# Patient Record
Sex: FEMALE | Race: AMERICAN INDIAN OR ALASKA NATIVE | NOT HISPANIC OR LATINO
[De-identification: names, ages, dates, MRNs, and addresses within clinical notes are randomized per-mention and may not be internally consistent; named-entity substitution may affect disease eponyms.]

---

## 2018-08-09 PROBLEM — Z00.00 ENCOUNTER FOR PREVENTIVE HEALTH EXAMINATION: Status: ACTIVE | Noted: 2018-08-09

## 2018-08-14 ENCOUNTER — APPOINTMENT (OUTPATIENT)
Dept: OBGYN | Facility: CLINIC | Age: 38
End: 2018-08-14
Payer: MEDICAID

## 2018-08-14 VITALS — BODY MASS INDEX: 35 KG/M2 | HEIGHT: 64 IN | WEIGHT: 205 LBS

## 2018-08-14 PROCEDURE — 99202 OFFICE O/P NEW SF 15 MIN: CPT

## 2018-08-22 ENCOUNTER — RESULT REVIEW (OUTPATIENT)
Age: 38
End: 2018-08-22

## 2018-08-24 ENCOUNTER — APPOINTMENT (OUTPATIENT)
Dept: OBGYN | Facility: CLINIC | Age: 38
End: 2018-08-24

## 2018-08-28 ENCOUNTER — APPOINTMENT (OUTPATIENT)
Dept: OBGYN | Facility: CLINIC | Age: 38
End: 2018-08-28
Payer: MEDICAID

## 2018-08-28 PROCEDURE — 99213 OFFICE O/P EST LOW 20 MIN: CPT

## 2018-09-06 ENCOUNTER — APPOINTMENT (OUTPATIENT)
Dept: BREAST CENTER | Facility: CLINIC | Age: 38
End: 2018-09-06
Payer: COMMERCIAL

## 2018-09-06 VITALS
DIASTOLIC BLOOD PRESSURE: 72 MMHG | BODY MASS INDEX: 35 KG/M2 | HEART RATE: 80 BPM | HEIGHT: 64 IN | WEIGHT: 205 LBS | TEMPERATURE: 98.2 F | SYSTOLIC BLOOD PRESSURE: 109 MMHG

## 2018-09-06 DIAGNOSIS — Z78.9 OTHER SPECIFIED HEALTH STATUS: ICD-10-CM

## 2018-09-06 DIAGNOSIS — Z87.59 PERSONAL HISTORY OF OTHER COMPLICATIONS OF PREGNANCY, CHILDBIRTH AND THE PUERPERIUM: ICD-10-CM

## 2018-09-06 DIAGNOSIS — Z80.8 FAMILY HISTORY OF MALIGNANT NEOPLASM OF OTHER ORGANS OR SYSTEMS: ICD-10-CM

## 2018-09-06 DIAGNOSIS — Z80.0 FAMILY HISTORY OF MALIGNANT NEOPLASM OF DIGESTIVE ORGANS: ICD-10-CM

## 2018-09-06 PROCEDURE — 99243 OFF/OP CNSLTJ NEW/EST LOW 30: CPT

## 2018-09-10 ENCOUNTER — APPOINTMENT (OUTPATIENT)
Dept: BREAST CENTER | Facility: CLINIC | Age: 38
End: 2018-09-10
Payer: COMMERCIAL

## 2018-09-10 ENCOUNTER — OUTPATIENT (OUTPATIENT)
Dept: OUTPATIENT SERVICES | Facility: HOSPITAL | Age: 38
LOS: 1 days | End: 2018-09-10

## 2018-09-10 VITALS
TEMPERATURE: 98.4 F | SYSTOLIC BLOOD PRESSURE: 118 MMHG | HEIGHT: 64 IN | BODY MASS INDEX: 35 KG/M2 | HEART RATE: 84 BPM | WEIGHT: 205 LBS | DIASTOLIC BLOOD PRESSURE: 78 MMHG

## 2018-09-10 PROCEDURE — 19083 BX BREAST 1ST LESION US IMAG: CPT

## 2018-09-12 ENCOUNTER — OTHER (OUTPATIENT)
Age: 38
End: 2018-09-12

## 2018-09-14 ENCOUNTER — MESSAGE (OUTPATIENT)
Age: 38
End: 2018-09-14

## 2018-09-20 ENCOUNTER — APPOINTMENT (OUTPATIENT)
Dept: BREAST CENTER | Facility: CLINIC | Age: 38
End: 2018-09-20
Payer: COMMERCIAL

## 2018-09-20 ENCOUNTER — APPOINTMENT (OUTPATIENT)
Dept: BREAST CENTER | Facility: CLINIC | Age: 38
End: 2018-09-20

## 2018-09-20 PROCEDURE — 99215 OFFICE O/P EST HI 40 MIN: CPT

## 2018-09-24 ENCOUNTER — APPOINTMENT (OUTPATIENT)
Dept: BREAST CENTER | Facility: CLINIC | Age: 38
End: 2018-09-24

## 2018-10-01 ENCOUNTER — MESSAGE (OUTPATIENT)
Age: 38
End: 2018-10-01

## 2018-10-04 ENCOUNTER — ASOB RESULT (OUTPATIENT)
Age: 38
End: 2018-10-04

## 2018-10-04 ENCOUNTER — APPOINTMENT (OUTPATIENT)
Dept: ANTEPARTUM | Facility: CLINIC | Age: 38
End: 2018-10-04
Payer: MEDICAID

## 2018-10-04 PROCEDURE — 76817 TRANSVAGINAL US OBSTETRIC: CPT

## 2018-10-10 ENCOUNTER — OUTPATIENT (OUTPATIENT)
Dept: OUTPATIENT SERVICES | Facility: HOSPITAL | Age: 38
LOS: 1 days | Discharge: HOME | End: 2018-10-10
Payer: MEDICAID

## 2018-10-10 DIAGNOSIS — C50.919 MALIGNANT NEOPLASM OF UNSPECIFIED SITE OF UNSPECIFIED FEMALE BREAST: ICD-10-CM

## 2018-10-12 ENCOUNTER — EMERGENCY (EMERGENCY)
Facility: HOSPITAL | Age: 38
LOS: 1 days | End: 2018-10-12
Payer: MEDICAID

## 2018-10-12 PROCEDURE — 76817 TRANSVAGINAL US OBSTETRIC: CPT | Mod: 26

## 2018-10-12 PROCEDURE — 99284 EMERGENCY DEPT VISIT MOD MDM: CPT

## 2018-10-12 PROCEDURE — 76815 OB US LIMITED FETUS(S): CPT | Mod: 26

## 2018-10-17 ENCOUNTER — APPOINTMENT (OUTPATIENT)
Dept: HEMATOLOGY ONCOLOGY | Facility: CLINIC | Age: 38
End: 2018-10-17
Payer: MEDICAID

## 2018-10-17 ENCOUNTER — RESULT REVIEW (OUTPATIENT)
Age: 38
End: 2018-10-17

## 2018-10-17 VITALS
DIASTOLIC BLOOD PRESSURE: 69 MMHG | HEIGHT: 59.41 IN | BODY MASS INDEX: 41.4 KG/M2 | SYSTOLIC BLOOD PRESSURE: 110 MMHG | WEIGHT: 208.09 LBS | TEMPERATURE: 98 F | RESPIRATION RATE: 16 BRPM | OXYGEN SATURATION: 100 % | HEART RATE: 64 BPM

## 2018-10-17 DIAGNOSIS — Z86.018 PERSONAL HISTORY OF OTHER BENIGN NEOPLASM: ICD-10-CM

## 2018-10-17 LAB
BASOPHILS # BLD AUTO: 0 K/UL — SIGNIFICANT CHANGE UP (ref 0–0.2)
BASOPHILS NFR BLD AUTO: 0.6 % — SIGNIFICANT CHANGE UP (ref 0–2)
EOSINOPHIL # BLD AUTO: 0.1 K/UL — SIGNIFICANT CHANGE UP (ref 0–0.5)
EOSINOPHIL NFR BLD AUTO: 0.9 % — SIGNIFICANT CHANGE UP (ref 0–6)
HCT VFR BLD CALC: 37.8 % — SIGNIFICANT CHANGE UP (ref 34.5–45)
HGB BLD-MCNC: 12.8 G/DL — SIGNIFICANT CHANGE UP (ref 11.5–15.5)
LYMPHOCYTES # BLD AUTO: 2.5 K/UL — SIGNIFICANT CHANGE UP (ref 1–3.3)
LYMPHOCYTES # BLD AUTO: 30.6 % — SIGNIFICANT CHANGE UP (ref 13–44)
MCHC RBC-ENTMCNC: 27.6 PG — SIGNIFICANT CHANGE UP (ref 27–34)
MCHC RBC-ENTMCNC: 33.9 G/DL — SIGNIFICANT CHANGE UP (ref 32–36)
MCV RBC AUTO: 81.4 FL — SIGNIFICANT CHANGE UP (ref 80–100)
MONOCYTES # BLD AUTO: 0.3 K/UL — SIGNIFICANT CHANGE UP (ref 0–0.9)
MONOCYTES NFR BLD AUTO: 4 % — SIGNIFICANT CHANGE UP (ref 2–14)
NEUTROPHILS # BLD AUTO: 5.2 K/UL — SIGNIFICANT CHANGE UP (ref 1.8–7.4)
NEUTROPHILS NFR BLD AUTO: 63.9 % — SIGNIFICANT CHANGE UP (ref 43–77)
PLATELET # BLD AUTO: 274 K/UL — SIGNIFICANT CHANGE UP (ref 150–400)
RBC # BLD: 4.65 M/UL — SIGNIFICANT CHANGE UP (ref 3.8–5.2)
RBC # FLD: 12.9 % — SIGNIFICANT CHANGE UP (ref 10.3–14.5)
WBC # BLD: 8.2 K/UL — SIGNIFICANT CHANGE UP (ref 3.8–10.5)
WBC # FLD AUTO: 8.2 K/UL — SIGNIFICANT CHANGE UP (ref 3.8–10.5)

## 2018-10-17 PROCEDURE — 99205 OFFICE O/P NEW HI 60 MIN: CPT

## 2018-10-17 PROCEDURE — 93010 ELECTROCARDIOGRAM REPORT: CPT

## 2018-10-18 ENCOUNTER — APPOINTMENT (OUTPATIENT)
Dept: BREAST CENTER | Facility: CLINIC | Age: 38
End: 2018-10-18

## 2018-10-18 PROBLEM — Z86.018 HISTORY OF UTERINE LEIOMYOMA: Status: RESOLVED | Noted: 2018-08-14 | Resolved: 2018-10-18

## 2018-10-18 LAB
ALBUMIN SERPL ELPH-MCNC: 4.2 G/DL
ALP BLD-CCNC: 84 U/L
ALT SERPL-CCNC: 17 U/L
ANION GAP SERPL CALC-SCNC: 14 MMOL/L
APTT BLD: 27.6 SEC
AST SERPL-CCNC: 14 U/L
BILIRUB SERPL-MCNC: 0.2 MG/DL
BUN SERPL-MCNC: 4 MG/DL
CALCIUM SERPL-MCNC: 9.5 MG/DL
CHLORIDE SERPL-SCNC: 103 MMOL/L
CO2 SERPL-SCNC: 23 MMOL/L
CREAT SERPL-MCNC: 0.45 MG/DL
GLUCOSE SERPL-MCNC: 94 MG/DL
HAV IGM SER QL: NONREACTIVE
HBV CORE IGM SER QL: NONREACTIVE
HBV SURFACE AG SER QL: NONREACTIVE
HCV AB SER QL: NONREACTIVE
HCV S/CO RATIO: 0.07 S/CO
INR PPP: 1.05 RATIO
POTASSIUM SERPL-SCNC: 4.5 MMOL/L
PROT SERPL-MCNC: 6.9 G/DL
PT BLD: 11.9 SEC
SODIUM SERPL-SCNC: 140 MMOL/L

## 2018-10-19 ENCOUNTER — OTHER (OUTPATIENT)
Age: 38
End: 2018-10-19

## 2018-10-23 ENCOUNTER — APPOINTMENT (OUTPATIENT)
Dept: HEMATOLOGY ONCOLOGY | Facility: CLINIC | Age: 38
End: 2018-10-23
Payer: MEDICAID

## 2018-10-23 VITALS
TEMPERATURE: 97.9 F | SYSTOLIC BLOOD PRESSURE: 124 MMHG | HEART RATE: 72 BPM | WEIGHT: 209.44 LBS | OXYGEN SATURATION: 10 % | DIASTOLIC BLOOD PRESSURE: 70 MMHG | BODY MASS INDEX: 41.72 KG/M2 | RESPIRATION RATE: 16 BRPM

## 2018-10-23 PROCEDURE — 99215 OFFICE O/P EST HI 40 MIN: CPT

## 2018-10-24 ENCOUNTER — OTHER (OUTPATIENT)
Age: 38
End: 2018-10-24

## 2018-10-24 ENCOUNTER — LABORATORY RESULT (OUTPATIENT)
Age: 38
End: 2018-10-24

## 2018-10-24 ENCOUNTER — ASOB RESULT (OUTPATIENT)
Age: 38
End: 2018-10-24

## 2018-10-24 ENCOUNTER — APPOINTMENT (OUTPATIENT)
Dept: ANTEPARTUM | Facility: CLINIC | Age: 38
End: 2018-10-24
Payer: MEDICAID

## 2018-10-24 ENCOUNTER — FORM ENCOUNTER (OUTPATIENT)
Age: 38
End: 2018-10-24

## 2018-10-24 ENCOUNTER — APPOINTMENT (OUTPATIENT)
Dept: MATERNAL FETAL MEDICINE | Facility: CLINIC | Age: 38
End: 2018-10-24
Payer: MEDICAID

## 2018-10-24 ENCOUNTER — APPOINTMENT (OUTPATIENT)
Dept: BREAST CENTER | Facility: CLINIC | Age: 38
End: 2018-10-24
Payer: MEDICAID

## 2018-10-24 VITALS
BODY MASS INDEX: 41.18 KG/M2 | SYSTOLIC BLOOD PRESSURE: 122 MMHG | HEIGHT: 59.41 IN | DIASTOLIC BLOOD PRESSURE: 80 MMHG | WEIGHT: 207 LBS

## 2018-10-24 VITALS — WEIGHT: 207 LBS

## 2018-10-24 PROCEDURE — 99213 OFFICE O/P EST LOW 20 MIN: CPT | Mod: 25

## 2018-10-24 PROCEDURE — 36416 COLLJ CAPILLARY BLOOD SPEC: CPT

## 2018-10-24 PROCEDURE — 99215 OFFICE O/P EST HI 40 MIN: CPT

## 2018-10-24 PROCEDURE — 76801 OB US < 14 WKS SINGLE FETUS: CPT

## 2018-10-25 ENCOUNTER — LABORATORY RESULT (OUTPATIENT)
Age: 38
End: 2018-10-25

## 2018-10-25 ENCOUNTER — APPOINTMENT (OUTPATIENT)
Dept: RADIOLOGY | Facility: CLINIC | Age: 38
End: 2018-10-25
Payer: MEDICAID

## 2018-10-25 ENCOUNTER — APPOINTMENT (OUTPATIENT)
Dept: ULTRASOUND IMAGING | Facility: CLINIC | Age: 38
End: 2018-10-25
Payer: MEDICAID

## 2018-10-25 ENCOUNTER — OUTPATIENT (OUTPATIENT)
Dept: OUTPATIENT SERVICES | Facility: HOSPITAL | Age: 38
LOS: 1 days | End: 2018-10-25
Payer: MEDICAID

## 2018-10-25 DIAGNOSIS — C50.919 MALIGNANT NEOPLASM OF UNSPECIFIED SITE OF UNSPECIFIED FEMALE BREAST: ICD-10-CM

## 2018-10-25 PROCEDURE — 71046 X-RAY EXAM CHEST 2 VIEWS: CPT | Mod: 26

## 2018-10-25 PROCEDURE — 76700 US EXAM ABDOM COMPLETE: CPT

## 2018-10-25 PROCEDURE — 76700 US EXAM ABDOM COMPLETE: CPT | Mod: 26

## 2018-10-25 PROCEDURE — 71046 X-RAY EXAM CHEST 2 VIEWS: CPT

## 2018-10-29 ENCOUNTER — OTHER (OUTPATIENT)
Age: 38
End: 2018-10-29

## 2018-10-30 ENCOUNTER — OTHER (OUTPATIENT)
Age: 38
End: 2018-10-30

## 2018-10-30 ENCOUNTER — RESULT REVIEW (OUTPATIENT)
Age: 38
End: 2018-10-30

## 2018-10-31 ENCOUNTER — OTHER (OUTPATIENT)
Age: 38
End: 2018-10-31

## 2018-10-31 ENCOUNTER — APPOINTMENT (OUTPATIENT)
Dept: CV DIAGNOSITCS | Facility: HOSPITAL | Age: 38
End: 2018-10-31

## 2018-10-31 ENCOUNTER — OUTPATIENT (OUTPATIENT)
Dept: OUTPATIENT SERVICES | Facility: HOSPITAL | Age: 38
LOS: 1 days | End: 2018-10-31
Payer: MEDICAID

## 2018-10-31 DIAGNOSIS — C50.919 MALIGNANT NEOPLASM OF UNSPECIFIED SITE OF UNSPECIFIED FEMALE BREAST: ICD-10-CM

## 2018-10-31 PROCEDURE — 93306 TTE W/DOPPLER COMPLETE: CPT

## 2018-10-31 PROCEDURE — 93306 TTE W/DOPPLER COMPLETE: CPT | Mod: 26

## 2018-11-01 ENCOUNTER — NON-APPOINTMENT (OUTPATIENT)
Age: 38
End: 2018-11-01

## 2018-11-01 ENCOUNTER — OTHER (OUTPATIENT)
Age: 38
End: 2018-11-01

## 2018-11-01 ENCOUNTER — APPOINTMENT (OUTPATIENT)
Dept: CARDIOLOGY | Facility: CLINIC | Age: 38
End: 2018-11-01
Payer: MEDICAID

## 2018-11-01 ENCOUNTER — LABORATORY RESULT (OUTPATIENT)
Age: 38
End: 2018-11-01

## 2018-11-01 VITALS
HEIGHT: 59 IN | HEART RATE: 67 BPM | OXYGEN SATURATION: 100 % | DIASTOLIC BLOOD PRESSURE: 70 MMHG | SYSTOLIC BLOOD PRESSURE: 108 MMHG | WEIGHT: 207 LBS | BODY MASS INDEX: 41.73 KG/M2

## 2018-11-01 PROCEDURE — 93000 ELECTROCARDIOGRAM COMPLETE: CPT

## 2018-11-01 PROCEDURE — 99245 OFF/OP CONSLTJ NEW/EST HI 55: CPT

## 2018-11-02 ENCOUNTER — OUTPATIENT (OUTPATIENT)
Dept: OUTPATIENT SERVICES | Facility: HOSPITAL | Age: 38
LOS: 1 days | End: 2018-11-02
Payer: MEDICAID

## 2018-11-02 VITALS
OXYGEN SATURATION: 99 % | TEMPERATURE: 98 F | HEIGHT: 59 IN | WEIGHT: 207.9 LBS | HEART RATE: 88 BPM | DIASTOLIC BLOOD PRESSURE: 70 MMHG | RESPIRATION RATE: 18 BRPM | SYSTOLIC BLOOD PRESSURE: 124 MMHG

## 2018-11-02 DIAGNOSIS — N63.10 UNSPECIFIED LUMP IN THE RIGHT BREAST, UNSPECIFIED QUADRANT: ICD-10-CM

## 2018-11-02 DIAGNOSIS — C50.811 MALIGNANT NEOPLASM OF OVERLAPPING SITES OF RIGHT FEMALE BREAST: ICD-10-CM

## 2018-11-02 DIAGNOSIS — Z01.818 ENCOUNTER FOR OTHER PREPROCEDURAL EXAMINATION: ICD-10-CM

## 2018-11-02 DIAGNOSIS — Z34.90 ENCOUNTER FOR SUPERVISION OF NORMAL PREGNANCY, UNSPECIFIED, UNSPECIFIED TRIMESTER: ICD-10-CM

## 2018-11-02 PROCEDURE — G0463: CPT

## 2018-11-02 NOTE — H&P PST ADULT - PROBLEM SELECTOR PLAN 1
right breast mastectomy  technetium injection-no blue dye for sentinel lymph node biopsy  possible axillary lymph node dissection

## 2018-11-02 NOTE — H&P PST ADULT - HISTORY OF PRESENT ILLNESS
This is a 39 y/o female who is 11 weeks gestation, with right breast mass, she presents today for surgery, pt spoke Beninese, translated by Kristian # [id number]

## 2018-11-02 NOTE — H&P PST ADULT - ASSESSMENT
malignant neoplasm of overlapping sites of right female breast  encounter for supervision of normal pregnancy, unspecified

## 2018-11-02 NOTE — H&P PST ADULT - NS HIV RISK FACTOR NO
Diabetic Annual Assessment Clinical Note    /68 mmHg    Foot Assessment    Visual Inspection of the feet completed at appointment.     · Skin Abnormal, dryness noted  · Callous Normal  · Redness Normal  · Swelling Normal  · Hair Growth Abnormal  · Conchis No, Declined

## 2018-11-02 NOTE — H&P PST ADULT - NSANTHOSAYNRD_GEN_A_CORE
No. RUBÉN screening performed.  STOP BANG Legend: 0-2 = LOW Risk; 3-4 = INTERMEDIATE Risk; 5-8 = HIGH Risk

## 2018-11-05 ENCOUNTER — LABORATORY RESULT (OUTPATIENT)
Age: 38
End: 2018-11-05

## 2018-11-05 ENCOUNTER — APPOINTMENT (OUTPATIENT)
Dept: ANTEPARTUM | Facility: CLINIC | Age: 38
End: 2018-11-05
Payer: MEDICAID

## 2018-11-05 ENCOUNTER — OUTPATIENT (OUTPATIENT)
Dept: OUTPATIENT SERVICES | Facility: HOSPITAL | Age: 38
LOS: 1 days | End: 2018-11-05
Payer: MEDICAID

## 2018-11-05 ENCOUNTER — NON-APPOINTMENT (OUTPATIENT)
Age: 38
End: 2018-11-05

## 2018-11-05 ENCOUNTER — ASOB RESULT (OUTPATIENT)
Age: 38
End: 2018-11-05

## 2018-11-05 ENCOUNTER — OTHER (OUTPATIENT)
Age: 38
End: 2018-11-05

## 2018-11-05 ENCOUNTER — APPOINTMENT (OUTPATIENT)
Dept: MATERNAL FETAL MEDICINE | Facility: CLINIC | Age: 38
End: 2018-11-05
Payer: MEDICAID

## 2018-11-05 VITALS — SYSTOLIC BLOOD PRESSURE: 124 MMHG | BODY MASS INDEX: 41.89 KG/M2 | WEIGHT: 207.4 LBS | DIASTOLIC BLOOD PRESSURE: 74 MMHG

## 2018-11-05 DIAGNOSIS — O09.899 SUPERVISION OF OTHER HIGH RISK PREGNANCIES, UNSPECIFIED TRIMESTER: ICD-10-CM

## 2018-11-05 PROBLEM — E66.01 MORBID (SEVERE) OBESITY DUE TO EXCESS CALORIES: Chronic | Status: ACTIVE | Noted: 2018-11-02

## 2018-11-05 LAB
BILIRUB UR QL STRIP: NEGATIVE
GLUCOSE UR-MCNC: NEGATIVE
HCG UR QL: 0.2 EU/DL
HGB UR QL STRIP.AUTO: NEGATIVE
KETONES UR-MCNC: NEGATIVE
LEUKOCYTE ESTERASE UR QL STRIP: NEGATIVE
NITRITE UR QL STRIP: NEGATIVE
PH UR STRIP: 7.5
PROT UR STRIP-MCNC: NEGATIVE
SP GR UR STRIP: 1.02

## 2018-11-05 PROCEDURE — 99213 OFFICE O/P EST LOW 20 MIN: CPT | Mod: GC,25

## 2018-11-05 PROCEDURE — 87591 N.GONORRHOEAE DNA AMP PROB: CPT

## 2018-11-05 PROCEDURE — 87624 HPV HI-RISK TYP POOLED RSLT: CPT

## 2018-11-05 PROCEDURE — G0463: CPT

## 2018-11-05 PROCEDURE — 81003 URINALYSIS AUTO W/O SCOPE: CPT | Mod: NC,QW

## 2018-11-05 PROCEDURE — 87491 CHLMYD TRACH DNA AMP PROBE: CPT

## 2018-11-05 PROCEDURE — 76813 OB US NUCHAL MEAS 1 GEST: CPT

## 2018-11-05 PROCEDURE — 76801 OB US < 14 WKS SINGLE FETUS: CPT

## 2018-11-05 PROCEDURE — 81003 URINALYSIS AUTO W/O SCOPE: CPT

## 2018-11-06 ENCOUNTER — TRANSCRIPTION ENCOUNTER (OUTPATIENT)
Age: 38
End: 2018-11-06

## 2018-11-06 LAB
C TRACH RRNA SPEC QL NAA+PROBE: SIGNIFICANT CHANGE UP
HPV HIGH+LOW RISK DNA PNL CVX: SIGNIFICANT CHANGE UP
N GONORRHOEA RRNA SPEC QL NAA+PROBE: SIGNIFICANT CHANGE UP
SPECIMEN SOURCE: SIGNIFICANT CHANGE UP

## 2018-11-07 ENCOUNTER — RESULT REVIEW (OUTPATIENT)
Age: 38
End: 2018-11-07

## 2018-11-07 ENCOUNTER — INPATIENT (INPATIENT)
Facility: HOSPITAL | Age: 38
LOS: 0 days | Discharge: HOME | DRG: 819 | End: 2018-11-08
Attending: SURGERY | Admitting: SURGERY
Payer: MEDICAID

## 2018-11-07 ENCOUNTER — APPOINTMENT (OUTPATIENT)
Dept: BREAST CENTER | Facility: HOSPITAL | Age: 38
End: 2018-11-07
Payer: MEDICAID

## 2018-11-07 VITALS
TEMPERATURE: 98 F | SYSTOLIC BLOOD PRESSURE: 109 MMHG | RESPIRATION RATE: 17 BRPM | WEIGHT: 207.9 LBS | OXYGEN SATURATION: 100 % | HEART RATE: 85 BPM | DIASTOLIC BLOOD PRESSURE: 64 MMHG | HEIGHT: 59 IN

## 2018-11-07 DIAGNOSIS — Z34.90 ENCOUNTER FOR SUPERVISION OF NORMAL PREGNANCY, UNSPECIFIED, UNSPECIFIED TRIMESTER: ICD-10-CM

## 2018-11-07 PROCEDURE — 88331 PATH CONSLTJ SURG 1 BLK 1SPC: CPT | Mod: 26

## 2018-11-07 PROCEDURE — 19303 MAST SIMPLE COMPLETE: CPT

## 2018-11-07 PROCEDURE — 88342 IMHCHEM/IMCYTCHM 1ST ANTB: CPT | Mod: 26

## 2018-11-07 PROCEDURE — 38792 RA TRACER ID OF SENTINL NODE: CPT

## 2018-11-07 PROCEDURE — 88307 TISSUE EXAM BY PATHOLOGIST: CPT | Mod: 26

## 2018-11-07 PROCEDURE — ZZZZZ: CPT

## 2018-11-07 PROCEDURE — 88341 IMHCHEM/IMCYTCHM EA ADD ANTB: CPT | Mod: 26

## 2018-11-07 PROCEDURE — 78195 LYMPH SYSTEM IMAGING: CPT | Mod: 26

## 2018-11-07 PROCEDURE — 88334 PATH CONSLTJ SURG CYTO XM EA: CPT | Mod: 26,59

## 2018-11-07 PROCEDURE — 38525 BIOPSY/REMOVAL LYMPH NODES: CPT

## 2018-11-07 PROCEDURE — 88305 TISSUE EXAM BY PATHOLOGIST: CPT | Mod: 26

## 2018-11-07 RX ORDER — ONDANSETRON 8 MG/1
4 TABLET, FILM COATED ORAL ONCE
Qty: 0 | Refills: 0 | Status: DISCONTINUED | OUTPATIENT
Start: 2018-11-07 | End: 2018-11-07

## 2018-11-07 RX ORDER — ACETAMINOPHEN 500 MG
650 TABLET ORAL EVERY 6 HOURS
Qty: 0 | Refills: 0 | Status: DISCONTINUED | OUTPATIENT
Start: 2018-11-07 | End: 2018-11-08

## 2018-11-07 RX ORDER — SODIUM CHLORIDE 9 MG/ML
1000 INJECTION, SOLUTION INTRAVENOUS
Qty: 0 | Refills: 0 | Status: DISCONTINUED | OUTPATIENT
Start: 2018-11-07 | End: 2018-11-08

## 2018-11-07 RX ORDER — CODEINE SULFATE 60 MG/1
30 TABLET ORAL EVERY 4 HOURS
Qty: 0 | Refills: 0 | Status: DISCONTINUED | OUTPATIENT
Start: 2018-11-07 | End: 2018-11-08

## 2018-11-07 RX ORDER — SODIUM CHLORIDE 9 MG/ML
1000 INJECTION, SOLUTION INTRAVENOUS
Qty: 0 | Refills: 0 | Status: DISCONTINUED | OUTPATIENT
Start: 2018-11-07 | End: 2018-11-07

## 2018-11-07 RX ORDER — SODIUM CHLORIDE 9 MG/ML
3 INJECTION INTRAMUSCULAR; INTRAVENOUS; SUBCUTANEOUS EVERY 8 HOURS
Qty: 0 | Refills: 0 | Status: DISCONTINUED | OUTPATIENT
Start: 2018-11-07 | End: 2018-11-07

## 2018-11-07 RX ORDER — HYDROMORPHONE HYDROCHLORIDE 2 MG/ML
0.5 INJECTION INTRAMUSCULAR; INTRAVENOUS; SUBCUTANEOUS
Qty: 0 | Refills: 0 | Status: DISCONTINUED | OUTPATIENT
Start: 2018-11-07 | End: 2018-11-07

## 2018-11-07 RX ADMIN — SODIUM CHLORIDE 100 MILLILITER(S): 9 INJECTION, SOLUTION INTRAVENOUS at 21:28

## 2018-11-07 RX ADMIN — Medication 1 TABLET(S): at 21:28

## 2018-11-07 NOTE — CHART NOTE - NSCHARTNOTEFT_GEN_A_CORE
Pt seen with Dr. Granger at bedside in amb surgery for pre-op fetal ultrasound;   confirmed +; +movement noted  Will confirm postop in recovery

## 2018-11-07 NOTE — BRIEF OPERATIVE NOTE - PROCEDURE
<<-----Click on this checkbox to enter Procedure Right mastectomy with axillary sentinel lymph node biopsy  11/07/2018    Active  [id number]

## 2018-11-07 NOTE — BRIEF OPERATIVE NOTE - OPERATION/FINDINGS
right breast mass at 12 o'clock, sentinel lymph node biopsy, 3 lymph nodes sent for frozen, all negative for malignancy

## 2018-11-08 ENCOUNTER — TRANSCRIPTION ENCOUNTER (OUTPATIENT)
Age: 38
End: 2018-11-08

## 2018-11-08 VITALS
TEMPERATURE: 98 F | HEART RATE: 70 BPM | DIASTOLIC BLOOD PRESSURE: 60 MMHG | SYSTOLIC BLOOD PRESSURE: 104 MMHG | OXYGEN SATURATION: 100 % | RESPIRATION RATE: 17 BRPM

## 2018-11-08 DIAGNOSIS — Z82.49 FAMILY HISTORY OF ISCHEMIC HEART DISEASE AND OTHER DISEASES OF THE CIRCULATORY SYSTEM: ICD-10-CM

## 2018-11-08 DIAGNOSIS — C50.811 MALIGNANT NEOPLASM OF OVERLAPPING SITES OF RIGHT FEMALE BREAST: ICD-10-CM

## 2018-11-08 DIAGNOSIS — O9A.119: ICD-10-CM

## 2018-11-08 DIAGNOSIS — R73.03 PREDIABETES: ICD-10-CM

## 2018-11-08 LAB
CYTOLOGY SPEC DOC CYTO: SIGNIFICANT CHANGE UP
HCT VFR BLD CALC: 31.7 % — LOW (ref 34.5–45)
HGB BLD-MCNC: 10.3 G/DL — LOW (ref 11.5–15.5)
MCHC RBC-ENTMCNC: 27.1 PG — SIGNIFICANT CHANGE UP (ref 27–34)
MCHC RBC-ENTMCNC: 32.5 GM/DL — SIGNIFICANT CHANGE UP (ref 32–36)
MCV RBC AUTO: 83.3 FL — SIGNIFICANT CHANGE UP (ref 80–100)
PLATELET # BLD AUTO: 237 K/UL — SIGNIFICANT CHANGE UP (ref 150–400)
RBC # BLD: 3.8 M/UL — SIGNIFICANT CHANGE UP (ref 3.8–5.2)
RBC # FLD: 13.8 % — SIGNIFICANT CHANGE UP (ref 10.3–14.5)
WBC # BLD: 6.9 K/UL — SIGNIFICANT CHANGE UP (ref 3.8–10.5)
WBC # FLD AUTO: 6.9 K/UL — SIGNIFICANT CHANGE UP (ref 3.8–10.5)

## 2018-11-08 PROCEDURE — 88341 IMHCHEM/IMCYTCHM EA ADD ANTB: CPT

## 2018-11-08 PROCEDURE — 85027 COMPLETE CBC AUTOMATED: CPT

## 2018-11-08 PROCEDURE — 88331 PATH CONSLTJ SURG 1 BLK 1SPC: CPT

## 2018-11-08 PROCEDURE — 88305 TISSUE EXAM BY PATHOLOGIST: CPT

## 2018-11-08 PROCEDURE — A9541: CPT

## 2018-11-08 PROCEDURE — 88307 TISSUE EXAM BY PATHOLOGIST: CPT

## 2018-11-08 PROCEDURE — 88342 IMHCHEM/IMCYTCHM 1ST ANTB: CPT

## 2018-11-08 PROCEDURE — 86901 BLOOD TYPING SEROLOGIC RH(D): CPT

## 2018-11-08 PROCEDURE — 86900 BLOOD TYPING SEROLOGIC ABO: CPT

## 2018-11-08 PROCEDURE — 86850 RBC ANTIBODY SCREEN: CPT

## 2018-11-08 PROCEDURE — 88333 PATH CONSLTJ SURG CYTO XM 1: CPT

## 2018-11-08 PROCEDURE — 78195 LYMPH SYSTEM IMAGING: CPT

## 2018-11-08 RX ORDER — ACETAMINOPHEN 500 MG
2 TABLET ORAL
Qty: 80 | Refills: 0 | OUTPATIENT
Start: 2018-11-08 | End: 2018-11-17

## 2018-11-08 RX ORDER — CODEINE SULFATE 60 MG/1
1 TABLET ORAL
Qty: 12 | Refills: 0 | OUTPATIENT
Start: 2018-11-08 | End: 2018-11-10

## 2018-11-08 NOTE — DISCHARGE NOTE ADULT - HOSPITAL COURSE
38F 13w2d pregnant with right breast cancer underwent a right breast mastectomy with sentinel lymph node biopsy on 11/8/18. Fetal heart was checked prior and after the OR and both were normal. Patient's postoperative course was uncomplicated. Pain is controlled and she is tolerating diet. Patient will go home with the LANCE drain and will follow-up with Dr. Granger on 11/15/18.

## 2018-11-08 NOTE — DISCHARGE NOTE ADULT - PLAN OF CARE
Post-operative recovery Pain control as needed. Ambulate often. Encouraged movement of right upper extremity. Drain LANCE bulb every 12 hours if needed or once a day if the output is low. Pain control as needed. Ambulate often. Encouraged movement of right upper extremity. Drain LANCE bulb every 12 hours if needed or once a day if the output is low. Record the output. Can leave dressing open to air or apply dry gauze as needed.

## 2018-11-08 NOTE — DISCHARGE NOTE ADULT - PATIENT PORTAL LINK FT
You can access the [facility] Patient Portal, offered by [facility], by registering with the following website: [email or web address]

## 2018-11-08 NOTE — DISCHARGE NOTE ADULT - MEDICATION SUMMARY - MEDICATIONS TO TAKE
I will START or STAY ON the medications listed below when I get home from the hospital:    Tylenol 325 mg oral tablet  -- 2 tab(s) by mouth every 6 hours, As needed, Mild Pain (1 - 3), Moderate Pain (4 - 6) MDD:8 tablets  -- Indication: For Post-operative Pain    codeine sulfate 30 mg oral tablet  -- 1 tab(s) by mouth every 6 hours, As Needed -Severe Pain (7 - 10) MDD:4 tablets  -- Indication: For Post-operative Pain    PreNata oral tablet, chewable  -- 1 tab(s) by mouth once a day  -- Indication: For Pregnancy

## 2018-11-08 NOTE — PROGRESS NOTE ADULT - SUBJECTIVE AND OBJECTIVE BOX
38F POD #1 s/p right mastectomy with SLNBx.     Patient seen and examined at bedside. Patient denies pain, fevers, or chills. Tolerating clears without N/V. States she is hungry. Has been ambulating.     Vital Signs Last 24 Hrs  T(C): 36.8 (08 Nov 2018 05:51), Max: 37.2 (07 Nov 2018 17:11)  T(F): 98.3 (08 Nov 2018 05:51), Max: 98.9 (07 Nov 2018 17:11)  HR: 68 (08 Nov 2018 05:51) (68 - 118)  BP: 110/65 (08 Nov 2018 05:51) (104/57 - 127/68)  BP(mean): 77 (07 Nov 2018 16:56) (70 - 81)  RR: 18 (08 Nov 2018 05:51) (14 - 25)  SpO2: 100% (08 Nov 2018 05:51) (99% - 100%)    PE:   NAD  normal respiratory effort  right chest dressing c/d/i   LANCE drain in place    Output:   LANCE: 110cc/12hrs/SS    CBC pending    MEDICATIONS  (STANDING):  lactated ringers. 1000 milliLiter(s) (100 mL/Hr) IV Continuous <Continuous>  prenatal multivitamin 1 Tablet(s) Oral daily    MEDICATIONS  (PRN):  acetaminophen   Tablet .. 650 milliGRAM(s) Oral every 6 hours PRN Mild Pain (1 - 3), Moderate Pain (4 - 6)  codeine 30 milliGRAM(s) Oral every 4 hours PRN Severe Pain (7 - 10) 38F POD #1 s/p right mastectomy with SLNBx.     Patient seen and examined at bedside. Patient denies pain, fevers, or chills. Tolerating clears without N/V. States she is hungry. Has been ambulating.     Vital Signs Last 24 Hrs  T(C): 36.8 (08 Nov 2018 05:51), Max: 37.2 (07 Nov 2018 17:11)  T(F): 98.3 (08 Nov 2018 05:51), Max: 98.9 (07 Nov 2018 17:11)  HR: 68 (08 Nov 2018 05:51) (68 - 118)  BP: 110/65 (08 Nov 2018 05:51) (104/57 - 127/68)  BP(mean): 77 (07 Nov 2018 16:56) (70 - 81)  RR: 18 (08 Nov 2018 05:51) (14 - 25)  SpO2: 100% (08 Nov 2018 05:51) (99% - 100%)    PE:   NAD  normal respiratory effort  right chest dressing c/d/i ,  Incision intact, no evidence of hematoma or ecchymosis. Flap viable and flat.   LANCE drain in place, sanguinous.    Output:   LANCE: 110cc/12hrs/SS    CBC pending: Addendum:  Hgb 10.3 this am    MEDICATIONS  (STANDING):  lactated ringers. 1000 milliLiter(s) (100 mL/Hr) IV Continuous <Continuous>  prenatal multivitamin 1 Tablet(s) Oral daily    MEDICATIONS  (PRN):  acetaminophen   Tablet .. 650 milliGRAM(s) Oral every 6 hours PRN Mild Pain (1 - 3), Moderate Pain (4 - 6)  codeine 30 milliGRAM(s) Oral every 4 hours PRN Severe Pain (7 - 10)

## 2018-11-08 NOTE — DISCHARGE NOTE ADULT - CARE PROVIDER_API CALL
Vesna Granger), [facility]  [address] [place] [address]  Phone: [phone]  Fax: [phone]

## 2018-11-08 NOTE — PROGRESS NOTE ADULT - ASSESSMENT
38F POD #1 s/p right mastectomy with SLNBx. Doing well.  - Advance to regular diet  - pain control  - Encourage IS  - Ambulate   - Discharge today 38F POD #1 s/p right mastectomy with SLNBx. Doing well.  - Advance to regular diet  - pain control  - Encourage IS  - Ambulate   - Discharge today   -Pt may shower, no bath.  LANCE teachings  F.u with me in 1 week in [place], call sooner if issues.

## 2018-11-09 ENCOUNTER — OTHER (OUTPATIENT)
Age: 38
End: 2018-11-09

## 2018-11-09 ENCOUNTER — APPOINTMENT (OUTPATIENT)
Dept: INFUSION THERAPY | Facility: HOSPITAL | Age: 38
End: 2018-11-09

## 2018-11-09 NOTE — H&P PST ADULT - BP NONINVASIVE MEAN (MM HG)
Spoke with parent identified patient using name and .   Let know urine results and mom said she would be back in to  a cup
88

## 2018-11-15 ENCOUNTER — OUTPATIENT (OUTPATIENT)
Dept: OUTPATIENT SERVICES | Facility: HOSPITAL | Age: 38
LOS: 1 days | Discharge: HOME | End: 2018-11-15

## 2018-11-15 ENCOUNTER — APPOINTMENT (OUTPATIENT)
Dept: BREAST CENTER | Facility: CLINIC | Age: 38
End: 2018-11-15
Payer: MEDICAID

## 2018-11-15 VITALS
TEMPERATURE: 97.3 F | BODY MASS INDEX: 40.32 KG/M2 | SYSTOLIC BLOOD PRESSURE: 95 MMHG | DIASTOLIC BLOOD PRESSURE: 60 MMHG | HEART RATE: 72 BPM | WEIGHT: 200 LBS | HEIGHT: 59 IN

## 2018-11-15 DIAGNOSIS — C50.919 MALIGNANT NEOPLASM OF UNSPECIFIED SITE OF UNSPECIFIED FEMALE BREAST: ICD-10-CM

## 2018-11-15 PROCEDURE — 99024 POSTOP FOLLOW-UP VISIT: CPT

## 2018-11-19 ENCOUNTER — APPOINTMENT (OUTPATIENT)
Dept: BREAST CENTER | Facility: CLINIC | Age: 38
End: 2018-11-19
Payer: MEDICAID

## 2018-11-19 ENCOUNTER — OUTPATIENT (OUTPATIENT)
Dept: OUTPATIENT SERVICES | Facility: HOSPITAL | Age: 38
LOS: 1 days | End: 2018-11-19
Payer: MEDICAID

## 2018-11-19 ENCOUNTER — APPOINTMENT (OUTPATIENT)
Dept: MATERNAL FETAL MEDICINE | Facility: CLINIC | Age: 38
End: 2018-11-19
Payer: MEDICAID

## 2018-11-19 ENCOUNTER — LABORATORY RESULT (OUTPATIENT)
Age: 38
End: 2018-11-19

## 2018-11-19 VITALS
WEIGHT: 202 LBS | HEART RATE: 64 BPM | DIASTOLIC BLOOD PRESSURE: 69 MMHG | BODY MASS INDEX: 40.72 KG/M2 | HEIGHT: 59 IN | SYSTOLIC BLOOD PRESSURE: 122 MMHG

## 2018-11-19 DIAGNOSIS — O09.899 SUPERVISION OF OTHER HIGH RISK PREGNANCIES, UNSPECIFIED TRIMESTER: ICD-10-CM

## 2018-11-19 LAB
BILIRUB UR QL STRIP: NEGATIVE
GLUCOSE UR-MCNC: NEGATIVE
HCG UR QL: 0.2 EU/DL
HGB UR QL STRIP.AUTO: NEGATIVE
KETONES UR-MCNC: NEGATIVE
LEUKOCYTE ESTERASE UR QL STRIP: NORMAL
NITRITE UR QL STRIP: NEGATIVE
PH UR STRIP: 6
PROT UR STRIP-MCNC: NEGATIVE
SP GR UR STRIP: >1.03

## 2018-11-19 PROCEDURE — G0463: CPT

## 2018-11-19 PROCEDURE — 99214 OFFICE O/P EST MOD 30 MIN: CPT

## 2018-11-19 PROCEDURE — 87800 DETECT AGNT MULT DNA DIREC: CPT

## 2018-11-19 PROCEDURE — 99024 POSTOP FOLLOW-UP VISIT: CPT

## 2018-11-19 RX ORDER — AMOXICILLIN 500 MG/1
500 TABLET, FILM COATED ORAL 3 TIMES DAILY
Qty: 21 | Refills: 0 | Status: COMPLETED | COMMUNITY
Start: 2018-11-09 | End: 2018-11-19

## 2018-11-20 ENCOUNTER — NON-APPOINTMENT (OUTPATIENT)
Age: 38
End: 2018-11-20

## 2018-11-20 LAB
CANDIDA AB TITR SER: DETECTED
G VAGINALIS DNA SPEC QL NAA+PROBE: SIGNIFICANT CHANGE UP
T VAGINALIS SPEC QL WET PREP: SIGNIFICANT CHANGE UP

## 2018-11-21 DIAGNOSIS — O09.92 SUPERVISION OF HIGH RISK PREGNANCY, UNSPECIFIED, SECOND TRIMESTER: ICD-10-CM

## 2018-11-21 DIAGNOSIS — Z82.49 FAMILY HISTORY OF ISCHEMIC HEART DISEASE AND OTHER DISEASES OF THE CIRCULATORY SYSTEM: ICD-10-CM

## 2018-11-21 DIAGNOSIS — C50.811 MALIGNANT NEOPLASM OF OVERLAPPING SITES OF RIGHT FEMALE BREAST: ICD-10-CM

## 2018-11-22 ENCOUNTER — NON-APPOINTMENT (OUTPATIENT)
Age: 38
End: 2018-11-22

## 2018-11-26 ENCOUNTER — APPOINTMENT (OUTPATIENT)
Dept: HEMATOLOGY ONCOLOGY | Facility: CLINIC | Age: 38
End: 2018-11-26
Payer: MEDICAID

## 2018-11-26 VITALS
TEMPERATURE: 98.1 F | BODY MASS INDEX: 41.19 KG/M2 | SYSTOLIC BLOOD PRESSURE: 99 MMHG | HEART RATE: 71 BPM | OXYGEN SATURATION: 100 % | WEIGHT: 203.92 LBS | RESPIRATION RATE: 16 BRPM | DIASTOLIC BLOOD PRESSURE: 66 MMHG

## 2018-11-26 PROCEDURE — 99215 OFFICE O/P EST HI 40 MIN: CPT

## 2018-11-26 RX ORDER — MICONAZOLE NITRATE 200 MG/1
200 SUPPOSITORY VAGINAL
Qty: 3 | Refills: 0 | Status: DISCONTINUED | COMMUNITY
Start: 2018-11-22 | End: 2018-11-26

## 2018-11-30 NOTE — REASON FOR VISIT
[Follow-Up Visit] : a follow-up [Spouse] : spouse [FreeTextEntry1] :  [id number] [FreeTextEntry2] : marilyn

## 2018-11-30 NOTE — CONSULT LETTER
[Dear  ___] : Dear  [unfilled], [Consult Letter:] : I had the pleasure of evaluating your patient, [unfilled]. [Please see my note below.] : Please see my note below. [Consult Closing:] : Thank you very much for allowing me to participate in the care of this patient.  If you have any questions, please do not hesitate to contact me. [Sincerely,] : Sincerely, [DrRonnie  ___] : Dr. DE ANDA [DrRonnie ___] : Dr. DE ANDA [FreeTextEntry2] : Vesna Granger MD [FreeTextEntry3] : Viridiana Patrick M.D.\par  of Medicine\par [facility]\par [facility]\par [address] [place] [address]\par [address]\par Tele # [phone]; Fax [phone]

## 2018-11-30 NOTE — ASSESSMENT
[Curative] : Goals of care discussed with patient: Curative [FreeTextEntry1] : In summary, this is a very pleasant 38-year-old Yoruba-speaking lady who is diagnosed with T2 N0 stage IIA poorly differentiated ER/AK/HER-2/rajni NEGATIVE IDC of the right breast. She is currently 16 weeks pregnant. A CXR and abdominal sono didn’t show distant mets.  BRCA negative\par \par I had extensive discussion with the patient and spouse regarding breast cancer in pregnancy and the treatment options that are safe for both the mother and the growing fetus. She refused termination of pregnancy after finding out cancer diagnosis.  We discussed that all treatments including chemotherapy and surgery are contraindicated in first trimester but are considered relatively safe in second and third trimester. \par \par She is s/p surgery 2 weeks ago. We discussed the path report in detail and adjuvant chemotherapy is strongly recommended to decrease the risk of recurrence and improve survival. We have discussed medical termination of pregnancy as an option but patient decided to keep the pregnancy. We reviewed that the largest experience in pregnancy has been with anthracycline and alkylating agent chemotherapy. She will be a candidate for chemotherapy with Adriamycin plus cyclophosphamide in a sequential dose dense manner. We reviewed data from [place] registry which showed Neulasta is reasonably safe to use in 2nd and third trimester although it was a very small dataset. . There is not enough data to use taxanes in pregnancy therefore Taxol will be administered post partum. \par \par POTENTIAL SIDE EFFECTS TO THE PATIENT: I discussed the expected and unexpected side effects of chemotherapy, including but not limited to hair loss, fatigue, change in taste, mouth sores, nausea, vomiting, diarrhea, infusion reaction, allergic reaction, significant myelosuppression, need for blood transfusion, infection, bleeding, cardiac toxicity, neuropathy, chemical cystitis, kidney injury, nerve pain, muscle pain and detrimental effect on liver, lung and heart function. I also discussed a small but potential risk of development of acute leukemia with the use of Adriamycin and cyclophosphamide therapy.\par \par POTENTIAL SIDE EFFECTS TO THE FETUS: We discussed the available data including Case series and case reports of chemotherapy in second and third trimester of pregnancy which shows low risk (2-3%) for teratogenicity or developmental malformations but moderate to high risk for low birth weight, intrauterine growth retardation, prematurity and stillbirth/fetal demise. The long-term side effects to the  are unknown especially the risk of cardiotoxicity and infertility. \par \par The patient understood all the potential side effects and signed an informed consent after discussing the risks, benefits and alternatives.\par \par PLAN: She is recovering well from surgery. We plan to start chemotherapy in 2 weeks (18). She will be at 18 weeks gestation by that time. She will receive adriamycin every 2 weeks x 4 cycle followed by Cytoxan every 2 weeks x 4 cycles. We plan to hold chemotherapy 4 weeks prior to delivery to prevent  neutropenic complications. Currently she is planned for elective induction (NVD) at 37 weeks. We would start Taxol post partum\par \par PREMEDS/SUPPORTIVE CARE: Zofran ativan and dex are listed as part of prechemo antiemetic regimen in NCCN guidelines for breast cancer in pregnancy. The use of Emend is controversial in pregnancy. We discussed the issue of port placement in the OR, but that would increase anesthesia time by 45 min therefore we would treat her peripherally and likely do port placement after delivery. We discussed the issue of preeclampsia or gestational diabetes with steroids. She will be monitored closely by MFM. She will see MFM between every chemo session for close fetal monitoring. \par \par I explained her that EOD work up is limited due to risk of radiation exposure to the growing fetus. We plan to perform CT scans or a PET/CT scan after delivery. She will also be a candidate for PMRT due to large tumor size. \par \par The patient had plenty of time to ask questions and all of her questions were answered to her satisfaction. I gave her my office phone number and encouraged her to call with any questions or additional information.

## 2018-11-30 NOTE — PHYSICAL EXAM
[Fully active, able to carry on all pre-disease performance without restriction] : Status 0 - Fully active, able to carry on all pre-disease performance without restriction [Obese] : obese [Normal] : affect appropriate [de-identified] : S/p right mastectomy healing well. No CW or axillary nodules

## 2018-11-30 NOTE — HISTORY OF PRESENT ILLNESS
[de-identified] : Ms. EVANGELISTA HESTER  is a 38 year old female here for an evaluation of breast cancer. Her oncologic history is as follows:\par \par She felt a right breast mass in 8/2018 and was evaluated seen by GYN who sent her for breast imaging. She underwent diagnostic breast imaging on 8/21/18 which showed at 12:00 position of right breast there is a lobulated focus, measuring 2.0 x 2.0 x 2.2 cm 2 cm FN. 4 mm deep to the skin. Left breast benign. No enlarged axillary lymph nodes. Bx recommended. 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 There is marked lymphocytic infiltrate around the tumor. She was scheduled for MRI but found out that she was pregnant. Current gestation age at the time of initial consult: 11 weeks\par \par 10/23/18\par She is here with her . We discussed the diagnosis of rapidly growing triple negative breast ca, the size has increased since diagnosis and that chemotherapy will be needed to treat this cancer which can not be started until she is 14 weeks. They want to keep the pregnancy understanding that it will cause delay in treatment and potential risk to the patient and fetus. We discussed that lumpectomy will have issues with positive margins and RT will be contraindicated during to pregnancy and therefore u/l mastectomy is recommend. I discussed with Dr Granger that expander placement at the time of surgery will be an option. She is seeing the pt tomorrow and will discuss surgery and reconstruction options with her. She also saw Dr Eden Patrick med onc for a a second opinion.  [de-identified] : Ms. EVANGELISAT RUBIO is here for f/u for right breast cancer 16 weeks gestation\par s/p surgery (right mastectomy) 11/7/18 , 4.5 cm tumor and sentinel node negative- path report d/w her in detail. Tumor grew more than double in size between dx and surgery and is s/o aggressive tumor. Discussed to start chemo 12/7/18. S/e of chemo jody during pregnancy, expected and unexpected, pertaining to her and the fetus d/w her. Discussed to see MFM b/w chemo. Also a candidate for PMRT due to large tumor size\par LMP 8/8/18

## 2018-12-06 ENCOUNTER — APPOINTMENT (OUTPATIENT)
Dept: BREAST CENTER | Facility: CLINIC | Age: 38
End: 2018-12-06
Payer: MEDICAID

## 2018-12-06 ENCOUNTER — RECORD ABSTRACTING (OUTPATIENT)
Age: 38
End: 2018-12-06

## 2018-12-06 VITALS
BODY MASS INDEX: 41.53 KG/M2 | DIASTOLIC BLOOD PRESSURE: 67 MMHG | HEART RATE: 81 BPM | HEIGHT: 59 IN | WEIGHT: 206 LBS | TEMPERATURE: 98.1 F | SYSTOLIC BLOOD PRESSURE: 105 MMHG

## 2018-12-06 DIAGNOSIS — Z87.898 PERSONAL HISTORY OF OTHER SPECIFIED CONDITIONS: ICD-10-CM

## 2018-12-06 DIAGNOSIS — R92.8 OTHER ABNORMAL AND INCONCLUSIVE FINDINGS ON DIAGNOSTIC IMAGING OF BREAST: ICD-10-CM

## 2018-12-06 PROCEDURE — 99024 POSTOP FOLLOW-UP VISIT: CPT

## 2018-12-07 ENCOUNTER — LABORATORY RESULT (OUTPATIENT)
Age: 38
End: 2018-12-07

## 2018-12-07 ENCOUNTER — APPOINTMENT (OUTPATIENT)
Dept: INFUSION THERAPY | Facility: HOSPITAL | Age: 38
End: 2018-12-07

## 2018-12-07 ENCOUNTER — OTHER (OUTPATIENT)
Age: 38
End: 2018-12-07

## 2018-12-07 ENCOUNTER — RESULT REVIEW (OUTPATIENT)
Age: 38
End: 2018-12-07

## 2018-12-07 ENCOUNTER — APPOINTMENT (OUTPATIENT)
Dept: HEMATOLOGY ONCOLOGY | Facility: CLINIC | Age: 38
End: 2018-12-07
Payer: MEDICAID

## 2018-12-07 VITALS
RESPIRATION RATE: 16 BRPM | BODY MASS INDEX: 41.63 KG/M2 | OXYGEN SATURATION: 99 % | HEART RATE: 81 BPM | TEMPERATURE: 98.4 F | WEIGHT: 206.13 LBS | SYSTOLIC BLOOD PRESSURE: 101 MMHG | DIASTOLIC BLOOD PRESSURE: 68 MMHG

## 2018-12-07 PROBLEM — C50.911 MALIGNANT NEOPLASM OF UNSPECIFIED SITE OF RIGHT FEMALE BREAST: Chronic | Status: ACTIVE | Noted: 2018-12-04

## 2018-12-07 LAB
BASOPHILS # BLD AUTO: 0 K/UL — SIGNIFICANT CHANGE UP (ref 0–0.2)
BASOPHILS NFR BLD AUTO: 0.4 % — SIGNIFICANT CHANGE UP (ref 0–2)
EOSINOPHIL # BLD AUTO: 0.2 K/UL — SIGNIFICANT CHANGE UP (ref 0–0.5)
EOSINOPHIL NFR BLD AUTO: 2.1 % — SIGNIFICANT CHANGE UP (ref 0–6)
HCT VFR BLD CALC: 33.3 % — LOW (ref 34.5–45)
HGB BLD-MCNC: 11.4 G/DL — LOW (ref 11.5–15.5)
LYMPHOCYTES # BLD AUTO: 1.6 K/UL — SIGNIFICANT CHANGE UP (ref 1–3.3)
LYMPHOCYTES # BLD AUTO: 20.6 % — SIGNIFICANT CHANGE UP (ref 13–44)
MCHC RBC-ENTMCNC: 27.2 PG — SIGNIFICANT CHANGE UP (ref 27–34)
MCHC RBC-ENTMCNC: 34.2 G/DL — SIGNIFICANT CHANGE UP (ref 32–36)
MCV RBC AUTO: 79.6 FL — LOW (ref 80–100)
MONOCYTES # BLD AUTO: 0.3 K/UL — SIGNIFICANT CHANGE UP (ref 0–0.9)
MONOCYTES NFR BLD AUTO: 4 % — SIGNIFICANT CHANGE UP (ref 2–14)
NEUTROPHILS # BLD AUTO: 5.8 K/UL — SIGNIFICANT CHANGE UP (ref 1.8–7.4)
NEUTROPHILS NFR BLD AUTO: 72.9 % — SIGNIFICANT CHANGE UP (ref 43–77)
PLATELET # BLD AUTO: 263 K/UL — SIGNIFICANT CHANGE UP (ref 150–400)
RBC # BLD: 4.19 M/UL — SIGNIFICANT CHANGE UP (ref 3.8–5.2)
RBC # FLD: 13.1 % — SIGNIFICANT CHANGE UP (ref 10.3–14.5)
WBC # BLD: 8 K/UL — SIGNIFICANT CHANGE UP (ref 3.8–10.5)
WBC # FLD AUTO: 8 K/UL — SIGNIFICANT CHANGE UP (ref 3.8–10.5)

## 2018-12-07 PROCEDURE — 99215 OFFICE O/P EST HI 40 MIN: CPT

## 2018-12-07 NOTE — CONSULT LETTER
[FreeTextEntry2] : Vesna Granger MD [FreeTextEntry3] : Viridiana Patrick M.D.\par  of Medicine\par [facility]\par [facility] [place]\par [address] [place] [address]\par [address]\par Tele # [phone]; Fax [phone]

## 2018-12-07 NOTE — ASSESSMENT
[FreeTextEntry1] : In summary, this is a very pleasant 38-year-old Mauritian-speaking lady who is diagnosed with T2 N0 stage IIA poorly differentiated ER/SC/HER-2/arjni NEGATIVE IDC of the right breast. She is currently 17 weeks pregnant. A CXR and abdominal sono didn’t show distant mets.  BRCA negative. ddACT sequential started 12/7/18\par \par - Breast ca: On sequential ddACT chemo. C# 1 Justin today. S/e reviewed again. \par - Chemo induced N/V- Will get premedications with dexamethasone and Aloxi. She will use Reglan, Compazine as needed. IV fluid prn. no emend\par - GF induced bone pain- take Claritin. \par - Chemo induced dysgeusia and fatigue: Encourage p.o. fluids, small frequent meals.\par - Alopecia- prescription for wig given. \par - Instructed to call office and go directly to the emergency room with fever more than 100.4, shaking chills, productive cough, sore throat, shortness of breath or urinary symptoms. Patient verbalized understanding and agreement.\par - Emotional support provided, all questions answered.\par - She will f/u with Dr Valdez next week for close monitoring of fetus\par - Patient reminded to call with any new symptoms day or night and any change with the pregnancy to notify MFM right away.\par \par Patient seen and examined along with Dr. Viridiana Patrick\par \par rto 2 weeks

## 2018-12-07 NOTE — PHYSICAL EXAM
[de-identified] : S/p right mastectomy healing well. No CW or axillary nodules [de-identified] : Gravid uterus

## 2018-12-10 ENCOUNTER — LABORATORY RESULT (OUTPATIENT)
Age: 38
End: 2018-12-10

## 2018-12-10 ENCOUNTER — OUTPATIENT (OUTPATIENT)
Dept: OUTPATIENT SERVICES | Facility: HOSPITAL | Age: 38
LOS: 1 days | End: 2018-12-10
Payer: MEDICAID

## 2018-12-10 ENCOUNTER — NON-APPOINTMENT (OUTPATIENT)
Age: 38
End: 2018-12-10

## 2018-12-10 ENCOUNTER — APPOINTMENT (OUTPATIENT)
Dept: ANTEPARTUM | Facility: CLINIC | Age: 38
End: 2018-12-10
Payer: MEDICAID

## 2018-12-10 ENCOUNTER — APPOINTMENT (OUTPATIENT)
Dept: MATERNAL FETAL MEDICINE | Facility: CLINIC | Age: 38
End: 2018-12-10
Payer: MEDICAID

## 2018-12-10 ENCOUNTER — ASOB RESULT (OUTPATIENT)
Age: 38
End: 2018-12-10

## 2018-12-10 VITALS — WEIGHT: 205 LBS | BODY MASS INDEX: 41.41 KG/M2 | DIASTOLIC BLOOD PRESSURE: 60 MMHG | SYSTOLIC BLOOD PRESSURE: 100 MMHG

## 2018-12-10 DIAGNOSIS — O09.899 SUPERVISION OF OTHER HIGH RISK PREGNANCIES, UNSPECIFIED TRIMESTER: ICD-10-CM

## 2018-12-10 DIAGNOSIS — Z51.11 ENCOUNTER FOR ANTINEOPLASTIC CHEMOTHERAPY: ICD-10-CM

## 2018-12-10 DIAGNOSIS — Z90.11 ACQUIRED ABSENCE OF RIGHT BREAST AND NIPPLE: Chronic | ICD-10-CM

## 2018-12-10 DIAGNOSIS — Z3A.17 17 WEEKS GESTATION OF PREGNANCY: ICD-10-CM

## 2018-12-10 DIAGNOSIS — Z51.89 ENCOUNTER FOR OTHER SPECIFIED AFTERCARE: ICD-10-CM

## 2018-12-10 DIAGNOSIS — R11.2 NAUSEA WITH VOMITING, UNSPECIFIED: ICD-10-CM

## 2018-12-10 DIAGNOSIS — O09.522 SUPERVISION OF ELDERLY MULTIGRAVIDA, SECOND TRIMESTER: ICD-10-CM

## 2018-12-10 LAB
BILIRUB UR QL STRIP: NEGATIVE
GLUCOSE UR-MCNC: NEGATIVE
HCG UR QL: 0.2 EU/DL
HGB UR QL STRIP.AUTO: NEGATIVE
KETONES UR-MCNC: NEGATIVE
LEUKOCYTE ESTERASE UR QL STRIP: NORMAL
NITRITE UR QL STRIP: NEGATIVE
PH UR STRIP: 7
PROT UR STRIP-MCNC: NEGATIVE
SP GR UR STRIP: 1.02

## 2018-12-10 PROCEDURE — 81003 URINALYSIS AUTO W/O SCOPE: CPT | Mod: NC,QW

## 2018-12-10 PROCEDURE — 76805 OB US >/= 14 WKS SNGL FETUS: CPT | Mod: 26

## 2018-12-10 PROCEDURE — 99213 OFFICE O/P EST LOW 20 MIN: CPT | Mod: 25

## 2018-12-11 ENCOUNTER — LABORATORY RESULT (OUTPATIENT)
Age: 38
End: 2018-12-11

## 2018-12-11 LAB — GLUCOSE 1H P MEAL SERPL-MCNC: 151 MG/DL — HIGH (ref 70–134)

## 2018-12-12 ENCOUNTER — NON-APPOINTMENT (OUTPATIENT)
Age: 38
End: 2018-12-12

## 2018-12-12 LAB
CULTURE RESULTS: SIGNIFICANT CHANGE UP
SPECIMEN SOURCE: SIGNIFICANT CHANGE UP

## 2018-12-13 LAB — LEAD BLD-MCNC: 3 UG/DL — SIGNIFICANT CHANGE UP (ref 0–4)

## 2018-12-14 ENCOUNTER — APPOINTMENT (OUTPATIENT)
Dept: INFUSION THERAPY | Facility: HOSPITAL | Age: 38
End: 2018-12-14

## 2018-12-14 ENCOUNTER — APPOINTMENT (OUTPATIENT)
Dept: HEMATOLOGY ONCOLOGY | Facility: CLINIC | Age: 38
End: 2018-12-14
Payer: MEDICAID

## 2018-12-14 ENCOUNTER — INPATIENT (INPATIENT)
Facility: HOSPITAL | Age: 38
LOS: 0 days | Discharge: HOME | DRG: 392 | End: 2018-12-15
Attending: OBSTETRICS & GYNECOLOGY | Admitting: OBSTETRICS & GYNECOLOGY
Payer: MEDICAID

## 2018-12-14 VITALS
HEIGHT: 61 IN | RESPIRATION RATE: 18 BRPM | OXYGEN SATURATION: 98 % | SYSTOLIC BLOOD PRESSURE: 127 MMHG | HEART RATE: 88 BPM | WEIGHT: 192.02 LBS | TEMPERATURE: 98 F | DIASTOLIC BLOOD PRESSURE: 81 MMHG

## 2018-12-14 VITALS
DIASTOLIC BLOOD PRESSURE: 77 MMHG | RESPIRATION RATE: 16 BRPM | TEMPERATURE: 98.9 F | WEIGHT: 202.16 LBS | SYSTOLIC BLOOD PRESSURE: 110 MMHG | BODY MASS INDEX: 40.83 KG/M2 | OXYGEN SATURATION: 100 % | HEART RATE: 91 BPM

## 2018-12-14 DIAGNOSIS — Z3A.00 WEEKS OF GESTATION OF PREGNANCY NOT SPECIFIED: ICD-10-CM

## 2018-12-14 DIAGNOSIS — Z34.80 ENCOUNTER FOR SUPERVISION OF OTHER NORMAL PREGNANCY, UNSPECIFIED TRIMESTER: ICD-10-CM

## 2018-12-14 DIAGNOSIS — Z90.11 ACQUIRED ABSENCE OF RIGHT BREAST AND NIPPLE: Chronic | ICD-10-CM

## 2018-12-14 DIAGNOSIS — O26.899 OTHER SPECIFIED PREGNANCY RELATED CONDITIONS, UNSPECIFIED TRIMESTER: ICD-10-CM

## 2018-12-14 LAB
ALBUMIN SERPL ELPH-MCNC: 3.5 G/DL — SIGNIFICANT CHANGE UP (ref 3.3–5)
ALP SERPL-CCNC: 109 U/L — SIGNIFICANT CHANGE UP (ref 40–120)
ALT FLD-CCNC: 8 U/L — LOW (ref 10–45)
ANION GAP SERPL CALC-SCNC: 14 MMOL/L — SIGNIFICANT CHANGE UP (ref 5–17)
AST SERPL-CCNC: 9 U/L — LOW (ref 10–40)
BASOPHILS # BLD AUTO: 0 K/UL — SIGNIFICANT CHANGE UP (ref 0–0.2)
BILIRUB SERPL-MCNC: 0.1 MG/DL — LOW (ref 0.2–1.2)
BLD GP AB SCN SERPL QL: NEGATIVE — SIGNIFICANT CHANGE UP
BUN SERPL-MCNC: <4 MG/DL — LOW (ref 7–23)
CALCIUM SERPL-MCNC: 8.8 MG/DL — SIGNIFICANT CHANGE UP (ref 8.4–10.5)
CHLORIDE SERPL-SCNC: 103 MMOL/L — SIGNIFICANT CHANGE UP (ref 96–108)
CO2 SERPL-SCNC: 21 MMOL/L — LOW (ref 22–31)
CREAT SERPL-MCNC: 0.32 MG/DL — LOW (ref 0.5–1.3)
EOSINOPHIL # BLD AUTO: 0 K/UL — SIGNIFICANT CHANGE UP (ref 0–0.5)
GLUCOSE SERPL-MCNC: 107 MG/DL — HIGH (ref 70–99)
HCT VFR BLD CALC: 31.3 % — LOW (ref 34.5–45)
HGB BLD-MCNC: 10.7 G/DL — LOW (ref 11.5–15.5)
LYMPHOCYTES # BLD AUTO: 3.6 K/UL — HIGH (ref 1–3.3)
LYMPHOCYTES # BLD AUTO: 49 % — HIGH (ref 13–44)
MCHC RBC-ENTMCNC: 27.3 PG — SIGNIFICANT CHANGE UP (ref 27–34)
MCHC RBC-ENTMCNC: 34.1 GM/DL — SIGNIFICANT CHANGE UP (ref 32–36)
MCV RBC AUTO: 80.2 FL — SIGNIFICANT CHANGE UP (ref 80–100)
MONOCYTES # BLD AUTO: 0.2 K/UL — SIGNIFICANT CHANGE UP (ref 0–0.9)
MONOCYTES NFR BLD AUTO: 3 % — SIGNIFICANT CHANGE UP (ref 2–14)
NEUTROPHILS # BLD AUTO: 3.6 K/UL — SIGNIFICANT CHANGE UP (ref 1.8–7.4)
NEUTROPHILS NFR BLD AUTO: 45 % — SIGNIFICANT CHANGE UP (ref 43–77)
PLATELET # BLD AUTO: 265 K/UL — SIGNIFICANT CHANGE UP (ref 150–400)
POTASSIUM SERPL-MCNC: 3.6 MMOL/L — SIGNIFICANT CHANGE UP (ref 3.5–5.3)
POTASSIUM SERPL-SCNC: 3.6 MMOL/L — SIGNIFICANT CHANGE UP (ref 3.5–5.3)
PROT SERPL-MCNC: 6.5 G/DL — SIGNIFICANT CHANGE UP (ref 6–8.3)
RBC # BLD: 3.9 M/UL — SIGNIFICANT CHANGE UP (ref 3.8–5.2)
RBC # FLD: 14.1 % — SIGNIFICANT CHANGE UP (ref 10.3–14.5)
RH IG SCN BLD-IMP: POSITIVE — SIGNIFICANT CHANGE UP
SODIUM SERPL-SCNC: 138 MMOL/L — SIGNIFICANT CHANGE UP (ref 135–145)
WBC # BLD: 7.4 K/UL — SIGNIFICANT CHANGE UP (ref 3.8–10.5)
WBC # FLD AUTO: 7.4 K/UL — SIGNIFICANT CHANGE UP (ref 3.8–10.5)

## 2018-12-14 PROCEDURE — 99215 OFFICE O/P EST HI 40 MIN: CPT

## 2018-12-14 RX ORDER — SODIUM CHLORIDE 9 MG/ML
1000 INJECTION, SOLUTION INTRAVENOUS
Qty: 0 | Refills: 0 | Status: DISCONTINUED | OUTPATIENT
Start: 2018-12-14 | End: 2018-12-15

## 2018-12-14 RX ORDER — FOLIC ACID 0.8 MG
1 TABLET ORAL DAILY
Qty: 0 | Refills: 0 | Status: DISCONTINUED | OUTPATIENT
Start: 2018-12-14 | End: 2018-12-15

## 2018-12-14 RX ORDER — LOPERAMIDE HCL 2 MG
2 TABLET ORAL EVERY 4 HOURS
Qty: 0 | Refills: 0 | Status: DISCONTINUED | OUTPATIENT
Start: 2018-12-14 | End: 2018-12-15

## 2018-12-15 ENCOUNTER — TRANSCRIPTION ENCOUNTER (OUTPATIENT)
Age: 38
End: 2018-12-15

## 2018-12-15 VITALS
SYSTOLIC BLOOD PRESSURE: 100 MMHG | DIASTOLIC BLOOD PRESSURE: 65 MMHG | TEMPERATURE: 98 F | HEART RATE: 94 BPM | RESPIRATION RATE: 18 BRPM | OXYGEN SATURATION: 99 %

## 2018-12-15 LAB
1ST TRIMESTER DATA: SIGNIFICANT CHANGE UP
2ND TRIMESTER DATA: SIGNIFICANT CHANGE UP
AFP AMN-MCNC: SIGNIFICANT CHANGE UP
AFP SERPL-ACNC: SIGNIFICANT CHANGE UP
B-HCG FREE SERPL-MCNC: SIGNIFICANT CHANGE UP
B-HCG FREE SERPL-MCNC: SIGNIFICANT CHANGE UP
CLINICAL BIOCHEMIST REVIEW: SIGNIFICANT CHANGE UP
DEMOGRAPHIC DATA: SIGNIFICANT CHANGE UP
INHIBIN A SERPL-MCNC: SIGNIFICANT CHANGE UP
NT: SIGNIFICANT CHANGE UP
PAPP-A SERPL-ACNC: SIGNIFICANT CHANGE UP
SCREEN-FOOTER: SIGNIFICANT CHANGE UP
SCREEN-RECOMMENDATIONS: SIGNIFICANT CHANGE UP
T PALLIDUM AB TITR SER: NEGATIVE — SIGNIFICANT CHANGE UP
U ESTRIOL SERPL-SCNC: SIGNIFICANT CHANGE UP

## 2018-12-15 RX ORDER — LOPERAMIDE HCL 2 MG
1 TABLET ORAL
Qty: 0 | Refills: 0 | COMMUNITY
Start: 2018-12-15

## 2018-12-15 RX ADMIN — SODIUM CHLORIDE 125 MILLILITER(S): 9 INJECTION, SOLUTION INTRAVENOUS at 05:04

## 2018-12-15 NOTE — DISCHARGE NOTE ANTEPARTUM - PROVIDER TOKENS
FREE:[LAST:[[facility]],PHONE:[phone]],FAX:[(   )    -],ADDRESS:[[address] [place] [address]]]

## 2018-12-15 NOTE — DISCHARGE NOTE ANTEPARTUM - CARE PROVIDER_API CALL
[facility],   [address] [place] [address]  Phone: [phone]  Fax: (   )    -

## 2018-12-15 NOTE — DISCHARGE NOTE ANTEPARTUM - INSTRUCTIONS
Follow up with MD as directed call if any increased pain, fever, chills, pain on urination, vaginal bleeding, leakage of fluid, cramping, decreased fetal movement, abdominal pain and diarrhea

## 2018-12-15 NOTE — DISCHARGE NOTE ANTEPARTUM - PATIENT PORTAL LINK FT
You can access the [facility] Patient Portal, offered by [facility], by registering with the following website: [email or web address]

## 2018-12-15 NOTE — DISCHARGE NOTE ANTEPARTUM - MEDICATION SUMMARY - MEDICATIONS TO TAKE
I will START or STAY ON the medications listed below when I get home from the hospital:    loperamide 2 mg oral capsule  -- 1 cap(s) by mouth every 4 hours, As needed, Diarrhea  -- Indication: For Diarrhea    PreNata oral tablet, chewable  -- 1 tab(s) by mouth once a day  -- Indication: For Wellness

## 2018-12-15 NOTE — DISCHARGE NOTE ANTEPARTUM - HOSPITAL COURSE
a/w with severe diarrhea at 19w s/p chemo treatment for breast cancer for symptom monitoring and IV fluids. Pt was watched for 24 hour diarrhea spontaneously resolved; discharged home with close follow up.

## 2018-12-15 NOTE — DISCHARGE NOTE ANTEPARTUM - PLAN OF CARE
Wellness Rest more frequently or maintain complete bed rest if instructed by your health care provider.  Do not place anything into your vagina.   Do not have sexual intercourse.   Do not douche or use vaginal suppositories unless instructed by your health care provider.  Call your health care provider if you have any of the following:  - feel less than 4 baby movements in 1 hour.   - Contractions every fifteen (15) minutes for more than one (1) hour.   Which may be belly tightening and which may happen every few minutes. These contractions are not always painful, but can be at times.     - Increased vaginal discharge or fluid leakage, spotting or bleeding.   - Temperature above 100.4F    - Chills  - Change in the color of your vaginal fluid  - Foul smelling discharge    - Nausea, vomiting  - constant headache    - blurry vision

## 2018-12-15 NOTE — DISCHARGE NOTE ANTEPARTUM - CARE PLAN
Principal Discharge DX:	Breast cancer, right  Goal:	Wellness  Assessment and plan of treatment:	Rest more frequently or maintain complete bed rest if instructed by your health care provider.  Do not place anything into your vagina.   Do not have sexual intercourse.   Do not douche or use vaginal suppositories unless instructed by your health care provider.  Call your health care provider if you have any of the following:  - feel less than 4 baby movements in 1 hour.   - Contractions every fifteen (15) minutes for more than one (1) hour.   Which may be belly tightening and which may happen every few minutes. These contractions are not always painful, but can be at times.     - Increased vaginal discharge or fluid leakage, spotting or bleeding.   - Temperature above 100.4F    - Chills  - Change in the color of your vaginal fluid  - Foul smelling discharge    - Nausea, vomiting  - constant headache    - blurry vision

## 2018-12-16 ENCOUNTER — OUTPATIENT (OUTPATIENT)
Dept: OUTPATIENT SERVICES | Facility: HOSPITAL | Age: 38
LOS: 1 days | Discharge: HOME | End: 2018-12-16

## 2018-12-16 DIAGNOSIS — C50.919 MALIGNANT NEOPLASM OF UNSPECIFIED SITE OF UNSPECIFIED FEMALE BREAST: ICD-10-CM

## 2018-12-16 DIAGNOSIS — Z90.11 ACQUIRED ABSENCE OF RIGHT BREAST AND NIPPLE: Chronic | ICD-10-CM

## 2018-12-17 DIAGNOSIS — E86.0 DEHYDRATION: ICD-10-CM

## 2018-12-17 DIAGNOSIS — O9A.119: ICD-10-CM

## 2018-12-19 NOTE — ASSESSMENT
[Curative] : Goals of care discussed with patient: Curative [FreeTextEntry1] : In summary, this is a very pleasant 38-year-old Occitan-speaking lady who is diagnosed with T2 N0 stage IIA poorly differentiated ER/MI/HER-2/rajni NEGATIVE IDC of the right breast. She is currently 17 weeks pregnant. A CXR and abdominal sono didn’t show distant mets.  BRCA negative. ddACT sequential started 12/7/18\par \par - Breast ca: On sequential ddACT chemo. Justin C1D8  \par - Diarrhea and crampy abd pain- Likely chemo induced. She will get IVF hydration. She is sent to [facility] for fetal monitoring and r/o early labor.\par - Chemo induced N/V- Will get premedications with dexamethasone and Aloxi. She will use Reglan, Compazine as needed. IV fluid prn. no emend\par - GF induced bone pain- take Claritin. \par - Chemo induced dysgeusia and fatigue: Encourage p.o. fluids, small frequent meals.\par - Alopecia- prescription for wig given. \par - Instructed to call office and go directly to the emergency room with fever more than 100.4, shaking chills, productive cough, sore throat, shortness of breath or urinary symptoms. Patient verbalized understanding and agreement.\par - Emotional support provided, all questions answered.\par - Patient reminded to call with any new symptoms day or night and any change with the pregnancy to notify [facility] right away. Emergency phone number again provided to patient.\par \par Patient seen and examined along with Dr. Viridiana Patrick\par \par RTO 1 week

## 2018-12-19 NOTE — CONSULT LETTER
[Dear  ___] : Dear  [unfilled], [Consult Letter:] : I had the pleasure of evaluating your patient, [unfilled]. [Please see my note below.] : Please see my note below. [Consult Closing:] : Thank you very much for allowing me to participate in the care of this patient.  If you have any questions, please do not hesitate to contact me. [Sincerely,] : Sincerely, [DrRonnie  ___] : Dr. DE ANDA [DrRonnie ___] : Dr. DE ANDA [FreeTextEntry2] : Vesna Granger MD [FreeTextEntry3] : Viridiana Patrick M.D.\par  of Medicine\par [facility]\par [facility]\par [address] [place] [address]\par [address]\par Tele # [phone]; Fax [phone]

## 2018-12-19 NOTE — PHYSICAL EXAM
[Fully active, able to carry on all pre-disease performance without restriction] : Status 0 - Fully active, able to carry on all pre-disease performance without restriction [Obese] : obese [Normal] : affect appropriate [de-identified] : S/p right mastectomy healing well. No CW or axillary nodules [de-identified] : Gravid uterus

## 2018-12-19 NOTE — HISTORY OF PRESENT ILLNESS
[de-identified] : Ms. EVANGELISTA HESTER  is a 38 year old female here for an evaluation of breast cancer. Her oncologic history is as follows:\par \par She felt a right breast mass in 2018 and was evaluated seen by GYN who sent her for breast imaging. She underwent diagnostic breast imaging on 18 which showed at 12:00 position of right breast there is a lobulated focus, measuring 2.0 x 2.0 x 2.2 cm 2 cm FN. 4 mm deep to the skin. Left breast benign. No enlarged axillary lymph nodes. Bx recommended. 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 There is marked lymphocytic infiltrate around the tumor. She was scheduled for MRI but found out that she was pregnant. Current gestation age at the time of initial consult: 11 weeks\par \par 10/23/18\par She is here with her . We discussed the diagnosis of rapidly growing triple negative breast ca, the size has increased since diagnosis and that chemotherapy will be needed to treat this cancer which can not be started until she is 14 weeks. They want to keep the pregnancy understanding that it will cause delay in treatment and potential risk to the patient and fetus. We discussed that lumpectomy will have issues with positive margins and RT will be contraindicated during to pregnancy and therefore u/l mastectomy is recommend. I discussed with Dr Granger that expander placement at the time of surgery will be an option. She is seeing the pt tomorrow and will discuss surgery and reconstruction options with her. She also saw Dr Eden Patrick med onc for a a second opinion. \par \par s/p surgery (right mastectomy) 18 , 4.5 cm tumor and sentinel node negative- path report d/w her in detail. Tumor grew more than double in size between dx and surgery and is s/o aggressive tumor. Discussed to start chemo 18. S/e of chemo jody during pregnancy, expected and unexpected, pertaining to her and the fetus d/w her. Discussed to see MFM b/w chemo. Also a candidate for PMRT due to large tumor size. LMP 18\par \par I had extensive discussion with the patient and spouse regarding breast cancer in pregnancy and the treatment options that are safe for both the mother and the growing fetus. She refused termination of pregnancy after finding out cancer diagnosis.  We discussed that all treatments including chemotherapy and surgery are contraindicated in first trimester but are considered relatively safe in second and third trimester.  We have discussed medical termination of pregnancy as an option but patient decided to keep the pregnancy. We reviewed that the largest experience in pregnancy has been with anthracycline and alkylating agent chemotherapy. She will be a candidate for chemotherapy with Adriamycin plus cyclophosphamide in a sequential dose dense manner. We reviewed data from [place] registry which showed Neulasta is reasonably safe to use in 2nd and third trimester although it was a very small dataset. . There is not enough data to use taxanes in pregnancy therefore Taxol will be administered post partum. Potential s/e to the patient and growing fetus were discussed with the patient and  in detail via a . The patient understood all the potential side effects and signed an informed consent after discussing the risks, benefits and alternatives.\par \par \par PLAN: Starting 18 ( 17 weeks), She will receive adriamycin every 2 weeks x 4 cycle followed by Cytoxan every 2 weeks x 4 cycles. We plan to hold chemotherapy 4 weeks prior to delivery to prevent  neutropenic complications. Currently she is planned for elective induction (NVD) at 37 weeks. We would start Taxol post partum\par \par PREMEDS/SUPPORTIVE CARE: Zofran ativan and dex are listed as part of prechemo antiemetic regimen in NCCN guidelines for breast cancer in pregnancy. The use of Emend is controversial in pregnancy. We discussed the issue of port placement in the OR, but that would increase anesthesia time by 45 min therefore we would treat her peripherally and likely do port placement after delivery. We discussed the issue of preeclampsia or gestational diabetes with steroids. She will be monitored closely by MFM. She will see MFM between every chemo session for close fetal monitoring. \par \par I explained her that EOD work up is limited due to risk of radiation exposure to the growing fetus. We plan to perform CT scans or a PET/CT scan after delivery. She will also be a candidate for PMRT due to large tumor size. \par  [de-identified] : Ms. EVANGELISTA RUBIO is here for f/u for right breast cancer 18 weeks gestation\par C/o body aches on Sunday 12/9, with mild abd pain. Diarrhea started Wednesday but she was having soft stools since Monday.\par She denies blood in the stools, or nausea and vomiting. + cramping with BMs. \par She is eating and drinking w/o difficulty .No mouth sores, fever and chills. \par Baby is moving well, no vaginal discharge or vaginal bleeding. No back pain\par She has healed from the breast surgery with minimal pain when lifting arm otherwise is doing well.\par \par \par \par \par BRAT diet if sxs worsen may take Imodium for severe loose stools

## 2018-12-19 NOTE — REASON FOR VISIT
[Follow-Up Visit] : a follow-up [Urgent Visit] : an urgent  [Spouse] : spouse [FreeTextEntry1] :  [id number] [FreeTextEntry2] : Kam

## 2018-12-20 ENCOUNTER — LABORATORY RESULT (OUTPATIENT)
Age: 38
End: 2018-12-20

## 2018-12-20 ENCOUNTER — APPOINTMENT (OUTPATIENT)
Dept: BREAST CENTER | Facility: CLINIC | Age: 38
End: 2018-12-20
Payer: MEDICAID

## 2018-12-20 VITALS
HEIGHT: 59 IN | HEART RATE: 83 BPM | SYSTOLIC BLOOD PRESSURE: 116 MMHG | TEMPERATURE: 97.7 F | WEIGHT: 206 LBS | BODY MASS INDEX: 41.53 KG/M2 | DIASTOLIC BLOOD PRESSURE: 71 MMHG

## 2018-12-20 PROCEDURE — 82950 GLUCOSE TEST: CPT

## 2018-12-20 PROCEDURE — 84702 CHORIONIC GONADOTROPIN TEST: CPT

## 2018-12-20 PROCEDURE — 87086 URINE CULTURE/COLONY COUNT: CPT

## 2018-12-20 PROCEDURE — 82677 ASSAY OF ESTRIOL: CPT

## 2018-12-20 PROCEDURE — 99024 POSTOP FOLLOW-UP VISIT: CPT

## 2018-12-20 PROCEDURE — 76805 OB US >/= 14 WKS SNGL FETUS: CPT

## 2018-12-20 PROCEDURE — 82105 ALPHA-FETOPROTEIN SERUM: CPT

## 2018-12-20 PROCEDURE — 86336 INHIBIN A: CPT

## 2018-12-20 PROCEDURE — 81003 URINALYSIS AUTO W/O SCOPE: CPT

## 2018-12-20 PROCEDURE — 84704 HCG FREE BETACHAIN TEST: CPT

## 2018-12-20 PROCEDURE — 83655 ASSAY OF LEAD: CPT

## 2018-12-20 PROCEDURE — G0463: CPT

## 2018-12-20 PROCEDURE — 82951 GLUCOSE TOLERANCE TEST (GTT): CPT

## 2018-12-21 ENCOUNTER — RESULT REVIEW (OUTPATIENT)
Age: 38
End: 2018-12-21

## 2018-12-21 ENCOUNTER — LABORATORY RESULT (OUTPATIENT)
Age: 38
End: 2018-12-21

## 2018-12-21 ENCOUNTER — APPOINTMENT (OUTPATIENT)
Dept: INFUSION THERAPY | Facility: HOSPITAL | Age: 38
End: 2018-12-21

## 2018-12-21 ENCOUNTER — APPOINTMENT (OUTPATIENT)
Dept: HEMATOLOGY ONCOLOGY | Facility: CLINIC | Age: 38
End: 2018-12-21
Payer: MEDICAID

## 2018-12-21 VITALS
WEIGHT: 205.47 LBS | RESPIRATION RATE: 16 BRPM | DIASTOLIC BLOOD PRESSURE: 73 MMHG | BODY MASS INDEX: 41.5 KG/M2 | OXYGEN SATURATION: 100 % | TEMPERATURE: 97.9 F | HEART RATE: 77 BPM | SYSTOLIC BLOOD PRESSURE: 110 MMHG

## 2018-12-21 LAB
BASOPHILS # BLD AUTO: 0 K/UL — SIGNIFICANT CHANGE UP (ref 0–0.2)
BASOPHILS NFR BLD AUTO: 0.3 % — SIGNIFICANT CHANGE UP (ref 0–2)
EOSINOPHIL # BLD AUTO: 0.2 K/UL — SIGNIFICANT CHANGE UP (ref 0–0.5)
EOSINOPHIL NFR BLD AUTO: 1.8 % — SIGNIFICANT CHANGE UP (ref 0–6)
GLUCOSE 1H P GLC SERPL-MCNC: 190 MG/DL — SIGNIFICANT CHANGE UP (ref 70–199)
GLUCOSE 2H P GLC SERPL-MCNC: 169 MG/DL — HIGH (ref 70–139)
GLUCOSE 3H P GLC SERPL-MCNC: 128 MG/DL — SIGNIFICANT CHANGE UP
GLUCOSE P FAST BLDV-MCNC: 84 MG/DL — SIGNIFICANT CHANGE UP (ref 70–99)
HCT VFR BLD CALC: 32.1 % — LOW (ref 34.5–45)
HGB BLD-MCNC: 11.1 G/DL — LOW (ref 11.5–15.5)
LYMPHOCYTES # BLD AUTO: 1.9 K/UL — SIGNIFICANT CHANGE UP (ref 1–3.3)
LYMPHOCYTES # BLD AUTO: 20 % — SIGNIFICANT CHANGE UP (ref 13–44)
MCHC RBC-ENTMCNC: 27.6 PG — SIGNIFICANT CHANGE UP (ref 27–34)
MCHC RBC-ENTMCNC: 34.7 G/DL — SIGNIFICANT CHANGE UP (ref 32–36)
MCV RBC AUTO: 79.7 FL — LOW (ref 80–100)
MONOCYTES # BLD AUTO: 0.4 K/UL — SIGNIFICANT CHANGE UP (ref 0–0.9)
MONOCYTES NFR BLD AUTO: 4.5 % — SIGNIFICANT CHANGE UP (ref 2–14)
NEUTROPHILS # BLD AUTO: 7 K/UL — SIGNIFICANT CHANGE UP (ref 1.8–7.4)
NEUTROPHILS NFR BLD AUTO: 73.3 % — SIGNIFICANT CHANGE UP (ref 43–77)
PLATELET # BLD AUTO: 197 K/UL — SIGNIFICANT CHANGE UP (ref 150–400)
RBC # BLD: 4.02 M/UL — SIGNIFICANT CHANGE UP (ref 3.8–5.2)
RBC # FLD: 13.7 % — SIGNIFICANT CHANGE UP (ref 10.3–14.5)
WBC # BLD: 9.5 K/UL — SIGNIFICANT CHANGE UP (ref 3.8–10.5)
WBC # FLD AUTO: 9.5 K/UL — SIGNIFICANT CHANGE UP (ref 3.8–10.5)

## 2018-12-21 PROCEDURE — 99215 OFFICE O/P EST HI 40 MIN: CPT

## 2018-12-21 NOTE — ASSESSMENT
[Curative] : Goals of care discussed with patient: Curative [FreeTextEntry1] : In summary, this is a very pleasant 38-year-old Pashto-speaking lady who is diagnosed with T2 N0 stage IIA poorly differentiated ER/AL/HER-2/rajni NEGATIVE IDC of the right breast. She is currently 17 weeks pregnant. A CXR and abdominal sono didn’t show distant mets.  BRCA negative. ddACT sequential started 12/7/18\par \par - Breast ca: On sequential ddACT chemo. Justin C2 today. Counts good to proceed with chemo.\par - Diarrhea and crampy abd pain- Likely chemo induced. Imodium prn after chemo. If sx worsen, she will f/u MFM to r/o premature labor\par - Chemo induced N/V- Will get premedications with dexamethasone and Aloxi. She will use Reglan, Compazine as needed. IV fluid prn. no emend\par - GF induced bone pain- take Claritin. \par - Chemo induced dysgeusia and fatigue: Encourage p.o. fluids, small frequent meals.\par - Instructed to call office and go directly to the emergency room with fever more than 100.4, shaking chills, productive cough, sore throat, shortness of breath or urinary symptoms. Patient verbalized understanding and agreement.\par - Emotional support provided, all questions answered.\par - Patient reminded to call with any new symptoms day or night and any change with the pregnancy to notify MFM right away. Emergency phone number again provided to patient.\par \par rto 2 weeks

## 2018-12-21 NOTE — HISTORY OF PRESENT ILLNESS
[de-identified] : Ms. EVANGELISTA HESTER  is a 38 year old female here for an evaluation of breast cancer. Her oncologic history is as follows:\par \par She felt a right breast mass in 2018 and was evaluated seen by GYN who sent her for breast imaging. She underwent diagnostic breast imaging on 18 which showed at 12:00 position of right breast there is a lobulated focus, measuring 2.0 x 2.0 x 2.2 cm 2 cm FN. 4 mm deep to the skin. Left breast benign. No enlarged axillary lymph nodes. Bx recommended. 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 There is marked lymphocytic infiltrate around the tumor. She was scheduled for MRI but found out that she was pregnant. Current gestation age at the time of initial consult: 11 weeks\par \par 10/23/18\par She is here with her . We discussed the diagnosis of rapidly growing triple negative breast ca, the size has increased since diagnosis and that chemotherapy will be needed to treat this cancer which can not be started until she is 14 weeks. They want to keep the pregnancy understanding that it will cause delay in treatment and potential risk to the patient and fetus. We discussed that lumpectomy will have issues with positive margins and RT will be contraindicated during to pregnancy and therefore u/l mastectomy is recommend. I discussed with Dr Granger that expander placement at the time of surgery will be an option. She is seeing the pt tomorrow and will discuss surgery and reconstruction options with her. She also saw Dr Eden Patrick med onc for a a second opinion. \par \par s/p surgery (right mastectomy) 18 , 4.5 cm tumor and sentinel node negative- path report d/w her in detail. Tumor grew more than double in size between dx and surgery and is s/o aggressive tumor. Discussed to start chemo 18. S/e of chemo jody during pregnancy, expected and unexpected, pertaining to her and the fetus d/w her. Discussed to see MFM b/w chemo. Also a candidate for PMRT due to large tumor size. LMP 18\par \par I had extensive discussion with the patient and spouse regarding breast cancer in pregnancy and the treatment options that are safe for both the mother and the growing fetus. She refused termination of pregnancy after finding out cancer diagnosis.  We discussed that all treatments including chemotherapy and surgery are contraindicated in first trimester but are considered relatively safe in second and third trimester.  We have discussed medical termination of pregnancy as an option but patient decided to keep the pregnancy. We reviewed that the largest experience in pregnancy has been with anthracycline and alkylating agent chemotherapy. She will be a candidate for chemotherapy with Adriamycin plus cyclophosphamide in a sequential dose dense manner. We reviewed data from [place] registry which showed Neulasta is reasonably safe to use in 2nd and third trimester although it was a very small dataset. . There is not enough data to use taxanes in pregnancy therefore Taxol will be administered post partum. Potential s/e to the patient and growing fetus were discussed with the patient and  in detail via a . The patient understood all the potential side effects and signed an informed consent after discussing the risks, benefits and alternatives.\par \par \par PLAN: Starting 18 ( 17 weeks), She will receive adriamycin every 2 weeks x 4 cycle followed by Cytoxan every 2 weeks x 4 cycles. We plan to hold chemotherapy 4 weeks prior to delivery to prevent  neutropenic complications. Currently she is planned for elective induction (NVD) at 37 weeks. We would start Taxol post partum\par \par PREMEDS/SUPPORTIVE CARE: Zofran ativan and dex are listed as part of prechemo antiemetic regimen in NCCN guidelines for breast cancer in pregnancy. The use of Emend is controversial in pregnancy. We discussed the issue of port placement in the OR, but that would increase anesthesia time by 45 min therefore we would treat her peripherally and likely do port placement after delivery. We discussed the issue of preeclampsia or gestational diabetes with steroids. She will be monitored closely by MFM. She will see MFM between every chemo session for close fetal monitoring. \par \par I explained her that EOD work up is limited due to risk of radiation exposure to the growing fetus. We plan to perform CT scans or a PET/CT scan after delivery. She will also be a candidate for PMRT due to large tumor size. \par  [de-identified] : Ms. EVANGELISTA RUBIO is here for f/u for right breast cancer 19 weeks gestation Adriamycin C2D1\par She was seen C1D8 last week for abdominal cramping and diarrhea. She felt better after IV fluids. She was seen by MFM and was monitored overnight. Fetus was ok and cervix was closed. She was prescribed imodium by MFM. Sx resolved now. She also had mild body pains and nausea with chemo, all resolved now.\par She is eating and drinking w/o difficulty .No mouth sores, fever and chills. \par Baby is moving well, no vaginal discharge or vaginal bleeding. No back pain\par She has healed from the breast surgery with minimal pain when lifting arm otherwise is doing well.

## 2018-12-21 NOTE — PHYSICAL EXAM
[Fully active, able to carry on all pre-disease performance without restriction] : Status 0 - Fully active, able to carry on all pre-disease performance without restriction [Obese] : obese [Normal] : affect appropriate [de-identified] : S/p right mastectomy healing well. No CW or axillary nodules [de-identified] : Gravid uterus

## 2018-12-21 NOTE — REASON FOR VISIT
[Follow-Up Visit] : a follow-up [Other: _____] : [unfilled] [[employer] ] : provided by [employer]   [FreeTextEntry1] : [id number] [FreeTextEntry2] : Adri

## 2018-12-21 NOTE — CONSULT LETTER
[Dear  ___] : Dear  [unfilled], [Consult Letter:] : I had the pleasure of evaluating your patient, [unfilled]. [Please see my note below.] : Please see my note below. [Consult Closing:] : Thank you very much for allowing me to participate in the care of this patient.  If you have any questions, please do not hesitate to contact me. [Sincerely,] : Sincerely, [DrRonnie  ___] : Dr. DE ANDA [DrRonnie ___] : Dr. DE ANDA [FreeTextEntry2] : Vesna Granger MD [FreeTextEntry3] : Viridiana Patrick M.D.\par  of Medicine\par [facility]\par [facility] [place]\par [address] [place] [address]\par [address]\par Tele # [phone]; Fax [phone]

## 2018-12-24 ENCOUNTER — APPOINTMENT (OUTPATIENT)
Dept: MATERNAL FETAL MEDICINE | Facility: CLINIC | Age: 38
End: 2018-12-24

## 2018-12-24 ENCOUNTER — OUTPATIENT (OUTPATIENT)
Dept: OUTPATIENT SERVICES | Facility: HOSPITAL | Age: 38
LOS: 1 days | End: 2018-12-24
Payer: MEDICAID

## 2018-12-24 ENCOUNTER — NON-APPOINTMENT (OUTPATIENT)
Age: 38
End: 2018-12-24

## 2018-12-24 ENCOUNTER — APPOINTMENT (OUTPATIENT)
Dept: MATERNAL FETAL MEDICINE | Facility: CLINIC | Age: 38
End: 2018-12-24
Payer: MEDICAID

## 2018-12-24 ENCOUNTER — ASOB RESULT (OUTPATIENT)
Age: 38
End: 2018-12-24

## 2018-12-24 ENCOUNTER — APPOINTMENT (OUTPATIENT)
Dept: ANTEPARTUM | Facility: CLINIC | Age: 38
End: 2018-12-24
Payer: MEDICAID

## 2018-12-24 VITALS — BODY MASS INDEX: 41.41 KG/M2 | SYSTOLIC BLOOD PRESSURE: 106 MMHG | DIASTOLIC BLOOD PRESSURE: 68 MMHG | WEIGHT: 205 LBS

## 2018-12-24 DIAGNOSIS — R11.2 NAUSEA WITH VOMITING, UNSPECIFIED: ICD-10-CM

## 2018-12-24 DIAGNOSIS — Z90.11 ACQUIRED ABSENCE OF RIGHT BREAST AND NIPPLE: Chronic | ICD-10-CM

## 2018-12-24 DIAGNOSIS — Z51.89 ENCOUNTER FOR OTHER SPECIFIED AFTERCARE: ICD-10-CM

## 2018-12-24 DIAGNOSIS — O09.899 SUPERVISION OF OTHER HIGH RISK PREGNANCIES, UNSPECIFIED TRIMESTER: ICD-10-CM

## 2018-12-24 DIAGNOSIS — Z51.11 ENCOUNTER FOR ANTINEOPLASTIC CHEMOTHERAPY: ICD-10-CM

## 2018-12-24 LAB
BILIRUB UR QL STRIP: NEGATIVE
CLARITY UR: CLEAR
COLLECTION METHOD: NORMAL
GLUCOSE UR-MCNC: 500
HCG UR QL: 0.2 EU/DL
HGB UR QL STRIP.AUTO: NORMAL
KETONES UR-MCNC: NORMAL
LEUKOCYTE ESTERASE UR QL STRIP: NEGATIVE
NITRITE UR QL STRIP: NEGATIVE
PH UR STRIP: 6
PROT UR STRIP-MCNC: NEGATIVE
SP GR UR STRIP: 1.03

## 2018-12-24 PROCEDURE — 76811 OB US DETAILED SNGL FETUS: CPT | Mod: 26

## 2018-12-24 PROCEDURE — G0108: CPT

## 2018-12-24 PROCEDURE — 81003 URINALYSIS AUTO W/O SCOPE: CPT | Mod: NC,QW

## 2018-12-24 PROCEDURE — 76817 TRANSVAGINAL US OBSTETRIC: CPT

## 2018-12-24 PROCEDURE — 99213 OFFICE O/P EST LOW 20 MIN: CPT | Mod: 25

## 2018-12-24 PROCEDURE — 81003 URINALYSIS AUTO W/O SCOPE: CPT

## 2018-12-24 PROCEDURE — G0108 DIAB MANAGE TRN  PER INDIV: CPT

## 2018-12-24 PROCEDURE — 76811 OB US DETAILED SNGL FETUS: CPT

## 2018-12-24 PROCEDURE — 76817 TRANSVAGINAL US OBSTETRIC: CPT | Mod: 26

## 2018-12-24 PROCEDURE — G0463: CPT

## 2018-12-26 ENCOUNTER — NON-APPOINTMENT (OUTPATIENT)
Age: 38
End: 2018-12-26

## 2018-12-28 ENCOUNTER — OTHER (OUTPATIENT)
Age: 38
End: 2018-12-28

## 2018-12-29 ENCOUNTER — LABORATORY RESULT (OUTPATIENT)
Age: 38
End: 2018-12-29

## 2018-12-29 DIAGNOSIS — C50.919 MALIGNANT NEOPLASM OF UNSPECIFIED SITE OF UNSPECIFIED FEMALE BREAST: ICD-10-CM

## 2018-12-29 DIAGNOSIS — O24.419 GESTATIONAL DIABETES MELLITUS IN PREGNANCY, UNSPECIFIED CONTROL: ICD-10-CM

## 2019-01-02 ENCOUNTER — INPATIENT (INPATIENT)
Facility: HOSPITAL | Age: 39
LOS: 0 days | Discharge: HOME | DRG: 832 | End: 2019-01-03
Attending: OBSTETRICS & GYNECOLOGY | Admitting: OBSTETRICS & GYNECOLOGY
Payer: MEDICAID

## 2019-01-02 ENCOUNTER — OTHER (OUTPATIENT)
Age: 39
End: 2019-01-02

## 2019-01-02 VITALS
RESPIRATION RATE: 18 BRPM | HEART RATE: 84 BPM | DIASTOLIC BLOOD PRESSURE: 67 MMHG | OXYGEN SATURATION: 100 % | TEMPERATURE: 98 F | HEIGHT: 61 IN | SYSTOLIC BLOOD PRESSURE: 106 MMHG | WEIGHT: 207.23 LBS

## 2019-01-02 DIAGNOSIS — Z3A.00 WEEKS OF GESTATION OF PREGNANCY NOT SPECIFIED: ICD-10-CM

## 2019-01-02 DIAGNOSIS — O26.899 OTHER SPECIFIED PREGNANCY RELATED CONDITIONS, UNSPECIFIED TRIMESTER: ICD-10-CM

## 2019-01-02 DIAGNOSIS — Z34.80 ENCOUNTER FOR SUPERVISION OF OTHER NORMAL PREGNANCY, UNSPECIFIED TRIMESTER: ICD-10-CM

## 2019-01-02 DIAGNOSIS — Z90.11 ACQUIRED ABSENCE OF RIGHT BREAST AND NIPPLE: Chronic | ICD-10-CM

## 2019-01-02 LAB
ANION GAP SERPL CALC-SCNC: 14 MMOL/L — SIGNIFICANT CHANGE UP (ref 5–17)
APTT BLD: 23 SEC — LOW (ref 27.5–36.3)
BASOPHILS # BLD AUTO: 0.1 K/UL — SIGNIFICANT CHANGE UP (ref 0–0.2)
BASOPHILS NFR BLD AUTO: 0.6 % — SIGNIFICANT CHANGE UP (ref 0–2)
BLD GP AB SCN SERPL QL: NEGATIVE — SIGNIFICANT CHANGE UP
BUN SERPL-MCNC: 5 MG/DL — LOW (ref 7–23)
CALCIUM SERPL-MCNC: 9.1 MG/DL — SIGNIFICANT CHANGE UP (ref 8.4–10.5)
CHLORIDE SERPL-SCNC: 101 MMOL/L — SIGNIFICANT CHANGE UP (ref 96–108)
CO2 SERPL-SCNC: 22 MMOL/L — SIGNIFICANT CHANGE UP (ref 22–31)
CREAT SERPL-MCNC: 0.33 MG/DL — LOW (ref 0.5–1.3)
EOSINOPHIL # BLD AUTO: 0.1 K/UL — SIGNIFICANT CHANGE UP (ref 0–0.5)
EOSINOPHIL NFR BLD AUTO: 0.6 % — SIGNIFICANT CHANGE UP (ref 0–6)
FIBRINOGEN PPP-MCNC: 876 MG/DL — HIGH (ref 350–510)
GLUCOSE SERPL-MCNC: 90 MG/DL — SIGNIFICANT CHANGE UP (ref 70–99)
HCT VFR BLD CALC: 29.8 % — LOW (ref 34.5–45)
HGB BLD-MCNC: 10.5 G/DL — LOW (ref 11.5–15.5)
INR BLD: 0.95 RATIO — SIGNIFICANT CHANGE UP (ref 0.88–1.16)
LYMPHOCYTES # BLD AUTO: 2.4 K/UL — SIGNIFICANT CHANGE UP (ref 1–3.3)
LYMPHOCYTES # BLD AUTO: 21 % — SIGNIFICANT CHANGE UP (ref 13–44)
MAGNESIUM SERPL-MCNC: 1.9 MG/DL — SIGNIFICANT CHANGE UP (ref 1.6–2.6)
MCHC RBC-ENTMCNC: 28.4 PG — SIGNIFICANT CHANGE UP (ref 27–34)
MCHC RBC-ENTMCNC: 35.3 GM/DL — SIGNIFICANT CHANGE UP (ref 32–36)
MCV RBC AUTO: 80.6 FL — SIGNIFICANT CHANGE UP (ref 80–100)
MONOCYTES # BLD AUTO: 0.6 K/UL — SIGNIFICANT CHANGE UP (ref 0–0.9)
MONOCYTES NFR BLD AUTO: 5.1 % — SIGNIFICANT CHANGE UP (ref 2–14)
NEUTROPHILS # BLD AUTO: 8.4 K/UL — HIGH (ref 1.8–7.4)
NEUTROPHILS NFR BLD AUTO: 72.7 % — SIGNIFICANT CHANGE UP (ref 43–77)
PHOSPHATE SERPL-MCNC: 2.8 MG/DL — SIGNIFICANT CHANGE UP (ref 2.5–4.5)
PLATELET # BLD AUTO: 244 K/UL — SIGNIFICANT CHANGE UP (ref 150–400)
POTASSIUM SERPL-MCNC: 3.3 MMOL/L — LOW (ref 3.5–5.3)
POTASSIUM SERPL-SCNC: 3.3 MMOL/L — LOW (ref 3.5–5.3)
PROTHROM AB SERPL-ACNC: 10.9 SEC — SIGNIFICANT CHANGE UP (ref 10–12.9)
RBC # BLD: 3.7 M/UL — LOW (ref 3.8–5.2)
RBC # FLD: 14.6 % — HIGH (ref 10.3–14.5)
RH IG SCN BLD-IMP: POSITIVE — SIGNIFICANT CHANGE UP
SODIUM SERPL-SCNC: 137 MMOL/L — SIGNIFICANT CHANGE UP (ref 135–145)
WBC # BLD: 11.6 K/UL — HIGH (ref 3.8–10.5)
WBC # FLD AUTO: 11.6 K/UL — HIGH (ref 3.8–10.5)

## 2019-01-02 RX ORDER — SODIUM CHLORIDE 9 MG/ML
1000 INJECTION, SOLUTION INTRAVENOUS ONCE
Qty: 0 | Refills: 0 | Status: COMPLETED | OUTPATIENT
Start: 2019-01-02 | End: 2019-01-02

## 2019-01-02 RX ORDER — SODIUM CHLORIDE 9 MG/ML
1000 INJECTION, SOLUTION INTRAVENOUS
Qty: 0 | Refills: 0 | Status: DISCONTINUED | OUTPATIENT
Start: 2019-01-02 | End: 2019-01-03

## 2019-01-02 RX ORDER — ACETAMINOPHEN 500 MG
1000 TABLET ORAL ONCE
Qty: 0 | Refills: 0 | Status: COMPLETED | OUTPATIENT
Start: 2019-01-02 | End: 2019-01-02

## 2019-01-02 RX ORDER — VANCOMYCIN HCL 1 G
125 VIAL (EA) INTRAVENOUS EVERY 6 HOURS
Qty: 0 | Refills: 0 | Status: DISCONTINUED | OUTPATIENT
Start: 2019-01-02 | End: 2019-01-03

## 2019-01-02 RX ADMIN — SODIUM CHLORIDE 1000 MILLILITER(S): 9 INJECTION, SOLUTION INTRAVENOUS at 23:21

## 2019-01-02 RX ADMIN — Medication 125 MILLIGRAM(S): at 23:32

## 2019-01-02 RX ADMIN — SODIUM CHLORIDE 125 MILLILITER(S): 9 INJECTION, SOLUTION INTRAVENOUS at 23:21

## 2019-01-02 RX ADMIN — Medication 400 MILLIGRAM(S): at 23:21

## 2019-01-03 ENCOUNTER — TRANSCRIPTION ENCOUNTER (OUTPATIENT)
Age: 39
End: 2019-01-03

## 2019-01-03 ENCOUNTER — RESULT REVIEW (OUTPATIENT)
Age: 39
End: 2019-01-03

## 2019-01-03 VITALS
TEMPERATURE: 98 F | DIASTOLIC BLOOD PRESSURE: 67 MMHG | OXYGEN SATURATION: 99 % | RESPIRATION RATE: 18 BRPM | SYSTOLIC BLOOD PRESSURE: 106 MMHG | HEART RATE: 76 BPM

## 2019-01-03 LAB
ALBUMIN SERPL ELPH-MCNC: 3.4 G/DL — SIGNIFICANT CHANGE UP (ref 3.3–5)
ALP SERPL-CCNC: 93 U/L — SIGNIFICANT CHANGE UP (ref 40–120)
ALT FLD-CCNC: 5 U/L — LOW (ref 10–45)
ANION GAP SERPL CALC-SCNC: 10 MMOL/L — SIGNIFICANT CHANGE UP (ref 5–17)
APPEARANCE UR: CLEAR — SIGNIFICANT CHANGE UP
AST SERPL-CCNC: 9 U/L — LOW (ref 10–40)
BILIRUB SERPL-MCNC: 0.1 MG/DL — LOW (ref 0.2–1.2)
BILIRUB UR-MCNC: NEGATIVE — SIGNIFICANT CHANGE UP
BUN SERPL-MCNC: <4 MG/DL — LOW (ref 7–23)
CALCIUM SERPL-MCNC: 9.1 MG/DL — SIGNIFICANT CHANGE UP (ref 8.4–10.5)
CHLORIDE SERPL-SCNC: 104 MMOL/L — SIGNIFICANT CHANGE UP (ref 96–108)
CO2 SERPL-SCNC: 21 MMOL/L — LOW (ref 22–31)
COLOR SPEC: COLORLESS — SIGNIFICANT CHANGE UP
CREAT SERPL-MCNC: 0.36 MG/DL — LOW (ref 0.5–1.3)
CULTURE RESULTS: SIGNIFICANT CHANGE UP
DIFF PNL FLD: NEGATIVE — SIGNIFICANT CHANGE UP
GLUCOSE BLDC GLUCOMTR-MCNC: 117 MG/DL — HIGH (ref 70–99)
GLUCOSE BLDC GLUCOMTR-MCNC: 122 MG/DL — HIGH (ref 70–99)
GLUCOSE SERPL-MCNC: 113 MG/DL — HIGH (ref 70–99)
GLUCOSE UR QL: NEGATIVE — SIGNIFICANT CHANGE UP
KETONES UR-MCNC: NEGATIVE — SIGNIFICANT CHANGE UP
LEUKOCYTE ESTERASE UR-ACNC: NEGATIVE — SIGNIFICANT CHANGE UP
NITRITE UR-MCNC: NEGATIVE — SIGNIFICANT CHANGE UP
PH UR: 6.5 — SIGNIFICANT CHANGE UP (ref 5–8)
POTASSIUM SERPL-MCNC: 3.9 MMOL/L — SIGNIFICANT CHANGE UP (ref 3.5–5.3)
POTASSIUM SERPL-SCNC: 3.9 MMOL/L — SIGNIFICANT CHANGE UP (ref 3.5–5.3)
PROT SERPL-MCNC: 6.3 G/DL — SIGNIFICANT CHANGE UP (ref 6–8.3)
PROT UR-MCNC: NEGATIVE — SIGNIFICANT CHANGE UP
SODIUM SERPL-SCNC: 135 MMOL/L — SIGNIFICANT CHANGE UP (ref 135–145)
SP GR SPEC: 1 — LOW (ref 1.01–1.02)
SPECIMEN SOURCE: SIGNIFICANT CHANGE UP
UROBILINOGEN FLD QL: NEGATIVE — SIGNIFICANT CHANGE UP

## 2019-01-03 PROCEDURE — 99221 1ST HOSP IP/OBS SF/LOW 40: CPT | Mod: GC

## 2019-01-03 PROCEDURE — 99232 SBSQ HOSP IP/OBS MODERATE 35: CPT

## 2019-01-03 PROCEDURE — 99222 1ST HOSP IP/OBS MODERATE 55: CPT

## 2019-01-03 RX ORDER — POTASSIUM CHLORIDE 20 MEQ
20 PACKET (EA) ORAL
Qty: 0 | Refills: 0 | Status: COMPLETED | OUTPATIENT
Start: 2019-01-03 | End: 2019-01-03

## 2019-01-03 RX ORDER — VANCOMYCIN HCL 1 G
1 VIAL (EA) INTRAVENOUS
Qty: 16 | Refills: 0 | OUTPATIENT
Start: 2019-01-03 | End: 2019-01-06

## 2019-01-03 RX ORDER — AMOXICILLIN 250 MG/5ML
500 SUSPENSION, RECONSTITUTED, ORAL (ML) ORAL
Qty: 0 | Refills: 0 | Status: DISCONTINUED | OUTPATIENT
Start: 2019-01-03 | End: 2019-01-03

## 2019-01-03 RX ORDER — DIPHENHYDRAMINE HCL 50 MG
25 CAPSULE ORAL ONCE
Qty: 0 | Refills: 0 | Status: DISCONTINUED | OUTPATIENT
Start: 2019-01-03 | End: 2019-01-03

## 2019-01-03 RX ORDER — AMOXICILLIN 250 MG/5ML
1 SUSPENSION, RECONSTITUTED, ORAL (ML) ORAL
Qty: 28 | Refills: 0 | OUTPATIENT
Start: 2019-01-03 | End: 2019-01-09

## 2019-01-03 RX ORDER — INFLUENZA VIRUS VACCINE 15; 15; 15; 15 UG/.5ML; UG/.5ML; UG/.5ML; UG/.5ML
0.5 SUSPENSION INTRAMUSCULAR ONCE
Qty: 0 | Refills: 0 | Status: COMPLETED | OUTPATIENT
Start: 2019-01-03 | End: 2019-01-03

## 2019-01-03 RX ORDER — ACETAMINOPHEN 500 MG
1000 TABLET ORAL ONCE
Qty: 0 | Refills: 0 | Status: COMPLETED | OUTPATIENT
Start: 2019-01-03 | End: 2019-01-03

## 2019-01-03 RX ADMIN — Medication 500 MILLIGRAM(S): at 17:49

## 2019-01-03 RX ADMIN — Medication 125 MILLIGRAM(S): at 12:00

## 2019-01-03 RX ADMIN — Medication 400 MILLIGRAM(S): at 05:24

## 2019-01-03 RX ADMIN — Medication 1000 MILLIGRAM(S): at 00:10

## 2019-01-03 RX ADMIN — Medication 125 MILLIGRAM(S): at 17:49

## 2019-01-03 RX ADMIN — Medication 20 MILLIEQUIVALENT(S): at 08:37

## 2019-01-03 RX ADMIN — Medication 500 MILLIGRAM(S): at 12:00

## 2019-01-03 RX ADMIN — Medication 20 MILLIEQUIVALENT(S): at 10:25

## 2019-01-03 RX ADMIN — Medication 125 MILLIGRAM(S): at 07:07

## 2019-01-03 RX ADMIN — SODIUM CHLORIDE 125 MILLILITER(S): 9 INJECTION, SOLUTION INTRAVENOUS at 08:37

## 2019-01-03 NOTE — CHART NOTE - NSCHARTNOTEFT_GEN_A_CORE
MFM Fellow    Patient seen at bedside with Dr. Winston and Dr. Montalvo  In summary, this is a 37yo @21.2wks presents for weakness, diarrhea x several weeks duration. Patient noted to be C.Diff+/Shigella+ on cultures. Since admission, patient confirms loose stool, however decreased. +cramping. Denies lof/vb. +FM. Denies n/v, fevers/chills. No recent travel. States that she ntoed green vaginal discharge yesterday, which has not reoccured.     PNC c/b dx of breast CA (triple neg) in 10/2018, now s/p R mastectomy (), current on chemotx (last cycle on ), receiving adriamycin and cytoxan with plan for taxol post partum. Patient due for another cycle on . MedOnc: Dr. Viridiana Patrick  Patient with early diagnosis of GDM. Will need to schedule fetal echo at a later date.     ICU Vital Signs Last 24 Hrs  T(C): 36.8 (2019 08:19), Max: 36.9 (2019 20:50)  T(F): 98.2 (2019 08:19), Max: 98.4 (2019 20:50)  HR: 86 (2019 08:19) (76 - 86)  BP: 96/68 (2019 08:19) (96/68 - 107/73)  RR: 19 (2019 08:19) (18 - 19)  SpO2: 100% (2019 08:19) (100% - 100%)    As per discussion with ID- will initiate short course of ampicillin treatment (7 days) as well as PO vancomycin (14 days). Will repeat stool cultures/GI PCR, and depending on results, may decrease ampicillin treatment to 5 days. As per ID, would recommend not having chemotherapy course tomorrow. Will touch base with oncology regarding said recommendations.     Will check CMP and replete electrolytes as needed. Continue regular diet. Continue IVF hydration. Ambulate as tolerated. Will monitor FS while admitted.     Patient with outpatient ID appointment scheduled for .   Evaluate for possible discharge today.     Trey MCNAMARA  MFM Fellow

## 2019-01-03 NOTE — DISCHARGE NOTE ANTEPARTUM - CARE PLAN
Principal Discharge DX:	Clostridium difficile diarrhea  Goal:	resolution of diarrhea  Assessment and plan of treatment:	Take vancomycin and amoxicillin as direct (both 4 times a day). Follow up with infectious disease ([address]) as scheduled on Jan 8. Follow up with Dr. Patrick next week. Follow up with high risk clinic on Monday Jan 7.  Secondary Diagnosis:	21 weeks gestation of pregnancy  Goal:	continuation of pregnancy and prenatal care  Assessment and plan of treatment:	Monitor FS at home if glucometer is working. Follow up in [facility] on Monday for further management.

## 2019-01-03 NOTE — DISCHARGE NOTE ANTEPARTUM - PLAN OF CARE
resolution of diarrhea Take vancomycin and amoxicillin as direct (both 4 times a day). Follow up with infectious disease ([address]) as scheduled on Jan 8. Follow up with Dr. Patrick next week. Follow up with high risk clinic on Monday Jan 7. continuation of pregnancy and prenatal care Monitor FS at home if glucometer is working. Follow up in [facility] on Monday for further management.

## 2019-01-03 NOTE — DISCHARGE NOTE ANTEPARTUM - PROVIDER TOKENS
FREE:[LAST:[[facility]],PHONE:[phone]:[phone]    -],ADDRESS:[[address] [place] [address]]],TOKEN:'[id number]:'[id number]'

## 2019-01-03 NOTE — DISCHARGE NOTE ANTEPARTUM - PATIENT PORTAL LINK FT
You can access the [facility] Patient Portal, offered by [facility], by registering with the following website: [email or web address]

## 2019-01-03 NOTE — CONSULT NOTE ADULT - ATTENDING COMMENTS
Patient interviewed on rounds with Dr. Sommers.  Agree with above.     Juan Mosley MD   [phone]

## 2019-01-03 NOTE — DISCHARGE NOTE ANTEPARTUM - MEDICATION SUMMARY - MEDICATIONS TO TAKE
I will START or STAY ON the medications listed below when I get home from the hospital:    vancomycin 125 mg oral capsule  -- 1 cap(s) by mouth 4 times a day   -- Finish all this medication unless otherwise directed by prescriber.    -- Indication: For c diff    PreNata oral tablet, chewable  -- 1 tab(s) by mouth once a day  -- Indication: For vitamin    amoxicillin 500 mg oral tablet  -- 1 tab(s) by mouth every 6 hours   -- Finish all this medication unless otherwise directed by prescriber.    -- Indication: For Shigella

## 2019-01-03 NOTE — CONSULT NOTE ADULT - SUBJECTIVE AND OBJECTIVE BOX
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 Last chemo being dec 21st.    She developed diarrhea 2 weeks ago, watery, no blood-occ abd pain-no cramps. Was initially given imodium-but later stool culture sent wa spositive for shigella and also C diff pCR positive.  Patient had 4 BMS yesterday-was admitted for iV hydration and further management.  Fetal monitoring done and per OB fetus is doing well.    Allergies  No Known Allergies    Intolerances    MEDICATIONS  (STANDING):  amoxicillin 500 milliGRAM(s) Oral four times a day  diphenhydrAMINE   Injectable 25 milliGRAM(s) IV Push once  lactated ringers 1000 milliLiter(s) (125 mL/Hr) IV Continuous <Continuous>  vancomycin    Solution 125 milliGRAM(s) Oral every 6 hours    PAST MEDICAL & SURGICAL HISTORY:  Breast cancer, right  Morbid obesity  History of right total mastectomy    FAMILY HISTORY:  No pertinent family history in first degree relatives    SOCIAL HISTORY: No EtOH, no tobacco    REVIEW OF SYSTEMS:  All other review of systems is negative unless indicated above.    Height (cm): 154.94 (01-02 @ 20:50)  Weight (kg): 94 (01-02 @ 20:50)  BMI (kg/m2): 39.2 (01-02 @ 20:50)  BSA (m2): 1.92 (01-02 @ 20:50)    T(F): 98.2 (01-03-19 @ 12:29), Max: 98.4 (01-02-19 @ 20:50)  HR: 76 (01-03-19 @ 12:29)  BP: 106/67 (01-03-19 @ 12:29)  RR: 18 (01-03-19 @ 12:29)  SpO2: 99% (01-03-19 @ 12:29)  Wt(kg): --    GENERAL: NAD, well-developed  HEAD:  Atraumatic, Normocephalic  EYES: EOMI, PERRLA, conjunctiva and sclera clear  NECK: Supple, No JVD  CHEST/LUNG: Clear to auscultation bilaterally; No wheeze  HEART: Regular rate and rhythm; No murmurs, rubs, or gallops  ABDOMEN: Soft, Nontender, Nondistended; Bowel sounds present  EXTREMITIES:  2+ Peripheral Pulses, No clubbing, cyanosis, or edema  NEUROLOGY: non-focal  SKIN: No rashes or lesions                          10.5   11.6  )-----------( 244      ( 02 Jan 2019 21:56 )             29.8       01-03    135  |  104  |  <4<L>  ----------------------------<  113<H>  3.9   |  21<L>  |  0.36<L>    Ca    9.1      03 Jan 2019 12:07  Phos  2.8     01-02  Mg     1.9     01-02    TPro  6.3  /  Alb  3.4  /  TBili  0.1<L>  /  DBili  x   /  AST  9<L>  /  ALT  5<L>  /  AlkPhos  93  01-03      Magnesium, Serum: 1.9 mg/dL (01-02 @ 21:56)  Phosphorus Level, Serum: 2.8 mg/dL (01-02 @ 21:56) HPI:  38y old  Female currently 21 weeks pregnant with recently diagnosed 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     She developed diarrhea 2 weeks ago, watery, associated w abd cramps, no hematochezia. Was initially given imodium-but later stool culture sent due to persistent diarrhea which was positive for shigella and c. diff. Patient reports 5 BM yesterday, now improved to 3BM.  Still has abdominal cramping with meals.  Otherwise, no abd pain, n/v, fevers, chills.  Reports her mother recently came from [facility] 1 month ago and has similar sx of diarrhea. Fetal monitoring done and per OB fetus is doing well.    Allergies  No Known Allergies    Intolerances    MEDICATIONS  (STANDING):  amoxicillin 500 milliGRAM(s) Oral four times a day  diphenhydrAMINE   Injectable 25 milliGRAM(s) IV Push once  lactated ringers 1000 milliLiter(s) (125 mL/Hr) IV Continuous <Continuous>  vancomycin    Solution 125 milliGRAM(s) Oral every 6 hours    PAST MEDICAL & SURGICAL HISTORY:  Breast cancer, right  Morbid obesity  History of right total mastectomy    FAMILY HISTORY:  No pertinent family history in first degree relatives    SOCIAL HISTORY: No EtOH, no tobacco    REVIEW OF SYSTEMS:  All other review of systems is negative unless indicated above.    Height (cm): 154.94 (01-02 @ 20:50)  Weight (kg): 94 (01-02 @ 20:50)  BMI (kg/m2): 39.2 (01-02 @ 20:50)  BSA (m2): 1.92 (01-02 @ 20:50)    T(F): 98.2 (01-03-19 @ 12:29), Max: 98.4 (01-02-19 @ 20:50)  HR: 76 (01-03-19 @ 12:29)  BP: 106/67 (01-03-19 @ 12:29)  RR: 18 (01-03-19 @ 12:29)  SpO2: 99% (01-03-19 @ 12:29)  Wt(kg): --    GENERAL: NAD, well-developed  HEAD:  Atraumatic, Normocephalic  EYES: EOMI, PERRLA, conjunctiva and sclera clear  NECK: Supple, No JVD  CHEST/LUNG: Clear to auscultation bilaterally; No wheeze  HEART: Regular rate and rhythm; No murmurs, rubs, or gallops  ABDOMEN: Soft, nontender, Nondistended; Bowel sounds present  EXTREMITIES:  2+ Peripheral Pulses, No clubbing, cyanosis, or edema  NEUROLOGY: non-focal  SKIN: No rashes or lesions                          10.5   11.6  )-----------( 244      ( 02 Jan 2019 21:56 )             29.8       01-03    135  |  104  |  <4<L>  ----------------------------<  113<H>  3.9   |  21<L>  |  0.36<L>    Ca    9.1      03 Jan 2019 12:07  Phos  2.8     01-02  Mg     1.9     01-02    TPro  6.3  /  Alb  3.4  /  TBili  0.1<L>  /  DBili  x   /  AST  9<L>  /  ALT  5<L>  /  AlkPhos  93  01-03      Magnesium, Serum: 1.9 mg/dL (01-02 @ 21:56)  Phosphorus Level, Serum: 2.8 mg/dL (01-02 @ 21:56) HPI:  38y old  Female currently 21 weeks pregnant with recently diagnosed stage IIA 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     She developed diarrhea 2 weeks ago, watery, associated w abd cramps, no hematochezia. Was initially given imodium-but later stool culture sent due to persistent diarrhea which was positive for shigella and c. diff. Patient reports 5 BM yesterday, now improved to 3BM.  Still has abdominal cramping with meals.  Otherwise, no abd pain, n/v, fevers, chills.  Reports her mother recently came from [facility] 1 month ago and has similar sx of diarrhea. Fetal monitoring done and per OB fetus is doing well.    Allergies  No Known Allergies    Intolerances    MEDICATIONS  (STANDING):  amoxicillin 500 milliGRAM(s) Oral four times a day  diphenhydrAMINE   Injectable 25 milliGRAM(s) IV Push once  lactated ringers 1000 milliLiter(s) (125 mL/Hr) IV Continuous <Continuous>  vancomycin    Solution 125 milliGRAM(s) Oral every 6 hours    PAST MEDICAL & SURGICAL HISTORY:  Breast cancer, right  Morbid obesity  History of right total mastectomy    FAMILY HISTORY:  No pertinent family history in first degree relatives    SOCIAL HISTORY: No EtOH, no tobacco    REVIEW OF SYSTEMS:  All other review of systems is negative unless indicated above.    Height (cm): 154.94 (01-02 @ 20:50)  Weight (kg): 94 (01-02 @ 20:50)  BMI (kg/m2): 39.2 (01-02 @ 20:50)  BSA (m2): 1.92 (01-02 @ 20:50)    T(F): 98.2 (01-03-19 @ 12:29), Max: 98.4 (01-02-19 @ 20:50)  HR: 76 (01-03-19 @ 12:29)  BP: 106/67 (01-03-19 @ 12:29)  RR: 18 (01-03-19 @ 12:29)  SpO2: 99% (01-03-19 @ 12:29)  Wt(kg): --    GENERAL: NAD, well-developed  HEAD:  Atraumatic, Normocephalic  EYES: EOMI, PERRLA, conjunctiva and sclera clear  NECK: Supple, No JVD  CHEST/LUNG: Clear to auscultation bilaterally; No wheeze  HEART: Regular rate and rhythm; No murmurs, rubs, or gallops  ABDOMEN: Soft, nontender, Nondistended; Bowel sounds present  EXTREMITIES:  2+ Peripheral Pulses, No clubbing, cyanosis, or edema  NEUROLOGY: non-focal  SKIN: No rashes or lesions                          10.5   11.6  )-----------( 244      ( 02 Jan 2019 21:56 )             29.8       01-03    135  |  104  |  <4<L>  ----------------------------<  113<H>  3.9   |  21<L>  |  0.36<L>    Ca    9.1      03 Jan 2019 12:07  Phos  2.8     01-02  Mg     1.9     01-02    TPro  6.3  /  Alb  3.4  /  TBili  0.1<L>  /  DBili  x   /  AST  9<L>  /  ALT  5<L>  /  AlkPhos  93  01-03      Magnesium, Serum: 1.9 mg/dL (01-02 @ 21:56)  Phosphorus Level, Serum: 2.8 mg/dL (01-02 @ 21:56)

## 2019-01-03 NOTE — CONSULT NOTE ADULT - SUBJECTIVE AND OBJECTIVE BOX
Patient is a 38y old  Female who presents with a chief complaint of diarrhea X 2 weeks  Patient with h/o being preganant(currently at 21 weeks)  She was also diagnosed with Breast Ca in 11/18 and had a mastectomy followed by chemotherapy-last chemo being dec 21st.  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    REVIEW OF SYSTEMS  General:	Denies any malaise fatigue or chills. Fevers absent    Skin:No rash  	  Ophthalmologic:Denies any visual complaints,discharge redness or photophobia  	  ENMT:No nasal discharge,headache,sinus congestion or throat pain.No dental complaints    Respiratory and Thorax:No cough,sputum or chest pain.Denies shortness of breath  	  Cardiovascular:	No chest pain,palpitaions or dizziness    Gastrointestinal:	as above  1 loose BM today  No abd pain  No nausea or vomitting    Genitourinary:	No dysuria,frequency. No flank pain    Musculoskeletal:	No joint swelling or pain.No weakness    Neurological:No confusion,diziness.No extremity weakness.No bladder or bowel incontinence	    Psychiatric:No delusions or hallucinations	    Hematology/Lymphatics:	No LN swelling.No gum bleeding     Endocrine:	No recent weight gain or loss.No abnormal heat/cold intolerance    Allergic/Immunologic:	No hives or rash   Allergies    No Known Allergies    Intolerances        Antimicrobials:    amoxicillin 500 milliGRAM(s) Oral four times a day  vancomycin    Solution 125 milliGRAM(s) Oral every 6 hours      Prior Antimicrobials:  MEDICATIONS  (STANDING):    amoxicillin   500 milliGRAM(s) Oral (01-03-19 @ 12:00)    vancomycin    Solution   125 milliGRAM(s) Oral (01-03-19 @ 12:00)   125 milliGRAM(s) Oral (01-03-19 @ 07:07)   125 milliGRAM(s) Oral (01-02-19 @ 23:32)      Other medications reviewed      Vital Signs Last 24 Hrs  T(C): 36.8 (03 Jan 2019 08:19), Max: 36.9 (02 Jan 2019 20:50)  T(F): 98.2 (03 Jan 2019 08:19), Max: 98.4 (02 Jan 2019 20:50)  HR: 86 (03 Jan 2019 08:19) (76 - 86)  BP: 96/68 (03 Jan 2019 08:19) (96/68 - 107/73)  BP(mean): --  RR: 19 (03 Jan 2019 08:19) (18 - 19)  SpO2: 100% (03 Jan 2019 08:19) (100% - 100%)    PHYSICAL EXAM:Pleasant patient in no acute distress.      Constitutional:Comfortable.Awake and alert  No cachexia     Eyes:PERRL EOMI.NO discharge or conjunctival injection    ENMT:No sinus tenderness.No thrush.No pharyngeal exudate or erythema.Fair dental hygiene    Neck:Supple,No LN,no JVD      Respiratory:Good air entry bilaterally,CTA    Cardiovascular:S1 S2 wnl, No murmurs,rub or gallops    Gastrointestinal:Soft BS(+) Fullness- ? gravid uterus  No tenderness  No rebound or guarding     Genitourinary:No CVA tendereness         Extremities:No cyanosis,clubbing or edema.    Vascular:peripheral pulses felt    Neurological:AAO X 3,No grossly focal deficits    Skin:No rash     Lymph Nodes:No palpable LNs    Musculoskeletal:No joint swelling or LOM    Psychiatric:Affect normal.          Labs:                            10.5   11.6  )-----------( 244      ( 02 Jan 2019 21:56 )             29.8       Complete Blood Count + Automated Diff (01.02.19 @ 21:56)    WBC Count: 11.6 K/uL      01-02    137  |  101  |  5<L>  ----------------------------<  90  3.3<L>   |  22  |  0.33<L>    Ca    9.1      02 Jan 2019 21:56  Phos  2.8     01-02  Mg     1.9     01-02 Patient is a 38y old  Female who presents with a chief complaint of diarrhea X 2 weeks  Patient with h/o being preganant(currently at 21 weeks)  She was also diagnosed with Breast Ca in 11/18 and had a mastectomy followed by chemotherapy-last chemo being dec 21st.  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    REVIEW OF SYSTEMS  General:	Denies any malaise fatigue or chills. Fevers absent    Skin:No rash  	  Ophthalmologic:Denies any visual complaints,discharge redness or photophobia  	  ENMT:No nasal discharge,headache,sinus congestion or throat pain.No dental complaints    Respiratory and Thorax:No cough,sputum or chest pain.Denies shortness of breath  	  Cardiovascular:	No chest pain,palpitaions or dizziness    Gastrointestinal:	as above  1 loose BM today  No abd pain  No nausea or vomitting    Genitourinary:	No dysuria,frequency. No flank pain    Musculoskeletal:	No joint swelling or pain.No weakness    Neurological:No confusion,diziness.No extremity weakness.No bladder or bowel incontinence	    Psychiatric:No delusions or hallucinations	    Hematology/Lymphatics:	No LN swelling.No gum bleeding     Endocrine:	No recent weight gain or loss.No abnormal heat/cold intolerance    Allergic/Immunologic:	No hives or rash   Allergies    No Known Allergies    Intolerances        Antimicrobials:    amoxicillin 500 milliGRAM(s) Oral four times a day  vancomycin    Solution 125 milliGRAM(s) Oral every 6 hours      Prior Antimicrobials:  MEDICATIONS  (STANDING):    amoxicillin   500 milliGRAM(s) Oral (01-03-19 @ 12:00)    vancomycin    Solution   125 milliGRAM(s) Oral (01-03-19 @ 12:00)   125 milliGRAM(s) Oral (01-03-19 @ 07:07)   125 milliGRAM(s) Oral (01-02-19 @ 23:32)      Other medications reviewed      Vital Signs Last 24 Hrs  T(C): 36.8 (03 Jan 2019 08:19), Max: 36.9 (02 Jan 2019 20:50)  T(F): 98.2 (03 Jan 2019 08:19), Max: 98.4 (02 Jan 2019 20:50)  HR: 86 (03 Jan 2019 08:19) (76 - 86)  BP: 96/68 (03 Jan 2019 08:19) (96/68 - 107/73)  BP(mean): --  RR: 19 (03 Jan 2019 08:19) (18 - 19)  SpO2: 100% (03 Jan 2019 08:19) (100% - 100%)    PHYSICAL EXAM:Pleasant patient in no acute distress.      Constitutional:Comfortable.Awake and alert  No cachexia     Eyes:PERRL EOMI.NO discharge or conjunctival injection    ENMT:No sinus tenderness.No thrush.No pharyngeal exudate or erythema.Fair dental hygiene    Neck:Supple,No LN,no JVD      Respiratory:Good air entry bilaterally,CTA    Cardiovascular:S1 S2 wnl, No murmurs,rub or gallops    Gastrointestinal:Soft BS(+) Fullness- ? gravid uterus  No tenderness  No rebound or guarding     Genitourinary:No CVA tendereness         Extremities:No cyanosis,clubbing or edema.    Vascular:peripheral pulses felt    Neurological:AAO X 3,No grossly focal deficits    Skin:No rash     Lymph Nodes:No palpable LNs    Musculoskeletal:No joint swelling or LOM    Psychiatric:Affect normal.          Labs:                            10.5   11.6  )-----------( 244      ( 02 Jan 2019 21:56 )             29.8       Complete Blood Count + Automated Diff (01.02.19 @ 21:56)    WBC Count: 11.6 K/uL      01-02    137  |  101  |  5<L>  ----------------------------<  90  3.3<L>   |  22  |  0.33<L>    Ca    9.1      02 Jan 2019 21:56  Phos  2.8     01-02  Mg     1.9     01-02            Culture - Stool  	Previous Result Next Result					  	Order Start Date & Time 	12- 10:07	Result Date & Time 	12- 21:58		  	Ordering Clinician 	ELIOT BAUMANN	Result Status 	Final		  	Specimen 	None	Last Updated At 	[facility]		  	Observation Date & Time 	12- 10:07	Lab Number 	[id number]		  	Specimen Received Date & Time 	12- 19:40	Order Number 	[id number]		  			Age at Time of Test 	38 Years		    	Test Item	Value	Test Item Status	Sensitivities	               	Culture Result	 No enteric pathogens to date: Final culture pending Moderate Shigella species  (Stool culture examined for Salmonella, Shigella, Campylobacter, Aeromonas, Plesiomonas, Vibrio, E.coli O157 and Yersinia) 	Final		               	ORGANISM	 Shigella species  		Yes	    	  	Shigella species	  	Minimum Inhibitory Concentration	  Ampicillin	S (<=8)	  Ciprofloxacin	S (<=1)	  Trimethoprim/Sulfamethoxazole	R (>2/38)	        12/29/18 C diff PCR positive(result in HIE)

## 2019-01-03 NOTE — DISCHARGE NOTE ANTEPARTUM - MEDICATION SUMMARY - MEDICATIONS TO STOP TAKING
I will STOP taking the medications listed below when I get home from the hospital:    loperamide 2 mg oral capsule  -- 1 cap(s) by mouth every 4 hours, As needed, Diarrhea

## 2019-01-03 NOTE — CONSULT NOTE ADULT - ASSESSMENT
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 diff and shigella likely 2/2 to sick contact.    #GI infection  -afebrile, mild leukocytosis, VSS  -appreciate ID consult  -dc with amoxicillin and vanc as recommended  -fu ID closely outpt after discharge    #Breast cancer  -due for C3 of Adriamycin tomorrow, will hold in the setting of infection  -fu outpt with Dr. Viridiana Patrick upon discharge.    Vivian Sommers  Oncology Fellow  [phone]

## 2019-01-03 NOTE — DISCHARGE NOTE ANTEPARTUM - CARE PROVIDERS DIRECT ADDRESSES
,DirectAddress_Unknown,[email or web address]

## 2019-01-03 NOTE — DISCHARGE NOTE ANTEPARTUM - INSTRUCTIONS
Keep follow up appointment with OB, and diabetic educator as instructed and call Keep follow up appointment with OB ,ID, Oncology and diabetic educator as instructed and call doctor if you continue to have diarrhea, and any questions of concerns. Keep self well hydrated, continue good hand washing and good hygiene. Keep follow up appointment with OB ,ID, Oncology and diabetic educator as instructed and call doctor if you continue to have diarrhea, and any questions of concerns. Keep self well hydrated, continue good hand washing and good hygiene, and monitor blood sugars at home and call doctor if blood sugars is greater than 140.

## 2019-01-03 NOTE — CONSULT NOTE ADULT - ASSESSMENT
37 yo woman with  1) c diff colitis  2) Shigella on stool Cx  3) Diarrhea-still with loose stools  4) Breast Ca on chemo-last chemo 12/21 -not neutropenic  5) 21 weeks pregnant-monitored by OB-fetal health stable  6) Not  but has 3 yound kids at home.    Given immunecompromised status and continued symptoms Shigella will require Treatment ,though antimicrobials may exacerbate or delay C diff healing  Currently has no s/s complications or toxic megacolon. 37 yo woman with  1) c diff colitis  2) Shigella on stool Cx  3) Diarrhea-still with loose stools  4) Breast Ca on chemo-last chemo 12/21 -not neutropenic  5) 21 weeks pregnant-monitored by OB-fetal health stable  6) Not  but has 3 yound kids at home.    Given immunecompromised status and continued symptoms Shigella will require Treatment ,though antimicrobials may exacerbate or delay C diff healing  Currently has no s/s complications or toxic megacolon.    Rec:  1) Check blood Cx,repeat stool Cx and PCR  2) empiric po amox and po vanco  3) Will limit amox to a short 5-7 day course(Unless s/s dissemination or bacteremic)  Po vanco X 14 days  4) s/s of worsening explained to patient  5) IV hydration per primary team  If stable could be transitioned to OP-to follow up in ID clinic after DC-advised to come to Er immediately if any s/s worsening   6) Fetal health per OB  7) Hold chemo till acute infection resolved    Case d/w Ob team  Will tailor plan for ID issues  per course,results.Will defer to primary team on management of other issues.  Will Follow.  Beeper [id number]-iepm/afterhours/No response-[id number]

## 2019-01-03 NOTE — DISCHARGE NOTE ANTEPARTUM - CARE PROVIDER_API CALL
[facility],   [address] [place] [address]  Phone: [phone]  Fax: (   )    -    Billy Lundberg), Infectious Disease; Internal Medicine  [address] [place] [id number]  Phone: [phone]  Fax: [phone]    Viridiana Patrick), HematologyOncology; [facility]; Internal Medicine  [address] [place] [address]  Phone: [phone]  Fax: [phone]

## 2019-01-03 NOTE — DISCHARGE NOTE ANTEPARTUM - HOSPITAL COURSE
Pt (21wk pregnant, currently undergoing chemotherapy treatment for triple negative breast cancer s/p mastectomy) admitted for care coordination for c diff and shigella in stool cultures with chronic diarrhea. She was started on PO vanco for c diff and amox for shigella per ID recommendations. Onc saw the patient and recommended deferring chemotherapy for 1 week. She was discharged in stable condition with follow up with [facility], ID, and onc.

## 2019-01-04 ENCOUNTER — APPOINTMENT (OUTPATIENT)
Dept: HEMATOLOGY ONCOLOGY | Facility: CLINIC | Age: 39
End: 2019-01-04

## 2019-01-04 LAB
CULTURE RESULTS: SIGNIFICANT CHANGE UP
SPECIMEN SOURCE: SIGNIFICANT CHANGE UP

## 2019-01-05 LAB
-  AMPICILLIN: SIGNIFICANT CHANGE UP
-  CIPROFLOXACIN: SIGNIFICANT CHANGE UP
-  TRIMETHOPRIM/SULFAMETHOXAZOLE: SIGNIFICANT CHANGE UP
METHOD TYPE: SIGNIFICANT CHANGE UP

## 2019-01-07 ENCOUNTER — OUTPATIENT (OUTPATIENT)
Dept: OUTPATIENT SERVICES | Facility: HOSPITAL | Age: 39
LOS: 1 days | End: 2019-01-07
Payer: MEDICAID

## 2019-01-07 ENCOUNTER — APPOINTMENT (OUTPATIENT)
Dept: MATERNAL FETAL MEDICINE | Facility: CLINIC | Age: 39
End: 2019-01-07
Payer: MEDICAID

## 2019-01-07 ENCOUNTER — OUTPATIENT (OUTPATIENT)
Dept: OUTPATIENT SERVICES | Age: 39
LOS: 1 days | Discharge: HOME | End: 2019-01-07

## 2019-01-07 VITALS — DIASTOLIC BLOOD PRESSURE: 66 MMHG | BODY MASS INDEX: 42.62 KG/M2 | SYSTOLIC BLOOD PRESSURE: 98 MMHG | WEIGHT: 211 LBS

## 2019-01-07 DIAGNOSIS — Z90.11 ACQUIRED ABSENCE OF RIGHT BREAST AND NIPPLE: Chronic | ICD-10-CM

## 2019-01-07 DIAGNOSIS — O09.899 SUPERVISION OF OTHER HIGH RISK PREGNANCIES, UNSPECIFIED TRIMESTER: ICD-10-CM

## 2019-01-07 LAB
BILIRUB UR QL STRIP: NEGATIVE
CLARITY UR: CLEAR
COLLECTION METHOD: NORMAL
CULTURE RESULTS: SIGNIFICANT CHANGE UP
GLUCOSE UR-MCNC: NEGATIVE
HCG UR QL: 0.2 EU/DL
HGB UR QL STRIP.AUTO: NEGATIVE
KETONES UR-MCNC: NEGATIVE
LEUKOCYTE ESTERASE UR QL STRIP: NEGATIVE
NITRITE UR QL STRIP: NEGATIVE
ORGANISM # SPEC MICROSCOPIC CNT: SIGNIFICANT CHANGE UP
ORGANISM # SPEC MICROSCOPIC CNT: SIGNIFICANT CHANGE UP
PH UR STRIP: 8
PROT UR STRIP-MCNC: NEGATIVE
SP GR UR STRIP: 1.02
SPECIMEN SOURCE: SIGNIFICANT CHANGE UP

## 2019-01-07 PROCEDURE — G0108 DIAB MANAGE TRN  PER INDIV: CPT

## 2019-01-07 PROCEDURE — G0108: CPT

## 2019-01-07 PROCEDURE — G0463: CPT

## 2019-01-07 PROCEDURE — 81003 URINALYSIS AUTO W/O SCOPE: CPT

## 2019-01-07 PROCEDURE — 81003 URINALYSIS AUTO W/O SCOPE: CPT | Mod: NC,QW

## 2019-01-07 PROCEDURE — 99213 OFFICE O/P EST LOW 20 MIN: CPT | Mod: 25

## 2019-01-08 ENCOUNTER — APPOINTMENT (OUTPATIENT)
Dept: INFECTIOUS DISEASE | Facility: CLINIC | Age: 39
End: 2019-01-08

## 2019-01-08 ENCOUNTER — APPOINTMENT (OUTPATIENT)
Dept: PEDIATRIC CARDIOLOGY | Facility: CLINIC | Age: 39
End: 2019-01-08
Payer: MEDICAID

## 2019-01-08 LAB
CULTURE RESULTS: SIGNIFICANT CHANGE UP
CULTURE RESULTS: SIGNIFICANT CHANGE UP
SPECIMEN SOURCE: SIGNIFICANT CHANGE UP
SPECIMEN SOURCE: SIGNIFICANT CHANGE UP

## 2019-01-08 PROCEDURE — 76820 UMBILICAL ARTERY ECHO: CPT

## 2019-01-08 PROCEDURE — 93325 DOPPLER ECHO COLOR FLOW MAPG: CPT | Mod: 59

## 2019-01-08 PROCEDURE — 76825 ECHO EXAM OF FETAL HEART: CPT

## 2019-01-08 PROCEDURE — 76827 ECHO EXAM OF FETAL HEART: CPT

## 2019-01-08 PROCEDURE — 99241 OFFICE CONSULTATION NEW/ESTAB PATIENT 15 MIN: CPT | Mod: 25

## 2019-01-11 ENCOUNTER — LABORATORY RESULT (OUTPATIENT)
Age: 39
End: 2019-01-11

## 2019-01-11 ENCOUNTER — RESULT REVIEW (OUTPATIENT)
Age: 39
End: 2019-01-11

## 2019-01-11 ENCOUNTER — APPOINTMENT (OUTPATIENT)
Dept: INFUSION THERAPY | Facility: HOSPITAL | Age: 39
End: 2019-01-11

## 2019-01-11 ENCOUNTER — APPOINTMENT (OUTPATIENT)
Dept: HEMATOLOGY ONCOLOGY | Facility: CLINIC | Age: 39
End: 2019-01-11
Payer: MEDICAID

## 2019-01-11 VITALS
TEMPERATURE: 98 F | OXYGEN SATURATION: 100 % | HEART RATE: 77 BPM | SYSTOLIC BLOOD PRESSURE: 101 MMHG | BODY MASS INDEX: 42.08 KG/M2 | WEIGHT: 208.31 LBS | RESPIRATION RATE: 16 BRPM | DIASTOLIC BLOOD PRESSURE: 68 MMHG

## 2019-01-11 LAB
BASOPHILS # BLD AUTO: 0.1 K/UL — SIGNIFICANT CHANGE UP (ref 0–0.2)
BASOPHILS NFR BLD AUTO: 0.6 % — SIGNIFICANT CHANGE UP (ref 0–2)
EOSINOPHIL # BLD AUTO: 0.1 K/UL — SIGNIFICANT CHANGE UP (ref 0–0.5)
EOSINOPHIL NFR BLD AUTO: 1.2 % — SIGNIFICANT CHANGE UP (ref 0–6)
HCT VFR BLD CALC: 31.3 % — LOW (ref 34.5–45)
HGB BLD-MCNC: 10.5 G/DL — LOW (ref 11.5–15.5)
LYMPHOCYTES # BLD AUTO: 2.3 K/UL — SIGNIFICANT CHANGE UP (ref 1–3.3)
LYMPHOCYTES # BLD AUTO: 25.8 % — SIGNIFICANT CHANGE UP (ref 13–44)
MCHC RBC-ENTMCNC: 27.4 PG — SIGNIFICANT CHANGE UP (ref 27–34)
MCHC RBC-ENTMCNC: 33.7 G/DL — SIGNIFICANT CHANGE UP (ref 32–36)
MCV RBC AUTO: 81.4 FL — SIGNIFICANT CHANGE UP (ref 80–100)
MONOCYTES # BLD AUTO: 0.4 K/UL — SIGNIFICANT CHANGE UP (ref 0–0.9)
MONOCYTES NFR BLD AUTO: 4.8 % — SIGNIFICANT CHANGE UP (ref 2–14)
NEUTROPHILS # BLD AUTO: 6 K/UL — SIGNIFICANT CHANGE UP (ref 1.8–7.4)
NEUTROPHILS NFR BLD AUTO: 67.6 % — SIGNIFICANT CHANGE UP (ref 43–77)
PLATELET # BLD AUTO: 366 K/UL — SIGNIFICANT CHANGE UP (ref 150–400)
RBC # BLD: 3.84 M/UL — SIGNIFICANT CHANGE UP (ref 3.8–5.2)
RBC # FLD: 15 % — HIGH (ref 10.3–14.5)
WBC # BLD: 8.9 K/UL — SIGNIFICANT CHANGE UP (ref 3.8–10.5)
WBC # FLD AUTO: 8.9 K/UL — SIGNIFICANT CHANGE UP (ref 3.8–10.5)

## 2019-01-11 PROCEDURE — 99215 OFFICE O/P EST HI 40 MIN: CPT

## 2019-01-12 RX ORDER — AMOXICILLIN 500 MG/1
500 CAPSULE ORAL
Qty: 28 | Refills: 0 | Status: DISCONTINUED | COMMUNITY
Start: 2019-01-03

## 2019-01-12 RX ORDER — CEPHALEXIN 500 MG/1
500 CAPSULE ORAL
Qty: 20 | Refills: 0 | Status: DISCONTINUED | COMMUNITY
Start: 2018-09-21

## 2019-01-12 RX ORDER — LOPERAMIDE HCL 2 MG
2 CAPSULE ORAL
Refills: 0 | Status: DISCONTINUED | COMMUNITY
End: 2019-01-12

## 2019-01-13 ENCOUNTER — NON-APPOINTMENT (OUTPATIENT)
Age: 39
End: 2019-01-13

## 2019-01-14 ENCOUNTER — OUTPATIENT (OUTPATIENT)
Dept: OUTPATIENT SERVICES | Facility: HOSPITAL | Age: 39
LOS: 1 days | Discharge: HOME | End: 2019-01-14

## 2019-01-14 ENCOUNTER — APPOINTMENT (OUTPATIENT)
Dept: MATERNAL FETAL MEDICINE | Facility: CLINIC | Age: 39
End: 2019-01-14
Payer: MEDICAID

## 2019-01-14 ENCOUNTER — NON-APPOINTMENT (OUTPATIENT)
Age: 39
End: 2019-01-14

## 2019-01-14 ENCOUNTER — OUTPATIENT (OUTPATIENT)
Dept: OUTPATIENT SERVICES | Facility: HOSPITAL | Age: 39
LOS: 1 days | End: 2019-01-14
Payer: MEDICAID

## 2019-01-14 VITALS — BODY MASS INDEX: 42.42 KG/M2 | SYSTOLIC BLOOD PRESSURE: 100 MMHG | WEIGHT: 210 LBS | DIASTOLIC BLOOD PRESSURE: 68 MMHG

## 2019-01-14 DIAGNOSIS — C50.919 MALIGNANT NEOPLASM OF UNSPECIFIED SITE OF UNSPECIFIED FEMALE BREAST: ICD-10-CM

## 2019-01-14 DIAGNOSIS — Z90.11 ACQUIRED ABSENCE OF RIGHT BREAST AND NIPPLE: Chronic | ICD-10-CM

## 2019-01-14 DIAGNOSIS — O09.899 SUPERVISION OF OTHER HIGH RISK PREGNANCIES, UNSPECIFIED TRIMESTER: ICD-10-CM

## 2019-01-14 PROCEDURE — G0463: CPT

## 2019-01-14 PROCEDURE — 99213 OFFICE O/P EST LOW 20 MIN: CPT | Mod: 25

## 2019-01-14 PROCEDURE — 81003 URINALYSIS AUTO W/O SCOPE: CPT

## 2019-01-14 PROCEDURE — 81003 URINALYSIS AUTO W/O SCOPE: CPT | Mod: NC,QW

## 2019-01-15 ENCOUNTER — OTHER (OUTPATIENT)
Age: 39
End: 2019-01-15

## 2019-01-15 LAB
BILIRUB UR QL STRIP: NORMAL
GLUCOSE UR-MCNC: NORMAL
HCG UR QL: 0.2 EU/DL
HGB UR QL STRIP.AUTO: NORMAL
KETONES UR-MCNC: NORMAL
LEUKOCYTE ESTERASE UR QL STRIP: NORMAL
NITRITE UR QL STRIP: NORMAL
PH UR STRIP: 7
PROT UR STRIP-MCNC: NORMAL
SP GR UR STRIP: 1.01

## 2019-01-15 NOTE — PHYSICAL EXAM
[Fully active, able to carry on all pre-disease performance without restriction] : Status 0 - Fully active, able to carry on all pre-disease performance without restriction [Obese] : obese [Normal] : affect appropriate [de-identified] : S/p right mastectomy healing well. No CW or axillary nodules. [de-identified] : Gravid uterus

## 2019-01-15 NOTE — HISTORY OF PRESENT ILLNESS
[de-identified] : Ms. EVANGELISTA HESTER  is a 38 year old female here for an evaluation of breast cancer. Her oncologic history is as follows:\par \par She felt a right breast mass in 2018 and was evaluated seen by GYN who sent her for breast imaging. She underwent diagnostic breast imaging on 18 which showed at 12:00 position of right breast there is a lobulated focus, measuring 2.0 x 2.0 x 2.2 cm 2 cm FN. 4 mm deep to the skin. Left breast benign. No enlarged axillary lymph nodes. Bx recommended. 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 There is marked lymphocytic infiltrate around the tumor. She was scheduled for MRI but found out that she was pregnant. Current gestation age at the time of initial consult: 11 weeks\par \par 10/23/18\par She is here with her . We discussed the diagnosis of rapidly growing triple negative breast ca, the size has increased since diagnosis and that chemotherapy will be needed to treat this cancer which can not be started until she is 14 weeks. They want to keep the pregnancy understanding that it will cause delay in treatment and potential risk to the patient and fetus. We discussed that lumpectomy will have issues with positive margins and RT will be contraindicated during to pregnancy and therefore u/l mastectomy is recommend. I discussed with Dr Granger that expander placement at the time of surgery will be an option. She is seeing the pt tomorrow and will discuss surgery and reconstruction options with her. She also saw Dr Eden Patrick med onc for a a second opinion. \par \par s/p surgery (right mastectomy) 18 , 4.5 cm tumor and sentinel node negative- path report d/w her in detail. Tumor grew more than double in size between dx and surgery and is s/o aggressive tumor. Discussed to start chemo 18. S/e of chemo jody during pregnancy, expected and unexpected, pertaining to her and the fetus d/w her. Discussed to see MFM b/w chemo. Also a candidate for PMRT due to large tumor size. LMP 18\par \par I had extensive discussion with the patient and spouse regarding breast cancer in pregnancy and the treatment options that are safe for both the mother and the growing fetus. She refused termination of pregnancy after finding out cancer diagnosis.  We discussed that all treatments including chemotherapy and surgery are contraindicated in first trimester but are considered relatively safe in second and third trimester.  We have discussed medical termination of pregnancy as an option but patient decided to keep the pregnancy. We reviewed that the largest experience in pregnancy has been with anthracycline and alkylating agent chemotherapy. She will be a candidate for chemotherapy with Adriamycin plus cyclophosphamide in a sequential dose dense manner. We reviewed data from [place] registry which showed Neulasta is reasonably safe to use in 2nd and third trimester although it was a very small dataset. . There is not enough data to use taxanes in pregnancy therefore Taxol will be administered post partum. Potential s/e to the patient and growing fetus were discussed with the patient and  in detail via a . The patient understood all the potential side effects and signed an informed consent after discussing the risks, benefits and alternatives.\par \par \par PLAN: Starting 18 ( 17 weeks), She will receive adriamycin every 2 weeks x 4 cycle followed by Cytoxan every 2 weeks x 4 cycles. We plan to hold chemotherapy 4 weeks prior to delivery to prevent  neutropenic complications. Currently she is planned for elective induction (NVD) at 37 weeks. We would start Taxol post partum\par \par PREMEDS/SUPPORTIVE CARE: Zofran ativan and dex are listed as part of prechemo antiemetic regimen in NCCN guidelines for breast cancer in pregnancy. The use of Emend is controversial in pregnancy. We discussed the issue of port placement in the OR, but that would increase anesthesia time by 45 min therefore we would treat her peripherally and likely do port placement after delivery. We discussed the issue of preeclampsia or gestational diabetes with steroids. She will be monitored closely by MFM. She will see MFM between every chemo session for close fetal monitoring. \par \par I explained her that EOD work up is limited due to risk of radiation exposure to the growing fetus. We plan to perform CT scans or a PET/CT scan after delivery. She will also be a candidate for PMRT due to large tumor size. \par  [de-identified] : Ms. EVANGELISTA RUBIO is here for f/u for right breast cancer, 22 weeks gestation,  Adriamycin (C2 on 12/21) C3D1 today. Delayed 1 week due to CDIFF and shigella. \par \par S/p infection with shigella and c-diff, confirmed with cultures and PCR x 2, was hospitalized for diarrhea and abd pain, treated with ampicillin and vanco,  Ampicillin completed yesterday. Vanco 2 more days left. Patient reports mom had Shigella 2 years ago, Mom has come in to help her 2 months prior but is not ill. She denies sick contacts. \par Diarrhea greatly improved, she reports 1 soft stool a day and denies abd cramping or blood in stools. She is eating and drinking w/o difficulty. Mild nausea, no vomiting, mouth sores, fever and chills. No Bone pain, no neuropathy. Mild fatigue and altered taste x 3 days after chemo. \par Baby is moving well, no vaginal discharge or vaginal bleeding. No back pain, No pelvic pain.\par She has healed from the breast surgery with minimal pain when lifting arm otherwise is doing well.\par

## 2019-01-15 NOTE — CONSULT LETTER
[Dear  ___] : Dear  [unfilled], [Consult Letter:] : I had the pleasure of evaluating your patient, [unfilled]. [Please see my note below.] : Please see my note below. [Consult Closing:] : Thank you very much for allowing me to participate in the care of this patient.  If you have any questions, please do not hesitate to contact me. [Sincerely,] : Sincerely, [DrRonnie  ___] : Dr. DE ANDA [DrRonnie ___] : Dr. DE ANDA [FreeTextEntry2] : Vesna Granger MD [FreeTextEntry3] : Viridiana Patrick M.D.\par  of Medicine\par [facility]\par [facility] [place]\par [address] [place] [address]\par [address]\par Tele # [phone]; Fax [phone]

## 2019-01-15 NOTE — ASSESSMENT
[Curative] : Goals of care discussed with patient: Curative [FreeTextEntry1] : In summary, this is a very pleasant 38-year-old Yakut-speaking lady who is diagnosed with T2 N0 stage IIA poorly differentiated ER/ME/HER-2/rajni NEGATIVE IDC of the right breast. She is currently 22 weeks pregnant. A CXR and abdominal sono didn’t show distant mets.  BRCA negative. ddACT sequential started 12/7/18\par \par - Breast ca: On sequential ddACT chemo. She developed Cdiff and shigella diarrhea after C2. Completed abx course. Sx greatly improved. Justin C3 today. Counts good to proceed with chemo.\par -Chemo induced anemia: mild- will continue to monitor. Transfusion if Hb < 8. \par - Infectious Diarrhea: secondary to Shigella and c-diff. Patient hospitalized and had 1 week delay of cycle 3. She is now finishing up vanco with great improvement in symptoms. I discussed the case with Dr Lundberg in ID and she is cleared to restart chemo as long as sx are better. She will finish up abx course. We will consider longer course of PO vanco if diarrhea recur after chemo. Patient missed appointment on 1/8 with infectious disease. She will f/u with ID in 2 weeks.  She was advised to call and speak with on call doctor if She has increased loose stools, and vanco will be prescribed for longer duration.\par -Abd pain secondary to infectious diarrhea: currently resolved. We discussed crampy abd pain could be a sign of early labor. She understood and will call OB/MFM if pain develops\par - Chemo induced N/V- Will get premedications with dexamethasone and Aloxi. She will use Reglan as needed. IV fluid prn. no emend\par - GF induced bone pain- take Claritin. \par - Chemo induced dysgeusia and fatigue: Encourage p.o. fluids, small frequent meals.\par - Instructed to call office and go directly to the emergency room with fever more than 100.4, shaking chills, productive cough, sore throat, shortness of breath or urinary symptoms. Patient verbalized understanding and agreement.\par - Emotional support provided, all questions answered.\par - Patient reminded to call with any new symptoms day or night and any change with the pregnancy to notify MFM right away. Emergency phone number again provided to patient.\par \par Patient seen and examined along with Dr. Viridiana Patrick.\par \par RTO 2 weeks

## 2019-01-15 NOTE — REASON FOR VISIT
[Follow-Up Visit] : a follow-up [Other: _____] : [unfilled] [[employer] ] : provided by [employer]   [FreeTextEntry2] : Aston [id number]

## 2019-01-17 ENCOUNTER — OUTPATIENT (OUTPATIENT)
Dept: OUTPATIENT SERVICES | Facility: HOSPITAL | Age: 39
LOS: 1 days | End: 2019-01-17
Payer: MEDICAID

## 2019-01-17 DIAGNOSIS — O26.899 OTHER SPECIFIED PREGNANCY RELATED CONDITIONS, UNSPECIFIED TRIMESTER: ICD-10-CM

## 2019-01-17 DIAGNOSIS — O9A.119: ICD-10-CM

## 2019-01-17 DIAGNOSIS — O24.419 GESTATIONAL DIABETES MELLITUS IN PREGNANCY, UNSPECIFIED CONTROL: ICD-10-CM

## 2019-01-17 DIAGNOSIS — Z90.11 ACQUIRED ABSENCE OF RIGHT BREAST AND NIPPLE: Chronic | ICD-10-CM

## 2019-01-17 DIAGNOSIS — Z3A.00 WEEKS OF GESTATION OF PREGNANCY NOT SPECIFIED: ICD-10-CM

## 2019-01-17 DIAGNOSIS — A09 INFECTIOUS GASTROENTERITIS AND COLITIS, UNSPECIFIED: ICD-10-CM

## 2019-01-17 DIAGNOSIS — O99.210 OBESITY COMPLICATING PREGNANCY, UNSPECIFIED TRIMESTER: ICD-10-CM

## 2019-01-17 LAB
BASOPHILS # BLD AUTO: 0.1 K/UL — SIGNIFICANT CHANGE UP (ref 0–0.2)
EOSINOPHIL # BLD AUTO: 0.2 K/UL — SIGNIFICANT CHANGE UP (ref 0–0.5)
EOSINOPHIL NFR BLD AUTO: 3 % — SIGNIFICANT CHANGE UP (ref 0–6)
HCT VFR BLD CALC: 29.8 % — LOW (ref 34.5–45)
HGB BLD-MCNC: 10.4 G/DL — LOW (ref 11.5–15.5)
LYMPHOCYTES # BLD AUTO: 2.9 K/UL — SIGNIFICANT CHANGE UP (ref 1–3.3)
LYMPHOCYTES # BLD AUTO: 34 % — SIGNIFICANT CHANGE UP (ref 13–44)
MCHC RBC-ENTMCNC: 28.8 PG — SIGNIFICANT CHANGE UP (ref 27–34)
MCHC RBC-ENTMCNC: 34.9 GM/DL — SIGNIFICANT CHANGE UP (ref 32–36)
MCV RBC AUTO: 82.5 FL — SIGNIFICANT CHANGE UP (ref 80–100)
MONOCYTES # BLD AUTO: 0.3 K/UL — SIGNIFICANT CHANGE UP (ref 0–0.9)
MONOCYTES NFR BLD AUTO: 5 % — SIGNIFICANT CHANGE UP (ref 2–14)
NEUTROPHILS # BLD AUTO: 6.7 K/UL — SIGNIFICANT CHANGE UP (ref 1.8–7.4)
NEUTROPHILS NFR BLD AUTO: 55 % — SIGNIFICANT CHANGE UP (ref 43–77)
PLATELET # BLD AUTO: 320 K/UL — SIGNIFICANT CHANGE UP (ref 150–400)
RBC # BLD: 3.61 M/UL — LOW (ref 3.8–5.2)
RBC # FLD: 14.7 % — HIGH (ref 10.3–14.5)
WBC # BLD: 10.1 K/UL — SIGNIFICANT CHANGE UP (ref 3.8–10.5)
WBC # FLD AUTO: 10.1 K/UL — SIGNIFICANT CHANGE UP (ref 3.8–10.5)

## 2019-01-17 PROCEDURE — 99242 OFF/OP CONSLTJ NEW/EST SF 20: CPT

## 2019-01-17 RX ORDER — SODIUM CHLORIDE 9 MG/ML
1000 INJECTION, SOLUTION INTRAVENOUS ONCE
Qty: 0 | Refills: 0 | Status: DISCONTINUED | OUTPATIENT
Start: 2019-01-17 | End: 2019-02-01

## 2019-01-17 RX ORDER — VANCOMYCIN HCL 1 G
125 VIAL (EA) INTRAVENOUS EVERY 6 HOURS
Qty: 0 | Refills: 0 | Status: DISCONTINUED | OUTPATIENT
Start: 2019-01-17 | End: 2019-02-01

## 2019-01-17 RX ORDER — SODIUM CHLORIDE 9 MG/ML
1000 INJECTION, SOLUTION INTRAVENOUS
Qty: 0 | Refills: 0 | Status: DISCONTINUED | OUTPATIENT
Start: 2019-01-17 | End: 2019-02-01

## 2019-01-18 ENCOUNTER — APPOINTMENT (OUTPATIENT)
Dept: INFUSION THERAPY | Facility: HOSPITAL | Age: 39
End: 2019-01-18

## 2019-01-18 ENCOUNTER — OTHER (OUTPATIENT)
Age: 39
End: 2019-01-18

## 2019-01-18 ENCOUNTER — APPOINTMENT (OUTPATIENT)
Dept: ANTEPARTUM | Facility: CLINIC | Age: 39
End: 2019-01-18
Payer: MEDICAID

## 2019-01-18 ENCOUNTER — APPOINTMENT (OUTPATIENT)
Dept: HEMATOLOGY ONCOLOGY | Facility: CLINIC | Age: 39
End: 2019-01-18

## 2019-01-18 ENCOUNTER — ASOB RESULT (OUTPATIENT)
Age: 39
End: 2019-01-18

## 2019-01-18 LAB
APPEARANCE UR: CLEAR — SIGNIFICANT CHANGE UP
BILIRUB UR-MCNC: NEGATIVE — SIGNIFICANT CHANGE UP
COLOR SPEC: YELLOW — SIGNIFICANT CHANGE UP
CULTURE RESULTS: SIGNIFICANT CHANGE UP
DIFF PNL FLD: NEGATIVE — SIGNIFICANT CHANGE UP
GLUCOSE UR QL: NEGATIVE — SIGNIFICANT CHANGE UP
KETONES UR-MCNC: NEGATIVE — SIGNIFICANT CHANGE UP
LEUKOCYTE ESTERASE UR-ACNC: NEGATIVE — SIGNIFICANT CHANGE UP
NITRITE UR-MCNC: NEGATIVE — SIGNIFICANT CHANGE UP
PH UR: 8.5 — HIGH (ref 5–8)
PROT UR-MCNC: NEGATIVE — SIGNIFICANT CHANGE UP
SP GR SPEC: 1.01 — LOW (ref 1.01–1.02)
SPECIMEN SOURCE: SIGNIFICANT CHANGE UP
UROBILINOGEN FLD QL: NEGATIVE — SIGNIFICANT CHANGE UP

## 2019-01-18 PROCEDURE — G0463: CPT

## 2019-01-18 PROCEDURE — 76817 TRANSVAGINAL US OBSTETRIC: CPT | Mod: 26

## 2019-01-18 PROCEDURE — 85027 COMPLETE CBC AUTOMATED: CPT

## 2019-01-18 PROCEDURE — 87086 URINE CULTURE/COLONY COUNT: CPT

## 2019-01-18 PROCEDURE — 81003 URINALYSIS AUTO W/O SCOPE: CPT

## 2019-01-18 PROCEDURE — 87507 IADNA-DNA/RNA PROBE TQ 12-25: CPT

## 2019-01-18 PROCEDURE — 76816 OB US FOLLOW-UP PER FETUS: CPT | Mod: 26

## 2019-01-18 RX ORDER — AMOXICILLIN 250 MG/5ML
1 SUSPENSION, RECONSTITUTED, ORAL (ML) ORAL
Qty: 21 | Refills: 0 | OUTPATIENT
Start: 2019-01-18 | End: 2019-01-24

## 2019-01-18 RX ORDER — VANCOMYCIN HCL 1 G
1 VIAL (EA) INTRAVENOUS
Qty: 56 | Refills: 0 | OUTPATIENT
Start: 2019-01-18 | End: 2019-01-31

## 2019-01-18 RX ADMIN — Medication 125 MILLIGRAM(S): at 01:10

## 2019-01-18 RX ADMIN — Medication 125 MILLIGRAM(S): at 06:13

## 2019-01-18 RX ADMIN — SODIUM CHLORIDE 125 MILLILITER(S): 9 INJECTION, SOLUTION INTRAVENOUS at 01:11

## 2019-01-18 NOTE — CONSULT NOTE ADULT - ASSESSMENT
30 yo woman pregnant at 23 weeks  h/o shigella with diarrhea  also C diff Positive  s/p 7 adys of amox and po vanco x 2 weeks  Diarrhea had resolved-but now again with recurrence  Fetal monitoring OK  Abd exam benign  No s/s clinically of dissemination  GI PCRa gain positive-stool Cx pending  While the PCR may represent colonization/false positive given her pregnancy as well as chemo would be reasonable to re treat with amox and po vanco pending the repeat Culture  fetal monitoring per OB  If stable ok with ID to DC-to follow in ID clinic as scheduled this wednesday  Advised in taht case to come back to ER immediately if any s/s worsening  Case d/w OB team  Infectious Diseases Service will cover over weekend.  Please call [phone] if issues

## 2019-01-18 NOTE — CONSULT NOTE ADULT - SUBJECTIVE AND OBJECTIVE BOX
Patient is a 38y old  Female who presents with a chief complaint of diarrhea  Patient well known to me   Previously seen for C diff/shigella .  Patient also 23 wks pregannt,h/o breast ca s/p chemotherapy  had been treated 3 weeks ago with po amoxicillin and vanco  Finished vanco 1/14  Had been doing well-developed some abdominal cramps and loose stools  was admitted for observation-fetal monitoring was OK  She had a formed stool today am  No cramps  was restarted on po vanco  Stool PCR sent   ID consulted    PAST MEDICAL & SURGICAL HISTORY:  Breast cancer, right  Morbid obesity  History of right total mastectomy      Social history: no     Smoking,    ETOH,      IVDU       FAMILY HISTORY:  No pertinent family history in first degree relatives  - Family history of medical problems in all first degree relatives reviewed.None of these were found to be related to patients current illness.    REVIEW OF SYSTEMS  General:	Denies any malaise fatigue or chills. Fevers absent    Skin:No rash  	  Ophthalmologic:Denies any visual complaints,discharge redness or photophobia  	  ENMT:No nasal discharge,headache,sinus congestion or throat pain.No dental complaints    Respiratory and Thorax:No cough,sputum or chest pain.Denies shortness of breath  	  Cardiovascular:	No chest pain,palpitaions or dizziness    Gastrointestinal:	as above     Genitourinary:	No dysuria,frequency. No flank pain    Musculoskeletal:	No joint swelling or pain.No weakness    Neurological:No confusion,diziness.No extremity weakness.No bladder or bowel incontinence	    Psychiatric:No delusions or hallucinations	    Hematology/Lymphatics:	No LN swelling.No gum bleeding     Endocrine:	No recent weight gain or loss.No abnormal heat/cold intolerance    Allergic/Immunologic:	No hives or rash   Allergies    No Known Allergies    Intolerances        Antimicrobials:    vancomycin    Solution 125 milliGRAM(s) Oral every 6 hours      Prior Antimicrobials:  MEDICATIONS  (STANDING):  vancomycin    Solution   125 milliGRAM(s) Oral (01-18-19 @ 06:13)   125 milliGRAM(s) Oral (01-18-19 @ 01:10)      Other medications reviewed      VSS reviewed afebrile     PHYSICAL EXAM:Pleasant patient in no acute distress.      Constitutional:Comfortable.Awake and alert  No cachexia     Eyes:PERRL EOMI.NO discharge or conjunctival injection    ENMT:No sinus tenderness.No thrush.No pharyngeal exudate or erythema.Fair dental hygiene    Neck:Supple,No LN,no JVD      Respiratory:Good air entry bilaterally,CTA    Cardiovascular:S1 S2 wnl, No murmurs,rub or gallops    Gastrointestinal:gravid uterus Soft BS(+) no tenderness no masses ,No rebound or guarding    Genitourinary:No CVA tendereness         Extremities:No cyanosis,clubbing or edema.    Vascular:peripheral pulses felt    Neurological:AAO X 3,No grossly focal deficits    Skin:No rash     Lymph Nodes:No palpable LNs    Musculoskeletal:No joint swelling or LOM    Psychiatric:Affect normal.          Labs:                            10.4   10.1  )-----------( 320      ( 17 Jan 2019 22:38 )             29.8         GI PCR Panel, Stool (01.18.19 @ 10:36)    Specimen Source: .Stool Feces    Culture Results:   Shigella/Enteroinvasive E. coli (EIEC)  DETECTED by PCR  *******Please Note:*******  GI panel PCR evaluates for:  Campylobacter, Plesiomonas shigelloides, Salmonella,  Vibrio, Yersinia enterocolitica, Enteroaggregative  Escherichia coli (EAEC), Enteropathogenic E.coli (EPEC),  Enterotoxigenic E. coli (ETEC) lt/st, Shiga-like  toxin-producing E. coli (STEC) stx1/stx2,  Shigella/ Enteroinvasive E. coli (EIEC), Cryptosporidium,  Cyclospora cayetanensis, Entamoeba histolytica,  Giardia lamblia, Adenovirus F 40/41, Astrovirus,  Norovirus GI/GII, Rotavirus A, Sapovirus

## 2019-01-19 LAB
CULTURE RESULTS: SIGNIFICANT CHANGE UP
SPECIMEN SOURCE: SIGNIFICANT CHANGE UP

## 2019-01-23 ENCOUNTER — OUTPATIENT (OUTPATIENT)
Dept: OUTPATIENT SERVICES | Facility: HOSPITAL | Age: 39
LOS: 1 days | End: 2019-01-23
Payer: MEDICAID

## 2019-01-23 ENCOUNTER — APPOINTMENT (OUTPATIENT)
Dept: OTHER | Facility: CLINIC | Age: 39
End: 2019-01-23
Payer: MEDICAID

## 2019-01-23 ENCOUNTER — APPOINTMENT (OUTPATIENT)
Dept: MATERNAL FETAL MEDICINE | Facility: CLINIC | Age: 39
End: 2019-01-23
Payer: MEDICAID

## 2019-01-23 ENCOUNTER — APPOINTMENT (OUTPATIENT)
Dept: INFECTIOUS DISEASE | Facility: CLINIC | Age: 39
End: 2019-01-23
Payer: MEDICAID

## 2019-01-23 VITALS
DIASTOLIC BLOOD PRESSURE: 67 MMHG | BODY MASS INDEX: 41.73 KG/M2 | SYSTOLIC BLOOD PRESSURE: 100 MMHG | WEIGHT: 207 LBS | HEART RATE: 70 BPM | OXYGEN SATURATION: 100 % | TEMPERATURE: 97.2 F | HEIGHT: 59 IN | RESPIRATION RATE: 17 BRPM

## 2019-01-23 VITALS
SYSTOLIC BLOOD PRESSURE: 100 MMHG | HEIGHT: 59 IN | BODY MASS INDEX: 41.6 KG/M2 | DIASTOLIC BLOOD PRESSURE: 60 MMHG | WEIGHT: 206.38 LBS

## 2019-01-23 DIAGNOSIS — O09.899 SUPERVISION OF OTHER HIGH RISK PREGNANCIES, UNSPECIFIED TRIMESTER: ICD-10-CM

## 2019-01-23 DIAGNOSIS — Z90.11 ACQUIRED ABSENCE OF RIGHT BREAST AND NIPPLE: Chronic | ICD-10-CM

## 2019-01-23 PROCEDURE — G0108: CPT

## 2019-01-23 PROCEDURE — 99214 OFFICE O/P EST MOD 30 MIN: CPT

## 2019-01-23 PROCEDURE — 99243 OFF/OP CNSLTJ NEW/EST LOW 30: CPT

## 2019-01-23 PROCEDURE — 81003 URINALYSIS AUTO W/O SCOPE: CPT | Mod: NC,QW

## 2019-01-23 PROCEDURE — G0108 DIAB MANAGE TRN  PER INDIV: CPT

## 2019-01-23 PROCEDURE — 99213 OFFICE O/P EST LOW 20 MIN: CPT | Mod: 25

## 2019-01-23 PROCEDURE — 81003 URINALYSIS AUTO W/O SCOPE: CPT

## 2019-01-23 PROCEDURE — G0463: CPT

## 2019-01-23 NOTE — REVIEW OF SYSTEMS
[Fever] : no fever [Chills] : no chills [Eye Pain] : no eye pain [Chest Pain] : no chest pain [Shortness Of Breath] : no shortness of breath [Wheezing] : no wheezing [SOB on Exertion] : no shortness of breath during exertion [Abdominal Pain] : no abdominal pain [Vomiting] : no vomiting [Dysuria] : no dysuria [Joint Pain] : no joint pain [Skin Lesions] : no skin lesions [FreeTextEntry7] : semi-formed to loose stools-NOT watery,2-3 /day.No abd cramps

## 2019-01-23 NOTE — PHYSICAL EXAM
[General Appearance - Alert] : alert [General Appearance - In No Acute Distress] : in no acute distress [Sclera] : the sclera and conjunctiva were normal [Extraocular Movements] : extraocular movements were intact [Outer Ear] : the ears and nose were normal in appearance [Neck Appearance] : the appearance of the neck was normal [] : no respiratory distress [Exaggerated Use Of Accessory Muscles For Inspiration] : no accessory muscle use [Heart Rate And Rhythm] : heart rate was normal and rhythm regular [Murmurs] : no murmurs [Full Pulse] : the pedal pulses are present [Edema] : there was no peripheral edema [Bowel Sounds] : normal bowel sounds [Abdomen Soft] : soft [Abdomen Tenderness] : non-tender [Musculoskeletal - Swelling] : no joint swelling [Cranial Nerves] : cranial nerves 2-12 were intact [Oriented To Time, Place, And Person] : oriented to person, place, and time [FreeTextEntry1] : gravid uterus

## 2019-01-23 NOTE — HISTORY OF PRESENT ILLNESS
[FreeTextEntry1] : 39 yo woman 24 weeks pregnant\par h/o breast ca on chemo\par recently seen for C diff\par Had recurrence after finishing 2 week course\par Also with shigella on Cx and PCR in 12/18-was given amoxicillin which she had taken for a week\par Last week seen in hospital-after chemo-had abd cramps and diarrhea\par Stool PCR again positive\par Improved-fetal monitoring OK\par dicsharged on po amoxicllin and vanco\par Now feels well\par ROS as below

## 2019-01-23 NOTE — ASSESSMENT
[FreeTextEntry1] : 37 yo with pregnancy\par Breast Ca on chemo\par C diff-recurrent\par Shigella colonization v recurrence_prior stool Cx were positive X 2-wasnt sent during hospital stay\par Clinically stable\par On po amox + vanco\par Rec:\par 1) Will check stool Cx-and PCR>if Cx negative-the stool PCR represents colonization and will not need treatment\par 2) Continue po vancoi-will give tapering 4 week course to finish 6 week course in view of recurrence and chemo\par 3) Advised about s/s worsening-to goto ER if occurs\par 4) Chemo per hem-onc-if stable and no active loose stools can proceed from ID point of view-however will 1) Continue po vanco as above 2) Monitor stool Cx and clinically for any shigella recurrence\par \par RTC 4-6 weeks-asked to contact us earlier if any issues.

## 2019-01-24 DIAGNOSIS — O24.410 GESTATIONAL DIABETES MELLITUS IN PREGNANCY, DIET CONTROLLED: ICD-10-CM

## 2019-01-24 DIAGNOSIS — C50.919 MALIGNANT NEOPLASM OF UNSPECIFIED SITE OF UNSPECIFIED FEMALE BREAST: ICD-10-CM

## 2019-01-25 ENCOUNTER — LABORATORY RESULT (OUTPATIENT)
Age: 39
End: 2019-01-25

## 2019-01-25 ENCOUNTER — RESULT REVIEW (OUTPATIENT)
Age: 39
End: 2019-01-25

## 2019-01-25 ENCOUNTER — APPOINTMENT (OUTPATIENT)
Dept: INFUSION THERAPY | Facility: HOSPITAL | Age: 39
End: 2019-01-25

## 2019-01-25 ENCOUNTER — APPOINTMENT (OUTPATIENT)
Dept: HEMATOLOGY ONCOLOGY | Facility: CLINIC | Age: 39
End: 2019-01-25
Payer: MEDICAID

## 2019-01-25 VITALS
RESPIRATION RATE: 14 BRPM | TEMPERATURE: 98.1 F | HEART RATE: 77 BPM | SYSTOLIC BLOOD PRESSURE: 105 MMHG | OXYGEN SATURATION: 100 % | BODY MASS INDEX: 42.3 KG/M2 | WEIGHT: 209.44 LBS | DIASTOLIC BLOOD PRESSURE: 71 MMHG

## 2019-01-25 LAB
BASOPHILS # BLD AUTO: 0.1 K/UL — SIGNIFICANT CHANGE UP (ref 0–0.2)
BASOPHILS NFR BLD AUTO: 0.6 % — SIGNIFICANT CHANGE UP (ref 0–2)
EOSINOPHIL # BLD AUTO: 0.1 K/UL — SIGNIFICANT CHANGE UP (ref 0–0.5)
EOSINOPHIL NFR BLD AUTO: 1.3 % — SIGNIFICANT CHANGE UP (ref 0–6)
HCT VFR BLD CALC: 30.4 % — LOW (ref 34.5–45)
HGB BLD-MCNC: 10.7 G/DL — LOW (ref 11.5–15.5)
LYMPHOCYTES # BLD AUTO: 2.2 K/UL — SIGNIFICANT CHANGE UP (ref 1–3.3)
LYMPHOCYTES # BLD AUTO: 23.3 % — SIGNIFICANT CHANGE UP (ref 13–44)
MCHC RBC-ENTMCNC: 28.5 PG — SIGNIFICANT CHANGE UP (ref 27–34)
MCHC RBC-ENTMCNC: 35.3 G/DL — SIGNIFICANT CHANGE UP (ref 32–36)
MCV RBC AUTO: 80.9 FL — SIGNIFICANT CHANGE UP (ref 80–100)
MONOCYTES # BLD AUTO: 0.4 K/UL — SIGNIFICANT CHANGE UP (ref 0–0.9)
MONOCYTES NFR BLD AUTO: 4.8 % — SIGNIFICANT CHANGE UP (ref 2–14)
NEUTROPHILS # BLD AUTO: 6.5 K/UL — SIGNIFICANT CHANGE UP (ref 1.8–7.4)
NEUTROPHILS NFR BLD AUTO: 70 % — SIGNIFICANT CHANGE UP (ref 43–77)
PLATELET # BLD AUTO: 204 K/UL — SIGNIFICANT CHANGE UP (ref 150–400)
RBC # BLD: 3.76 M/UL — LOW (ref 3.8–5.2)
RBC # FLD: 14.9 % — HIGH (ref 10.3–14.5)
WBC # BLD: 9.4 K/UL — SIGNIFICANT CHANGE UP (ref 3.8–10.5)
WBC # FLD AUTO: 9.4 K/UL — SIGNIFICANT CHANGE UP (ref 3.8–10.5)

## 2019-01-25 PROCEDURE — 99215 OFFICE O/P EST HI 40 MIN: CPT

## 2019-01-26 DIAGNOSIS — O09.92 SUPERVISION OF HIGH RISK PREGNANCY, UNSPECIFIED, SECOND TRIMESTER: ICD-10-CM

## 2019-01-26 DIAGNOSIS — O24.419 GESTATIONAL DIABETES MELLITUS IN PREGNANCY, UNSPECIFIED CONTROL: ICD-10-CM

## 2019-01-26 DIAGNOSIS — O9A.119: ICD-10-CM

## 2019-01-28 ENCOUNTER — APPOINTMENT (OUTPATIENT)
Dept: MATERNAL FETAL MEDICINE | Facility: CLINIC | Age: 39
End: 2019-01-28

## 2019-01-29 LAB
BACTERIA STL CULT: NORMAL
GI PCR PANEL, STOOL: NORMAL

## 2019-01-30 ENCOUNTER — ASOB RESULT (OUTPATIENT)
Age: 39
End: 2019-01-30

## 2019-01-30 ENCOUNTER — APPOINTMENT (OUTPATIENT)
Dept: ANTEPARTUM | Facility: CLINIC | Age: 39
End: 2019-01-30
Payer: MEDICAID

## 2019-01-30 PROCEDURE — 76815 OB US LIMITED FETUS(S): CPT | Mod: 26

## 2019-01-31 NOTE — PHYSICAL EXAM
[Fully active, able to carry on all pre-disease performance without restriction] : Status 0 - Fully active, able to carry on all pre-disease performance without restriction [Obese] : obese [Normal] : affect appropriate [de-identified] : S/p right mastectomy healing well. No CW or axillary nodules. [de-identified] : Gravid uterus.

## 2019-01-31 NOTE — REASON FOR VISIT
[Follow-Up Visit] : a follow-up [Other: _____] : [unfilled] [[employer] ] : provided by [employer]   [FreeTextEntry2] : Aston [id number]

## 2019-01-31 NOTE — CONSULT LETTER
[Dear  ___] : Dear  [unfilled], [Consult Letter:] : I had the pleasure of evaluating your patient, [unfilled]. [Please see my note below.] : Please see my note below. [Consult Closing:] : Thank you very much for allowing me to participate in the care of this patient.  If you have any questions, please do not hesitate to contact me. [Sincerely,] : Sincerely, [DrRonnie  ___] : Dr. DE ANDA [DrRonnie ___] : Dr. DE ANDA [FreeTextEntry2] : Vesna Granger MD [FreeTextEntry3] : Viridiana Patrick M.D.\par  of Medicine\par [facility]\par [facility] [place]\par [address] [place] [address]\par [address]\par Tele # [phone]; Fax [phone]

## 2019-01-31 NOTE — ASSESSMENT
[Curative] : Goals of care discussed with patient: Curative [FreeTextEntry1] : In summary, this is a very pleasant 38-year-old Mongolian-speaking lady who is diagnosed with T2 N0 stage IIA poorly differentiated ER/AK/HER-2/rajni NEGATIVE IDC of the right breast. She is currently 22 weeks pregnant. A CXR and abdominal sono didn’t show distant mets.  BRCA negative. ddACT sequential started 18\par \par - Breast ca: On sequential ddACT chemo. She developed Cdiff and shigella diarrhea after C2. Completed abx course. Sx improved and she got C3 delayed by a week. She is here for C4. She reports worsening diarrhea x 2 days. She has been having diarrhea for 2 weeks but it is watery and more frequent x 2 days. She recently had stool studies with Dr Glover and she has cleared shigella after abx x 2. I discussed the concern about worsening diarrhea after chemo and risk of  labor. I would see her next week and treat if sx improves. She will stay on PO vanco for long term due to risk of CDiff recurrence. I called infectious disease and discussed the plan for abx with Dr Glover. \par -Chemo induced anemia: mild- will continue to monitor. Transfusion if Hb < 8. \par -Abd pain secondary to infectious diarrhea: currently resolved. We discussed crampy abd pain could be a sign of early labor. She understood and will call OB/MFM if pain develops\par - Chemo induced N/V- Will get premedications with dexamethasone and Aloxi. She will use Reglan as needed. IV fluid prn. no emend\par - GF induced bone pain- take Claritin. \par - Chemo induced dysgeusia and fatigue: Encourage p.o. fluids, small frequent meals.\par - Instructed to call office and go directly to the emergency room with fever more than 100.4, shaking chills, productive cough, sore throat, shortness of breath or urinary symptoms. Patient verbalized understanding and agreement.\par - Emotional support provided, all questions answered.\par - Patient reminded to call with any new symptoms day or night and any change with the pregnancy to notify MFM right away. Emergency phone number again provided to patient.\par \par Patient seen and examined along with Dr. Viridiana Patrick.\par \par RTO 2 weeks

## 2019-01-31 NOTE — HISTORY OF PRESENT ILLNESS
[de-identified] : Ms. EVANGELISTA HESTER  is a 38 year old female here for an evaluation of breast cancer. Her oncologic history is as follows:\par \par She felt a right breast mass in 2018 and was evaluated seen by GYN who sent her for breast imaging. She underwent diagnostic breast imaging on 18 which showed at 12:00 position of right breast there is a lobulated focus, measuring 2.0 x 2.0 x 2.2 cm 2 cm FN. 4 mm deep to the skin. Left breast benign. No enlarged axillary lymph nodes. Bx recommended. 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 There is marked lymphocytic infiltrate around the tumor. She was scheduled for MRI but found out that she was pregnant. Current gestation age at the time of initial consult: 11 weeks\par \par 10/23/18\par She is here with her . We discussed the diagnosis of rapidly growing triple negative breast ca, the size has increased since diagnosis and that chemotherapy will be needed to treat this cancer which can not be started until she is 14 weeks. They want to keep the pregnancy understanding that it will cause delay in treatment and potential risk to the patient and fetus. We discussed that lumpectomy will have issues with positive margins and RT will be contraindicated during to pregnancy and therefore u/l mastectomy is recommend. I discussed with Dr Granger that expander placement at the time of surgery will be an option. She is seeing the pt tomorrow and will discuss surgery and reconstruction options with her. She also saw Dr Eden Patrick med onc for a a second opinion. \par \par s/p surgery (right mastectomy) 18 , 4.5 cm tumor and sentinel node negative- path report d/w her in detail. Tumor grew more than double in size between dx and surgery and is s/o aggressive tumor. Discussed to start chemo 18. S/e of chemo jody during pregnancy, expected and unexpected, pertaining to her and the fetus d/w her. Discussed to see MFM b/w chemo. Also a candidate for PMRT due to large tumor size. LMP 18\par \par I had extensive discussion with the patient and spouse regarding breast cancer in pregnancy and the treatment options that are safe for both the mother and the growing fetus. She refused termination of pregnancy after finding out cancer diagnosis.  We discussed that all treatments including chemotherapy and surgery are contraindicated in first trimester but are considered relatively safe in second and third trimester.  We have discussed medical termination of pregnancy as an option but patient decided to keep the pregnancy. We reviewed that the largest experience in pregnancy has been with anthracycline and alkylating agent chemotherapy. She will be a candidate for chemotherapy with Adriamycin plus cyclophosphamide in a sequential dose dense manner. We reviewed data from [place] registry which showed Neulasta is reasonably safe to use in 2nd and third trimester although it was a very small dataset. . There is not enough data to use taxanes in pregnancy therefore Taxol will be administered post partum. Potential s/e to the patient and growing fetus were discussed with the patient and  in detail via a . The patient understood all the potential side effects and signed an informed consent after discussing the risks, benefits and alternatives.\par \par \par PLAN: Starting 18 ( 17 weeks), She will receive adriamycin every 2 weeks x 4 cycle followed by Cytoxan every 2 weeks x 4 cycles. We plan to hold chemotherapy 4 weeks prior to delivery to prevent  neutropenic complications. Currently she is planned for elective induction (NVD) at 37 weeks. We would start Taxol post partum\par \par PREMEDS/SUPPORTIVE CARE: Zofran ativan and dex are listed as part of prechemo antiemetic regimen in NCCN guidelines for breast cancer in pregnancy. The use of Emend is controversial in pregnancy. We discussed the issue of port placement in the OR, but that would increase anesthesia time by 45 min therefore we would treat her peripherally and likely do port placement after delivery. We discussed the issue of preeclampsia or gestational diabetes with steroids. She will be monitored closely by MFM. She will see MFM between every chemo session for close fetal monitoring. \par \par I explained her that EOD work up is limited due to risk of radiation exposure to the growing fetus. We plan to perform CT scans or a PET/CT scan after delivery. She will also be a candidate for PMRT due to large tumor size. \par \par S/p infection with shigella and c-diff, confirmed with cultures and PCR x 2, was hospitalized for diarrhea and abd pain, treated with ampicillin and vanco,  Ampicillin completed yesterday. Vanco 2 more days left. Patient reports mom had Shigella 2 years ago, Mom has come in to help her 2 months prior but is not ill. She denies sick contacts. \par  [de-identified] : Ms. EVANGELISTA RUBIO is here for f/u for right breast cancer, 24 weeks gestation,  Adriamycin (C2 on 12/21) C3D1 was delayed 1 week due to CDIFF and shigella. S/p readmission to hospital for increased loose stools and abd cramping. Seen by ID, started on PO amox and PO vanco. She saw ID 2 days ago and stool culture and PCR negative. She has been having 3-4 loose watery BM x 2 weeks, no blood, no abd pain or cramping. Stools were semisolid initially now loose watery x 2 days. She finished amox today. \par Mild nausea, no vomiting, mouth sores, fever and chills. No Bone pain, no neuropathy. Mild fatigue and altered taste x 3 days after chemo. \par Baby is moving well, no vaginal discharge or vaginal bleeding. No back pain, No pelvic pain.\par She has healed from the breast surgery with minimal pain when lifting arm otherwise is doing well.\par \par

## 2019-02-01 ENCOUNTER — RESULT REVIEW (OUTPATIENT)
Age: 39
End: 2019-02-01

## 2019-02-01 ENCOUNTER — APPOINTMENT (OUTPATIENT)
Dept: HEMATOLOGY ONCOLOGY | Facility: CLINIC | Age: 39
End: 2019-02-01
Payer: MEDICAID

## 2019-02-01 ENCOUNTER — APPOINTMENT (OUTPATIENT)
Dept: INFUSION THERAPY | Facility: HOSPITAL | Age: 39
End: 2019-02-01

## 2019-02-01 ENCOUNTER — LABORATORY RESULT (OUTPATIENT)
Age: 39
End: 2019-02-01

## 2019-02-01 VITALS
WEIGHT: 209.44 LBS | OXYGEN SATURATION: 95 % | SYSTOLIC BLOOD PRESSURE: 98 MMHG | HEART RATE: 76 BPM | BODY MASS INDEX: 42.3 KG/M2 | RESPIRATION RATE: 16 BRPM | DIASTOLIC BLOOD PRESSURE: 65 MMHG | TEMPERATURE: 98.5 F

## 2019-02-01 LAB
BASOPHILS # BLD AUTO: 0.1 K/UL — SIGNIFICANT CHANGE UP (ref 0–0.2)
BASOPHILS NFR BLD AUTO: 0.7 % — SIGNIFICANT CHANGE UP (ref 0–2)
EOSINOPHIL # BLD AUTO: 0.1 K/UL — SIGNIFICANT CHANGE UP (ref 0–0.5)
EOSINOPHIL NFR BLD AUTO: 1 % — SIGNIFICANT CHANGE UP (ref 0–6)
HCT VFR BLD CALC: 29.7 % — LOW (ref 34.5–45)
HGB BLD-MCNC: 10.2 G/DL — LOW (ref 11.5–15.5)
LYMPHOCYTES # BLD AUTO: 1.5 K/UL — SIGNIFICANT CHANGE UP (ref 1–3.3)
LYMPHOCYTES # BLD AUTO: 19.5 % — SIGNIFICANT CHANGE UP (ref 13–44)
MCHC RBC-ENTMCNC: 28.4 PG — SIGNIFICANT CHANGE UP (ref 27–34)
MCHC RBC-ENTMCNC: 34.3 G/DL — SIGNIFICANT CHANGE UP (ref 32–36)
MCV RBC AUTO: 82.9 FL — SIGNIFICANT CHANGE UP (ref 80–100)
MONOCYTES # BLD AUTO: 0.3 K/UL — SIGNIFICANT CHANGE UP (ref 0–0.9)
MONOCYTES NFR BLD AUTO: 4.6 % — SIGNIFICANT CHANGE UP (ref 2–14)
NEUTROPHILS # BLD AUTO: 5.6 K/UL — SIGNIFICANT CHANGE UP (ref 1.8–7.4)
NEUTROPHILS NFR BLD AUTO: 74.3 % — SIGNIFICANT CHANGE UP (ref 43–77)
PLATELET # BLD AUTO: 315 K/UL — SIGNIFICANT CHANGE UP (ref 150–400)
RBC # BLD: 3.58 M/UL — LOW (ref 3.8–5.2)
RBC # FLD: 15.2 % — HIGH (ref 10.3–14.5)
WBC # BLD: 7.6 K/UL — SIGNIFICANT CHANGE UP (ref 3.8–10.5)
WBC # FLD AUTO: 7.6 K/UL — SIGNIFICANT CHANGE UP (ref 3.8–10.5)

## 2019-02-01 PROCEDURE — 99215 OFFICE O/P EST HI 40 MIN: CPT

## 2019-02-02 NOTE — PHYSICAL EXAM
[Fully active, able to carry on all pre-disease performance without restriction] : Status 0 - Fully active, able to carry on all pre-disease performance without restriction [Obese] : obese [Normal] : affect appropriate [de-identified] : S/p right mastectomy healing well. No CW or axillary nodules. [de-identified] : Gravid uterus.

## 2019-02-02 NOTE — REASON FOR VISIT
[Follow-Up Visit] : a follow-up [Other: _____] : [unfilled] [[employer] ] : provided by [employer]   [FreeTextEntry2] : david [phone]

## 2019-02-02 NOTE — ASSESSMENT
[Curative] : Goals of care discussed with patient: Curative [FreeTextEntry1] : In summary, this is a very pleasant 38-year-old [place]ian-speaking lady who is diagnosed with T2 N0 stage IIA poorly differentiated ER/MN/HER-2/rajni NEGATIVE IDC of the right breast. She is currently 22 weeks pregnant. A CXR and abdominal sono didn’t show distant mets.  BRCA negative. ddACT sequential started 12/7/18\par \par - Breast ca: On sequential ddACT chemo. She developed Cdiff and shigella diarrhea after C2. Completed abx course. Sx improved and C3 was delayed by a week. C4 was also delayed by a week due to worsening diarrhea. She is feeling better now. Counts good. C4 today\par - We discussed cytoxan s/e, risks/benefits, schedule and premeds. She wont need neulasta/claritin/dex and other premeds. \par - She will stay on PO vanco for long term due to risk of CDiff recurrence. I called infectious disease and discussed the plan for abx with Dr Glover. \par -Chemo induced anemia: mild- will continue to monitor. Transfusion if Hb < 8. \par -Abd pain secondary to infectious diarrhea: currently resolved. We discussed crampy abd pain could be a sign of early labor. She understood and will call OB/MFM if pain develops.\par - Chemo induced N/V- Will get premedications with dexamethasone and Aloxi. She will use Reglan as needed. IV fluid prn. no emend\par - GF induced bone pain- take Claritin. \par - Chemo induced dysgeusia and fatigue: Encourage p.o. fluids, small frequent meals.\par - Instructed to call office and go directly to the emergency room with fever more than 100.4, shaking chills, productive cough, sore throat, shortness of breath or urinary symptoms. Patient verbalized understanding and agreement.\par - Emotional support provided, all questions answered.\par - Patient reminded to call with any new symptoms day or night and any change with the pregnancy to notify MFM right away. Emergency phone number again provided to patient.\par \par RTO 2 weeks

## 2019-02-02 NOTE — HISTORY OF PRESENT ILLNESS
[de-identified] : Ms. EVANGELISTA HESTER  is a 38 year old female here for an evaluation of breast cancer. Her oncologic history is as follows:\par \par She felt a right breast mass in 2018 and was evaluated seen by GYN who sent her for breast imaging. She underwent diagnostic breast imaging on 18 which showed at 12:00 position of right breast there is a lobulated focus, measuring 2.0 x 2.0 x 2.2 cm 2 cm FN. 4 mm deep to the skin. Left breast benign. No enlarged axillary lymph nodes. Bx recommended. 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 There is marked lymphocytic infiltrate around the tumor. She was scheduled for MRI but found out that she was pregnant. Current gestation age at the time of initial consult: 11 weeks\par \par 10/23/18\par She is here with her . We discussed the diagnosis of rapidly growing triple negative breast ca, the size has increased since diagnosis and that chemotherapy will be needed to treat this cancer which can not be started until she is 14 weeks. They want to keep the pregnancy understanding that it will cause delay in treatment and potential risk to the patient and fetus. We discussed that lumpectomy will have issues with positive margins and RT will be contraindicated during to pregnancy and therefore u/l mastectomy is recommend. I discussed with Dr Granger that expander placement at the time of surgery will be an option. She is seeing the pt tomorrow and will discuss surgery and reconstruction options with her. She also saw Dr Eden Patrick med onc for a a second opinion. \par \par s/p surgery (right mastectomy) 18 , 4.5 cm tumor and sentinel node negative- path report d/w her in detail. Tumor grew more than double in size between dx and surgery and is s/o aggressive tumor. Discussed to start chemo 18. S/e of chemo jody during pregnancy, expected and unexpected, pertaining to her and the fetus d/w her. Discussed to see MFM b/w chemo. Also a candidate for PMRT due to large tumor size. LMP 18\par \par I had extensive discussion with the patient and spouse regarding breast cancer in pregnancy and the treatment options that are safe for both the mother and the growing fetus. She refused termination of pregnancy after finding out cancer diagnosis.  We discussed that all treatments including chemotherapy and surgery are contraindicated in first trimester but are considered relatively safe in second and third trimester.  We have discussed medical termination of pregnancy as an option but patient decided to keep the pregnancy. We reviewed that the largest experience in pregnancy has been with anthracycline and alkylating agent chemotherapy. She will be a candidate for chemotherapy with Adriamycin plus cyclophosphamide in a sequential dose dense manner. We reviewed data from [place] registry which showed Neulasta is reasonably safe to use in 2nd and third trimester although it was a very small dataset. . There is not enough data to use taxanes in pregnancy therefore Taxol will be administered post partum. Potential s/e to the patient and growing fetus were discussed with the patient and  in detail via a . The patient understood all the potential side effects and signed an informed consent after discussing the risks, benefits and alternatives.\par \par \par PLAN: Starting 18 ( 17 weeks), She will receive adriamycin every 2 weeks x 4 cycle followed by Cytoxan every 2 weeks x 4 cycles. We plan to hold chemotherapy 4 weeks prior to delivery to prevent  neutropenic complications. Currently she is planned for elective induction (NVD) at 37 weeks. We would start Taxol post partum\par \par PREMEDS/SUPPORTIVE CARE: Zofran ativan and dex are listed as part of prechemo antiemetic regimen in NCCN guidelines for breast cancer in pregnancy. The use of Emend is controversial in pregnancy. We discussed the issue of port placement in the OR, but that would increase anesthesia time by 45 min therefore we would treat her peripherally and likely do port placement after delivery. We discussed the issue of preeclampsia or gestational diabetes with steroids. She will be monitored closely by MFM. She will see MFM between every chemo session for close fetal monitoring. \par \par I explained her that EOD work up is limited due to risk of radiation exposure to the growing fetus. We plan to perform CT scans or a PET/CT scan after delivery. She will also be a candidate for PMRT due to large tumor size. \par \par S/p infection with shigella and c-diff, confirmed with cultures and PCR x 2, was hospitalized for diarrhea and abd pain, treated with ampicillin and vanco,  Ampicillin completed yesterday. Vanco 2 more days left. Patient reports mom had Shigella 2 years ago, Mom has come in to help her 2 months prior but is not ill. She denies sick contacts. \par  [de-identified] : Ms. EVANGELISTA RUBIO is here for f/u for right breast cancer, 25 weeks gestation,  Adriamycin C4 today.  \par C3D1 was delayed 1 week due to diagnosis of CDIFF and shigella. C4 was delayed last week due to persistent loose watery stools. She is feeling better now. Soft BM 2-3 times a day. No watery diarrhea, bloody stools or abdominal cramping. Taking PO vanco. Mild nausea, no vomiting, mouth sores, fever and chills. No Bone pain, no neuropathy. Mild fatigue and altered taste x 3 days after chemo. \par Baby is moving well, no vaginal discharge or vaginal bleeding. No back pain, No pelvic pain.\par She has healed from the breast surgery with minimal pain when lifting arm otherwise is doing well.\par \par

## 2019-02-02 NOTE — CONSULT LETTER
[Dear  ___] : Dear  [unfilled], [Consult Letter:] : I had the pleasure of evaluating your patient, [unfilled]. [Please see my note below.] : Please see my note below. [Consult Closing:] : Thank you very much for allowing me to participate in the care of this patient.  If you have any questions, please do not hesitate to contact me. [Sincerely,] : Sincerely, [DrRonnie  ___] : Dr. DE ANDA [DrRonnie ___] : Dr. DE ANDA [FreeTextEntry2] : Vesna Granger MD [FreeTextEntry3] : Viridiana Patrick M.D.\par  of Medicine\par [facility]\par [facility]\par [address] [place] [address]\par [address]\par Tele # [phone]; Fax [phone]

## 2019-02-04 ENCOUNTER — NON-APPOINTMENT (OUTPATIENT)
Age: 39
End: 2019-02-04

## 2019-02-04 ENCOUNTER — APPOINTMENT (OUTPATIENT)
Dept: MATERNAL FETAL MEDICINE | Facility: CLINIC | Age: 39
End: 2019-02-04
Payer: MEDICAID

## 2019-02-04 ENCOUNTER — OUTPATIENT (OUTPATIENT)
Dept: OUTPATIENT SERVICES | Facility: HOSPITAL | Age: 39
LOS: 1 days | End: 2019-02-04
Payer: MEDICAID

## 2019-02-04 VITALS
DIASTOLIC BLOOD PRESSURE: 62 MMHG | BODY MASS INDEX: 42.74 KG/M2 | SYSTOLIC BLOOD PRESSURE: 106 MMHG | WEIGHT: 212 LBS | HEIGHT: 59 IN

## 2019-02-04 DIAGNOSIS — Z51.11 ENCOUNTER FOR ANTINEOPLASTIC CHEMOTHERAPY: ICD-10-CM

## 2019-02-04 DIAGNOSIS — R11.2 NAUSEA WITH VOMITING, UNSPECIFIED: ICD-10-CM

## 2019-02-04 DIAGNOSIS — Z51.89 ENCOUNTER FOR OTHER SPECIFIED AFTERCARE: ICD-10-CM

## 2019-02-04 DIAGNOSIS — Z90.11 ACQUIRED ABSENCE OF RIGHT BREAST AND NIPPLE: Chronic | ICD-10-CM

## 2019-02-04 DIAGNOSIS — O09.899 SUPERVISION OF OTHER HIGH RISK PREGNANCIES, UNSPECIFIED TRIMESTER: ICD-10-CM

## 2019-02-04 PROCEDURE — 81003 URINALYSIS AUTO W/O SCOPE: CPT

## 2019-02-04 PROCEDURE — G0108 DIAB MANAGE TRN  PER INDIV: CPT

## 2019-02-04 PROCEDURE — G0108: CPT

## 2019-02-04 PROCEDURE — 99213 OFFICE O/P EST LOW 20 MIN: CPT | Mod: 25

## 2019-02-04 PROCEDURE — G0463: CPT

## 2019-02-04 PROCEDURE — 81003 URINALYSIS AUTO W/O SCOPE: CPT | Mod: NC,QW

## 2019-02-05 ENCOUNTER — APPOINTMENT (OUTPATIENT)
Dept: PEDIATRIC CARDIOLOGY | Facility: CLINIC | Age: 39
End: 2019-02-05
Payer: MEDICAID

## 2019-02-05 ENCOUNTER — OUTPATIENT (OUTPATIENT)
Dept: OUTPATIENT SERVICES | Facility: HOSPITAL | Age: 39
LOS: 1 days | Discharge: HOME | End: 2019-02-05

## 2019-02-05 DIAGNOSIS — Z90.11 ACQUIRED ABSENCE OF RIGHT BREAST AND NIPPLE: Chronic | ICD-10-CM

## 2019-02-05 DIAGNOSIS — C50.919 MALIGNANT NEOPLASM OF UNSPECIFIED SITE OF UNSPECIFIED FEMALE BREAST: ICD-10-CM

## 2019-02-05 PROCEDURE — 93325 DOPPLER ECHO COLOR FLOW MAPG: CPT | Mod: 59

## 2019-02-05 PROCEDURE — 76821 MIDDLE CEREBRAL ARTERY ECHO: CPT

## 2019-02-05 PROCEDURE — 76826 ECHO EXAM OF FETAL HEART: CPT

## 2019-02-05 PROCEDURE — 99214 OFFICE O/P EST MOD 30 MIN: CPT | Mod: 25

## 2019-02-05 PROCEDURE — 76828 ECHO EXAM OF FETAL HEART: CPT

## 2019-02-05 PROCEDURE — 76820 UMBILICAL ARTERY ECHO: CPT

## 2019-02-11 ENCOUNTER — APPOINTMENT (OUTPATIENT)
Dept: ANTEPARTUM | Facility: CLINIC | Age: 39
End: 2019-02-11
Payer: MEDICAID

## 2019-02-11 ENCOUNTER — ASOB RESULT (OUTPATIENT)
Age: 39
End: 2019-02-11

## 2019-02-11 ENCOUNTER — NON-APPOINTMENT (OUTPATIENT)
Age: 39
End: 2019-02-11

## 2019-02-11 ENCOUNTER — LABORATORY RESULT (OUTPATIENT)
Age: 39
End: 2019-02-11

## 2019-02-11 ENCOUNTER — APPOINTMENT (OUTPATIENT)
Dept: MATERNAL FETAL MEDICINE | Facility: CLINIC | Age: 39
End: 2019-02-11
Payer: MEDICAID

## 2019-02-11 ENCOUNTER — OUTPATIENT (OUTPATIENT)
Dept: OUTPATIENT SERVICES | Facility: HOSPITAL | Age: 39
LOS: 1 days | End: 2019-02-11
Payer: MEDICAID

## 2019-02-11 VITALS
WEIGHT: 211.38 LBS | DIASTOLIC BLOOD PRESSURE: 60 MMHG | HEIGHT: 59 IN | BODY MASS INDEX: 42.61 KG/M2 | SYSTOLIC BLOOD PRESSURE: 100 MMHG

## 2019-02-11 DIAGNOSIS — O09.899 SUPERVISION OF OTHER HIGH RISK PREGNANCIES, UNSPECIFIED TRIMESTER: ICD-10-CM

## 2019-02-11 DIAGNOSIS — Z03.74 ENCOUNTER FOR SUSPECTED PROBLEM WITH FETAL GROWTH RULED OUT: ICD-10-CM

## 2019-02-11 DIAGNOSIS — Z90.11 ACQUIRED ABSENCE OF RIGHT BREAST AND NIPPLE: Chronic | ICD-10-CM

## 2019-02-11 DIAGNOSIS — Z3A.26 26 WEEKS GESTATION OF PREGNANCY: ICD-10-CM

## 2019-02-11 PROCEDURE — G0463: CPT

## 2019-02-11 PROCEDURE — 81003 URINALYSIS AUTO W/O SCOPE: CPT | Mod: NC,QW

## 2019-02-11 PROCEDURE — 36415 COLL VENOUS BLD VENIPUNCTURE: CPT

## 2019-02-11 PROCEDURE — G0108: CPT

## 2019-02-11 PROCEDURE — 83655 ASSAY OF LEAD: CPT

## 2019-02-11 PROCEDURE — 76816 OB US FOLLOW-UP PER FETUS: CPT | Mod: 26

## 2019-02-11 PROCEDURE — 81003 URINALYSIS AUTO W/O SCOPE: CPT

## 2019-02-11 PROCEDURE — 76816 OB US FOLLOW-UP PER FETUS: CPT

## 2019-02-11 PROCEDURE — G0108 DIAB MANAGE TRN  PER INDIV: CPT

## 2019-02-11 PROCEDURE — 99213 OFFICE O/P EST LOW 20 MIN: CPT | Mod: GE,25

## 2019-02-13 LAB — LEAD BLD-MCNC: 3 UG/DL — SIGNIFICANT CHANGE UP (ref 0–4)

## 2019-02-14 DIAGNOSIS — O24.419 GESTATIONAL DIABETES MELLITUS IN PREGNANCY, UNSPECIFIED CONTROL: ICD-10-CM

## 2019-02-14 DIAGNOSIS — C50.919 MALIGNANT NEOPLASM OF UNSPECIFIED SITE OF UNSPECIFIED FEMALE BREAST: ICD-10-CM

## 2019-02-15 ENCOUNTER — LABORATORY RESULT (OUTPATIENT)
Age: 39
End: 2019-02-15

## 2019-02-15 ENCOUNTER — RESULT REVIEW (OUTPATIENT)
Age: 39
End: 2019-02-15

## 2019-02-15 ENCOUNTER — APPOINTMENT (OUTPATIENT)
Dept: INFUSION THERAPY | Facility: HOSPITAL | Age: 39
End: 2019-02-15

## 2019-02-15 ENCOUNTER — APPOINTMENT (OUTPATIENT)
Dept: HEMATOLOGY ONCOLOGY | Facility: CLINIC | Age: 39
End: 2019-02-15
Payer: MEDICAID

## 2019-02-15 VITALS
OXYGEN SATURATION: 100 % | SYSTOLIC BLOOD PRESSURE: 115 MMHG | DIASTOLIC BLOOD PRESSURE: 75 MMHG | BODY MASS INDEX: 42.52 KG/M2 | WEIGHT: 210.54 LBS | RESPIRATION RATE: 15 BRPM | TEMPERATURE: 97.6 F | HEART RATE: 84 BPM

## 2019-02-15 LAB
BASOPHILS # BLD AUTO: 0.1 K/UL — SIGNIFICANT CHANGE UP (ref 0–0.2)
BASOPHILS NFR BLD AUTO: 0.8 % — SIGNIFICANT CHANGE UP (ref 0–2)
EOSINOPHIL # BLD AUTO: 0.2 K/UL — SIGNIFICANT CHANGE UP (ref 0–0.5)
EOSINOPHIL NFR BLD AUTO: 2.2 % — SIGNIFICANT CHANGE UP (ref 0–6)
HCT VFR BLD CALC: 32.3 % — LOW (ref 34.5–45)
HGB BLD-MCNC: 10.8 G/DL — LOW (ref 11.5–15.5)
LYMPHOCYTES # BLD AUTO: 2 K/UL — SIGNIFICANT CHANGE UP (ref 1–3.3)
LYMPHOCYTES # BLD AUTO: 22.8 % — SIGNIFICANT CHANGE UP (ref 13–44)
MCHC RBC-ENTMCNC: 28.2 PG — SIGNIFICANT CHANGE UP (ref 27–34)
MCHC RBC-ENTMCNC: 33.6 G/DL — SIGNIFICANT CHANGE UP (ref 32–36)
MCV RBC AUTO: 84 FL — SIGNIFICANT CHANGE UP (ref 80–100)
MONOCYTES # BLD AUTO: 0.3 K/UL — SIGNIFICANT CHANGE UP (ref 0–0.9)
MONOCYTES NFR BLD AUTO: 3.7 % — SIGNIFICANT CHANGE UP (ref 2–14)
NEUTROPHILS # BLD AUTO: 6.1 K/UL — SIGNIFICANT CHANGE UP (ref 1.8–7.4)
NEUTROPHILS NFR BLD AUTO: 70.5 % — SIGNIFICANT CHANGE UP (ref 43–77)
PLATELET # BLD AUTO: 235 K/UL — SIGNIFICANT CHANGE UP (ref 150–400)
RBC # BLD: 3.84 M/UL — SIGNIFICANT CHANGE UP (ref 3.8–5.2)
RBC # FLD: 15.7 % — HIGH (ref 10.3–14.5)
WBC # BLD: 8.6 K/UL — SIGNIFICANT CHANGE UP (ref 3.8–10.5)
WBC # FLD AUTO: 8.6 K/UL — SIGNIFICANT CHANGE UP (ref 3.8–10.5)

## 2019-02-15 PROCEDURE — 99215 OFFICE O/P EST HI 40 MIN: CPT

## 2019-02-16 DIAGNOSIS — O09.90 SUPERVISION OF HIGH RISK PREGNANCY, UNSPECIFIED, UNSPECIFIED TRIMESTER: ICD-10-CM

## 2019-02-16 DIAGNOSIS — O24.419 GESTATIONAL DIABETES MELLITUS IN PREGNANCY, UNSPECIFIED CONTROL: ICD-10-CM

## 2019-02-17 NOTE — HISTORY OF PRESENT ILLNESS
[de-identified] : Ms. EVANGELISTA HESTER  is a 38 year old female here for an evaluation of breast cancer. Her oncologic history is as follows:\par \par She felt a right breast mass in 2018 and was evaluated seen by GYN who sent her for breast imaging. She underwent diagnostic breast imaging on 18 which showed at 12:00 position of right breast there is a lobulated focus, measuring 2.0 x 2.0 x 2.2 cm 2 cm FN. 4 mm deep to the skin. Left breast benign. No enlarged axillary lymph nodes. Bx recommended. 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 There is marked lymphocytic infiltrate around the tumor. She was scheduled for MRI but found out that she was pregnant. Current gestation age at the time of initial consult: 11 weeks\par \par 10/23/18\par She is here with her . We discussed the diagnosis of rapidly growing triple negative breast ca, the size has increased since diagnosis and that chemotherapy will be needed to treat this cancer which can not be started until she is 14 weeks. They want to keep the pregnancy understanding that it will cause delay in treatment and potential risk to the patient and fetus. We discussed that lumpectomy will have issues with positive margins and RT will be contraindicated during to pregnancy and therefore u/l mastectomy is recommend. I discussed with Dr Granger that expander placement at the time of surgery will be an option. She is seeing the pt tomorrow and will discuss surgery and reconstruction options with her. She also saw Dr Eden Patrick med onc for a a second opinion. \par \par s/p surgery (right mastectomy) 18 , 4.5 cm tumor and sentinel node negative- path report d/w her in detail. Tumor grew more than double in size between dx and surgery and is s/o aggressive tumor. Discussed to start chemo 18. S/e of chemo jody during pregnancy, expected and unexpected, pertaining to her and the fetus d/w her. Discussed to see MFM b/w chemo. Also a candidate for PMRT due to large tumor size. LMP 18\par \par I had extensive discussion with the patient and spouse regarding breast cancer in pregnancy and the treatment options that are safe for both the mother and the growing fetus. She refused termination of pregnancy after finding out cancer diagnosis.  We discussed that all treatments including chemotherapy and surgery are contraindicated in first trimester but are considered relatively safe in second and third trimester.  We have discussed medical termination of pregnancy as an option but patient decided to keep the pregnancy. We reviewed that the largest experience in pregnancy has been with anthracycline and alkylating agent chemotherapy. She will be a candidate for chemotherapy with Adriamycin plus cyclophosphamide in a sequential dose dense manner. We reviewed data from [place] registry which showed Neulasta is reasonably safe to use in 2nd and third trimester although it was a very small dataset. . There is not enough data to use taxanes in pregnancy therefore Taxol will be administered post partum. Potential s/e to the patient and growing fetus were discussed with the patient and  in detail via a . The patient understood all the potential side effects and signed an informed consent after discussing the risks, benefits and alternatives.\par \par \par PLAN: Starting 18 ( 17 weeks), She will receive adriamycin every 2 weeks x 4 cycle followed by Cytoxan every 2 weeks x 4 cycles. We plan to hold chemotherapy 4 weeks prior to delivery to prevent  neutropenic complications. Currently she is planned for elective induction (NVD) at 37 weeks. We would start Taxol post partum\par \par PREMEDS/SUPPORTIVE CARE: Zofran ativan and dex are listed as part of prechemo antiemetic regimen in NCCN guidelines for breast cancer in pregnancy. The use of Emend is controversial in pregnancy. We discussed the issue of port placement in the OR, but that would increase anesthesia time by 45 min therefore we would treat her peripherally and likely do port placement after delivery. We discussed the issue of preeclampsia or gestational diabetes with steroids. She will be monitored closely by MFM. She will see MFM between every chemo session for close fetal monitoring. \par \par I explained her that EOD work up is limited due to risk of radiation exposure to the growing fetus. We plan to perform CT scans or a PET/CT scan after delivery. She will also be a candidate for PMRT due to large tumor size. \par \par S/p infection with shigella and c-diff, confirmed with cultures and PCR x 2, was hospitalized for diarrhea and abd pain, treated with ampicillin and vanco,  Ampicillin completed yesterday. Vanco 2 more days left. Patient reports mom had Shigella 2 years ago, Mom has come in to help her 2 months prior but is not ill. She denies sick contacts. \par  [de-identified] : Ms. EVANGELISTA RUBIO is here for f/u for right breast cancer, 27 weeks gestation,  s/p Adriamycin x 4 last cycle on 2/1/19. Now starting Cytoxan C1D1 today. \par Adriamycin C3D1 was delayed 1 week due to diagnosis of CDIFF and shigella. C4 further delayed another week due to persistent loose watery stools. She is feeling better now. Soft BM 2-3 times a day. No watery diarrhea, bloody stools or abdominal cramping. Taking po Vanco, has only one pill left, instructions on bottle for patient to take for 4 weeks, but only 67 pills dispensed.  Mild nausea, no vomiting, mouth sores, fever and chills. No Bone pain, no neuropathy. Mild fatigue and altered taste x 3 days after chemo. \par Baby is moving well, no vaginal discharge or vaginal bleeding. No back pain, No pelvic pain. She has healed well from the breast surgery with minimal pain when lifting arm otherwise is doing well. She was diagnosed with gestational diabetes and is started on insulin.\par \par

## 2019-02-17 NOTE — PHYSICAL EXAM
[Fully active, able to carry on all pre-disease performance without restriction] : Status 0 - Fully active, able to carry on all pre-disease performance without restriction [Obese] : obese [Normal] : affect appropriate [de-identified] : S/p right mastectomy healing well. No CW or axillary nodules [de-identified] : Gravid uterus.

## 2019-02-17 NOTE — ASSESSMENT
[Curative] : Goals of care discussed with patient: Curative [FreeTextEntry1] : In summary, this is a very pleasant 38-year-old Yakut-speaking lady who is diagnosed with T2 N0 stage IIA poorly differentiated ER/[place]/HER-2/rajni NEGATIVE IDC of the right breast. She is currently 22 weeks pregnant. A CXR and abdominal sono didn’t show distant mets.  BRCA negative. ddACT sequential started 12/7/18. C4 Justin on 2/1/19. Cycle 1 of Cytoxan today.\par \par - Breast ca: On sequential ddACT chemo. She developed Cdiff and shigella diarrhea after C2. Completed abx course. Sx improved and C3 was delayed by a week. C4 was also delayed by a week due to worsening diarrhea. She is feeling better now. Counts good. Cytoxan Cycle 1 today.Counts reviewed stable ok to proceed with chemo.\par - Anemia secondary to chemotherapy mild, no need for transfusion will continue to monitor.Transfusion if Hb<8.\par - She will stay on PO Vanco for long term due to risk of CDiff recurrence. She was prescribed Vanco, but only had one pill left. 21 pills of refill prescribed today and [facility] to ar[facility] by [facility]. I  contacted  Dr Glover who will make appointment for f/u and continue to prescribe a longer course of po Vanco.Symptoms of diarrhea have now resolved.\par -Abd pain secondary to infectious diarrhea: currently resolved. We discussed crampy abd pain could be a sign of early labor. She understood and will call OB/MFM if pain develops.\par - Chemo induced N/V- She will use Reglan as needed. IV fluid prn. no emend\par - Chemo induced dysgeusia and fatigue: Encourage p.o. fluids, small frequent meals.\par - Instructed to call office and go directly to the emergency room with fever more than 100.4, shaking chills, productive cough, sore throat, shortness of breath or urinary symptoms. Patient verbalized understanding and agreement.\par - Emotional support provided, all questions answered.\par - Patient reminded to call with any new symptoms day or night and any change with the pregnancy to notify MFM right away. Emergency phone number again provided to patient.\par -Gestational diabetes, She will continue with Insulin and f/u with MFM for Diabetes control.\par -Elevated Alk phos reactive secondary to chemotherapy.Will continue to monitor.\par \par Case d/w Dr. Wild covering for Dr. Viridiana Patrick\par \par RTO 2 weeks

## 2019-02-17 NOTE — CONSULT LETTER
[Dear  ___] : Dear  [unfilled], [Consult Letter:] : I had the pleasure of evaluating your patient, [unfilled]. [Please see my note below.] : Please see my note below. [Consult Closing:] : Thank you very much for allowing me to participate in the care of this patient.  If you have any questions, please do not hesitate to contact me. [Sincerely,] : Sincerely, [DrRonnie  ___] : Dr. DE ANDA [DrRonnie ___] : Dr. DE ANDA [FreeTextEntry2] : Vesna Granger MD [FreeTextEntry3] : Viridiana Patrick M.D.\par  of Medicine\par [facility]\par [facility]\par [address] [place] [address]\par [address]\par Tele # [phone]; Fax [phone]

## 2019-02-17 NOTE — REASON FOR VISIT
[Follow-Up Visit] : a follow-up [[employer] ] : provided by [employer]   [Other: _____] : [unfilled] [FreeTextEntry1] : [id number] [FreeTextEntry2] : Kim

## 2019-02-17 NOTE — REVIEW OF SYSTEMS
[Negative] : Allergic/Immunologic [Fever] : no fever [Recent Change In Weight] : ~T no recent weight change

## 2019-02-19 DIAGNOSIS — R11.2 NAUSEA WITH VOMITING, UNSPECIFIED: ICD-10-CM

## 2019-02-19 DIAGNOSIS — Z51.11 ENCOUNTER FOR ANTINEOPLASTIC CHEMOTHERAPY: ICD-10-CM

## 2019-02-20 ENCOUNTER — ASOB RESULT (OUTPATIENT)
Age: 39
End: 2019-02-20

## 2019-02-20 ENCOUNTER — APPOINTMENT (OUTPATIENT)
Dept: ANTEPARTUM | Facility: CLINIC | Age: 39
End: 2019-02-20
Payer: MEDICAID

## 2019-02-20 ENCOUNTER — NON-APPOINTMENT (OUTPATIENT)
Age: 39
End: 2019-02-20

## 2019-02-20 ENCOUNTER — OUTPATIENT (OUTPATIENT)
Dept: OUTPATIENT SERVICES | Facility: HOSPITAL | Age: 39
LOS: 1 days | End: 2019-02-20
Payer: MEDICAID

## 2019-02-20 ENCOUNTER — APPOINTMENT (OUTPATIENT)
Dept: MATERNAL FETAL MEDICINE | Facility: CLINIC | Age: 39
End: 2019-02-20
Payer: MEDICAID

## 2019-02-20 ENCOUNTER — RESULT CHARGE (OUTPATIENT)
Age: 39
End: 2019-02-20

## 2019-02-20 VITALS — BODY MASS INDEX: 42.42 KG/M2 | WEIGHT: 210 LBS | SYSTOLIC BLOOD PRESSURE: 106 MMHG | DIASTOLIC BLOOD PRESSURE: 60 MMHG

## 2019-02-20 DIAGNOSIS — O09.899 SUPERVISION OF OTHER HIGH RISK PREGNANCIES, UNSPECIFIED TRIMESTER: ICD-10-CM

## 2019-02-20 DIAGNOSIS — Z90.11 ACQUIRED ABSENCE OF RIGHT BREAST AND NIPPLE: Chronic | ICD-10-CM

## 2019-02-20 LAB
BILIRUB UR QL STRIP: NORMAL
GLUCOSE UR-MCNC: NORMAL
HCG UR QL: 0.2 EU/DL
HGB UR QL STRIP.AUTO: NORMAL
KETONES UR-MCNC: NORMAL
LEUKOCYTE ESTERASE UR QL STRIP: NORMAL
NITRITE UR QL STRIP: NORMAL
PH UR STRIP: 6
PROT UR STRIP-MCNC: NORMAL
SP GR UR STRIP: 1.02

## 2019-02-20 PROCEDURE — 76815 OB US LIMITED FETUS(S): CPT

## 2019-02-20 PROCEDURE — 76819 FETAL BIOPHYS PROFIL W/O NST: CPT

## 2019-02-20 PROCEDURE — 81003 URINALYSIS AUTO W/O SCOPE: CPT | Mod: NC,QW

## 2019-02-20 PROCEDURE — 99213 OFFICE O/P EST LOW 20 MIN: CPT | Mod: 25

## 2019-02-20 PROCEDURE — G0463: CPT

## 2019-02-20 PROCEDURE — 76815 OB US LIMITED FETUS(S): CPT | Mod: 26

## 2019-02-20 PROCEDURE — 81003 URINALYSIS AUTO W/O SCOPE: CPT

## 2019-02-20 PROCEDURE — 76819 FETAL BIOPHYS PROFIL W/O NST: CPT | Mod: 26

## 2019-02-21 ENCOUNTER — NON-APPOINTMENT (OUTPATIENT)
Age: 39
End: 2019-02-21

## 2019-02-22 DIAGNOSIS — O09.90 SUPERVISION OF HIGH RISK PREGNANCY, UNSPECIFIED, UNSPECIFIED TRIMESTER: ICD-10-CM

## 2019-02-22 DIAGNOSIS — O24.419 GESTATIONAL DIABETES MELLITUS IN PREGNANCY, UNSPECIFIED CONTROL: ICD-10-CM

## 2019-02-28 ENCOUNTER — INBOUND DOCUMENT (OUTPATIENT)
Age: 39
End: 2019-02-28

## 2019-03-01 ENCOUNTER — LABORATORY RESULT (OUTPATIENT)
Age: 39
End: 2019-03-01

## 2019-03-01 ENCOUNTER — APPOINTMENT (OUTPATIENT)
Dept: INFUSION THERAPY | Facility: HOSPITAL | Age: 39
End: 2019-03-01

## 2019-03-01 ENCOUNTER — APPOINTMENT (OUTPATIENT)
Dept: HEMATOLOGY ONCOLOGY | Facility: CLINIC | Age: 39
End: 2019-03-01
Payer: MEDICAID

## 2019-03-01 ENCOUNTER — RESULT REVIEW (OUTPATIENT)
Age: 39
End: 2019-03-01

## 2019-03-01 LAB
BASOPHILS # BLD AUTO: 0.1 K/UL — SIGNIFICANT CHANGE UP (ref 0–0.2)
BASOPHILS NFR BLD AUTO: 0.7 % — SIGNIFICANT CHANGE UP (ref 0–2)
EOSINOPHIL # BLD AUTO: 0.2 K/UL — SIGNIFICANT CHANGE UP (ref 0–0.5)
EOSINOPHIL NFR BLD AUTO: 2 % — SIGNIFICANT CHANGE UP (ref 0–6)
HCT VFR BLD CALC: 32.4 % — LOW (ref 34.5–45)
HGB BLD-MCNC: 10.8 G/DL — LOW (ref 11.5–15.5)
LYMPHOCYTES # BLD AUTO: 1.7 K/UL — SIGNIFICANT CHANGE UP (ref 1–3.3)
LYMPHOCYTES # BLD AUTO: 19.9 % — SIGNIFICANT CHANGE UP (ref 13–44)
MCHC RBC-ENTMCNC: 27.8 PG — SIGNIFICANT CHANGE UP (ref 27–34)
MCHC RBC-ENTMCNC: 33.5 G/DL — SIGNIFICANT CHANGE UP (ref 32–36)
MCV RBC AUTO: 83.2 FL — SIGNIFICANT CHANGE UP (ref 80–100)
MONOCYTES # BLD AUTO: 0.4 K/UL — SIGNIFICANT CHANGE UP (ref 0–0.9)
MONOCYTES NFR BLD AUTO: 5.1 % — SIGNIFICANT CHANGE UP (ref 2–14)
NEUTROPHILS # BLD AUTO: 6.1 K/UL — SIGNIFICANT CHANGE UP (ref 1.8–7.4)
NEUTROPHILS NFR BLD AUTO: 72.2 % — SIGNIFICANT CHANGE UP (ref 43–77)
PLATELET # BLD AUTO: 310 K/UL — SIGNIFICANT CHANGE UP (ref 150–400)
RBC # BLD: 3.89 M/UL — SIGNIFICANT CHANGE UP (ref 3.8–5.2)
RBC # FLD: 15 % — HIGH (ref 10.3–14.5)
WBC # BLD: 8.4 K/UL — SIGNIFICANT CHANGE UP (ref 3.8–10.5)
WBC # FLD AUTO: 8.4 K/UL — SIGNIFICANT CHANGE UP (ref 3.8–10.5)

## 2019-03-01 PROCEDURE — 99215 OFFICE O/P EST HI 40 MIN: CPT

## 2019-03-04 ENCOUNTER — INPATIENT (INPATIENT)
Facility: HOSPITAL | Age: 39
LOS: 3 days | Discharge: HOME | DRG: 832 | End: 2019-03-08
Attending: OBSTETRICS & GYNECOLOGY | Admitting: OBSTETRICS & GYNECOLOGY
Payer: MEDICAID

## 2019-03-04 ENCOUNTER — APPOINTMENT (OUTPATIENT)
Dept: MATERNAL FETAL MEDICINE | Facility: CLINIC | Age: 39
End: 2019-03-04
Payer: MEDICAID

## 2019-03-04 ENCOUNTER — APPOINTMENT (OUTPATIENT)
Dept: ANTEPARTUM | Facility: CLINIC | Age: 39
End: 2019-03-04

## 2019-03-04 ENCOUNTER — OUTPATIENT (OUTPATIENT)
Dept: OUTPATIENT SERVICES | Facility: HOSPITAL | Age: 39
LOS: 1 days | End: 2019-03-04
Payer: MEDICAID

## 2019-03-04 ENCOUNTER — NON-APPOINTMENT (OUTPATIENT)
Age: 39
End: 2019-03-04

## 2019-03-04 VITALS
DIASTOLIC BLOOD PRESSURE: 61 MMHG | HEART RATE: 73 BPM | OXYGEN SATURATION: 98 % | TEMPERATURE: 98 F | SYSTOLIC BLOOD PRESSURE: 97 MMHG | RESPIRATION RATE: 18 BRPM

## 2019-03-04 DIAGNOSIS — O26.899 OTHER SPECIFIED PREGNANCY RELATED CONDITIONS, UNSPECIFIED TRIMESTER: ICD-10-CM

## 2019-03-04 DIAGNOSIS — C44.501 UNSPECIFIED MALIGNANT NEOPLASM OF SKIN OF BREAST: ICD-10-CM

## 2019-03-04 DIAGNOSIS — Z90.11 ACQUIRED ABSENCE OF RIGHT BREAST AND NIPPLE: Chronic | ICD-10-CM

## 2019-03-04 DIAGNOSIS — O09.899 SUPERVISION OF OTHER HIGH RISK PREGNANCIES, UNSPECIFIED TRIMESTER: ICD-10-CM

## 2019-03-04 DIAGNOSIS — Z34.80 ENCOUNTER FOR SUPERVISION OF OTHER NORMAL PREGNANCY, UNSPECIFIED TRIMESTER: ICD-10-CM

## 2019-03-04 DIAGNOSIS — A04.72 ENTEROCOLITIS DUE TO CLOSTRIDIUM DIFFICILE, NOT SPECIFIED AS RECURRENT: ICD-10-CM

## 2019-03-04 DIAGNOSIS — Z3A.00 WEEKS OF GESTATION OF PREGNANCY NOT SPECIFIED: ICD-10-CM

## 2019-03-04 LAB
ALBUMIN SERPL ELPH-MCNC: 3.3 G/DL — SIGNIFICANT CHANGE UP (ref 3.3–5)
ALP SERPL-CCNC: 117 U/L — SIGNIFICANT CHANGE UP (ref 40–120)
ALT FLD-CCNC: 11 U/L — SIGNIFICANT CHANGE UP (ref 10–45)
ANION GAP SERPL CALC-SCNC: 15 MMOL/L — SIGNIFICANT CHANGE UP (ref 5–17)
AST SERPL-CCNC: 13 U/L — SIGNIFICANT CHANGE UP (ref 10–40)
BASOPHILS # BLD AUTO: 0 K/UL — SIGNIFICANT CHANGE UP (ref 0–0.2)
BASOPHILS NFR BLD AUTO: 0.4 % — SIGNIFICANT CHANGE UP (ref 0–2)
BILIRUB SERPL-MCNC: 0.1 MG/DL — LOW (ref 0.2–1.2)
BUN SERPL-MCNC: 6 MG/DL — LOW (ref 7–23)
C DIFF GDH STL QL: NEGATIVE — SIGNIFICANT CHANGE UP
C DIFF GDH STL QL: SIGNIFICANT CHANGE UP
CALCIUM SERPL-MCNC: 9.4 MG/DL — SIGNIFICANT CHANGE UP (ref 8.4–10.5)
CHLORIDE SERPL-SCNC: 105 MMOL/L — SIGNIFICANT CHANGE UP (ref 96–108)
CO2 SERPL-SCNC: 18 MMOL/L — LOW (ref 22–31)
CREAT SERPL-MCNC: 0.32 MG/DL — LOW (ref 0.5–1.3)
EOSINOPHIL # BLD AUTO: 0.1 K/UL — SIGNIFICANT CHANGE UP (ref 0–0.5)
EOSINOPHIL NFR BLD AUTO: 0.9 % — SIGNIFICANT CHANGE UP (ref 0–6)
GLUCOSE SERPL-MCNC: 98 MG/DL — SIGNIFICANT CHANGE UP (ref 70–99)
HCT VFR BLD CALC: 31.9 % — LOW (ref 34.5–45)
HGB BLD-MCNC: 11.3 G/DL — LOW (ref 11.5–15.5)
LYMPHOCYTES # BLD AUTO: 1.6 K/UL — SIGNIFICANT CHANGE UP (ref 1–3.3)
LYMPHOCYTES # BLD AUTO: 17.4 % — SIGNIFICANT CHANGE UP (ref 13–44)
MCHC RBC-ENTMCNC: 29.5 PG — SIGNIFICANT CHANGE UP (ref 27–34)
MCHC RBC-ENTMCNC: 35.3 GM/DL — SIGNIFICANT CHANGE UP (ref 32–36)
MCV RBC AUTO: 83.5 FL — SIGNIFICANT CHANGE UP (ref 80–100)
MONOCYTES # BLD AUTO: 0.4 K/UL — SIGNIFICANT CHANGE UP (ref 0–0.9)
MONOCYTES NFR BLD AUTO: 4.3 % — SIGNIFICANT CHANGE UP (ref 2–14)
NEUTROPHILS # BLD AUTO: 7.3 K/UL — SIGNIFICANT CHANGE UP (ref 1.8–7.4)
NEUTROPHILS NFR BLD AUTO: 77 % — SIGNIFICANT CHANGE UP (ref 43–77)
PLATELET # BLD AUTO: 273 K/UL — SIGNIFICANT CHANGE UP (ref 150–400)
POTASSIUM SERPL-MCNC: 3.7 MMOL/L — SIGNIFICANT CHANGE UP (ref 3.5–5.3)
POTASSIUM SERPL-SCNC: 3.7 MMOL/L — SIGNIFICANT CHANGE UP (ref 3.5–5.3)
PROT SERPL-MCNC: 6.7 G/DL — SIGNIFICANT CHANGE UP (ref 6–8.3)
RBC # BLD: 3.82 M/UL — SIGNIFICANT CHANGE UP (ref 3.8–5.2)
RBC # FLD: 15.3 % — HIGH (ref 10.3–14.5)
SODIUM SERPL-SCNC: 138 MMOL/L — SIGNIFICANT CHANGE UP (ref 135–145)
WBC # BLD: 9.5 K/UL — SIGNIFICANT CHANGE UP (ref 3.8–10.5)
WBC # FLD AUTO: 9.5 K/UL — SIGNIFICANT CHANGE UP (ref 3.8–10.5)

## 2019-03-04 PROCEDURE — G0463: CPT

## 2019-03-04 PROCEDURE — G0108 DIAB MANAGE TRN  PER INDIV: CPT

## 2019-03-04 PROCEDURE — 99255 IP/OBS CONSLTJ NEW/EST HI 80: CPT

## 2019-03-04 PROCEDURE — 99213 OFFICE O/P EST LOW 20 MIN: CPT | Mod: 25

## 2019-03-04 PROCEDURE — G0108: CPT

## 2019-03-04 PROCEDURE — 81003 URINALYSIS AUTO W/O SCOPE: CPT | Mod: NC,QW

## 2019-03-04 PROCEDURE — 81003 URINALYSIS AUTO W/O SCOPE: CPT

## 2019-03-04 RX ORDER — INSULIN LISPRO 100/ML
10 VIAL (ML) SUBCUTANEOUS
Qty: 0 | Refills: 0 | Status: DISCONTINUED | OUTPATIENT
Start: 2019-03-04 | End: 2019-03-08

## 2019-03-04 RX ORDER — DEXTROSE 50 % IN WATER 50 %
25 SYRINGE (ML) INTRAVENOUS ONCE
Qty: 0 | Refills: 0 | Status: DISCONTINUED | OUTPATIENT
Start: 2019-03-04 | End: 2019-03-08

## 2019-03-04 RX ORDER — SODIUM CHLORIDE 9 MG/ML
1000 INJECTION, SOLUTION INTRAVENOUS
Qty: 0 | Refills: 0 | Status: DISCONTINUED | OUTPATIENT
Start: 2019-03-04 | End: 2019-03-08

## 2019-03-04 RX ORDER — INSULIN GLARGINE 100 [IU]/ML
12 INJECTION, SOLUTION SUBCUTANEOUS AT BEDTIME
Qty: 0 | Refills: 0 | Status: DISCONTINUED | OUTPATIENT
Start: 2019-03-04 | End: 2019-03-05

## 2019-03-04 RX ORDER — DEXTROSE 50 % IN WATER 50 %
15 SYRINGE (ML) INTRAVENOUS ONCE
Qty: 0 | Refills: 0 | Status: DISCONTINUED | OUTPATIENT
Start: 2019-03-04 | End: 2019-03-08

## 2019-03-04 RX ORDER — DEXTROSE 50 % IN WATER 50 %
12.5 SYRINGE (ML) INTRAVENOUS ONCE
Qty: 0 | Refills: 0 | Status: DISCONTINUED | OUTPATIENT
Start: 2019-03-04 | End: 2019-03-08

## 2019-03-04 RX ORDER — GLUCAGON INJECTION, SOLUTION 0.5 MG/.1ML
1 INJECTION, SOLUTION SUBCUTANEOUS ONCE
Qty: 0 | Refills: 0 | Status: DISCONTINUED | OUTPATIENT
Start: 2019-03-04 | End: 2019-03-08

## 2019-03-04 RX ORDER — INSULIN LISPRO 100/ML
8 VIAL (ML) SUBCUTANEOUS
Qty: 0 | Refills: 0 | Status: DISCONTINUED | OUTPATIENT
Start: 2019-03-04 | End: 2019-03-08

## 2019-03-04 RX ORDER — INSULIN LISPRO 100 [IU]/ML
100 INJECTION, SOLUTION INTRAVENOUS; SUBCUTANEOUS
Qty: 2 | Refills: 2 | Status: DISCONTINUED | COMMUNITY
Start: 2019-02-11 | End: 2019-03-04

## 2019-03-04 RX ORDER — SODIUM CHLORIDE 9 MG/ML
1000 INJECTION, SOLUTION INTRAVENOUS
Qty: 0 | Refills: 0 | Status: DISCONTINUED | OUTPATIENT
Start: 2019-03-04 | End: 2019-03-07

## 2019-03-04 RX ORDER — HEPARIN SODIUM 5000 [USP'U]/ML
5000 INJECTION INTRAVENOUS; SUBCUTANEOUS EVERY 12 HOURS
Qty: 0 | Refills: 0 | Status: DISCONTINUED | OUTPATIENT
Start: 2019-03-04 | End: 2019-03-08

## 2019-03-04 RX ORDER — VANCOMYCIN HCL 1 G
125 VIAL (EA) INTRAVENOUS EVERY 6 HOURS
Qty: 0 | Refills: 0 | Status: DISCONTINUED | OUTPATIENT
Start: 2019-03-04 | End: 2019-03-05

## 2019-03-04 RX ORDER — INSULIN LISPRO 100/ML
VIAL (ML) SUBCUTANEOUS
Qty: 0 | Refills: 0 | Status: DISCONTINUED | OUTPATIENT
Start: 2019-03-04 | End: 2019-03-08

## 2019-03-04 RX ADMIN — INSULIN GLARGINE 12 UNIT(S): 100 INJECTION, SOLUTION SUBCUTANEOUS at 22:57

## 2019-03-04 NOTE — CONSULT NOTE ADULT - ASSESSMENT
38 f, 30 w pregnant with breast ca s/p R mastectomy and on chemo now, last chemo 3/1, recurrent C-diff and shigella diarrhea now off antibiotics p/w abd pain and diarrhea for 1 day. she had 9 watery  BMs today. The abd pain gets worse after eating, but denied fever, chills, N/V or dysuria  afebrile, no labs yet    watery diarrhea and abd pain in the setting of pregnant pt s/p chemo 3 days ago and previous recurrent C-diff  ?C-diff again vs chemo induced    * check CBC, CMP  * C-diff  * stool PCR  * stool cx  * start vanco  q 6 for now

## 2019-03-04 NOTE — CONSULT NOTE ADULT - SUBJECTIVE AND OBJECTIVE BOX
HPI:  38 f, 30 w pregnant with breast ca s/p R mastectomy and on chemo now, last chemo 3/1, recurrent C-diff and shigella diarrhea now off antibiotics p/w abd pain and diarrhea for 1 day. she had 9 watery  BMs today. The abd pain gets worse after eating, but denied fever, chills, N/V or dysuria    PAST MEDICAL & SURGICAL HISTORY:  Breast cancer, right  Morbid obesity  History of right total mastectomy      Allergies    No Known Allergies    Intolerances        ANTIMICROBIALS:      OTHER MEDS:  lactated ringers 1000 milliLiter(s) IV Continuous <Continuous>      SOCIAL HISTORY:     lives with  and children, no smoking, alcohol or drug abuse    FAMILY HISTORY:  No recent diarrhea illness in the children or       ROS:    All other systems negative     Constitutional: no fever, no chills, no weight loss, no night sweats  Eye: no eye pain, no redness, no vision changes  ENT:  no sore throat, no rhinorrhea  Cardiovascular:  no chest pain, no palpitation  Respiratory:  no SOB, no cough  GI:  + abd pain, no vomiting, + watery diarrhea  urinary: no dysuria, no hematuria, no flank pain  : no vaginal discharge or bleeding  musculoskeletal:  no joint pain, no joint swelling  skin:  no rash  neurology:  no headache, no seizure, no change in mental status  psych: no anxiety, no depression     Physical Exam:    General:    NAD, non toxic  Head: atraumatic, normocephalic  Eyes: normal sclera and conjunctiva  ENT:   no oropharyngeal lesions, no LAD, neck supple  Cardio:    regular S1,S2, no murmur  Respiratory:   clear b/l, no wheezing  abd:   soft, BS +, not tender, no hepatosplenomegaly  :   pregnant,  no CVAT, no suprapubic tenderness, no sutherland  Musculoskeletal : no joint swelling, no edema  Skin:    no rash  vascular: no lines, normal pulses  Neurologic:     no focal deficits  psych: normal affect, no suicidal ideation      Drug Dosing Weight  Height (cm): 150 (15 Feb 2019 09:13)  Weight (kg): 95.5 (15 Feb 2019 09:13)  BMI (kg/m2): 42.4 (15 Feb 2019 09:13)  BSA (m2): 1.89 (15 Feb 2019 09:13)    Vital Signs Last 24 Hrs  T(F): 98.3 (03-01-19 @ 09:30), Max: 98.3 (03-01-19 @ 09:30)                      RADIOLOGY:    no imaging

## 2019-03-05 ENCOUNTER — APPOINTMENT (OUTPATIENT)
Dept: ANTEPARTUM | Facility: CLINIC | Age: 39
End: 2019-03-05

## 2019-03-05 ENCOUNTER — NON-APPOINTMENT (OUTPATIENT)
Age: 39
End: 2019-03-05

## 2019-03-05 ENCOUNTER — ASOB RESULT (OUTPATIENT)
Age: 39
End: 2019-03-05

## 2019-03-05 DIAGNOSIS — O99.830 OTHER INFECTION CARRIER STATE COMPLICATING PREGNANCY: ICD-10-CM

## 2019-03-05 LAB
BILIRUB UR QL STRIP: NORMAL
CULTURE RESULTS: SIGNIFICANT CHANGE UP
GLUCOSE BLDC GLUCOMTR-MCNC: 102 MG/DL — HIGH (ref 70–99)
GLUCOSE BLDC GLUCOMTR-MCNC: 102 MG/DL — HIGH (ref 70–99)
GLUCOSE BLDC GLUCOMTR-MCNC: 78 MG/DL — SIGNIFICANT CHANGE UP (ref 70–99)
GLUCOSE BLDC GLUCOMTR-MCNC: 81 MG/DL — SIGNIFICANT CHANGE UP (ref 70–99)
GLUCOSE BLDC GLUCOMTR-MCNC: 81 MG/DL — SIGNIFICANT CHANGE UP (ref 70–99)
GLUCOSE BLDC GLUCOMTR-MCNC: 84 MG/DL — SIGNIFICANT CHANGE UP (ref 70–99)
GLUCOSE BLDC GLUCOMTR-MCNC: 91 MG/DL — SIGNIFICANT CHANGE UP (ref 70–99)
GLUCOSE UR-MCNC: NORMAL
HCG UR QL: 0.2 EU/DL
HGB UR QL STRIP.AUTO: NORMAL
KETONES UR-MCNC: NORMAL
LEUKOCYTE ESTERASE UR QL STRIP: NORMAL
NITRITE UR QL STRIP: NORMAL
PH UR STRIP: 7
PROT UR STRIP-MCNC: NORMAL
SP GR UR STRIP: 1.02
SPECIMEN SOURCE: SIGNIFICANT CHANGE UP

## 2019-03-05 PROCEDURE — 99221 1ST HOSP IP/OBS SF/LOW 40: CPT | Mod: 25

## 2019-03-05 PROCEDURE — 76816 OB US FOLLOW-UP PER FETUS: CPT | Mod: 26

## 2019-03-05 PROCEDURE — 99222 1ST HOSP IP/OBS MODERATE 55: CPT | Mod: GC

## 2019-03-05 PROCEDURE — 99232 SBSQ HOSP IP/OBS MODERATE 35: CPT

## 2019-03-05 PROCEDURE — 76818 FETAL BIOPHYS PROFILE W/NST: CPT | Mod: 26

## 2019-03-05 RX ORDER — LOPERAMIDE HCL 2 MG
4 TABLET ORAL ONCE
Qty: 0 | Refills: 0 | Status: COMPLETED | OUTPATIENT
Start: 2019-03-05 | End: 2019-03-05

## 2019-03-05 RX ORDER — INSULIN DETEMIR 100/ML (3)
12 INSULIN PEN (ML) SUBCUTANEOUS AT BEDTIME
Qty: 0 | Refills: 0 | Status: DISCONTINUED | OUTPATIENT
Start: 2019-03-05 | End: 2019-03-08

## 2019-03-05 RX ORDER — LOPERAMIDE HCL 2 MG
2 TABLET ORAL EVERY 12 HOURS
Qty: 0 | Refills: 0 | Status: DISCONTINUED | OUTPATIENT
Start: 2019-03-05 | End: 2019-03-07

## 2019-03-05 RX ADMIN — Medication 125 MILLIGRAM(S): at 06:26

## 2019-03-05 RX ADMIN — Medication 125 MILLIGRAM(S): at 00:27

## 2019-03-05 RX ADMIN — HEPARIN SODIUM 5000 UNIT(S): 5000 INJECTION INTRAVENOUS; SUBCUTANEOUS at 06:26

## 2019-03-05 RX ADMIN — Medication 8 UNIT(S): at 20:20

## 2019-03-05 RX ADMIN — HEPARIN SODIUM 5000 UNIT(S): 5000 INJECTION INTRAVENOUS; SUBCUTANEOUS at 17:56

## 2019-03-05 RX ADMIN — Medication 4 MILLIGRAM(S): at 16:39

## 2019-03-05 RX ADMIN — Medication 8 UNIT(S): at 09:59

## 2019-03-05 RX ADMIN — Medication 10 UNIT(S): at 15:08

## 2019-03-05 RX ADMIN — SODIUM CHLORIDE 125 MILLILITER(S): 9 INJECTION, SOLUTION INTRAVENOUS at 04:00

## 2019-03-05 NOTE — DIETITIAN INITIAL EVALUATION ADULT. - NS AS NUTRI INTERV ED CONTENT
Purpose of the nutrition education/Reviewed Nutrition Therapy for Gestational Diabetes. Discussed balanced meal pattern, monitoring portion sizes, carbohydrate counting, including protein at each meal and snack, and limiting concentrated sweets, eliminated juices from diet. Provided Pt a Consistent Carbohydrate Meal Pattern (Breakfast= 30g, Mid-morning Snack- 15 g, Lunch= 45-60g, Afternoon Snack= 15g, Dinner= 45-60g, Night Snack= 30g). Reinforced importance of self-monitoring blood glucose levels./Nutrition relationship to health/disease/Priority modifications/Recommended modifications

## 2019-03-05 NOTE — DIETITIAN INITIAL EVALUATION ADULT. - PERTINENT MEDS FT
12 units of lantus at night, 8 units humalog before breakfast and dinner, 10units of humalog before lunch, Humalog insulin sliding scale

## 2019-03-05 NOTE — DIETITIAN INITIAL EVALUATION ADULT. - OTHER INFO
Pt seen for diabetes on high-risk maternity unit. Pt states she does not have T2DM but was diagnosed with GDM in January. Started on insulin and injects in her abdomen and side of legs. Confirms good appetite, denies any nausea/vomiting. Presented with diarrhea, originally thought to be c.diff but labs were negative. Per ID diarrhea likely secondary to side effects from chemo for her recent diagnosis of breast cancer.

## 2019-03-05 NOTE — DIETITIAN INITIAL EVALUATION ADULT. - SOURCE
other (specify)/patient comprehensive chart review, Patient discussed on interdisciplinary rounds,  Gwenith/patient/other (specify)

## 2019-03-05 NOTE — CONSULT NOTE ADULT - SUBJECTIVE AND OBJECTIVE BOX
Chief Complaint:  Patient is a 38y old  Female who presents with a chief complaint of     HPI:  39 yo F 30 weeks pregnant with gestational DM and breast cancer (dx'ed .2018) s/p R masectomy and currently on chemotherapy, recent admission for Cdiff presenting with 2 days of diarrhea.    Patient reports diarrhea started yesterday, 12 BMs watery, nonbloody. Reports abdominal pain worsened with meals, LLQ crampy, relieved with BMs. Today patient reports improved abdominal pain and improved diarrhea.    As per patient, she was recently diagnosed with Cdiff colitis which she completed treatment with Vanco PO about a week ago. Denies any diarrhea over the course of last week until yesterday. Denies any fevers, chills, N/V, recent travels. Does have a dughter with strep throat at home but no other family members with diarrhea.        Allergies:  No Known Allergies      Home Medications:    Hospital Medications:  dextrose 40% Gel 15 Gram(s) Oral once PRN  dextrose 5%. 1000 milliLiter(s) IV Continuous <Continuous>  dextrose 50% Injectable 12.5 Gram(s) IV Push once  dextrose 50% Injectable 25 Gram(s) IV Push once  dextrose 50% Injectable 25 Gram(s) IV Push once  glucagon  Injectable 1 milliGRAM(s) IntraMuscular once PRN  heparin  Injectable 5000 Unit(s) SubCutaneous every 12 hours  insulin detemir injectable (LEVEMIR) 12 Unit(s) SubCutaneous at bedtime  insulin lispro (HumaLOG) corrective regimen sliding scale   SubCutaneous three times a day with meals  insulin lispro Injectable (HumaLOG) 8 Unit(s) SubCutaneous before breakfast  insulin lispro Injectable (HumaLOG) 10 Unit(s) SubCutaneous before lunch  insulin lispro Injectable (HumaLOG) 8 Unit(s) SubCutaneous before dinner  lactated ringers 1000 milliLiter(s) IV Continuous <Continuous>      PMHX/PSHX:  Breast cancer, right  Morbid obesity  No pertinent past medical history  History of right total mastectomy  No significant past surgical history      Family history:  No pertinent family history in first degree relatives      Social History:     ROS:   as above      PHYSICAL EXAM:     GENERAL:  NAD  HEENT: sclera anicteric  CHEST:  no increased WOB  HEART: rrr, no mgr  ABDOMEN: obese, nontender on palpation, no rebound or guarding  EXTREMITIES:  no cyanosis,clubbing or edema  SKIN:  No rash/erythema/ecchymoses/petechiae/wounds/abscess/warm/dry  NEURO:  Alert, oriented    Vital Signs:  Vital Signs Last 24 Hrs  T(C): 37.1 (05 Mar 2019 08:42), Max: 37.1 (05 Mar 2019 08:42)  T(F): 98.8 (05 Mar 2019 08:42), Max: 98.8 (05 Mar 2019 08:42)  HR: 69 (05 Mar 2019 08:42) (69 - 85)  BP: 106/70 (05 Mar 2019 08:42) (97/61 - 117/75)  BP(mean): --  RR: 18 (05 Mar 2019 08:42) (18 - 18)  SpO2: 100% (05 Mar 2019 08:42) (98% - 100%)  Daily     Daily Weight in k.1 (05 Mar 2019 10:52)    LABS:                        11.3   9.5   )-----------( 273      ( 04 Mar 2019 17:30 )             31.9     03-04    138  |  105  |  6<L>  ----------------------------<  98  3.7   |  18<L>  |  0.32<L>    Ca    9.4      04 Mar 2019 17:30    TPro  6.7  /  Alb  3.3  /  TBili  0.1<L>  /  DBili  x   /  AST  13  /  ALT  11  /  AlkPhos  117  03-04    LIVER FUNCTIONS - ( 04 Mar 2019 17:30 )  Alb: 3.3 g/dL / Pro: 6.7 g/dL / ALK PHOS: 117 U/L / ALT: 11 U/L / AST: 13 U/L / GGT: x                   Imaging:

## 2019-03-05 NOTE — DIETITIAN INITIAL EVALUATION ADULT. - ENERGY NEEDS
ht: 61 inches, pre pregnancy wt: 190 pounds, prepregnancy BMI: 35.6 kg/m2, IBW: 105 pounds (+/- 10%)  Edema: none noted. Skin per nursing documentation: no pressure ulcers.

## 2019-03-05 NOTE — CONSULT NOTE ADULT - ASSESSMENT
39 yo F 30 weeks pregnant with gestational DM and breast cancer (dx'ed 9.2018) s/p R masectomy and currently on chemotherapy, recent admission for Cdiff presenting with 2 days of diarrhea. Cdiff and GI PCR panel negative. Diarrhea now improving.    1)Diarrhea  Patient presenting with diarrhea following a recent diagnosis of Cdiff which resolved after Vanco PO treatment. Cdiff stool here negative. GI PCR panel negative from 3/5/19. Now improving.  DDx: viral gastroenteritis vs. chemo induced diarrhea    - supportive care as per primary team  - if diarrhea does not continue to improve or worsens, can consider further evaluation  - rest of plan as per primary team 37 yo F 30 weeks pregnant with gestational DM and breast cancer (dx'ed 9.2018) s/p R mastectomy and currently on chemotherapy, recent admission for Cdiff presenting with recurrent diarrhea a few days after finishing vancomycin. Cdiff and GI PCR panel negative. Diarrhea now improving.    1)Diarrhea  Patient presenting with diarrhea following a recent diagnosis of Cdiff which resolved after Vanco PO treatment. Cdiff stool here negative. GI PCR panel negative from 3/5/19. Now improving.  DDx: viral gastroenteritis vs. chemo induced diarrhea    - supportive care as per primary team  - if diarrhea does not continue to improve or worsens, can consider further evaluation  - rest of plan as per primary team

## 2019-03-05 NOTE — DIETITIAN INITIAL EVALUATION ADULT. - ADHERENCE
Pt following a consistent CHO diet after being diagnosed with GDM in January. State she [facility] on [facility]. Pt unaware of what usual blood sugars should be and was having sugars >160mg/dL after meals and 100-102mg/dL in the morning. Was balancing meals with protein and CHO but was drinking juice and using regular sugar. Portion sizes seemed appropriate and somewhat knowledgeable of foods containing CHO. Confirms NKFA. Took a prenatal Multivitamin PTA./fair

## 2019-03-05 NOTE — DIETITIAN INITIAL EVALUATION ADULT. - NS AS NUTRI INTERV MEALS SNACK
Carbohydrate - modified diet/Change diet to CSTCHOGDM. Encourage po intake w/ snacks ordered at meals.

## 2019-03-05 NOTE — PROGRESS NOTE ADULT - ASSESSMENT
38 f, 30 w pregnant with breast ca s/p R mastectomy and on chemo now, last chemo 3/1, recurrent C-diff and shigella diarrhea now off antibiotics p/w abd pain and diarrhea   afebrile, normal WBC  C-diff and PCR both negative    watery diarrhea and abd pain in the setting of pregnant pt s/p chemo 3 days ago and previous recurrent C-diff  C-diff and stool PCR both negative, likely chemo induced    * pt has a good appetite and eating  * monitor off antibiotics

## 2019-03-05 NOTE — PROGRESS NOTE ADULT - SUBJECTIVE AND OBJECTIVE BOX
Follow Up:  diarrhea with h/o C-diff, pregnant on chemo    Interval History: pt stable and afebrile, still has diarrhea but C-diff and stool PCR both negative    ROS:      All other systems negative    Constitutional: no fever, no chills  Head: no trauma  Eyes: no vision changes, no eye pain  ENT:  no sore throat, no rhinorrhea  Cardiovascular:  no chest pain, no palpitation  Respiratory:  no SOB, no cough  GI:  + abd pain and cramping, no vomiting, + watery diarrhea, today 6  urinary: no dysuria, no hematuria, no flank pain  musculoskeletal:  no joint pain, no joint swelling  skin:  no rash  neurology:  no headache, no seizure, no change in mental status  psych: no anxiety, no depression         Allergies  No Known Allergies        ANTIMICROBIALS:      OTHER MEDS:  dextrose 40% Gel 15 Gram(s) Oral once PRN  dextrose 5%. 1000 milliLiter(s) IV Continuous <Continuous>  dextrose 50% Injectable 12.5 Gram(s) IV Push once  dextrose 50% Injectable 25 Gram(s) IV Push once  dextrose 50% Injectable 25 Gram(s) IV Push once  glucagon  Injectable 1 milliGRAM(s) IntraMuscular once PRN  heparin  Injectable 5000 Unit(s) SubCutaneous every 12 hours  insulin detemir injectable (LEVEMIR) 12 Unit(s) SubCutaneous at bedtime  insulin lispro (HumaLOG) corrective regimen sliding scale   SubCutaneous three times a day with meals  insulin lispro Injectable (HumaLOG) 8 Unit(s) SubCutaneous before breakfast  insulin lispro Injectable (HumaLOG) 10 Unit(s) SubCutaneous before lunch  insulin lispro Injectable (HumaLOG) 8 Unit(s) SubCutaneous before dinner  lactated ringers 1000 milliLiter(s) IV Continuous <Continuous>      Vital Signs Last 24 Hrs  T(C): 37.1 (05 Mar 2019 08:42), Max: 37.1 (05 Mar 2019 08:42)  T(F): 98.8 (05 Mar 2019 08:42), Max: 98.8 (05 Mar 2019 08:42)  HR: 69 (05 Mar 2019 08:42) (69 - 85)  BP: 106/70 (05 Mar 2019 08:42) (97/61 - 117/75)  BP(mean): --  RR: 18 (05 Mar 2019 08:42) (18 - 18)  SpO2: 100% (05 Mar 2019 08:42) (98% - 100%)    Physical Exam:  General:    NAD,  non toxic, A&O x 3  Head: atraumatic, normocephalic  Eye: normal sclera and conjunctiva  ENT:    no oropharyngeal lesions,   no LAD,   neck supple  Cardio:     regular S1, S2,  no murmur  Respiratory:    clear b/l,    no wheezing  abd:     soft,   BS +,   no tenderness,    no organomegaly  :   no CVAT,  no suprapubic tenderness,   no  sutherland  Musculoskeletal:   no joint swelling,   no edema  vascular: no lines, normal pulses  Skin:    no rash  Neurologic:     no focal deficit  psych: normal affect, no suicidal ideation                          11.3   9.5   )-----------( 273      ( 04 Mar 2019 17:30 )             31.9       03-04    138  |  105  |  6<L>  ----------------------------<  98  3.7   |  18<L>  |  0.32<L>    Ca    9.4      04 Mar 2019 17:30    TPro  6.7  /  Alb  3.3  /  TBili  0.1<L>  /  DBili  x   /  AST  13  /  ALT  11  /  AlkPhos  117  03-04          MICROBIOLOGY:  v  .Stool Feces  03-05-19   GI PCR Results: NOT detected            Clostridium difficile [facility] Toxins A&amp;B, EIA:   Negative (03-04-19 @ 16:52)  Clostridium difficile [facility] Interpretation: Negative for toxigenic C. Difficile.     RADIOLOGY:  no imaging

## 2019-03-06 ENCOUNTER — OUTPATIENT (OUTPATIENT)
Dept: OUTPATIENT SERVICES | Facility: HOSPITAL | Age: 39
LOS: 1 days | Discharge: HOME | End: 2019-03-06
Payer: MEDICAID

## 2019-03-06 DIAGNOSIS — Z90.11 ACQUIRED ABSENCE OF RIGHT BREAST AND NIPPLE: Chronic | ICD-10-CM

## 2019-03-06 DIAGNOSIS — C50.919 MALIGNANT NEOPLASM OF UNSPECIFIED SITE OF UNSPECIFIED FEMALE BREAST: ICD-10-CM

## 2019-03-06 LAB
ANION GAP SERPL CALC-SCNC: 13 MMOL/L — SIGNIFICANT CHANGE UP (ref 5–17)
BUN SERPL-MCNC: 6 MG/DL — LOW (ref 7–23)
CALCIUM SERPL-MCNC: 8.8 MG/DL — SIGNIFICANT CHANGE UP (ref 8.4–10.5)
CHLORIDE SERPL-SCNC: 102 MMOL/L — SIGNIFICANT CHANGE UP (ref 96–108)
CO2 SERPL-SCNC: 19 MMOL/L — LOW (ref 22–31)
CREAT SERPL-MCNC: 0.36 MG/DL — LOW (ref 0.5–1.3)
CULTURE RESULTS: SIGNIFICANT CHANGE UP
GLUCOSE BLDC GLUCOMTR-MCNC: 101 MG/DL — HIGH (ref 70–99)
GLUCOSE BLDC GLUCOMTR-MCNC: 74 MG/DL — SIGNIFICANT CHANGE UP (ref 70–99)
GLUCOSE BLDC GLUCOMTR-MCNC: 78 MG/DL — SIGNIFICANT CHANGE UP (ref 70–99)
GLUCOSE BLDC GLUCOMTR-MCNC: 87 MG/DL — SIGNIFICANT CHANGE UP (ref 70–99)
GLUCOSE BLDC GLUCOMTR-MCNC: 88 MG/DL — SIGNIFICANT CHANGE UP (ref 70–99)
GLUCOSE BLDC GLUCOMTR-MCNC: 96 MG/DL — SIGNIFICANT CHANGE UP (ref 70–99)
GLUCOSE BLDC GLUCOMTR-MCNC: 98 MG/DL — SIGNIFICANT CHANGE UP (ref 70–99)
GLUCOSE SERPL-MCNC: 83 MG/DL — SIGNIFICANT CHANGE UP (ref 70–99)
POTASSIUM SERPL-MCNC: 3.5 MMOL/L — SIGNIFICANT CHANGE UP (ref 3.5–5.3)
POTASSIUM SERPL-SCNC: 3.5 MMOL/L — SIGNIFICANT CHANGE UP (ref 3.5–5.3)
SODIUM SERPL-SCNC: 134 MMOL/L — LOW (ref 135–145)
SPECIMEN SOURCE: SIGNIFICANT CHANGE UP

## 2019-03-06 PROCEDURE — 99232 SBSQ HOSP IP/OBS MODERATE 35: CPT

## 2019-03-06 PROCEDURE — 99231 SBSQ HOSP IP/OBS SF/LOW 25: CPT | Mod: 25

## 2019-03-06 RX ORDER — ONDANSETRON 8 MG/1
4 TABLET, FILM COATED ORAL ONCE
Qty: 0 | Refills: 0 | Status: COMPLETED | OUTPATIENT
Start: 2019-03-06 | End: 2019-03-06

## 2019-03-06 RX ORDER — SODIUM CHLORIDE 9 MG/ML
1000 INJECTION, SOLUTION INTRAVENOUS
Qty: 0 | Refills: 0 | Status: DISCONTINUED | OUTPATIENT
Start: 2019-03-06 | End: 2019-03-07

## 2019-03-06 RX ORDER — BENZOCAINE AND MENTHOL 5; 1 G/100ML; G/100ML
1 LIQUID ORAL EVERY 4 HOURS
Qty: 0 | Refills: 0 | Status: DISCONTINUED | OUTPATIENT
Start: 2019-03-06 | End: 2019-03-08

## 2019-03-06 RX ADMIN — Medication 2 MILLIGRAM(S): at 05:09

## 2019-03-06 RX ADMIN — HEPARIN SODIUM 5000 UNIT(S): 5000 INJECTION INTRAVENOUS; SUBCUTANEOUS at 17:23

## 2019-03-06 RX ADMIN — HEPARIN SODIUM 5000 UNIT(S): 5000 INJECTION INTRAVENOUS; SUBCUTANEOUS at 05:09

## 2019-03-06 RX ADMIN — Medication 2 MILLIGRAM(S): at 17:24

## 2019-03-06 RX ADMIN — Medication 12 UNIT(S): at 00:49

## 2019-03-06 RX ADMIN — Medication 12 UNIT(S): at 23:25

## 2019-03-06 RX ADMIN — Medication 8 UNIT(S): at 09:30

## 2019-03-06 RX ADMIN — Medication 8 UNIT(S): at 20:42

## 2019-03-06 RX ADMIN — Medication 10 UNIT(S): at 15:02

## 2019-03-06 RX ADMIN — Medication 75 MILLIGRAM(S): at 17:23

## 2019-03-06 RX ADMIN — ONDANSETRON 4 MILLIGRAM(S): 8 TABLET, FILM COATED ORAL at 20:01

## 2019-03-06 RX ADMIN — SODIUM CHLORIDE 125 MILLILITER(S): 9 INJECTION, SOLUTION INTRAVENOUS at 05:43

## 2019-03-06 NOTE — PROGRESS NOTE ADULT - SUBJECTIVE AND OBJECTIVE BOX
Follow Up:  diarrhea with h/o C-diff, pregnant on chemo    Interval History: pt stable and afebrile, C-diff and stool PCR both negative, diarrhea resolved    ROS:      All other systems negative    Constitutional: no fever, no chills  Head: no trauma  Eyes: no vision changes, no eye pain  ENT:  no sore throat, no rhinorrhea  Cardiovascular:  no chest pain, no palpitation  Respiratory:  no SOB, no cough  GI: no abd pain and cramping, no vomiting, resolved diarrhea  urinary: no dysuria, no hematuria, no flank pain  musculoskeletal:  no joint pain, no joint swelling  skin:  no rash  neurology:  no headache, no seizure, no change in mental status  psych: no anxiety, no depression           Allergies  No Known Allergies        ANTIMICROBIALS:      OTHER MEDS:  dextrose 40% Gel 15 Gram(s) Oral once PRN  dextrose 5%. 1000 milliLiter(s) IV Continuous <Continuous>  dextrose 50% Injectable 12.5 Gram(s) IV Push once  dextrose 50% Injectable 25 Gram(s) IV Push once  dextrose 50% Injectable 25 Gram(s) IV Push once  glucagon  Injectable 1 milliGRAM(s) IntraMuscular once PRN  heparin  Injectable 5000 Unit(s) SubCutaneous every 12 hours  insulin detemir injectable (LEVEMIR) 12 Unit(s) SubCutaneous at bedtime  insulin lispro (HumaLOG) corrective regimen sliding scale   SubCutaneous three times a day with meals  insulin lispro Injectable (HumaLOG) 8 Unit(s) SubCutaneous before breakfast  insulin lispro Injectable (HumaLOG) 10 Unit(s) SubCutaneous before lunch  insulin lispro Injectable (HumaLOG) 8 Unit(s) SubCutaneous before dinner  lactated ringers 1000 milliLiter(s) IV Continuous <Continuous>  lactated ringers. 1000 milliLiter(s) IV Continuous <Continuous>  loperamide 2 milliGRAM(s) Oral every 12 hours PRN      Vital Signs Last 24 Hrs  T(C): 36.8 (06 Mar 2019 08:49), Max: 37.1 (05 Mar 2019 21:40)  T(F): 98.2 (06 Mar 2019 08:49), Max: 98.8 (05 Mar 2019 21:40)  HR: 91 (06 Mar 2019 08:49) (79 - 92)  BP: 105/67 (06 Mar 2019 08:49) (96/64 - 108/73)  BP(mean): --  RR: 18 (06 Mar 2019 08:49) (18 - 18)  SpO2: 98% (06 Mar 2019 08:49) (98% - 100%)    Physical Exam:  General:    NAD,  non toxic, A&O x 3  Head: atraumatic, normocephalic  Eye: normal sclera and conjunctiva  ENT:    no oropharyngeal lesions,   no LAD,   neck supple  Cardio:     regular S1, S2,  no murmur  Respiratory:    clear b/l,    no wheezing  abd:     soft,   BS +,   no tenderness,    no organomegaly  :   no CVAT,  no suprapubic tenderness,   no  sutherland  Musculoskeletal:   no joint swelling,   no edema  vascular: no lines, normal pulses  Skin:    no rash  Neurologic:     no focal deficit  psych: normal affect, no suicidal ideation                          11.3   9.5   )-----------( 273      ( 04 Mar 2019 17:30 )             31.9       03-06    134<L>  |  102  |  6<L>  ----------------------------<  83  3.5   |  19<L>  |  0.36<L>    Ca    8.8      06 Mar 2019 06:42    TPro  6.7  /  Alb  3.3  /  TBili  0.1<L>  /  DBili  x   /  AST  13  /  ALT  11  /  AlkPhos  117  03-04          MICROBIOLOGY:  v  .Stool Feces  03-05-19   GI PCR Results: NOT detected      .Stool Feces  03-05-19   No enteric pathogens to date: Final culture pending  --  --            Clostridium difficile [facility] Toxins A&amp;B, EIA:   Negative (03-04-19 @ 16:52)  Clostridium difficile [facility] Interpretation: Negative for toxigenic C. Difficile.  history. (03-04-19 @ 16:52)      RADIOLOGY:  no imaging

## 2019-03-06 NOTE — HISTORY OF PRESENT ILLNESS
[de-identified] : Ms. EVANGELISTA HESTER  is a 38 year old female here for an evaluation of breast cancer. Her oncologic history is as follows:\par \par She felt a right breast mass in 2018 and was evaluated seen by GYN who sent her for breast imaging. She underwent diagnostic breast imaging on 18 which showed at 12:00 position of right breast there is a lobulated focus, measuring 2.0 x 2.0 x 2.2 cm 2 cm FN. 4 mm deep to the skin. Left breast benign. No enlarged axillary lymph nodes. Bx recommended. 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 There is marked lymphocytic infiltrate around the tumor. She was scheduled for MRI but found out that she was pregnant. Current gestation age at the time of initial consult: 11 weeks\par \par 10/23/18\par She is here with her . We discussed the diagnosis of rapidly growing triple negative breast ca, the size has increased since diagnosis and that chemotherapy will be needed to treat this cancer which can not be started until she is 14 weeks. They want to keep the pregnancy understanding that it will cause delay in treatment and potential risk to the patient and fetus. We discussed that lumpectomy will have issues with positive margins and RT will be contraindicated during to pregnancy and therefore u/l mastectomy is recommend. I discussed with Dr Granger that expander placement at the time of surgery will be an option. She is seeing the pt tomorrow and will discuss surgery and reconstruction options with her. She also saw Dr Eden Patrick med onc for a a second opinion. \par \par s/p surgery (right mastectomy) 18 , 4.5 cm tumor and sentinel node negative- path report d/w her in detail. Tumor grew more than double in size between dx and surgery and is s/o aggressive tumor. Discussed to start chemo 18. S/e of chemo jody during pregnancy, expected and unexpected, pertaining to her and the fetus d/w her. Discussed to see MFM b/w chemo. Also a candidate for PMRT due to large tumor size. LMP 18\par \par I had extensive discussion with the patient and spouse regarding breast cancer in pregnancy and the treatment options that are safe for both the mother and the growing fetus. She refused termination of pregnancy after finding out cancer diagnosis.  We discussed that all treatments including chemotherapy and surgery are contraindicated in first trimester but are considered relatively safe in second and third trimester.  We have discussed medical termination of pregnancy as an option but patient decided to keep the pregnancy. We reviewed that the largest experience in pregnancy has been with anthracycline and alkylating agent chemotherapy. She will be a candidate for chemotherapy with Adriamycin plus cyclophosphamide in a sequential dose dense manner. We reviewed data from [place] registry which showed Neulasta is reasonably safe to use in 2nd and third trimester although it was a very small dataset. . There is not enough data to use taxanes in pregnancy therefore Taxol will be administered post partum. Potential s/e to the patient and growing fetus were discussed with the patient and  in detail via a . The patient understood all the potential side effects and signed an informed consent after discussing the risks, benefits and alternatives.\par \par \par PLAN: Starting 18 ( 17 weeks), She will receive adriamycin every 2 weeks x 4 cycle followed by Cytoxan every 2 weeks x 4 cycles. We plan to hold chemotherapy 4 weeks prior to delivery to prevent  neutropenic complications. Currently she is planned for elective induction (NVD) at 37 weeks. We would start Taxol post partum\par \par PREMEDS/SUPPORTIVE CARE: Zofran ativan and dex are listed as part of prechemo antiemetic regimen in NCCN guidelines for breast cancer in pregnancy. The use of Emend is controversial in pregnancy. We discussed the issue of port placement in the OR, but that would increase anesthesia time by 45 min therefore we would treat her peripherally and likely do port placement after delivery. We discussed the issue of preeclampsia or gestational diabetes with steroids. She will be monitored closely by MFM. She will see MFM between every chemo session for close fetal monitoring. \par \par I explained her that EOD work up is limited due to risk of radiation exposure to the growing fetus. We plan to perform CT scans or a PET/CT scan after delivery. She will also be a candidate for PMRT due to large tumor size. \par \par S/p infection with shigella and c-diff, confirmed with cultures and PCR x 2, was hospitalized for diarrhea and abd pain, treated with ampicillin and vanco,  Ampicillin completed yesterday. Vanco 2 more days left. Patient reports mom had Shigella 2 years ago, Mom has come in to help her 2 months prior but is not ill. She denies sick contacts. \par  [de-identified] : Ms. EVANGELISTA RUBIO is here for f/u for right breast cancer, 29 weeks gestation,  s/p Adriamycin x 4 completed 2/1/19.  Cytoxan C2D1 today. \par Adriamycin course complicated by Shigella, and C-diff causing delays of cycle 3 and 4.  She was treated with long course of vanco, now off it for past couple of days. Being followed by infectious disease. BMs normal. She is feeling better now. No watery diarrhea, bloody stools or abdominal cramping.  Mild nausea, no vomiting, mouth sores, fever and chills. No Bone pain, no neuropathy. Mild fatigue and altered taste x 3 days after chemo. \par Baby is moving well, no vaginal discharge or vaginal bleeding. No back pain, No pelvic pain. She has healed well from the breast surgery with minimal pain when lifting arm otherwise is doing well. She was diagnosed with gestational diabetes and is taking insulin.\par \par

## 2019-03-06 NOTE — ASSESSMENT
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 She is currently 22 weeks pregnant. A CXR and abdominal sono didn’t show distant mets.  BRCA negative. ddACT sequential started 12/7/18. C4 Justin on 2/1/19. Cycle 2 of Cytoxan today.\par \par - Breast ca: On sequential ddACT chemo. She developed Cdiff and shigella diarrhea after C2 Justin Completed abx course. Sx improved no further diarrhea. She is feeling better now. Counts stable. ok to proceed with chemo C# 2 cytoxan\par - Anemia secondary to chemotherapy mild, no need for transfusion will continue to monitor.Transfusion if Hb<8.\par - Completed long course of Vanco. Followed by  Dr Glover next appt in 2 weeks. Symptoms of diarrhea have now resolved.\par -Abd pain secondary to infectious diarrhea: currently resolved. We discussed crampy abd pain could be a sign of early labor. She understood and will call OB/MFM if pain develops.\par - Chemo induced N/V- She will use Reglan as needed. IV fluid prn. no emend\par - Chemo induced dysgeusia and fatigue: Encourage p.o. fluids, small frequent meals.\par - Instructed to call office and go directly to the emergency room with fever more than 100.4, shaking chills, productive cough, sore throat, shortness of breath or urinary symptoms. Patient verbalized understanding and agreement.\par - Emotional support provided, all questions answered.\par - Patient reminded to call with any new symptoms day or night and any change with the pregnancy to notify MFM right away. Emergency phone number again provided to patient.\par -Gestational diabetes, She will continue with Insulin and f/u with MFM for Diabetes control.\par -Elevated Alk phos reactive secondary to chemotherapy.Will continue to monitor.\par \par RTO 2 weeks

## 2019-03-06 NOTE — CONSULT LETTER
[FreeTextEntry2] : Vesna Granger MD [FreeTextEntry3] : Viridiana Patrick M.D.\par  of Medicine\par [facility]\par [facility] [place]\par [address] [place] [address]\par [address]\par Tele # [phone]; Fax [phone]

## 2019-03-06 NOTE — PHYSICAL EXAM
[de-identified] : S/p right mastectomy healing well. No CW or axillary nodules. [de-identified] : Gravid uterus.

## 2019-03-06 NOTE — CHART NOTE - NSCHARTNOTEFT_GEN_A_CORE
Gastroenterology Brief Note   - diarrhea improved yesterday   - rest of care per primary team   - GI will sign off

## 2019-03-06 NOTE — PROGRESS NOTE ADULT - ASSESSMENT
38 f, 30 w pregnant with breast ca s/p R mastectomy and on chemo now, last chemo 3/1, recurrent C-diff and shigella diarrhea now off antibiotics p/w abd pain and diarrhea   afebrile, normal WBC  C-diff and PCR both negative  stool cx also negative    watery diarrhea and abd pain in the setting of pregnant pt s/p chemo 3 d PTA and previous recurrent C-diff  C-diff, stool PCR and stool cx negative, diarrhea resolved  likely chemo induced    * pt has a good appetite and eating, no diarrhea  * will sign off, please call with questions

## 2019-03-07 DIAGNOSIS — A04.72 ENTEROCOLITIS DUE TO CLOSTRIDIUM DIFFICILE, NOT SPECIFIED AS RECURRENT: ICD-10-CM

## 2019-03-07 DIAGNOSIS — O26.849 UTERINE SIZE-DATE DISCREPANCY, UNSPECIFIED TRIMESTER: ICD-10-CM

## 2019-03-07 LAB
ALBUMIN SERPL ELPH-MCNC: 3.3 G/DL — SIGNIFICANT CHANGE UP (ref 3.3–5)
ALP SERPL-CCNC: 110 U/L — SIGNIFICANT CHANGE UP (ref 40–120)
ALT FLD-CCNC: 13 U/L — SIGNIFICANT CHANGE UP (ref 10–45)
ANION GAP SERPL CALC-SCNC: 13 MMOL/L — SIGNIFICANT CHANGE UP (ref 5–17)
AST SERPL-CCNC: 22 U/L — SIGNIFICANT CHANGE UP (ref 10–40)
BILIRUB SERPL-MCNC: 0.2 MG/DL — SIGNIFICANT CHANGE UP (ref 0.2–1.2)
BUN SERPL-MCNC: 5 MG/DL — LOW (ref 7–23)
CALCIUM SERPL-MCNC: 9.4 MG/DL — SIGNIFICANT CHANGE UP (ref 8.4–10.5)
CHLORIDE SERPL-SCNC: 101 MMOL/L — SIGNIFICANT CHANGE UP (ref 96–108)
CO2 SERPL-SCNC: 20 MMOL/L — LOW (ref 22–31)
CREAT SERPL-MCNC: 0.36 MG/DL — LOW (ref 0.5–1.3)
GLUCOSE BLDC GLUCOMTR-MCNC: 102 MG/DL — HIGH (ref 70–99)
GLUCOSE BLDC GLUCOMTR-MCNC: 74 MG/DL — SIGNIFICANT CHANGE UP (ref 70–99)
GLUCOSE BLDC GLUCOMTR-MCNC: 80 MG/DL — SIGNIFICANT CHANGE UP (ref 70–99)
GLUCOSE BLDC GLUCOMTR-MCNC: 82 MG/DL — SIGNIFICANT CHANGE UP (ref 70–99)
GLUCOSE BLDC GLUCOMTR-MCNC: 85 MG/DL — SIGNIFICANT CHANGE UP (ref 70–99)
GLUCOSE BLDC GLUCOMTR-MCNC: 86 MG/DL — SIGNIFICANT CHANGE UP (ref 70–99)
GLUCOSE BLDC GLUCOMTR-MCNC: 93 MG/DL — SIGNIFICANT CHANGE UP (ref 70–99)
GLUCOSE SERPL-MCNC: 70 MG/DL — SIGNIFICANT CHANGE UP (ref 70–99)
POTASSIUM SERPL-MCNC: 3.4 MMOL/L — LOW (ref 3.5–5.3)
POTASSIUM SERPL-SCNC: 3.4 MMOL/L — LOW (ref 3.5–5.3)
PROT SERPL-MCNC: 6.4 G/DL — SIGNIFICANT CHANGE UP (ref 6–8.3)
SODIUM SERPL-SCNC: 134 MMOL/L — LOW (ref 135–145)

## 2019-03-07 PROCEDURE — 99223 1ST HOSP IP/OBS HIGH 75: CPT

## 2019-03-07 PROCEDURE — 99231 SBSQ HOSP IP/OBS SF/LOW 25: CPT | Mod: 25

## 2019-03-07 RX ORDER — LOPERAMIDE HCL 2 MG
2 TABLET ORAL EVERY 6 HOURS
Qty: 0 | Refills: 0 | Status: DISCONTINUED | OUTPATIENT
Start: 2019-03-07 | End: 2019-03-08

## 2019-03-07 RX ORDER — SODIUM CHLORIDE 9 MG/ML
1000 INJECTION, SOLUTION INTRAVENOUS
Qty: 0 | Refills: 0 | Status: DISCONTINUED | OUTPATIENT
Start: 2019-03-07 | End: 2019-03-08

## 2019-03-07 RX ADMIN — Medication 8 UNIT(S): at 08:25

## 2019-03-07 RX ADMIN — Medication 2 MILLIGRAM(S): at 06:44

## 2019-03-07 RX ADMIN — Medication 12 UNIT(S): at 23:21

## 2019-03-07 RX ADMIN — SODIUM CHLORIDE 125 MILLILITER(S): 9 INJECTION, SOLUTION INTRAVENOUS at 06:44

## 2019-03-07 RX ADMIN — Medication 2 MILLIGRAM(S): at 17:58

## 2019-03-07 RX ADMIN — Medication 2 MILLIGRAM(S): at 12:27

## 2019-03-07 RX ADMIN — SODIUM CHLORIDE 125 MILLILITER(S): 9 INJECTION, SOLUTION INTRAVENOUS at 03:21

## 2019-03-07 RX ADMIN — Medication 8 UNIT(S): at 19:58

## 2019-03-07 RX ADMIN — Medication 75 MILLIGRAM(S): at 17:58

## 2019-03-07 RX ADMIN — HEPARIN SODIUM 5000 UNIT(S): 5000 INJECTION INTRAVENOUS; SUBCUTANEOUS at 17:58

## 2019-03-07 RX ADMIN — Medication 10 UNIT(S): at 13:58

## 2019-03-07 RX ADMIN — Medication 2 MILLIGRAM(S): at 23:22

## 2019-03-07 RX ADMIN — HEPARIN SODIUM 5000 UNIT(S): 5000 INJECTION INTRAVENOUS; SUBCUTANEOUS at 06:44

## 2019-03-07 NOTE — CONSULT NOTE ADULT - ASSESSMENT
37 yo f w/ pmhx breast ca s/p mastectomy on chemotherapy who  presented with abdominal cramping and severe diarrhea, likely secondary to chemotherapy.    1) Diarrhea- infectious workup negative  - being monitored off of antibiotics  - likely secondary to chemotherapy, can continue supportive care  - continue imodium OTC, consider lomotil if refractory   - may need aggressive ppx with next chemotherapy treatment     2) Breast Cancer- triple negative disease on chemotherapy  - due for cytoxan again next Friday  - can f/u as outpatient to resume treatment assuming diarrhea self-resolves  - has appt with Dr. Patrick next week    Nery Cat, PGY-4  Hematology-Oncology Fellow  [phone] ([place]) [phone] ([facility]) 39 yo f w/ pmhx breast ca s/p mastectomy on chemotherapy who  presented with abdominal cramping and severe diarrhea, likely secondary to chemotherapy.    1) Diarrhea- infectious workup negative  - being monitored off of antibiotics  - likely secondary to chemotherapy, can continue supportive care  - continue imodium OTC, consider lomotil if refractory   - may need aggressive ppx with next chemotherapy treatment   - if persistent, may need GI evaluation with colonoscopy     2) Breast Cancer- triple negative disease on chemotherapy  - due for cytoxan again next Friday  - can f/u as outpatient to resume treatment assuming diarrhea self-resolves  - has appt with Dr. Patrick next week    Nery Cat, PGY-4  Hematology-Oncology Fellow  [phone] ([place]) [phone] ([facility])

## 2019-03-07 NOTE — CONSULT NOTE ADULT - SUBJECTIVE AND OBJECTIVE BOX
HPI:  39 yo f w/ pmhx breast ca s/p mastectomy on chemotherapy who  presented with abdominal cramping and severe diarrhea. She is currently 30 weeks pregnancy. Last chemo was on 3/1 with cytoxan. She then developed severe diarrhea on Monday with associated cramping pain. Of note, pt has history of recurrent C-diff and shigella diarrhea. She completed antibiotics about 8 days ago. Did not have any symptoms until this presentation. She reports having issues with prior chemotherapy as well. Has tried imodium in the past. At first had about 10 BMs. Feels better currently. Has had about 5 episodes today. No more abdominal pain, tolerating diet.     PAST MEDICAL & SURGICAL HISTORY:  Breast cancer, right  Morbid obesity  History of right total mastectomy      Allergies    No Known Allergies    Intolerances        MEDICATIONS  (STANDING):  dextrose 5%. 1000 milliLiter(s) (50 mL/Hr) IV Continuous <Continuous>  dextrose 50% Injectable 12.5 Gram(s) IV Push once  dextrose 50% Injectable 25 Gram(s) IV Push once  dextrose 50% Injectable 25 Gram(s) IV Push once  heparin  Injectable 5000 Unit(s) SubCutaneous every 12 hours  insulin detemir injectable (LEVEMIR) 12 Unit(s) SubCutaneous at bedtime  insulin lispro (HumaLOG) corrective regimen sliding scale   SubCutaneous three times a day with meals  insulin lispro Injectable (HumaLOG) 8 Unit(s) SubCutaneous before breakfast  insulin lispro Injectable (HumaLOG) 10 Unit(s) SubCutaneous before lunch  insulin lispro Injectable (HumaLOG) 8 Unit(s) SubCutaneous before dinner  lactated ringers 1000 milliLiter(s) (125 mL/Hr) IV Continuous <Continuous>  lactated ringers. 1000 milliLiter(s) (125 mL/Hr) IV Continuous <Continuous>  loperamide 2 milliGRAM(s) Oral every 6 hours  oseltamivir 75 milliGRAM(s) Oral every 24 hours    MEDICATIONS  (PRN):  benzocaine 15 mG/menthol 3.6 mG Lozenge 1 Lozenge Oral every 4 hours PRN Mouth Sores  dextrose 40% Gel 15 Gram(s) Oral once PRN Blood Glucose LESS THAN 70 milliGRAM(s)/deciliter  glucagon  Injectable 1 milliGRAM(s) IntraMuscular once PRN Glucose LESS THAN 70 milligrams/deciliter      FAMILY HISTORY:  No pertinent family history in first degree relatives      SOCIAL HISTORY: No EtOH, no tobacco        VITALS:   T(F): 98.2 (03-07-19 @ 13:00), Max: 98.8 (03-06-19 @ 17:20)  HR: 76 (03-07-19 @ 13:00)  BP: 96/62 (03-07-19 @ 13:00)  RR: 18 (03-07-19 @ 13:00)  SpO2: 98% (03-07-19 @ 13:00)  Wt(kg): --    PHYSICAL EXAM    GENERAL: NAD, well-developed  HEAD:  Atraumatic, Normocephalic  EYES: EOMI, PERRLA, conjunctiva and sclera clear  NECK: Supple, No JVD  CHEST/LUNG: Clear to auscultation bilaterally; No wheeze  HEART: Regular rate and rhythm; No murmurs, rubs, or gallops  ABDOMEN: Soft, Nontender, Nondistended  EXTREMITIES: No clubbing, cyanosis, or edema  NEUROLOGY: non-focal  SKIN: No rashes or lesions    LABS:     03-07    134<L>  |  101  |  5<L>  ----------------------------<  70  3.4<L>   |  20<L>  |  0.36<L>    Ca    9.4      07 Mar 2019 11:05    TPro  6.4  /  Alb  3.3  /  TBili  0.2  /  DBili  x   /  AST  22  /  ALT  13  /  AlkPhos  110  03-07        .Stool Feces  03-05 @ 00:51   GI PCR Results: NOT detected  *******Please Note:*******  GI panel PCR evaluates for:  Campylobacter, Plesiomonas shigelloides, Salmonella,  Vibrio, Yersinia enterocolitica, Enteroaggregative  Escherichia coli (EAEC), Enteropathogenic E.coli (EPEC),  Enterotoxigenic E. coli (ETEC) lt/st, Shiga-like  toxin-producing E. coli (STEC) stx1/stx2,  Shigella/ Enteroinvasive E. coli (EIEC), Cryptosporidium,  Cyclospora cayetanensis, Entamoeba histolytica,  Giardia lamblia, Adenovirus F 40/41, Astrovirus,  Norovirus GI/GII, Rotavirus A, Sapovirus  --  --      .Stool Feces  03-05 @ 00:49   No enteric pathogens isolated.  (Stool culture examined for Salmonella,  Shigella, Campylobacter, Aeromonas, Plesiomonas,  Vibrio, E.coli O157 and Yersinia)  --  --      .Urine Clean Catch (Midstream)  01-18 @ 13:56   <10,000 CFU/ml Normal Urogenital travis present  --  --      .Stool Feces  01-18 @ 10:36   Shigella/Enteroinvasive E. coli (EIEC)  DETECTED by PCR  *******Please Note:*******  GI panel PCR evaluates for:  Campylobacter, Plesiomonas shigelloides, Salmonella,  Vibrio, Yersinia enterocolitica, Enteroaggregative  Escherichia coli (EAEC), Enteropathogenic E.coli (EPEC),  Enterotoxigenic E. coli (ETEC) lt/st, Shiga-like  toxin-producing E. coli (STEC) stx1/stx2,  Shigella/ Enteroinvasive E. coli (EIEC), Cryptosporidium,  Cyclospora cayetanensis, Entamoeba histolytica,  Giardia lamblia, Adenovirus F 40/41, Astrovirus,  Norovirus GI/GII, Rotavirus A, Sapovirus  --  --      .Blood Blood-Peripheral  01-03 @ 18:25   No growth at 5 days.  --  --      .Stool Feces  01-03 @ 16:49   Moderate Shigella species  (Stool culture examined for Salmonella,  Shigella, Campylobacter, Aeromonas, Plesiomonas,  Vibrio, E.coli O157 and Yersinia)  --  Shigella species      .Urine Clean Catch (Midstream)  01-03 @ 05:32   <10,000 CFU/ml  Normal Urogenital travis present  --  --      .Urine  12-11 @ 06:19   Culture grew 3 or more types of organisms which indicate  collection contamination; consider recollection only if clinically  indicated.  --  --          IMAGING:

## 2019-03-08 ENCOUNTER — TRANSCRIPTION ENCOUNTER (OUTPATIENT)
Age: 39
End: 2019-03-08

## 2019-03-08 ENCOUNTER — APPOINTMENT (OUTPATIENT)
Dept: ANTEPARTUM | Facility: CLINIC | Age: 39
End: 2019-03-08

## 2019-03-08 ENCOUNTER — APPOINTMENT (OUTPATIENT)
Dept: PEDIATRIC CARDIOLOGY | Facility: CLINIC | Age: 39
End: 2019-03-08

## 2019-03-08 ENCOUNTER — ASOB RESULT (OUTPATIENT)
Age: 39
End: 2019-03-08

## 2019-03-08 VITALS
OXYGEN SATURATION: 98 % | SYSTOLIC BLOOD PRESSURE: 131 MMHG | HEART RATE: 71 BPM | DIASTOLIC BLOOD PRESSURE: 85 MMHG | RESPIRATION RATE: 18 BRPM | TEMPERATURE: 97 F

## 2019-03-08 LAB
ALBUMIN SERPL ELPH-MCNC: 2.8 G/DL — LOW (ref 3.3–5)
ALP SERPL-CCNC: 100 U/L — SIGNIFICANT CHANGE UP (ref 40–120)
ALT FLD-CCNC: 16 U/L — SIGNIFICANT CHANGE UP (ref 10–45)
ANION GAP SERPL CALC-SCNC: 14 MMOL/L — SIGNIFICANT CHANGE UP (ref 5–17)
AST SERPL-CCNC: 19 U/L — SIGNIFICANT CHANGE UP (ref 10–40)
BILIRUB SERPL-MCNC: 0.1 MG/DL — LOW (ref 0.2–1.2)
BUN SERPL-MCNC: 6 MG/DL — LOW (ref 7–23)
CALCIUM SERPL-MCNC: 8.7 MG/DL — SIGNIFICANT CHANGE UP (ref 8.4–10.5)
CHLORIDE SERPL-SCNC: 105 MMOL/L — SIGNIFICANT CHANGE UP (ref 96–108)
CO2 SERPL-SCNC: 20 MMOL/L — LOW (ref 22–31)
CREAT SERPL-MCNC: 0.38 MG/DL — LOW (ref 0.5–1.3)
GLUCOSE BLDC GLUCOMTR-MCNC: 100 MG/DL — HIGH (ref 70–99)
GLUCOSE BLDC GLUCOMTR-MCNC: 79 MG/DL — SIGNIFICANT CHANGE UP (ref 70–99)
GLUCOSE SERPL-MCNC: 78 MG/DL — SIGNIFICANT CHANGE UP (ref 70–99)
POTASSIUM SERPL-MCNC: 3.6 MMOL/L — SIGNIFICANT CHANGE UP (ref 3.5–5.3)
POTASSIUM SERPL-SCNC: 3.6 MMOL/L — SIGNIFICANT CHANGE UP (ref 3.5–5.3)
PROT SERPL-MCNC: 5.7 G/DL — LOW (ref 6–8.3)
SODIUM SERPL-SCNC: 139 MMOL/L — SIGNIFICANT CHANGE UP (ref 135–145)

## 2019-03-08 PROCEDURE — 87507 IADNA-DNA/RNA PROBE TQ 12-25: CPT

## 2019-03-08 PROCEDURE — 87045 FECES CULTURE AEROBIC BACT: CPT

## 2019-03-08 PROCEDURE — 76818 FETAL BIOPHYS PROFILE W/NST: CPT

## 2019-03-08 PROCEDURE — 80048 BASIC METABOLIC PNL TOTAL CA: CPT

## 2019-03-08 PROCEDURE — 82962 GLUCOSE BLOOD TEST: CPT

## 2019-03-08 PROCEDURE — 80053 COMPREHEN METABOLIC PANEL: CPT

## 2019-03-08 PROCEDURE — 59025 FETAL NON-STRESS TEST: CPT

## 2019-03-08 PROCEDURE — 87449 NOS EACH ORGANISM AG IA: CPT

## 2019-03-08 PROCEDURE — G0463: CPT

## 2019-03-08 PROCEDURE — 99238 HOSP IP/OBS DSCHRG MGMT 30/<: CPT | Mod: 25

## 2019-03-08 PROCEDURE — 87046 STOOL CULTR AEROBIC BACT EA: CPT

## 2019-03-08 PROCEDURE — 85027 COMPLETE CBC AUTOMATED: CPT

## 2019-03-08 PROCEDURE — 87324 CLOSTRIDIUM AG IA: CPT

## 2019-03-08 PROCEDURE — 90715 TDAP VACCINE 7 YRS/> IM: CPT

## 2019-03-08 PROCEDURE — 76818 FETAL BIOPHYS PROFILE W/NST: CPT | Mod: 26

## 2019-03-08 RX ORDER — INSULIN DETEMIR 100/ML (3)
12 INSULIN PEN (ML) SUBCUTANEOUS
Qty: 0 | Refills: 0 | COMMUNITY
Start: 2019-03-08

## 2019-03-08 RX ORDER — TETANUS TOXOID, REDUCED DIPHTHERIA TOXOID AND ACELLULAR PERTUSSIS VACCINE, ADSORBED 5; 2.5; 8; 8; 2.5 [IU]/.5ML; [IU]/.5ML; UG/.5ML; UG/.5ML; UG/.5ML
0.5 SUSPENSION INTRAMUSCULAR ONCE
Qty: 0 | Refills: 0 | Status: COMPLETED | OUTPATIENT
Start: 2019-03-08 | End: 2019-03-08

## 2019-03-08 RX ORDER — LOPERAMIDE HCL 2 MG
1 TABLET ORAL
Qty: 0 | Refills: 0 | COMMUNITY
Start: 2019-03-08

## 2019-03-08 RX ORDER — INSULIN LISPRO 100/ML
8 VIAL (ML) SUBCUTANEOUS
Qty: 0 | Refills: 0 | COMMUNITY
Start: 2019-03-08

## 2019-03-08 RX ADMIN — Medication 2 MILLIGRAM(S): at 07:30

## 2019-03-08 RX ADMIN — Medication 8 UNIT(S): at 08:04

## 2019-03-08 RX ADMIN — TETANUS TOXOID, REDUCED DIPHTHERIA TOXOID AND ACELLULAR PERTUSSIS VACCINE, ADSORBED 0.5 MILLILITER(S): 5; 2.5; 8; 8; 2.5 SUSPENSION INTRAMUSCULAR at 11:34

## 2019-03-08 RX ADMIN — HEPARIN SODIUM 5000 UNIT(S): 5000 INJECTION INTRAVENOUS; SUBCUTANEOUS at 07:30

## 2019-03-08 RX ADMIN — SODIUM CHLORIDE 125 MILLILITER(S): 9 INJECTION, SOLUTION INTRAVENOUS at 07:30

## 2019-03-08 NOTE — DISCHARGE NOTE ANTEPARTUM - MEDICATION SUMMARY - MEDICATIONS TO TAKE
I will START or STAY ON the medications listed below when I get home from the hospital:    insulin detemir 100 units/mL subcutaneous solution  -- 12 unit(s) subcutaneous once a day (at bedtime)  -- Indication: For diabetes    insulin lispro  -- 8 unit(s) subcutaneous at breakfast  10 units at lunch   8 units at dinner  -- Indication: For diabetes    loperamide 2 mg oral capsule  -- 1 cap(s) by mouth every 6 hours  -- Indication: For diarrhea    Reglan 10 mg oral tablet  -- 1 tab(s) by mouth 4 times a day (before meals and at bedtime), As Needed for nausea  -- Indication: For home    oseltamivir 75 mg oral capsule  -- 1 cap(s) by mouth every 24 hours  -- Indication: For flu     PreNata oral tablet, chewable  -- 1 tab(s) by mouth once a day  -- Indication: For home

## 2019-03-08 NOTE — DISCHARGE NOTE ANTEPARTUM - PLAN OF CARE
Antepartum care Continue to follow up with high risk clinic and Oncology as scheduled. Please call with any complaints or concerns. Keep all schedules appointments.

## 2019-03-08 NOTE — DISCHARGE NOTE ANTEPARTUM - HOSPITAL COURSE
38  30w3d presents for diarrhea, hx of chemo and recent c diff infection, stool studies negative, likely related to chemo administration, resolved with loperamide. Fetal status reassuring. On the day of discharge the patient is afebrile and hemodynamically stable. She is ambulating without assistance, voiding spontaneously, and tolerating regular diet. He pain is well controlled on oral medication. She is cleared for discharge with instructions for appropriate follow up.

## 2019-03-08 NOTE — DISCHARGE NOTE ANTEPARTUM - CARE PLAN
Principal Discharge DX:	Diarrhea, unspecified type  Goal:	Antepartum care  Assessment and plan of treatment:	Continue to follow up with high risk clinic and Oncology as scheduled. Please call with any complaints or concerns. Keep all schedules appointments.

## 2019-03-08 NOTE — DISCHARGE NOTE ANTEPARTUM - PATIENT PORTAL LINK FT
You can access the [facility] Patient Portal, offered by [facility], by registering with the following website: [email or web address]

## 2019-03-11 ENCOUNTER — APPOINTMENT (OUTPATIENT)
Dept: ANTEPARTUM | Facility: CLINIC | Age: 39
End: 2019-03-11
Payer: MEDICAID

## 2019-03-11 ENCOUNTER — NON-APPOINTMENT (OUTPATIENT)
Age: 39
End: 2019-03-11

## 2019-03-11 ENCOUNTER — APPOINTMENT (OUTPATIENT)
Dept: ANTEPARTUM | Facility: CLINIC | Age: 39
End: 2019-03-11

## 2019-03-11 ENCOUNTER — OUTPATIENT (OUTPATIENT)
Dept: OUTPATIENT SERVICES | Facility: HOSPITAL | Age: 39
LOS: 1 days | End: 2019-03-11
Payer: MEDICAID

## 2019-03-11 ENCOUNTER — ASOB RESULT (OUTPATIENT)
Age: 39
End: 2019-03-11

## 2019-03-11 ENCOUNTER — APPOINTMENT (OUTPATIENT)
Dept: MATERNAL FETAL MEDICINE | Facility: CLINIC | Age: 39
End: 2019-03-11

## 2019-03-11 ENCOUNTER — APPOINTMENT (OUTPATIENT)
Dept: MATERNAL FETAL MEDICINE | Facility: CLINIC | Age: 39
End: 2019-03-11
Payer: MEDICAID

## 2019-03-11 VITALS — WEIGHT: 210.5 LBS | SYSTOLIC BLOOD PRESSURE: 112 MMHG | DIASTOLIC BLOOD PRESSURE: 70 MMHG | BODY MASS INDEX: 42.52 KG/M2

## 2019-03-11 DIAGNOSIS — O09.899 SUPERVISION OF OTHER HIGH RISK PREGNANCIES, UNSPECIFIED TRIMESTER: ICD-10-CM

## 2019-03-11 DIAGNOSIS — Z90.11 ACQUIRED ABSENCE OF RIGHT BREAST AND NIPPLE: Chronic | ICD-10-CM

## 2019-03-11 LAB
BILIRUB UR QL STRIP: NORMAL
GLUCOSE UR-MCNC: NORMAL
HCG UR QL: 0.2 EU/DL
HGB UR QL STRIP.AUTO: NORMAL
KETONES UR-MCNC: NORMAL
LEUKOCYTE ESTERASE UR QL STRIP: NORMAL
NITRITE UR QL STRIP: NORMAL
PH UR STRIP: 6.5
PROT UR STRIP-MCNC: NORMAL
SP GR UR STRIP: 1.02

## 2019-03-11 PROCEDURE — G0108 DIAB MANAGE TRN  PER INDIV: CPT

## 2019-03-11 PROCEDURE — G0463: CPT

## 2019-03-11 PROCEDURE — 76818 FETAL BIOPHYS PROFILE W/NST: CPT | Mod: 26

## 2019-03-11 PROCEDURE — 81003 URINALYSIS AUTO W/O SCOPE: CPT

## 2019-03-11 PROCEDURE — 76818 FETAL BIOPHYS PROFILE W/NST: CPT

## 2019-03-11 PROCEDURE — G0108: CPT

## 2019-03-11 PROCEDURE — 99213 OFFICE O/P EST LOW 20 MIN: CPT | Mod: 25

## 2019-03-11 PROCEDURE — 81003 URINALYSIS AUTO W/O SCOPE: CPT | Mod: NC,QW

## 2019-03-12 ENCOUNTER — OUTPATIENT (OUTPATIENT)
Dept: OUTPATIENT SERVICES | Age: 39
LOS: 1 days | Discharge: HOME | End: 2019-03-12

## 2019-03-12 ENCOUNTER — MEDICATION RENEWAL (OUTPATIENT)
Age: 39
End: 2019-03-12

## 2019-03-12 ENCOUNTER — NON-APPOINTMENT (OUTPATIENT)
Age: 39
End: 2019-03-12

## 2019-03-12 DIAGNOSIS — Z90.11 ACQUIRED ABSENCE OF RIGHT BREAST AND NIPPLE: Chronic | ICD-10-CM

## 2019-03-13 ENCOUNTER — RX RENEWAL (OUTPATIENT)
Age: 39
End: 2019-03-13

## 2019-03-13 ENCOUNTER — APPOINTMENT (OUTPATIENT)
Dept: PEDIATRIC CARDIOLOGY | Facility: CLINIC | Age: 39
End: 2019-03-13
Payer: MEDICAID

## 2019-03-13 PROCEDURE — 99214 OFFICE O/P EST MOD 30 MIN: CPT | Mod: 25

## 2019-03-13 PROCEDURE — 93325 DOPPLER ECHO COLOR FLOW MAPG: CPT

## 2019-03-13 PROCEDURE — 76828 ECHO EXAM OF FETAL HEART: CPT

## 2019-03-13 PROCEDURE — 76826 ECHO EXAM OF FETAL HEART: CPT

## 2019-03-13 RX ORDER — INSULIN LISPRO 100 [IU]/ML
100 INJECTION, SOLUTION INTRAVENOUS; SUBCUTANEOUS
Qty: 1 | Refills: 0 | Status: DISCONTINUED | COMMUNITY
Start: 2019-03-04 | End: 2019-03-13

## 2019-03-15 ENCOUNTER — RESULT REVIEW (OUTPATIENT)
Age: 39
End: 2019-03-15

## 2019-03-15 ENCOUNTER — LABORATORY RESULT (OUTPATIENT)
Age: 39
End: 2019-03-15

## 2019-03-15 ENCOUNTER — APPOINTMENT (OUTPATIENT)
Dept: HEMATOLOGY ONCOLOGY | Facility: CLINIC | Age: 39
End: 2019-03-15
Payer: MEDICAID

## 2019-03-15 ENCOUNTER — APPOINTMENT (OUTPATIENT)
Dept: INFUSION THERAPY | Facility: HOSPITAL | Age: 39
End: 2019-03-15

## 2019-03-15 VITALS
WEIGHT: 209.99 LBS | BODY MASS INDEX: 42.42 KG/M2 | HEART RATE: 73 BPM | TEMPERATURE: 97.7 F | SYSTOLIC BLOOD PRESSURE: 109 MMHG | RESPIRATION RATE: 16 BRPM | DIASTOLIC BLOOD PRESSURE: 74 MMHG | OXYGEN SATURATION: 100 %

## 2019-03-15 LAB
BASOPHILS # BLD AUTO: 0.1 K/UL — SIGNIFICANT CHANGE UP (ref 0–0.2)
BASOPHILS NFR BLD AUTO: 0.8 % — SIGNIFICANT CHANGE UP (ref 0–2)
EOSINOPHIL # BLD AUTO: 0.3 K/UL — SIGNIFICANT CHANGE UP (ref 0–0.5)
EOSINOPHIL NFR BLD AUTO: 3.8 % — SIGNIFICANT CHANGE UP (ref 0–6)
HCT VFR BLD CALC: 31.4 % — LOW (ref 34.5–45)
HGB BLD-MCNC: 11 G/DL — LOW (ref 11.5–15.5)
LYMPHOCYTES # BLD AUTO: 2.1 K/UL — SIGNIFICANT CHANGE UP (ref 1–3.3)
LYMPHOCYTES # BLD AUTO: 30.4 % — SIGNIFICANT CHANGE UP (ref 13–44)
MCHC RBC-ENTMCNC: 28.8 PG — SIGNIFICANT CHANGE UP (ref 27–34)
MCHC RBC-ENTMCNC: 35 G/DL — SIGNIFICANT CHANGE UP (ref 32–36)
MCV RBC AUTO: 82.4 FL — SIGNIFICANT CHANGE UP (ref 80–100)
MONOCYTES # BLD AUTO: 0.3 K/UL — SIGNIFICANT CHANGE UP (ref 0–0.9)
MONOCYTES NFR BLD AUTO: 4.4 % — SIGNIFICANT CHANGE UP (ref 2–14)
NEUTROPHILS # BLD AUTO: 4.2 K/UL — SIGNIFICANT CHANGE UP (ref 1.8–7.4)
NEUTROPHILS NFR BLD AUTO: 60.6 % — SIGNIFICANT CHANGE UP (ref 43–77)
PLATELET # BLD AUTO: 285 K/UL — SIGNIFICANT CHANGE UP (ref 150–400)
RBC # BLD: 3.81 M/UL — SIGNIFICANT CHANGE UP (ref 3.8–5.2)
RBC # FLD: 14.3 % — SIGNIFICANT CHANGE UP (ref 10.3–14.5)
WBC # BLD: 7 K/UL — SIGNIFICANT CHANGE UP (ref 3.8–10.5)
WBC # FLD AUTO: 7 K/UL — SIGNIFICANT CHANGE UP (ref 3.8–10.5)

## 2019-03-15 PROCEDURE — 99215 OFFICE O/P EST HI 40 MIN: CPT

## 2019-03-18 ENCOUNTER — APPOINTMENT (OUTPATIENT)
Dept: ANTEPARTUM | Facility: CLINIC | Age: 39
End: 2019-03-18
Payer: MEDICAID

## 2019-03-18 ENCOUNTER — APPOINTMENT (OUTPATIENT)
Dept: BREAST CENTER | Facility: CLINIC | Age: 39
End: 2019-03-18
Payer: MEDICAID

## 2019-03-18 ENCOUNTER — ASOB RESULT (OUTPATIENT)
Age: 39
End: 2019-03-18

## 2019-03-18 ENCOUNTER — NON-APPOINTMENT (OUTPATIENT)
Age: 39
End: 2019-03-18

## 2019-03-18 ENCOUNTER — APPOINTMENT (OUTPATIENT)
Dept: MATERNAL FETAL MEDICINE | Facility: CLINIC | Age: 39
End: 2019-03-18
Payer: MEDICAID

## 2019-03-18 ENCOUNTER — OUTPATIENT (OUTPATIENT)
Dept: OUTPATIENT SERVICES | Facility: HOSPITAL | Age: 39
LOS: 1 days | End: 2019-03-18
Payer: MEDICAID

## 2019-03-18 VITALS
BODY MASS INDEX: 42.13 KG/M2 | HEART RATE: 74 BPM | SYSTOLIC BLOOD PRESSURE: 103 MMHG | DIASTOLIC BLOOD PRESSURE: 70 MMHG | TEMPERATURE: 97.8 F | HEIGHT: 59 IN | WEIGHT: 209 LBS

## 2019-03-18 VITALS — SYSTOLIC BLOOD PRESSURE: 112 MMHG | WEIGHT: 209 LBS | DIASTOLIC BLOOD PRESSURE: 80 MMHG | BODY MASS INDEX: 42.21 KG/M2

## 2019-03-18 DIAGNOSIS — Z90.11 ACQUIRED ABSENCE OF RIGHT BREAST AND NIPPLE: Chronic | ICD-10-CM

## 2019-03-18 DIAGNOSIS — O09.899 SUPERVISION OF OTHER HIGH RISK PREGNANCIES, UNSPECIFIED TRIMESTER: ICD-10-CM

## 2019-03-18 DIAGNOSIS — Z82.49 FAMILY HISTORY OF ISCHEMIC HEART DISEASE AND OTHER DISEASES OF THE CIRCULATORY SYSTEM: ICD-10-CM

## 2019-03-18 PROCEDURE — 81003 URINALYSIS AUTO W/O SCOPE: CPT

## 2019-03-18 PROCEDURE — 76818 FETAL BIOPHYS PROFILE W/NST: CPT | Mod: 26

## 2019-03-18 PROCEDURE — 81003 URINALYSIS AUTO W/O SCOPE: CPT | Mod: NC,QW

## 2019-03-18 PROCEDURE — 99214 OFFICE O/P EST MOD 30 MIN: CPT

## 2019-03-18 PROCEDURE — G0108 DIAB MANAGE TRN  PER INDIV: CPT

## 2019-03-18 PROCEDURE — 99213 OFFICE O/P EST LOW 20 MIN: CPT | Mod: 25,GE

## 2019-03-18 PROCEDURE — G0108: CPT

## 2019-03-18 PROCEDURE — G0463: CPT

## 2019-03-18 NOTE — PHYSICAL EXAM
[Normocephalic] : normocephalic [Atraumatic] : atraumatic [No Supraclavicular Adenopathy] : no supraclavicular adenopathy [Supple] : supple [Examined in the supine and seated position] : examined in the supine and seated position [No Thyromegaly] : no thyromegaly [No dominant masses] : no dominant masses in right breast  [No dominant masses] : no dominant masses left breast [No Nipple Retraction] : no right nipple retraction [No Nipple Discharge] : no left nipple discharge [No Axillary Lymphadenopathy] : no left axillary lymphadenopathy [No Edema] : no edema [No Swelling] : no swelling [Full ROM] : full range of motion [No Rashes] : no rashes [No Ulceration] : no ulceration [de-identified] : Right mastectomy scar healed well. No seroma.  [de-identified] : FLORI [de-identified] : gravid 32 wks

## 2019-03-18 NOTE — HISTORY OF PRESENT ILLNESS
[FreeTextEntry1] : 37 y/o  female, Ambry negative, EDC 5/4/19 at 32 wks, here for post-op visit. 11/7/18 S/P Right mastectomy with SLNBx. Pregnancy complicated by GDM on insulin. Starting NST testing this week. Plans for elective induction 37wk (4/23/19). Here with sister Lana.\par 11/7/18 [facility] Surgical path: R breast: IDC with apocrine features, G3 (4.5cm) with negative margins (3.2cm nearest margin posterior), R SLN negative for metastatic disease 0/3, LVI neg. Path stage IIA. \par Doing well. R arm slightly tender with full extension. No swelling to arm. Incision site without redness or signs of infection. Started chemo 12/7 with Dr. Viridiana Patrick. Next appt 12/21. \par Office core biopsy 9/10/18 [facility]: R 12:00 2.2X 2.0cm- Invasive mammary carcinoma with medullary features. G2/3, No comedonecrosis, marked lyphatic infiltrate around the tumor. ER/KS/Opi9xfk (-); Ki67 (70%), p53 (-); S100 (focally +); CD3 (+); CD68 (+); CK5/6 D5/16 B4 Squamous and mesothelial cells (+); E-Caderin Epithelial, pronormoblasts (+). \par 8/21/18 [facility] Rad [facility] US: R 12:00 N4 2.0X 2.0X 2.2cm lobulated hypoechoic focus seen 4mm deep to the skin. L breast no remarkable findings. BR4 US guided Bx rec. U/s of axilla was done and was negative. \par Maunt with stomach cancer, MGM with mouth cancer, Muncle leg cancer. No breast or ovarian cancer.

## 2019-03-18 NOTE — ASSESSMENT
[FreeTextEntry1] : 37 y/o  female, Ambry negative, EDC 5/4/19 at 32 wks, here for post-op visit. 11/7/18 S/P Right mastectomy with SLNBx. Pregnancy complicated by GDM on insulin. Starting NST testing this week. Plans for elective induction 37wk (4/23/19)\par 11/7/18 [facility] Surgical path: R breast: IDC with apocrine features, G3 (4.5cm) with negative margins (3.2cm nearest margin posterior), R SLN negative for metastatic disease 0/3, LVI neg. Path stage IIA. \par Doing well. R arm slightly tender with full extension. No swelling to arm. Incision site without redness or signs of infection. Started chemo 12/7 with Dr. Viridiana Patrick. Next appt 12/21. \par Office core biopsy 9/10/18 [facility]: R 12:00 2.2X 2.0cm- Invasive mammary carcinoma with medullary features. G2/3, No comedonecrosis, marked lyphatic infiltrate around the tumor. ER/ME/Wqm5feg (-); Ki67 (70%), p53 (-); S100 (focally +); CD3 (+); CD68 (+); CK5/6 D5/16 B4 Squamous and mesothelial cells (+); E-Caderin Epithelial, pronormoblasts (+). \par 8/21/18 [facility] Rad [facility] US: R 12:00 N4 2.0X 2.0X 2.2cm lobulated hypoechoic focus seen 4mm deep to the skin. L breast no remarkable findings. BR4 US guided Bx rec. U/s of axilla was done and was negative. \par Maunt with stomach cancer, MGM with mouth cancer, Muncle leg cancer. No breast or ovarian cancer.\par CBE: Right mastectomy scar well healed. Left negative.  No adenopathy.\par Discussed rec appt with rad onc since 4.5 cm and card given, after delivery.\par PET/CT per med onc after delivery.

## 2019-03-19 DIAGNOSIS — Z51.11 ENCOUNTER FOR ANTINEOPLASTIC CHEMOTHERAPY: ICD-10-CM

## 2019-03-19 DIAGNOSIS — R11.2 NAUSEA WITH VOMITING, UNSPECIFIED: ICD-10-CM

## 2019-03-19 RX ORDER — VANCOMYCIN HYDROCHLORIDE 125 MG/1
125 CAPSULE ORAL
Qty: 63 | Refills: 2 | Status: DISCONTINUED | COMMUNITY
Start: 2019-01-23 | End: 2019-03-19

## 2019-03-19 RX ORDER — VANCOMYCIN HYDROCHLORIDE 125 MG/1
125 CAPSULE ORAL
Qty: 40 | Refills: 0 | Status: DISCONTINUED | COMMUNITY
Start: 2018-12-31 | End: 2019-03-19

## 2019-03-19 RX ORDER — VANCOMYCIN HYDROCHLORIDE 125 MG/1
125 CAPSULE ORAL 3 TIMES DAILY
Qty: 21 | Refills: 0 | Status: DISCONTINUED | COMMUNITY
Start: 2019-02-15 | End: 2019-03-19

## 2019-03-20 DIAGNOSIS — Z64.1 PROBLEMS RELATED TO MULTIPARITY: ICD-10-CM

## 2019-03-20 DIAGNOSIS — O09.90 SUPERVISION OF HIGH RISK PREGNANCY, UNSPECIFIED, UNSPECIFIED TRIMESTER: ICD-10-CM

## 2019-03-20 DIAGNOSIS — O24.419 GESTATIONAL DIABETES MELLITUS IN PREGNANCY, UNSPECIFIED CONTROL: ICD-10-CM

## 2019-03-20 DIAGNOSIS — E66.9 OBESITY, UNSPECIFIED: ICD-10-CM

## 2019-03-20 DIAGNOSIS — O26.849 UTERINE SIZE-DATE DISCREPANCY, UNSPECIFIED TRIMESTER: ICD-10-CM

## 2019-03-21 ENCOUNTER — NON-APPOINTMENT (OUTPATIENT)
Age: 39
End: 2019-03-21

## 2019-03-21 ENCOUNTER — RX RENEWAL (OUTPATIENT)
Age: 39
End: 2019-03-21

## 2019-03-21 NOTE — ASSESSMENT
[Curative] : Goals of care discussed with patient: Curative [FreeTextEntry1] : In summary, this is a very pleasant 38-year-old Telugu-speaking lady who is diagnosed with T2 N0 stage IIA poorly differentiated ER/NV/HER-2/rajni NEGATIVE IDC of the right breast. She is currently 22 weeks pregnant. A CXR and abdominal sono didn’t show distant mets.  BRCA negative. ddACT sequential started 12/7/18. C4 Justin on 2/1/19. Cycle 3 of Cytoxan today.\par \par - Breast ca: On sequential ddACT chemo. She developed Cdiff and shigella diarrhea after C2 Justin Completed abx course. Sx improved and she tolerated next 6 weeks of chemo well. She is S/p hospitalization after last chemo again for another episode of diarrhea. Infectious work up was negative. Improved with supportive care. Diarrhea was likely chemo induced. We discussed imodium use in detail. She is feeling better now. Counts stable. ok to proceed with chemo C# 3 Cytoxan\par - Anemia secondary to chemotherapy/ pregnancy, grade 1 no need for transfusion will continue to monitor.Transfusion if Hb<8.\par -Elevated alk phos reactive secondary to chemotherapy will continue to monitor.\par - C diff and shigella infection: Completed long course abx. Continue f/u with  Dr Glover. \par -Abd pain secondary to infectious diarrhea: currently resolved. We discussed crampy abd pain could be a sign of early labor. She understood and will call OB/MFM if pain develops.\par - Chemo induced N/V- She will use Reglan as needed. IV fluid prn. no emend\par - Chemo induced dysgeusia and fatigue: Encourage p.o. fluids, small frequent meals.\par - Instructed to call office and go directly to the emergency room with fever more than 100.4, shaking chills, productive cough, sore throat, shortness of breath or urinary symptoms. Patient verbalized understanding and agreement.\par - Emotional support provided, all questions answered.\par - Patient reminded to call with any new symptoms day or night and any change with the pregnancy to notify MFM right away. Emergency phone number again provided to patient.\par -Gestational diabetes, She will continue with Insulin and f/u with MFM for Diabetes control.\par \par - She was given instructions for Imodium use, She will call if sx are not controlled with Imodium, or if she has blood, mucos, abd pain or fevers\par \par Patient seen and examined along with Dr. Viridiana Patrick\par \par RTO 2 weeks

## 2019-03-21 NOTE — CONSULT LETTER
[Dear  ___] : Dear  [unfilled], [Consult Letter:] : I had the pleasure of evaluating your patient, [unfilled]. [Please see my note below.] : Please see my note below. [Consult Closing:] : Thank you very much for allowing me to participate in the care of this patient.  If you have any questions, please do not hesitate to contact me. [Sincerely,] : Sincerely, [DrRonnie  ___] : Dr. DE ANDA [DrRonnie ___] : Dr. DE ANDA [FreeTextEntry2] : Vesna Granger MD [FreeTextEntry3] : Viridiana Patrick M.D.\par  of Medicine\par [facility]\par [facility] [place]\par [address] [place] [address]\par [address]\par Tele # [phone]; Fax [phone]

## 2019-03-21 NOTE — HISTORY OF PRESENT ILLNESS
[de-identified] : Ms. EVANGELISTA HESTER  is a 38 year old female here for an evaluation of breast cancer. Her oncologic history is as follows:\par \par She felt a right breast mass in 2018 and was evaluated seen by GYN who sent her for breast imaging. She underwent diagnostic breast imaging on 18 which showed at 12:00 position of right breast there is a lobulated focus, measuring 2.0 x 2.0 x 2.2 cm 2 cm FN. 4 mm deep to the skin. Left breast benign. No enlarged axillary lymph nodes. Bx recommended. 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 There is marked lymphocytic infiltrate around the tumor. She was scheduled for MRI but found out that she was pregnant. Current gestation age at the time of initial consult: 11 weeks\par \par 10/23/18\par She is here with her . We discussed the diagnosis of rapidly growing triple negative breast ca, the size has increased since diagnosis and that chemotherapy will be needed to treat this cancer which can not be started until she is 14 weeks. They want to keep the pregnancy understanding that it will cause delay in treatment and potential risk to the patient and fetus. We discussed that lumpectomy will have issues with positive margins and RT will be contraindicated during to pregnancy and therefore u/l mastectomy is recommend. I discussed with Dr Granger that expander placement at the time of surgery will be an option. She is seeing the pt tomorrow and will discuss surgery and reconstruction options with her. She also saw Dr Eden Patrick med onc for a a second opinion. \par \par s/p surgery (right mastectomy) 18 , 4.5 cm tumor and sentinel node negative- path report d/w her in detail. Tumor grew more than double in size between dx and surgery and is s/o aggressive tumor. Discussed to start chemo 18. S/e of chemo jody during pregnancy, expected and unexpected, pertaining to her and the fetus d/w her. Discussed to see MFM b/w chemo. Also a candidate for PMRT due to large tumor size. LMP 18\par \par I had extensive discussion with the patient and spouse regarding breast cancer in pregnancy and the treatment options that are safe for both the mother and the growing fetus. She refused termination of pregnancy after finding out cancer diagnosis.  We discussed that all treatments including chemotherapy and surgery are contraindicated in first trimester but are considered relatively safe in second and third trimester.  We have discussed medical termination of pregnancy as an option but patient decided to keep the pregnancy. We reviewed that the largest experience in pregnancy has been with anthracycline and alkylating agent chemotherapy. She will be a candidate for chemotherapy with Adriamycin plus cyclophosphamide in a sequential dose dense manner. We reviewed data from [place] registry which showed Neulasta is reasonably safe to use in 2nd and third trimester although it was a very small dataset. . There is not enough data to use taxanes in pregnancy therefore Taxol will be administered post partum. Potential s/e to the patient and growing fetus were discussed with the patient and  in detail via a . The patient understood all the potential side effects and signed an informed consent after discussing the risks, benefits and alternatives.\par \par \par PLAN: Starting 18 ( 17 weeks), She will receive adriamycin every 2 weeks x 4 cycle followed by Cytoxan every 2 weeks x 4 cycles. We plan to hold chemotherapy 4 weeks prior to delivery to prevent  neutropenic complications. Currently she is planned for elective induction (NVD) at 37 weeks. We would start Taxol post partum\par \par PREMEDS/SUPPORTIVE CARE: Zofran ativan and dex are listed as part of prechemo antiemetic regimen in NCCN guidelines for breast cancer in pregnancy. The use of Emend is controversial in pregnancy. We discussed the issue of port placement in the OR, but that would increase anesthesia time by 45 min therefore we would treat her peripherally and likely do port placement after delivery. We discussed the issue of preeclampsia or gestational diabetes with steroids. She will be monitored closely by MFM. She will see MFM between every chemo session for close fetal monitoring. \par \par I explained her that EOD work up is limited due to risk of radiation exposure to the growing fetus. We plan to perform CT scans or a PET/CT scan after delivery. She will also be a candidate for PMRT due to large tumor size. \par \par S/p infection with shigella and c-diff, confirmed with cultures and PCR x 2, was hospitalized for diarrhea and abd pain, treated with ampicillin and vanco,  Ampicillin completed yesterday. Vanco 2 more days left. Patient reports mom had Shigella 2 years ago, Mom has come in to help her 2 months prior but is not ill. She denies sick contacts. \par  [de-identified] : Ms. EVANGELISTA RUBIO is here for f/u for right breast cancer, 31 weeks gestation,  s/p Adriamycin x 4 completed 2/1/19. (Adriamycin course complicated by Shigella, and C-diff causing delays of cycle 3 and 4. ) Cytoxan C3D1 today. \par She was hospitalized for 5 days after last chemo due to diarrhea. Infectious work up was negative.Completed vancomycin course prior to last treatment. Sx were controlled with supportive care. She feels back to normal now. No abd pain, NVD, fevers.  No Bone pain, no neuropathy. Mild fatigue and altered taste x 3 days after chemo. \par Baby is moving well, no vaginal discharge or vaginal bleeding. No back pain, No pelvic pain. She has healed well from the breast surgery with minimal pain when lifting arm otherwise is doing well. She was diagnosed with gestational diabetes and is taking insulin.\par We discussed the plan to give C4 in 2 weeks and deliver at around 37 weeks. She will need Taxol q2w x 4 doses after delivery. We will perform CT scans after the baby is born. \par

## 2019-03-21 NOTE — REASON FOR VISIT
[Follow-Up Visit] : a follow-up [[employer] ] : provided by [employer]   [Other: _____] : [unfilled] [FreeTextEntry1] : [id number] [FreeTextEntry2] : [place]

## 2019-03-21 NOTE — PHYSICAL EXAM
[Fully active, able to carry on all pre-disease performance without restriction] : Status 0 - Fully active, able to carry on all pre-disease performance without restriction [Obese] : obese [Normal] : affect appropriate [de-identified] : S/p right mastectomy healing well. No CW or axillary nodules [de-identified] : Gravid uterus.

## 2019-03-25 ENCOUNTER — APPOINTMENT (OUTPATIENT)
Dept: ANTEPARTUM | Facility: CLINIC | Age: 39
End: 2019-03-25
Payer: MEDICAID

## 2019-03-25 ENCOUNTER — ASOB RESULT (OUTPATIENT)
Age: 39
End: 2019-03-25

## 2019-03-25 ENCOUNTER — APPOINTMENT (OUTPATIENT)
Dept: MATERNAL FETAL MEDICINE | Facility: CLINIC | Age: 39
End: 2019-03-25
Payer: MEDICAID

## 2019-03-25 ENCOUNTER — APPOINTMENT (OUTPATIENT)
Dept: INFECTIOUS DISEASE | Facility: CLINIC | Age: 39
End: 2019-03-25
Payer: MEDICAID

## 2019-03-25 ENCOUNTER — OUTPATIENT (OUTPATIENT)
Dept: OUTPATIENT SERVICES | Facility: HOSPITAL | Age: 39
LOS: 1 days | End: 2019-03-25
Payer: MEDICAID

## 2019-03-25 VITALS
HEIGHT: 59 IN | SYSTOLIC BLOOD PRESSURE: 108 MMHG | BODY MASS INDEX: 43.55 KG/M2 | DIASTOLIC BLOOD PRESSURE: 70 MMHG | WEIGHT: 216 LBS

## 2019-03-25 VITALS
BODY MASS INDEX: 43.55 KG/M2 | WEIGHT: 216 LBS | OXYGEN SATURATION: 99 % | TEMPERATURE: 98.1 F | HEIGHT: 59 IN | DIASTOLIC BLOOD PRESSURE: 65 MMHG | SYSTOLIC BLOOD PRESSURE: 111 MMHG | HEART RATE: 80 BPM

## 2019-03-25 DIAGNOSIS — O09.523 SUPERVISION OF ELDERLY MULTIGRAVIDA, THIRD TRIMESTER: ICD-10-CM

## 2019-03-25 DIAGNOSIS — O09.899 SUPERVISION OF OTHER HIGH RISK PREGNANCIES, UNSPECIFIED TRIMESTER: ICD-10-CM

## 2019-03-25 DIAGNOSIS — Z90.11 ACQUIRED ABSENCE OF RIGHT BREAST AND NIPPLE: Chronic | ICD-10-CM

## 2019-03-25 DIAGNOSIS — O24.419 GESTATIONAL DIABETES MELLITUS IN PREGNANCY, UNSPECIFIED CONTROL: ICD-10-CM

## 2019-03-25 PROCEDURE — G0463: CPT

## 2019-03-25 PROCEDURE — 99214 OFFICE O/P EST MOD 30 MIN: CPT

## 2019-03-25 PROCEDURE — 81003 URINALYSIS AUTO W/O SCOPE: CPT

## 2019-03-25 PROCEDURE — 76818 FETAL BIOPHYS PROFILE W/NST: CPT | Mod: 26

## 2019-03-25 PROCEDURE — G0108 DIAB MANAGE TRN  PER INDIV: CPT

## 2019-03-25 PROCEDURE — 76818 FETAL BIOPHYS PROFILE W/NST: CPT

## 2019-03-25 PROCEDURE — G0108: CPT

## 2019-03-25 PROCEDURE — 99213 OFFICE O/P EST LOW 20 MIN: CPT | Mod: 25

## 2019-03-25 PROCEDURE — 81003 URINALYSIS AUTO W/O SCOPE: CPT | Mod: NC,QW

## 2019-03-25 NOTE — ASSESSMENT
[FreeTextEntry1] : 37 yo with pregnancy\par Breast Ca on chemo\par C diff-recurrent\par Shigella colonization v recurrence_prior stool Cx \par \par Clinically stable\par s/p long tapering course of po vanco\par Now is stable off antimicrobials\par No diarrhea\par Some of her loose stools may be chemo related\par Have advised her about s/s worsening and to goto ER if occur\par have also advised her about risk of C diff recurrence\par To continue follow up with OB/Oncology\par No antimicrobials indicated presently\par Patient verbalized understanding\par have asked her to contact us if any issues-else she will be seen in the ID clinic as needed

## 2019-03-25 NOTE — REVIEW OF SYSTEMS
[As Noted in HPI] : as noted in HPI [Fever] : no fever [Chills] : no chills [Eye Pain] : no eye pain [Chest Pain] : no chest pain [Shortness Of Breath] : no shortness of breath [Wheezing] : no wheezing [SOB on Exertion] : no shortness of breath during exertion [Abdominal Pain] : no abdominal pain [Vomiting] : no vomiting [Dysuria] : no dysuria [Joint Pain] : no joint pain [Skin Lesions] : no skin lesions

## 2019-03-29 ENCOUNTER — OUTPATIENT (OUTPATIENT)
Dept: OUTPATIENT SERVICES | Facility: HOSPITAL | Age: 39
LOS: 1 days | End: 2019-03-29
Payer: MEDICAID

## 2019-03-29 ENCOUNTER — LABORATORY RESULT (OUTPATIENT)
Age: 39
End: 2019-03-29

## 2019-03-29 ENCOUNTER — APPOINTMENT (OUTPATIENT)
Dept: INFUSION THERAPY | Facility: HOSPITAL | Age: 39
End: 2019-03-29

## 2019-03-29 ENCOUNTER — RESULT REVIEW (OUTPATIENT)
Age: 39
End: 2019-03-29

## 2019-03-29 ENCOUNTER — APPOINTMENT (OUTPATIENT)
Dept: HEMATOLOGY ONCOLOGY | Facility: CLINIC | Age: 39
End: 2019-03-29
Payer: MEDICAID

## 2019-03-29 ENCOUNTER — APPOINTMENT (OUTPATIENT)
Dept: ANTEPARTUM | Facility: CLINIC | Age: 39
End: 2019-03-29

## 2019-03-29 ENCOUNTER — ASOB RESULT (OUTPATIENT)
Age: 39
End: 2019-03-29

## 2019-03-29 VITALS
DIASTOLIC BLOOD PRESSURE: 76 MMHG | TEMPERATURE: 97.5 F | OXYGEN SATURATION: 100 % | SYSTOLIC BLOOD PRESSURE: 112 MMHG | BODY MASS INDEX: 43.55 KG/M2 | WEIGHT: 215.61 LBS | RESPIRATION RATE: 18 BRPM | HEART RATE: 72 BPM

## 2019-03-29 DIAGNOSIS — O26.899 OTHER SPECIFIED PREGNANCY RELATED CONDITIONS, UNSPECIFIED TRIMESTER: ICD-10-CM

## 2019-03-29 DIAGNOSIS — Z3A.00 WEEKS OF GESTATION OF PREGNANCY NOT SPECIFIED: ICD-10-CM

## 2019-03-29 DIAGNOSIS — Z90.11 ACQUIRED ABSENCE OF RIGHT BREAST AND NIPPLE: Chronic | ICD-10-CM

## 2019-03-29 LAB
APPEARANCE UR: CLEAR — SIGNIFICANT CHANGE UP
BASOPHILS # BLD AUTO: 0 K/UL — SIGNIFICANT CHANGE UP (ref 0–0.2)
BASOPHILS NFR BLD AUTO: 0.2 % — SIGNIFICANT CHANGE UP (ref 0–2)
BILIRUB UR-MCNC: NEGATIVE — SIGNIFICANT CHANGE UP
COLOR SPEC: YELLOW — SIGNIFICANT CHANGE UP
DIFF PNL FLD: NEGATIVE — SIGNIFICANT CHANGE UP
EOSINOPHIL # BLD AUTO: 0.2 K/UL — SIGNIFICANT CHANGE UP (ref 0–0.5)
EOSINOPHIL NFR BLD AUTO: 3.6 % — SIGNIFICANT CHANGE UP (ref 0–6)
GLUCOSE UR QL: NEGATIVE — SIGNIFICANT CHANGE UP
HCT VFR BLD CALC: 32.4 % — LOW (ref 34.5–45)
HGB BLD-MCNC: 11.1 G/DL — LOW (ref 11.5–15.5)
KETONES UR-MCNC: NEGATIVE — SIGNIFICANT CHANGE UP
LEUKOCYTE ESTERASE UR-ACNC: NEGATIVE — SIGNIFICANT CHANGE UP
LYMPHOCYTES # BLD AUTO: 1.7 K/UL — SIGNIFICANT CHANGE UP (ref 1–3.3)
LYMPHOCYTES # BLD AUTO: 27.6 % — SIGNIFICANT CHANGE UP (ref 13–44)
MCHC RBC-ENTMCNC: 28.4 PG — SIGNIFICANT CHANGE UP (ref 27–34)
MCHC RBC-ENTMCNC: 34.2 G/DL — SIGNIFICANT CHANGE UP (ref 32–36)
MCV RBC AUTO: 83 FL — SIGNIFICANT CHANGE UP (ref 80–100)
MONOCYTES # BLD AUTO: 0.3 K/UL — SIGNIFICANT CHANGE UP (ref 0–0.9)
MONOCYTES NFR BLD AUTO: 4.2 % — SIGNIFICANT CHANGE UP (ref 2–14)
NEUTROPHILS # BLD AUTO: 4 K/UL — SIGNIFICANT CHANGE UP (ref 1.8–7.4)
NEUTROPHILS NFR BLD AUTO: 64.5 % — SIGNIFICANT CHANGE UP (ref 43–77)
NITRITE UR-MCNC: NEGATIVE — SIGNIFICANT CHANGE UP
PH UR: 7 — SIGNIFICANT CHANGE UP (ref 5–8)
PLATELET # BLD AUTO: 274 K/UL — SIGNIFICANT CHANGE UP (ref 150–400)
PROT UR-MCNC: SIGNIFICANT CHANGE UP
RBC # BLD: 3.91 M/UL — SIGNIFICANT CHANGE UP (ref 3.8–5.2)
RBC # FLD: 14.4 % — SIGNIFICANT CHANGE UP (ref 10.3–14.5)
SP GR SPEC: 1.02 — SIGNIFICANT CHANGE UP (ref 1.01–1.02)
UROBILINOGEN FLD QL: NEGATIVE — SIGNIFICANT CHANGE UP
WBC # BLD: 6.2 K/UL — SIGNIFICANT CHANGE UP (ref 3.8–10.5)
WBC # FLD AUTO: 6.2 K/UL — SIGNIFICANT CHANGE UP (ref 3.8–10.5)

## 2019-03-29 PROCEDURE — 76818 FETAL BIOPHYS PROFILE W/NST: CPT | Mod: 26

## 2019-03-29 PROCEDURE — 99215 OFFICE O/P EST HI 40 MIN: CPT

## 2019-03-29 PROCEDURE — 59025 FETAL NON-STRESS TEST: CPT

## 2019-03-29 PROCEDURE — 87086 URINE CULTURE/COLONY COUNT: CPT

## 2019-03-29 PROCEDURE — 76816 OB US FOLLOW-UP PER FETUS: CPT | Mod: 26

## 2019-03-29 PROCEDURE — G0463: CPT

## 2019-03-29 PROCEDURE — 81003 URINALYSIS AUTO W/O SCOPE: CPT

## 2019-03-29 RX ORDER — ACETAMINOPHEN 500 MG
975 TABLET ORAL ONCE
Qty: 0 | Refills: 0 | Status: DISCONTINUED | OUTPATIENT
Start: 2019-03-29 | End: 2019-04-13

## 2019-03-29 NOTE — REASON FOR VISIT
[Follow-Up Visit] : a follow-up [[employer] ] : provided by [employer]   [FreeTextEntry1] : [id number] [FreeTextEntry2] : Erwin

## 2019-03-29 NOTE — HISTORY OF PRESENT ILLNESS
[de-identified] : Ms. EVANGELISTA HESTER  is a 38 year old female here for an evaluation of breast cancer. Her oncologic history is as follows:\par \par She felt a right breast mass in 2018 and was evaluated seen by GYN who sent her for breast imaging. She underwent diagnostic breast imaging on 18 which showed at 12:00 position of right breast there is a lobulated focus, measuring 2.0 x 2.0 x 2.2 cm 2 cm FN. 4 mm deep to the skin. Left breast benign. No enlarged axillary lymph nodes. Bx recommended. 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 There is marked lymphocytic infiltrate around the tumor. She was scheduled for MRI but found out that she was pregnant. Current gestation age at the time of initial consult: 11 weeks\par \par 10/23/18\par She is here with her . We discussed the diagnosis of rapidly growing triple negative breast ca, the size has increased since diagnosis and that chemotherapy will be needed to treat this cancer which can not be started until she is 14 weeks. They want to keep the pregnancy understanding that it will cause delay in treatment and potential risk to the patient and fetus. We discussed that lumpectomy will have issues with positive margins and RT will be contraindicated during to pregnancy and therefore u/l mastectomy is recommend. I discussed with Dr Granger that expander placement at the time of surgery will be an option. She is seeing the pt tomorrow and will discuss surgery and reconstruction options with her. She also saw Dr Eden Patrick med onc for a a second opinion. \par \par s/p surgery (right mastectomy) 18 , 4.5 cm tumor and sentinel node negative- path report d/w her in detail. Tumor grew more than double in size between dx and surgery and is s/o aggressive tumor. Discussed to start chemo 18. S/e of chemo jody during pregnancy, expected and unexpected, pertaining to her and the fetus d/w her. Discussed to see MFM b/w chemo. Also a candidate for PMRT due to large tumor size. LMP 18\par \par I had extensive discussion with the patient and spouse regarding breast cancer in pregnancy and the treatment options that are safe for both the mother and the growing fetus. She refused termination of pregnancy after finding out cancer diagnosis.  We discussed that all treatments including chemotherapy and surgery are contraindicated in first trimester but are considered relatively safe in second and third trimester.  We have discussed medical termination of pregnancy as an option but patient decided to keep the pregnancy. We reviewed that the largest experience in pregnancy has been with anthracycline and alkylating agent chemotherapy. She will be a candidate for chemotherapy with Adriamycin plus cyclophosphamide in a sequential dose dense manner. We reviewed data from [place] registry which showed Neulasta is reasonably safe to use in 2nd and third trimester although it was a very small dataset. . There is not enough data to use taxanes in pregnancy therefore Taxol will be administered post partum. Potential s/e to the patient and growing fetus were discussed with the patient and  in detail via a . The patient understood all the potential side effects and signed an informed consent after discussing the risks, benefits and alternatives.\par \par \par PLAN: Starting 18 ( 17 weeks), She will receive adriamycin every 2 weeks x 4 cycle followed by Cytoxan every 2 weeks x 4 cycles. We plan to hold chemotherapy 4 weeks prior to delivery to prevent  neutropenic complications. Currently she is planned for elective induction (NVD) at 37 weeks. We would start Taxol post partum\par \par PREMEDS/SUPPORTIVE CARE: Zofran ativan and dex are listed as part of prechemo antiemetic regimen in NCCN guidelines for breast cancer in pregnancy. The use of Emend is controversial in pregnancy. We discussed the issue of port placement in the OR, but that would increase anesthesia time by 45 min therefore we would treat her peripherally and likely do port placement after delivery. We discussed the issue of preeclampsia or gestational diabetes with steroids. She will be monitored closely by MFM. She will see MFM between every chemo session for close fetal monitoring. \par \par I explained her that EOD work up is limited due to risk of radiation exposure to the growing fetus. We plan to perform CT scans or a PET/CT scan after delivery. She will also be a candidate for PMRT due to large tumor size. \par \par S/p infection with shigella and c-diff after Adriamycin c#2, confirmed with cultures and PCR x 2, was hospitalized for diarrhea and abd pain, treated with ampicillin and vanco,  Ampicillin completed yesterday. Vanco 2 more days left. Patient reports mom had Shigella 2 years ago, Mom has come in to help her 2 months prior but is not ill. She denies sick contacts. \par \par She was hospitalized for 5 days after Cytoxan Omi # 2 due to diarrhea. Infectious work up was negative.Completed vancomycin course prior to last treatment. Sx were controlled with supportive care. She feels back to normal now. No abd pain, NVD, fevers.  No Bone pain, no neuropathy.\par \par We discussed the plan to give C4 in 2 weeks and deliver at around 37 weeks. She will need Taxol q2w x 4 doses after delivery. We will perform CT scans after the baby is born. [de-identified] : Ms. EVANGELISTA RUBIO is here for f/u for right breast cancer, 33 weeks gestation,  s/p Adriamycin x 4 completed 2/1/19. (Adriamycin course complicated by Shigella, and C-diff causing delays of cycle 3 and 4. ) Cytoxan C4D1 today. \par C/o lower abd pressure and back pain for 2 days, never felt this way before, has some back pain + 8 lb weight gain, denies contractions. Baby moving well, no vaginal discharge or vaginal bleeding. . Denies n/v and diarrhea no cramping. no fevers. She was diagnosed with gestational diabetes and is taking insulin. BG stable.\par \par  \par

## 2019-03-29 NOTE — ASSESSMENT
[Curative] : Goals of care discussed with patient: Curative [FreeTextEntry1] : In summary, this is a very pleasant 38-year-old Georgian-speaking lady who is diagnosed with T2 N0 stage IIA poorly differentiated ER/CO/HER-2/rajni NEGATIVE IDC of the right breast. She is currently 22 weeks pregnant. A CXR and abdominal sono didn’t show distant mets.  BRCA negative. ddACT sequential started 12/7/18. C4 Justin on 2/1/19. Cycle 3 of Cytoxan today.\par \par - Breast ca: On sequential ddACT chemo. She developed Cdiff and shigella diarrhea after C2 Justin Completed abx course. Sx improved and she tolerated next 6 weeks of chemo well. She is S/p hospitalization after Cytoxan # 2  again for another episode of diarrhea. Infectious work up was negative. Improved with supportive care. Diarrhea was likely chemo induced. She is s/p C# 3 Cytoxan and took imodium PRN. She tolerated treatment well but is c/o new onset back pain and pelvic pressure x 2 days. I discussed my concern about premature labor. I called Dr Olaf Babin ([facility]) and discussed my concerns with her. She rec to send the  pt to L&D for checkup. We will cancel chemo today. She will be monitored and if sx resolve and no concern for premature labor, would plan to give cytoxan early next week. Pt wants tuesday appt. Current plan is to deliver at 37 weeks or later unless she develops diabetic fetopathy. She will need Taxol q2w x 4 doses after delivery. We will perform CT scans after the baby is born.\par - Anemia secondary to chemotherapy/ pregnancy, grade 1 no need for transfusion will continue to monitor.Transfusion if Hb<8.\par -Elevated alk phos reactive secondary to chemotherapy will continue to monitor.\par - C diff and shigella infection: Completed long course abx. No diarrhea. Continue f/u with  Dr Glover. \par - Chemo induced N/V- She will use Reglan as needed. IV fluid prn. no emend\par - Chemo induced dysgeusia and fatigue: Encourage p.o. fluids, small frequent meals.\par - Instructed to call office and go directly to the emergency room with fever more than 100.4, shaking chills, productive cough, sore throat, shortness of breath or urinary symptoms. Patient verbalized understanding and agreement.\par - Emotional support provided, all questions answered.\par - Patient reminded to call with any new symptoms day or night and any change with the pregnancy to notify MFM right away. Emergency phone number again provided to patient.\par -Gestational diabetes, She will continue with Insulin and f/u with MFM for Diabetes control.\par - She was given instructions for Imodium use, She will call if sx are not controlled with Imodium, or if she has blood, mucos, abd pain or fevers\par \par RTO next week

## 2019-03-29 NOTE — CONSULT LETTER
[Dear  ___] : Dear  [unfilled], [Consult Letter:] : I had the pleasure of evaluating your patient, [unfilled]. [Please see my note below.] : Please see my note below. [Consult Closing:] : Thank you very much for allowing me to participate in the care of this patient.  If you have any questions, please do not hesitate to contact me. [Sincerely,] : Sincerely, [DrRonnie  ___] : Dr. DE ANDA [DrRonnie ___] : Dr. DE ANDA [FreeTextEntry2] : Vesna Granger MD [FreeTextEntry3] : Viridiana Patrick M.D.\par  of Medicine\par [facility]\par [facility] [place]\par [address] [place] [address]\par [address]\par Tele # [phone]; Fax [phone]

## 2019-03-29 NOTE — PHYSICAL EXAM
[Fully active, able to carry on all pre-disease performance without restriction] : Status 0 - Fully active, able to carry on all pre-disease performance without restriction [Obese] : obese [Normal] : affect appropriate [de-identified] : S/p right mastectomy healing well. No CW or axillary nodules [de-identified] : Gravid uterus.

## 2019-03-31 LAB
CULTURE RESULTS: SIGNIFICANT CHANGE UP
SPECIMEN SOURCE: SIGNIFICANT CHANGE UP

## 2019-04-01 ENCOUNTER — APPOINTMENT (OUTPATIENT)
Dept: MATERNAL FETAL MEDICINE | Facility: CLINIC | Age: 39
End: 2019-04-01

## 2019-04-01 ENCOUNTER — APPOINTMENT (OUTPATIENT)
Dept: ANTEPARTUM | Facility: CLINIC | Age: 39
End: 2019-04-01

## 2019-04-01 DIAGNOSIS — O24.414 GESTATIONAL DIABETES MELLITUS IN PREGNANCY, INSULIN CONTROLLED: ICD-10-CM

## 2019-04-01 DIAGNOSIS — Z3A.32 32 WEEKS GESTATION OF PREGNANCY: ICD-10-CM

## 2019-04-02 ENCOUNTER — RESULT REVIEW (OUTPATIENT)
Age: 39
End: 2019-04-02

## 2019-04-02 ENCOUNTER — APPOINTMENT (OUTPATIENT)
Dept: HEMATOLOGY ONCOLOGY | Facility: CLINIC | Age: 39
End: 2019-04-02
Payer: MEDICAID

## 2019-04-02 ENCOUNTER — OUTPATIENT (OUTPATIENT)
Dept: OUTPATIENT SERVICES | Facility: HOSPITAL | Age: 39
LOS: 1 days | End: 2019-04-02
Payer: MEDICAID

## 2019-04-02 ENCOUNTER — APPOINTMENT (OUTPATIENT)
Dept: INFUSION THERAPY | Facility: HOSPITAL | Age: 39
End: 2019-04-02

## 2019-04-02 ENCOUNTER — LABORATORY RESULT (OUTPATIENT)
Age: 39
End: 2019-04-02

## 2019-04-02 ENCOUNTER — APPOINTMENT (OUTPATIENT)
Dept: ANTEPARTUM | Facility: CLINIC | Age: 39
End: 2019-04-02

## 2019-04-02 ENCOUNTER — ASOB RESULT (OUTPATIENT)
Age: 39
End: 2019-04-02

## 2019-04-02 DIAGNOSIS — Z90.11 ACQUIRED ABSENCE OF RIGHT BREAST AND NIPPLE: Chronic | ICD-10-CM

## 2019-04-02 DIAGNOSIS — O26.899 OTHER SPECIFIED PREGNANCY RELATED CONDITIONS, UNSPECIFIED TRIMESTER: ICD-10-CM

## 2019-04-02 DIAGNOSIS — Z3A.00 WEEKS OF GESTATION OF PREGNANCY NOT SPECIFIED: ICD-10-CM

## 2019-04-02 LAB
BASOPHILS # BLD AUTO: 0 K/UL — SIGNIFICANT CHANGE UP (ref 0–0.2)
BASOPHILS NFR BLD AUTO: 0.6 % — SIGNIFICANT CHANGE UP (ref 0–2)
EOSINOPHIL # BLD AUTO: 0.2 K/UL — SIGNIFICANT CHANGE UP (ref 0–0.5)
EOSINOPHIL NFR BLD AUTO: 2.9 % — SIGNIFICANT CHANGE UP (ref 0–6)
HCT VFR BLD CALC: 32.3 % — LOW (ref 34.5–45)
HGB BLD-MCNC: 11.4 G/DL — LOW (ref 11.5–15.5)
LYMPHOCYTES # BLD AUTO: 1.5 K/UL — SIGNIFICANT CHANGE UP (ref 1–3.3)
LYMPHOCYTES # BLD AUTO: 21.1 % — SIGNIFICANT CHANGE UP (ref 13–44)
MCHC RBC-ENTMCNC: 29.1 PG — SIGNIFICANT CHANGE UP (ref 27–34)
MCHC RBC-ENTMCNC: 35.2 G/DL — SIGNIFICANT CHANGE UP (ref 32–36)
MCV RBC AUTO: 82.5 FL — SIGNIFICANT CHANGE UP (ref 80–100)
MONOCYTES # BLD AUTO: 0.2 K/UL — SIGNIFICANT CHANGE UP (ref 0–0.9)
MONOCYTES NFR BLD AUTO: 2.8 % — SIGNIFICANT CHANGE UP (ref 2–14)
NEUTROPHILS # BLD AUTO: 5.1 K/UL — SIGNIFICANT CHANGE UP (ref 1.8–7.4)
NEUTROPHILS NFR BLD AUTO: 72.5 % — SIGNIFICANT CHANGE UP (ref 43–77)
PLATELET # BLD AUTO: 281 K/UL — SIGNIFICANT CHANGE UP (ref 150–400)
RBC # BLD: 3.92 M/UL — SIGNIFICANT CHANGE UP (ref 3.8–5.2)
RBC # FLD: 14.1 % — SIGNIFICANT CHANGE UP (ref 10.3–14.5)
WBC # BLD: 7.1 K/UL — SIGNIFICANT CHANGE UP (ref 3.8–10.5)
WBC # FLD AUTO: 7.1 K/UL — SIGNIFICANT CHANGE UP (ref 3.8–10.5)

## 2019-04-02 PROCEDURE — 59025 FETAL NON-STRESS TEST: CPT

## 2019-04-02 PROCEDURE — G0463: CPT

## 2019-04-02 PROCEDURE — 76818 FETAL BIOPHYS PROFILE W/NST: CPT | Mod: 26

## 2019-04-02 PROCEDURE — 99214 OFFICE O/P EST MOD 30 MIN: CPT

## 2019-04-02 PROCEDURE — 59025 FETAL NON-STRESS TEST: CPT | Mod: 26

## 2019-04-07 ENCOUNTER — LABORATORY RESULT (OUTPATIENT)
Age: 39
End: 2019-04-07

## 2019-04-07 NOTE — CONSULT LETTER
[Dear  ___] : Dear  [unfilled], [Consult Letter:] : I had the pleasure of evaluating your patient, [unfilled]. [Please see my note below.] : Please see my note below. [Consult Closing:] : Thank you very much for allowing me to participate in the care of this patient.  If you have any questions, please do not hesitate to contact me. [Sincerely,] : Sincerely, [DrRonnie  ___] : Dr. DE ANDA [DrRonnie ___] : Dr. DE ANDA [FreeTextEntry2] : Vesna Granger MD [FreeTextEntry3] : Viridiana Patrick M.D.\par  of Medicine\par [facility]\par [facility]\par [address] [place] [address]\par [address]\par Tele # [phone]; Fax [phone]

## 2019-04-07 NOTE — PHYSICAL EXAM
[Fully active, able to carry on all pre-disease performance without restriction] : Status 0 - Fully active, able to carry on all pre-disease performance without restriction [Obese] : obese [Normal] : affect appropriate [de-identified] : S/p right mastectomy healing well. No CW or axillary nodules [de-identified] : Gravid uterus

## 2019-04-07 NOTE — ASSESSMENT
[Curative] : Goals of care discussed with patient: Curative [FreeTextEntry1] : In summary, this is a very pleasant 38-year-old Yoruba-speaking lady who is diagnosed with T2 N0 stage IIA poorly differentiated ER/MD/HER-2/rajni NEGATIVE IDC of the right breast. She is currently 22 weeks pregnant. A CXR and abdominal sono didn’t show distant mets.  BRCA negative. ddACT sequential started 12/7/18. C4 Justin on 2/1/19. Cycle 3 of Cytoxan today.\par \par - Breast ca: On sequential ddACT chemo. She developed Cdiff and shigella diarrhea after C2 Justin. Completed abx course. Sx improved and she tolerated next 6 weeks of chemo well. She is S/p hospitalization after Cytoxan # 2  again for another episode of diarrhea. Infectious work up was negative. Improved with supportive care. Diarrhea was likely chemo induced. She is s/p C# 3 Cytoxan and took Imodium PRN. She presented for C# 4 last week but was c/o new onset back pain and pelvic pressure, concerning for  premature labor. I called Dr Olaf Babin ([facility]) and discussed my concerns with her. Pt was sent to L&D for checkup. She was seen by [facility] last weak and today. Sxs have resolved, no sign of premature labor. She will get Cytoxan C# 4 today.  Current plan is to deliver at 37 weeks or later unless she develops diabetic fetopathy. She will need Taxol q2w x 4 doses after delivery. We will perform CT scans after the baby is born.\par - Anemia secondary to chemotherapy/ pregnancy, grade 1 no need for transfusion will continue to monitor.Transfusion if Hb<8.\par -Elevated alk phos reactive secondary to chemotherapy will continue to monitor.\par - C diff and shigella infection: Completed long course abx. No diarrhea. Continue f/u with  Dr Glover. \par - Chemo induced N/V- She will use Reglan as needed. IV fluid prn. no emend\par - Chemo induced dysgeusia and fatigue: Encourage p.o. fluids, small frequent meals.\par - Instructed to call office and go directly to the emergency room with fever more than 100.4, shaking chills, productive cough, sore throat, shortness of breath or urinary symptoms. Patient verbalized understanding and agreement.\par - Emotional support provided, all questions answered.\par - Patient reminded to call with any new symptoms day or night and any change with the pregnancy to notify MFM right away. Emergency phone number again provided to patient.\par -Gestational diabetes, She will continue with Insulin and f/u with MFM for Diabetes control.\par \par \par RTO 2 weeks post partum. or sooner PRN.\par Case d/w Dr. Viridiana Patrick

## 2019-04-07 NOTE — HISTORY OF PRESENT ILLNESS
[de-identified] : Ms. EVANGELISTA HESTER  is a 38 year old female here for an evaluation of breast cancer. Her oncologic history is as follows:\par \par She felt a right breast mass in 2018 and was evaluated seen by GYN who sent her for breast imaging. She underwent diagnostic breast imaging on 18 which showed at 12:00 position of right breast there is a lobulated focus, measuring 2.0 x 2.0 x 2.2 cm 2 cm FN. 4 mm deep to the skin. Left breast benign. No enlarged axillary lymph nodes. Bx recommended. 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 There is marked lymphocytic infiltrate around the tumor. She was scheduled for MRI but found out that she was pregnant. Current gestation age at the time of initial consult: 11 weeks\par \par 10/23/18\par She is here with her . We discussed the diagnosis of rapidly growing triple negative breast ca, the size has increased since diagnosis and that chemotherapy will be needed to treat this cancer which can not be started until she is 14 weeks. They want to keep the pregnancy understanding that it will cause delay in treatment and potential risk to the patient and fetus. We discussed that lumpectomy will have issues with positive margins and RT will be contraindicated during to pregnancy and therefore u/l mastectomy is recommend. I discussed with Dr Granger that expander placement at the time of surgery will be an option. She is seeing the pt tomorrow and will discuss surgery and reconstruction options with her. She also saw Dr Eden Patrick med onc for a a second opinion. \par \par s/p surgery (right mastectomy) 18 , 4.5 cm tumor and sentinel node negative- path report d/w her in detail. Tumor grew more than double in size between dx and surgery and is s/o aggressive tumor. Discussed to start chemo 18. S/e of chemo jody during pregnancy, expected and unexpected, pertaining to her and the fetus d/w her. Discussed to see MFM b/w chemo. Also a candidate for PMRT due to large tumor size. LMP 18\par \par I had extensive discussion with the patient and spouse regarding breast cancer in pregnancy and the treatment options that are safe for both the mother and the growing fetus. She refused termination of pregnancy after finding out cancer diagnosis.  We discussed that all treatments including chemotherapy and surgery are contraindicated in first trimester but are considered relatively safe in second and third trimester.  We have discussed medical termination of pregnancy as an option but patient decided to keep the pregnancy. We reviewed that the largest experience in pregnancy has been with anthracycline and alkylating agent chemotherapy. She will be a candidate for chemotherapy with Adriamycin plus cyclophosphamide in a sequential dose dense manner. We reviewed data from [place] registry which showed Neulasta is reasonably safe to use in 2nd and third trimester although it was a very small dataset. . There is not enough data to use taxanes in pregnancy therefore Taxol will be administered post partum. Potential s/e to the patient and growing fetus were discussed with the patient and  in detail via a . The patient understood all the potential side effects and signed an informed consent after discussing the risks, benefits and alternatives.\par \par \par PLAN: Starting 18 ( 17 weeks), She will receive adriamycin every 2 weeks x 4 cycle followed by Cytoxan every 2 weeks x 4 cycles. We plan to hold chemotherapy 4 weeks prior to delivery to prevent  neutropenic complications. Currently she is planned for elective induction (NVD) at 37 weeks. We would start Taxol post partum\par \par PREMEDS/SUPPORTIVE CARE: Zofran ativan and dex are listed as part of prechemo antiemetic regimen in NCCN guidelines for breast cancer in pregnancy. The use of Emend is controversial in pregnancy. We discussed the issue of port placement in the OR, but that would increase anesthesia time by 45 min therefore we would treat her peripherally and likely do port placement after delivery. We discussed the issue of preeclampsia or gestational diabetes with steroids. She will be monitored closely by MFM. She will see MFM between every chemo session for close fetal monitoring. \par \par I explained her that EOD work up is limited due to risk of radiation exposure to the growing fetus. We plan to perform CT scans or a PET/CT scan after delivery. She will also be a candidate for PMRT due to large tumor size. \par \par S/p infection with shigella and c-diff after Adriamycin c#2, confirmed with cultures and PCR x 2, was hospitalized for diarrhea and abd pain, treated with ampicillin and vanco,  Ampicillin completed yesterday. Vanco 2 more days left. Patient reports mom had Shigella 2 years ago, Mom has come in to help her 2 months prior but is not ill. She denies sick contacts. \par \par She was hospitalized for 5 days after Cytoxan Omi # 2 due to diarrhea. Infectious work up was negative.Completed vancomycin course prior to last treatment. Sx were controlled with supportive care. She feels back to normal now. No abd pain, NVD, fevers.  No Bone pain, no neuropathy.\par \par We discussed the plan to give C4 in 2 weeks and deliver at around 37 weeks. She will need Taxol q2w x 4 doses after delivery. We will perform CT scans after the baby is born. [de-identified] : Ms. EVANGELISTA RUBIO is here for f/u for right breast cancer, 33 weeks gestation,  s/p Adriamycin x 4 completed 2/1/19. (Adriamycin course complicated by Shigella, and C-diff causing delays of cycle 3 and 4. ) Cytoxan C4D1 moved a couple days after being cancelled this past Friday.\par She C/o lower abd pressure and back pain more noticeable with ambulation. She was sent over to [facility] on 3/29, and again seen earlier today by [facility]. Her exam and ultrasound was good, No signs of early labor. She is not c/o of any new symptoms today. She denies contractions. Baby moving well, no abd pain, no back pain, no vaginal discharge or vaginal bleeding.  Denies n/v and diarrhea no cramping. no fevers. She was diagnosed with gestational diabetes and is taking insulin. BG stable.\par \par  \par

## 2019-04-07 NOTE — REASON FOR VISIT
[Follow-Up Visit] : a follow-up [[employer] ] : provided by [employer]   [Other: _____] : [unfilled] [FreeTextEntry1] : [id number] [FreeTextEntry2] : [place]

## 2019-04-08 ENCOUNTER — OUTPATIENT (OUTPATIENT)
Dept: OUTPATIENT SERVICES | Facility: HOSPITAL | Age: 39
LOS: 1 days | End: 2019-04-08
Payer: MEDICAID

## 2019-04-08 ENCOUNTER — APPOINTMENT (OUTPATIENT)
Dept: ANTEPARTUM | Facility: CLINIC | Age: 39
End: 2019-04-08
Payer: MEDICAID

## 2019-04-08 ENCOUNTER — ASOB RESULT (OUTPATIENT)
Age: 39
End: 2019-04-08

## 2019-04-08 ENCOUNTER — APPOINTMENT (OUTPATIENT)
Dept: MATERNAL FETAL MEDICINE | Facility: CLINIC | Age: 39
End: 2019-04-08
Payer: MEDICAID

## 2019-04-08 VITALS — DIASTOLIC BLOOD PRESSURE: 62 MMHG | SYSTOLIC BLOOD PRESSURE: 110 MMHG

## 2019-04-08 DIAGNOSIS — O09.899 SUPERVISION OF OTHER HIGH RISK PREGNANCIES, UNSPECIFIED TRIMESTER: ICD-10-CM

## 2019-04-08 DIAGNOSIS — Z90.11 ACQUIRED ABSENCE OF RIGHT BREAST AND NIPPLE: Chronic | ICD-10-CM

## 2019-04-08 PROCEDURE — 76818 FETAL BIOPHYS PROFILE W/NST: CPT | Mod: 26

## 2019-04-08 PROCEDURE — G0108 DIAB MANAGE TRN  PER INDIV: CPT

## 2019-04-08 PROCEDURE — 86780 TREPONEMA PALLIDUM: CPT

## 2019-04-08 PROCEDURE — 99213 OFFICE O/P EST LOW 20 MIN: CPT | Mod: 25

## 2019-04-08 PROCEDURE — 85027 COMPLETE CBC AUTOMATED: CPT

## 2019-04-08 PROCEDURE — 36415 COLL VENOUS BLD VENIPUNCTURE: CPT

## 2019-04-08 PROCEDURE — 76818 FETAL BIOPHYS PROFILE W/NST: CPT

## 2019-04-08 PROCEDURE — G0463: CPT | Mod: 25

## 2019-04-08 PROCEDURE — 87389 HIV-1 AG W/HIV-1&-2 AB AG IA: CPT

## 2019-04-08 PROCEDURE — 87653 STREP B DNA AMP PROBE: CPT

## 2019-04-08 PROCEDURE — G0108: CPT

## 2019-04-08 PROCEDURE — 81003 URINALYSIS AUTO W/O SCOPE: CPT

## 2019-04-08 PROCEDURE — 81003 URINALYSIS AUTO W/O SCOPE: CPT | Mod: NC,QW

## 2019-04-09 LAB
HCT VFR BLD CALC: 37.2 % — SIGNIFICANT CHANGE UP (ref 34.5–45)
HGB BLD-MCNC: 11.5 G/DL — SIGNIFICANT CHANGE UP (ref 11.5–15.5)
HIV 1+2 AB+HIV1 P24 AG SERPL QL IA: SIGNIFICANT CHANGE UP
MCHC RBC-ENTMCNC: 27.6 PG — SIGNIFICANT CHANGE UP (ref 27–34)
MCHC RBC-ENTMCNC: 30.9 GM/DL — LOW (ref 32–36)
MCV RBC AUTO: 89.4 FL — SIGNIFICANT CHANGE UP (ref 80–100)
PLATELET # BLD AUTO: 307 K/UL — SIGNIFICANT CHANGE UP (ref 150–400)
RBC # BLD: 4.16 M/UL — SIGNIFICANT CHANGE UP (ref 3.8–5.2)
RBC # FLD: 14.9 % — HIGH (ref 10.3–14.5)
T PALLIDUM AB TITR SER: NEGATIVE — SIGNIFICANT CHANGE UP
WBC # BLD: 7.43 K/UL — SIGNIFICANT CHANGE UP (ref 3.8–10.5)
WBC # FLD AUTO: 7.43 K/UL — SIGNIFICANT CHANGE UP (ref 3.8–10.5)

## 2019-04-10 ENCOUNTER — APPOINTMENT (OUTPATIENT)
Dept: HEMATOLOGY ONCOLOGY | Facility: CLINIC | Age: 39
End: 2019-04-10

## 2019-04-10 ENCOUNTER — OUTPATIENT (OUTPATIENT)
Dept: OUTPATIENT SERVICES | Facility: HOSPITAL | Age: 39
LOS: 1 days | Discharge: HOME | End: 2019-04-10

## 2019-04-10 DIAGNOSIS — Z90.11 ACQUIRED ABSENCE OF RIGHT BREAST AND NIPPLE: Chronic | ICD-10-CM

## 2019-04-10 DIAGNOSIS — C50.919 MALIGNANT NEOPLASM OF UNSPECIFIED SITE OF UNSPECIFIED FEMALE BREAST: ICD-10-CM

## 2019-04-10 LAB
GROUP B BETA STREP DNA (PCR): DETECTED
GROUP B BETA STREP INTERPRETATION: SIGNIFICANT CHANGE UP
SOURCE GROUP B STREP: SIGNIFICANT CHANGE UP

## 2019-04-15 ENCOUNTER — APPOINTMENT (OUTPATIENT)
Dept: MATERNAL FETAL MEDICINE | Facility: CLINIC | Age: 39
End: 2019-04-15
Payer: MEDICAID

## 2019-04-15 ENCOUNTER — ASOB RESULT (OUTPATIENT)
Age: 39
End: 2019-04-15

## 2019-04-15 ENCOUNTER — OUTPATIENT (OUTPATIENT)
Dept: OUTPATIENT SERVICES | Facility: HOSPITAL | Age: 39
LOS: 1 days | End: 2019-04-15
Payer: MEDICAID

## 2019-04-15 ENCOUNTER — APPOINTMENT (OUTPATIENT)
Dept: ANTEPARTUM | Facility: CLINIC | Age: 39
End: 2019-04-15
Payer: MEDICAID

## 2019-04-15 DIAGNOSIS — O09.899 SUPERVISION OF OTHER HIGH RISK PREGNANCIES, UNSPECIFIED TRIMESTER: ICD-10-CM

## 2019-04-15 DIAGNOSIS — Z90.11 ACQUIRED ABSENCE OF RIGHT BREAST AND NIPPLE: Chronic | ICD-10-CM

## 2019-04-15 DIAGNOSIS — C50.919 MALIGNANT NEOPLASM OF UNSPECIFIED SITE OF UNSPECIFIED FEMALE BREAST: ICD-10-CM

## 2019-04-15 DIAGNOSIS — O9A.119: ICD-10-CM

## 2019-04-15 PROCEDURE — G0108 DIAB MANAGE TRN  PER INDIV: CPT

## 2019-04-15 PROCEDURE — G0463: CPT

## 2019-04-15 PROCEDURE — 76818 FETAL BIOPHYS PROFILE W/NST: CPT

## 2019-04-15 PROCEDURE — 99213 OFFICE O/P EST LOW 20 MIN: CPT | Mod: GE,25

## 2019-04-15 PROCEDURE — 81003 URINALYSIS AUTO W/O SCOPE: CPT

## 2019-04-15 PROCEDURE — G0108: CPT

## 2019-04-15 PROCEDURE — 81003 URINALYSIS AUTO W/O SCOPE: CPT | Mod: NC,QW

## 2019-04-16 VITALS — SYSTOLIC BLOOD PRESSURE: 112 MMHG | DIASTOLIC BLOOD PRESSURE: 80 MMHG | WEIGHT: 220 LBS | BODY MASS INDEX: 44.43 KG/M2

## 2019-04-18 ENCOUNTER — APPOINTMENT (OUTPATIENT)
Dept: ANTEPARTUM | Facility: CLINIC | Age: 39
End: 2019-04-18
Payer: MEDICAID

## 2019-04-18 ENCOUNTER — ASOB RESULT (OUTPATIENT)
Age: 39
End: 2019-04-18

## 2019-04-18 PROCEDURE — 76818 FETAL BIOPHYS PROFILE W/NST: CPT | Mod: 26

## 2019-04-19 ENCOUNTER — NON-APPOINTMENT (OUTPATIENT)
Age: 39
End: 2019-04-19

## 2019-04-22 ENCOUNTER — OUTPATIENT (OUTPATIENT)
Dept: OUTPATIENT SERVICES | Facility: HOSPITAL | Age: 39
LOS: 1 days | End: 2019-04-22
Payer: MEDICAID

## 2019-04-22 ENCOUNTER — ASOB RESULT (OUTPATIENT)
Age: 39
End: 2019-04-22

## 2019-04-22 ENCOUNTER — APPOINTMENT (OUTPATIENT)
Dept: MATERNAL FETAL MEDICINE | Facility: CLINIC | Age: 39
End: 2019-04-22
Payer: MEDICAID

## 2019-04-22 ENCOUNTER — APPOINTMENT (OUTPATIENT)
Dept: ANTEPARTUM | Facility: CLINIC | Age: 39
End: 2019-04-22
Payer: MEDICAID

## 2019-04-22 ENCOUNTER — APPOINTMENT (OUTPATIENT)
Dept: MATERNAL FETAL MEDICINE | Facility: CLINIC | Age: 39
End: 2019-04-22

## 2019-04-22 VITALS — WEIGHT: 222 LBS | DIASTOLIC BLOOD PRESSURE: 66 MMHG | BODY MASS INDEX: 44.84 KG/M2 | SYSTOLIC BLOOD PRESSURE: 118 MMHG

## 2019-04-22 DIAGNOSIS — O09.899 SUPERVISION OF OTHER HIGH RISK PREGNANCIES, UNSPECIFIED TRIMESTER: ICD-10-CM

## 2019-04-22 DIAGNOSIS — Z90.11 ACQUIRED ABSENCE OF RIGHT BREAST AND NIPPLE: Chronic | ICD-10-CM

## 2019-04-22 LAB
BILIRUB UR QL STRIP: NORMAL
CLARITY UR: CLEAR
COLLECTION METHOD: NORMAL
GLUCOSE UR-MCNC: NORMAL
HCG UR QL: 0.2 EU/DL
HGB UR QL STRIP.AUTO: NORMAL
KETONES UR-MCNC: NORMAL
LEUKOCYTE ESTERASE UR QL STRIP: NORMAL
NITRITE UR QL STRIP: NORMAL
PH UR STRIP: 5.5
PROT UR STRIP-MCNC: NORMAL
SP GR UR STRIP: 1.02

## 2019-04-22 PROCEDURE — G0463: CPT

## 2019-04-22 PROCEDURE — G0108: CPT

## 2019-04-22 PROCEDURE — 76816 OB US FOLLOW-UP PER FETUS: CPT | Mod: 26

## 2019-04-22 PROCEDURE — 76818 FETAL BIOPHYS PROFILE W/NST: CPT | Mod: 26

## 2019-04-22 PROCEDURE — 81003 URINALYSIS AUTO W/O SCOPE: CPT

## 2019-04-22 PROCEDURE — 81003 URINALYSIS AUTO W/O SCOPE: CPT | Mod: NC,QW

## 2019-04-22 PROCEDURE — 99213 OFFICE O/P EST LOW 20 MIN: CPT | Mod: 25

## 2019-04-23 ENCOUNTER — NON-APPOINTMENT (OUTPATIENT)
Age: 39
End: 2019-04-23

## 2019-04-24 DIAGNOSIS — C50.919 MALIGNANT NEOPLASM OF UNSPECIFIED SITE OF UNSPECIFIED FEMALE BREAST: ICD-10-CM

## 2019-04-24 DIAGNOSIS — O09.90 SUPERVISION OF HIGH RISK PREGNANCY, UNSPECIFIED, UNSPECIFIED TRIMESTER: ICD-10-CM

## 2019-04-25 ENCOUNTER — ASOB RESULT (OUTPATIENT)
Age: 39
End: 2019-04-25

## 2019-04-25 ENCOUNTER — APPOINTMENT (OUTPATIENT)
Dept: ANTEPARTUM | Facility: CLINIC | Age: 39
End: 2019-04-25
Payer: MEDICAID

## 2019-04-25 ENCOUNTER — OUTPATIENT (OUTPATIENT)
Dept: OUTPATIENT SERVICES | Facility: HOSPITAL | Age: 39
LOS: 1 days | End: 2019-04-25
Payer: MEDICAID

## 2019-04-25 DIAGNOSIS — Z90.11 ACQUIRED ABSENCE OF RIGHT BREAST AND NIPPLE: Chronic | ICD-10-CM

## 2019-04-25 PROCEDURE — 59025 FETAL NON-STRESS TEST: CPT

## 2019-04-25 PROCEDURE — 59025 FETAL NON-STRESS TEST: CPT | Mod: 26

## 2019-04-29 ENCOUNTER — APPOINTMENT (OUTPATIENT)
Dept: MATERNAL FETAL MEDICINE | Facility: CLINIC | Age: 39
End: 2019-04-29
Payer: MEDICAID

## 2019-04-29 ENCOUNTER — ASOB RESULT (OUTPATIENT)
Age: 39
End: 2019-04-29

## 2019-04-29 ENCOUNTER — OUTPATIENT (OUTPATIENT)
Dept: OUTPATIENT SERVICES | Facility: HOSPITAL | Age: 39
LOS: 1 days | End: 2019-04-29
Payer: MEDICAID

## 2019-04-29 ENCOUNTER — APPOINTMENT (OUTPATIENT)
Dept: ANTEPARTUM | Facility: CLINIC | Age: 39
End: 2019-04-29
Payer: MEDICAID

## 2019-04-29 ENCOUNTER — NON-APPOINTMENT (OUTPATIENT)
Age: 39
End: 2019-04-29

## 2019-04-29 VITALS — DIASTOLIC BLOOD PRESSURE: 76 MMHG | SYSTOLIC BLOOD PRESSURE: 112 MMHG

## 2019-04-29 DIAGNOSIS — O09.899 SUPERVISION OF OTHER HIGH RISK PREGNANCIES, UNSPECIFIED TRIMESTER: ICD-10-CM

## 2019-04-29 DIAGNOSIS — Z90.11 ACQUIRED ABSENCE OF RIGHT BREAST AND NIPPLE: Chronic | ICD-10-CM

## 2019-04-29 PROCEDURE — G0463: CPT

## 2019-04-29 PROCEDURE — 81003 URINALYSIS AUTO W/O SCOPE: CPT

## 2019-04-29 PROCEDURE — 81003 URINALYSIS AUTO W/O SCOPE: CPT | Mod: NC,QW

## 2019-04-29 PROCEDURE — 76818 FETAL BIOPHYS PROFILE W/NST: CPT | Mod: 26

## 2019-04-29 PROCEDURE — 76818 FETAL BIOPHYS PROFILE W/NST: CPT

## 2019-04-29 PROCEDURE — 99213 OFFICE O/P EST LOW 20 MIN: CPT | Mod: GE,25

## 2019-04-30 ENCOUNTER — INPATIENT (INPATIENT)
Facility: HOSPITAL | Age: 39
LOS: 1 days | Discharge: HOME | End: 2019-05-02
Attending: OBSTETRICS & GYNECOLOGY | Admitting: OBSTETRICS & GYNECOLOGY
Payer: MEDICAID

## 2019-04-30 ENCOUNTER — RESULT REVIEW (OUTPATIENT)
Age: 39
End: 2019-04-30

## 2019-04-30 VITALS
HEART RATE: 81 BPM | TEMPERATURE: 99 F | DIASTOLIC BLOOD PRESSURE: 62 MMHG | SYSTOLIC BLOOD PRESSURE: 119 MMHG | OXYGEN SATURATION: 100 % | RESPIRATION RATE: 16 BRPM

## 2019-04-30 DIAGNOSIS — Z90.11 ACQUIRED ABSENCE OF RIGHT BREAST AND NIPPLE: Chronic | ICD-10-CM

## 2019-04-30 LAB
BASOPHILS # BLD AUTO: 0 K/UL — SIGNIFICANT CHANGE UP (ref 0–0.2)
BASOPHILS NFR BLD AUTO: 0.4 % — SIGNIFICANT CHANGE UP (ref 0–2)
BLD GP AB SCN SERPL QL: NEGATIVE — SIGNIFICANT CHANGE UP
EOSINOPHIL # BLD AUTO: 0.1 K/UL — SIGNIFICANT CHANGE UP (ref 0–0.5)
EOSINOPHIL NFR BLD AUTO: 1.5 % — SIGNIFICANT CHANGE UP (ref 0–6)
GLUCOSE BLDC GLUCOMTR-MCNC: 109 MG/DL — HIGH (ref 70–99)
GLUCOSE BLDC GLUCOMTR-MCNC: 88 MG/DL — SIGNIFICANT CHANGE UP (ref 70–99)
HCT VFR BLD CALC: 33.4 % — LOW (ref 34.5–45)
HGB BLD-MCNC: 11.9 G/DL — SIGNIFICANT CHANGE UP (ref 11.5–15.5)
LYMPHOCYTES # BLD AUTO: 2.3 K/UL — SIGNIFICANT CHANGE UP (ref 1–3.3)
LYMPHOCYTES # BLD AUTO: 27.7 % — SIGNIFICANT CHANGE UP (ref 13–44)
MCHC RBC-ENTMCNC: 29.9 PG — SIGNIFICANT CHANGE UP (ref 27–34)
MCHC RBC-ENTMCNC: 35.8 GM/DL — SIGNIFICANT CHANGE UP (ref 32–36)
MCV RBC AUTO: 83.5 FL — SIGNIFICANT CHANGE UP (ref 80–100)
MONOCYTES # BLD AUTO: 0.5 K/UL — SIGNIFICANT CHANGE UP (ref 0–0.9)
MONOCYTES NFR BLD AUTO: 6 % — SIGNIFICANT CHANGE UP (ref 2–14)
NEUTROPHILS # BLD AUTO: 5.2 K/UL — SIGNIFICANT CHANGE UP (ref 1.8–7.4)
NEUTROPHILS NFR BLD AUTO: 64.4 % — SIGNIFICANT CHANGE UP (ref 43–77)
PLATELET # BLD AUTO: 236 K/UL — SIGNIFICANT CHANGE UP (ref 150–400)
RBC # BLD: 4 M/UL — SIGNIFICANT CHANGE UP (ref 3.8–5.2)
RBC # FLD: 14 % — SIGNIFICANT CHANGE UP (ref 10.3–14.5)
RH IG SCN BLD-IMP: POSITIVE — SIGNIFICANT CHANGE UP
T PALLIDUM AB TITR SER: NEGATIVE — SIGNIFICANT CHANGE UP
WBC # BLD: 8.1 K/UL — SIGNIFICANT CHANGE UP (ref 3.8–10.5)
WBC # FLD AUTO: 8.1 K/UL — SIGNIFICANT CHANGE UP (ref 3.8–10.5)

## 2019-04-30 PROCEDURE — 74177 CT ABD & PELVIS W/CONTRAST: CPT | Mod: 26

## 2019-04-30 PROCEDURE — 99223 1ST HOSP IP/OBS HIGH 75: CPT | Mod: GC

## 2019-04-30 PROCEDURE — 59409 OBSTETRICAL CARE: CPT | Mod: U9

## 2019-04-30 PROCEDURE — 71260 CT THORAX DX C+: CPT | Mod: 26

## 2019-04-30 PROCEDURE — 88307 TISSUE EXAM BY PATHOLOGIST: CPT | Mod: 26

## 2019-04-30 RX ORDER — TETANUS TOXOID, REDUCED DIPHTHERIA TOXOID AND ACELLULAR PERTUSSIS VACCINE, ADSORBED 5; 2.5; 8; 8; 2.5 [IU]/.5ML; [IU]/.5ML; UG/.5ML; UG/.5ML; UG/.5ML
0.5 SUSPENSION INTRAMUSCULAR ONCE
Qty: 0 | Refills: 0 | Status: DISCONTINUED | OUTPATIENT
Start: 2019-04-30 | End: 2019-05-02

## 2019-04-30 RX ORDER — ACETAMINOPHEN 500 MG
975 TABLET ORAL EVERY 6 HOURS
Qty: 0 | Refills: 0 | Status: COMPLETED | OUTPATIENT
Start: 2019-04-30 | End: 2020-03-28

## 2019-04-30 RX ORDER — OXYTOCIN 10 UNIT/ML
4 VIAL (ML) INJECTION
Qty: 30 | Refills: 0 | Status: DISCONTINUED | OUTPATIENT
Start: 2019-04-30 | End: 2019-04-30

## 2019-04-30 RX ORDER — ACETAMINOPHEN 500 MG
975 TABLET ORAL EVERY 6 HOURS
Qty: 0 | Refills: 0 | Status: DISCONTINUED | OUTPATIENT
Start: 2019-04-30 | End: 2019-05-02

## 2019-04-30 RX ORDER — IBUPROFEN 200 MG
600 TABLET ORAL EVERY 6 HOURS
Qty: 0 | Refills: 0 | Status: DISCONTINUED | OUTPATIENT
Start: 2019-04-30 | End: 2019-05-02

## 2019-04-30 RX ORDER — DIPHENHYDRAMINE HCL 50 MG
25 CAPSULE ORAL EVERY 6 HOURS
Qty: 0 | Refills: 0 | Status: DISCONTINUED | OUTPATIENT
Start: 2019-04-30 | End: 2019-05-02

## 2019-04-30 RX ORDER — HYDROCORTISONE 1 %
1 OINTMENT (GRAM) TOPICAL EVERY 6 HOURS
Qty: 0 | Refills: 0 | Status: DISCONTINUED | OUTPATIENT
Start: 2019-04-30 | End: 2019-05-02

## 2019-04-30 RX ORDER — PRAMOXINE HYDROCHLORIDE 150 MG/15G
1 AEROSOL, FOAM RECTAL EVERY 4 HOURS
Qty: 0 | Refills: 0 | Status: DISCONTINUED | OUTPATIENT
Start: 2019-04-30 | End: 2019-05-02

## 2019-04-30 RX ORDER — LANOLIN
1 OINTMENT (GRAM) TOPICAL EVERY 6 HOURS
Qty: 0 | Refills: 0 | Status: DISCONTINUED | OUTPATIENT
Start: 2019-04-30 | End: 2019-05-02

## 2019-04-30 RX ORDER — AER TRAVELER 0.5 G/1
1 SOLUTION RECTAL; TOPICAL EVERY 4 HOURS
Qty: 0 | Refills: 0 | Status: DISCONTINUED | OUTPATIENT
Start: 2019-04-30 | End: 2019-05-02

## 2019-04-30 RX ORDER — AMPICILLIN TRIHYDRATE 250 MG
1 CAPSULE ORAL EVERY 4 HOURS
Qty: 0 | Refills: 0 | Status: DISCONTINUED | OUTPATIENT
Start: 2019-04-30 | End: 2019-04-30

## 2019-04-30 RX ORDER — SODIUM CHLORIDE 9 MG/ML
1000 INJECTION INTRAMUSCULAR; INTRAVENOUS; SUBCUTANEOUS
Qty: 0 | Refills: 0 | Status: DISCONTINUED | OUTPATIENT
Start: 2019-04-30 | End: 2019-04-30

## 2019-04-30 RX ORDER — ACETAMINOPHEN 500 MG
1000 TABLET ORAL ONCE
Qty: 0 | Refills: 0 | Status: COMPLETED | OUTPATIENT
Start: 2019-04-30 | End: 2019-04-30

## 2019-04-30 RX ORDER — DIBUCAINE 1 %
1 OINTMENT (GRAM) RECTAL EVERY 6 HOURS
Qty: 0 | Refills: 0 | Status: DISCONTINUED | OUTPATIENT
Start: 2019-04-30 | End: 2019-05-02

## 2019-04-30 RX ORDER — SODIUM CHLORIDE 9 MG/ML
1000 INJECTION, SOLUTION INTRAVENOUS ONCE
Qty: 0 | Refills: 0 | Status: DISCONTINUED | OUTPATIENT
Start: 2019-04-30 | End: 2019-04-30

## 2019-04-30 RX ORDER — MAGNESIUM HYDROXIDE 400 MG/1
30 TABLET, CHEWABLE ORAL
Qty: 0 | Refills: 0 | Status: DISCONTINUED | OUTPATIENT
Start: 2019-04-30 | End: 2019-05-02

## 2019-04-30 RX ORDER — OXYCODONE HYDROCHLORIDE 5 MG/1
5 TABLET ORAL
Qty: 0 | Refills: 0 | Status: DISCONTINUED | OUTPATIENT
Start: 2019-04-30 | End: 2019-05-02

## 2019-04-30 RX ORDER — IBUPROFEN 200 MG
600 TABLET ORAL EVERY 6 HOURS
Qty: 0 | Refills: 0 | Status: COMPLETED | OUTPATIENT
Start: 2019-04-30 | End: 2020-03-28

## 2019-04-30 RX ORDER — BENZOCAINE 10 %
1 GEL (GRAM) MUCOUS MEMBRANE EVERY 6 HOURS
Qty: 0 | Refills: 0 | Status: DISCONTINUED | OUTPATIENT
Start: 2019-04-30 | End: 2019-05-02

## 2019-04-30 RX ORDER — OXYTOCIN 10 UNIT/ML
333.33 VIAL (ML) INJECTION
Qty: 20 | Refills: 0 | Status: DISCONTINUED | OUTPATIENT
Start: 2019-04-30 | End: 2019-05-02

## 2019-04-30 RX ORDER — CITRIC ACID/SODIUM CITRATE 300-500 MG
15 SOLUTION, ORAL ORAL EVERY 4 HOURS
Qty: 0 | Refills: 0 | Status: DISCONTINUED | OUTPATIENT
Start: 2019-04-30 | End: 2019-04-30

## 2019-04-30 RX ORDER — OXYCODONE HYDROCHLORIDE 5 MG/1
5 TABLET ORAL ONCE
Qty: 0 | Refills: 0 | Status: DISCONTINUED | OUTPATIENT
Start: 2019-04-30 | End: 2019-05-02

## 2019-04-30 RX ORDER — AMPICILLIN TRIHYDRATE 250 MG
2 CAPSULE ORAL ONCE
Qty: 0 | Refills: 0 | Status: COMPLETED | OUTPATIENT
Start: 2019-04-30 | End: 2019-04-30

## 2019-04-30 RX ORDER — SIMETHICONE 80 MG/1
80 TABLET, CHEWABLE ORAL EVERY 4 HOURS
Qty: 0 | Refills: 0 | Status: DISCONTINUED | OUTPATIENT
Start: 2019-04-30 | End: 2019-05-02

## 2019-04-30 RX ORDER — SODIUM CHLORIDE 9 MG/ML
1000 INJECTION, SOLUTION INTRAVENOUS
Qty: 0 | Refills: 0 | Status: DISCONTINUED | OUTPATIENT
Start: 2019-04-30 | End: 2019-04-30

## 2019-04-30 RX ORDER — OXYTOCIN 10 UNIT/ML
333.33 VIAL (ML) INJECTION
Qty: 20 | Refills: 0 | Status: DISCONTINUED | OUTPATIENT
Start: 2019-04-30 | End: 2019-04-30

## 2019-04-30 RX ORDER — AMPICILLIN TRIHYDRATE 250 MG
CAPSULE ORAL
Qty: 0 | Refills: 0 | Status: DISCONTINUED | OUTPATIENT
Start: 2019-04-30 | End: 2019-04-30

## 2019-04-30 RX ORDER — DOCUSATE SODIUM 100 MG
100 CAPSULE ORAL
Qty: 0 | Refills: 0 | Status: DISCONTINUED | OUTPATIENT
Start: 2019-04-30 | End: 2019-05-02

## 2019-04-30 RX ORDER — SODIUM CHLORIDE 9 MG/ML
10 INJECTION INTRAMUSCULAR; INTRAVENOUS; SUBCUTANEOUS EVERY 8 HOURS
Qty: 0 | Refills: 0 | Status: DISCONTINUED | OUTPATIENT
Start: 2019-04-30 | End: 2019-05-02

## 2019-04-30 RX ORDER — GLYCERIN ADULT
1 SUPPOSITORY, RECTAL RECTAL AT BEDTIME
Qty: 0 | Refills: 0 | Status: DISCONTINUED | OUTPATIENT
Start: 2019-04-30 | End: 2019-05-02

## 2019-04-30 RX ADMIN — Medication 208 GRAM(S): at 06:04

## 2019-04-30 RX ADMIN — SODIUM CHLORIDE 125 MILLILITER(S): 9 INJECTION, SOLUTION INTRAVENOUS at 01:45

## 2019-04-30 RX ADMIN — SODIUM CHLORIDE 125 MILLILITER(S): 9 INJECTION INTRAMUSCULAR; INTRAVENOUS; SUBCUTANEOUS at 01:30

## 2019-04-30 RX ADMIN — Medication 1000 MILLIGRAM(S): at 11:17

## 2019-04-30 RX ADMIN — Medication 4 MILLIUNIT(S)/MIN: at 06:05

## 2019-04-30 RX ADMIN — Medication 600 MILLIGRAM(S): at 16:00

## 2019-04-30 RX ADMIN — Medication 400 MILLIGRAM(S): at 10:07

## 2019-04-30 RX ADMIN — Medication 216 GRAM(S): at 01:45

## 2019-04-30 RX ADMIN — SODIUM CHLORIDE 10 MILLILITER(S): 9 INJECTION INTRAMUSCULAR; INTRAVENOUS; SUBCUTANEOUS at 14:00

## 2019-04-30 RX ADMIN — Medication 600 MILLIGRAM(S): at 23:32

## 2019-04-30 RX ADMIN — Medication 600 MILLIGRAM(S): at 15:05

## 2019-04-30 NOTE — CONSULT NOTE ADULT - SUBJECTIVE AND OBJECTIVE BOX
HPI:  38F w/ T2 N0 stage IIA poorly differentiated ER/MT/HER-2/rajni NEGATIVE IDC of the right breast (diagnosed at 11 week gestation), CXR and abdominal US without distant mets, BRCA negative, started on ddACT sequential 12/7/18, s/p last cytoxan on 4/2/19, admitted for induction of labor.         PAST MEDICAL & SURGICAL HISTORY:  Breast cancer, right  Morbid obesity  History of right total mastectomy      Allergies  No Known Allergies        MEDICATIONS  (STANDING):  oxytocin Infusion 333.333 milliUNIT(s)/Min (1000 mL/Hr) IV Continuous <Continuous>      FAMILY HISTORY:  No pertinent family history in first degree relatives      SOCIAL HISTORY: No EtOH, no tobacco    REVIEW OF SYSTEMS:    CONSTITUTIONAL: No weakness, fevers or chills  EYES/ENT: No visual changes;  No vertigo or throat pain   NECK: No pain or stiffness  RESPIRATORY: No cough, wheezing, hemoptysis; No shortness of breath  CARDIOVASCULAR: No chest pain or palpitations  GASTROINTESTINAL: No abdominal or epigastric pain. No nausea, vomiting, or hematemesis; No diarrhea or constipation. No melena or hematochezia.  GENITOURINARY: No dysuria, frequency or hematuria  NEUROLOGICAL: No numbness or weakness  SKIN: No itching, burning, rashes, or lesions   All other review of systems is negative unless indicated above.        T(F): 98.6 (04-30-19 @ 09:00), Max: 98.6 (04-30-19 @ 09:00)  HR: 68 (04-30-19 @ 10:30)  BP: 102/57 (04-30-19 @ 10:30)  RR: 18 (04-30-19 @ 10:30)  SpO2: 99% (04-30-19 @ 10:30)      GENERAL: NAD, well-developed  HEAD:  Atraumatic, Normocephalic  EYES: EOMI, PERRLA, conjunctiva and sclera clear  NECK: Supple, No JVD  CHEST/LUNG: Clear to auscultation bilaterally; No wheeze  HEART: Regular rate and rhythm; No murmurs, rubs, or gallops  ABDOMEN: Soft, Nontender, Nondistended; Bowel sounds present  EXTREMITIES:  2+ Peripheral Pulses, No clubbing, cyanosis, or edema  NEUROLOGY: non-focal  SKIN: No rashes or lesions                          11.9   8.1   )-----------( 236      ( 30 Apr 2019 01:40 )             33.4 HPI:  38F w/ T2 N0 stage IIA poorly differentiated ER/MD/HER-2/rajni NEGATIVE IDC of the right breast (diagnosed at 11 week gestation), CXR and abdominal US without distant mets, BRCA negative, started on ddACT sequential 12/7/18, s/p last cytoxan on 4/2/19, admitted for induction of labor.     Patient is s/p uncomplicated vaginal delivery 4/30.      PAST MEDICAL & SURGICAL HISTORY:  Breast cancer, right  Morbid obesity  History of right total mastectomy      Allergies  No Known Allergies      MEDICATIONS  (STANDING):  oxytocin Infusion 333.333 milliUNIT(s)/Min (1000 mL/Hr) IV Continuous <Continuous>      FAMILY HISTORY:  No pertinent family history in first degree relatives      SOCIAL HISTORY: No EtOH, no tobacco      REVIEW OF SYSTEMS:  CONSTITUTIONAL: No weakness, fevers or chills  EYES/ENT: No visual changes;  No vertigo or throat pain   NECK: No pain or stiffness  RESPIRATORY: No cough, wheezing, hemoptysis; No shortness of breath  CARDIOVASCULAR: No chest pain or palpitations  GASTROINTESTINAL: No abdominal or epigastric pain. No nausea, vomiting, or hematemesis; No diarrhea or constipation. No melena or hematochezia.  GENITOURINARY: No dysuria, frequency or hematuria  NEUROLOGICAL: No numbness or weakness  SKIN: No itching, burning, rashes, or lesions   All other review of systems is negative unless indicated above.        T(F): 98.6 (04-30-19 @ 09:00), Max: 98.6 (04-30-19 @ 09:00)  HR: 68 (04-30-19 @ 10:30)  BP: 102/57 (04-30-19 @ 10:30)  RR: 18 (04-30-19 @ 10:30)  SpO2: 99% (04-30-19 @ 10:30)      GENERAL: NAD, well-developed  HEAD:  Atraumatic, Normocephalic  EYES: EOMI, conjunctiva and sclera clear  NECK: Supple  CHEST/LUNG: Clear to auscultation bilaterally; No wheezes or crackles   HEART: Regular rate and rhythm; No murmurs, rubs, or gallops  ABDOMEN: Soft, appropriately tender   EXTREMITIES:  No clubbing, cyanosis, or edema  NEUROLOGY: non-focal  SKIN: No rashes or lesions                          11.9   8.1   )-----------( 236      ( 30 Apr 2019 01:40 )             33.4

## 2019-04-30 NOTE — CHART NOTE - NSCHARTNOTEFT_GEN_A_CORE
Per hematology patient to undergo CT Chest and Abd/pelvis w/IV contrast as well and full body nuclear medicine scan. Following nuclear medicine scan patient unable to be in contact with baby or breast feed for 24h. Discussed with patient bedside. She is aware and understands. Nursery made aware as well. To be seen by oncology today.    Betty Romero, PGY1  D/W Dr. Bang     #[phone]

## 2019-04-30 NOTE — CONSULT NOTE ADULT - ASSESSMENT
38F w/ T2 N0 stage IIA poorly differentiated ER/ME/HER-2/rajni NEGATIVE IDC of the right breast (diagnosed at 11 week gestation), CXR and abdominal US without distant mets, BRCA negative, started on ddACT sequential 12/7/18, s/p last cytoxan on 4/2/19, admitted for induction of labor.   s/p uncomplicated vaginal delivery on 4/30.    # Triple-negative Breast Cancer:   - diagnosed during pregnancy, ~10 weeks gestation  - received adriamycin and cytoxan during pregnancy, last dose 4/2/19  - plan for Taxol post-partum  - recommend inpatient CT Chest/Abd/Pelvis   - NM Bone scan to be done as an outpatient    Discussed with primary oncologist, Dr. Viridiana Patrick.    Caren Delgado MD  Hematology/Oncology Fellow, PGY-4  pager: [phone]  After 5pm or on weekends, please page the on-call fellow.

## 2019-05-01 ENCOUNTER — TRANSCRIPTION ENCOUNTER (OUTPATIENT)
Age: 39
End: 2019-05-01

## 2019-05-01 LAB
HCT VFR BLD CALC: 32.5 % — LOW (ref 34.5–45)
HGB BLD-MCNC: 10 G/DL — LOW (ref 11.5–15.5)

## 2019-05-01 RX ORDER — IBUPROFEN 200 MG
1 TABLET ORAL
Qty: 0 | Refills: 0 | DISCHARGE
Start: 2019-05-01

## 2019-05-01 RX ORDER — ACETAMINOPHEN 500 MG
3 TABLET ORAL
Qty: 0 | Refills: 0 | DISCHARGE
Start: 2019-05-01

## 2019-05-01 RX ADMIN — Medication 100 MILLIGRAM(S): at 15:20

## 2019-05-01 RX ADMIN — Medication 600 MILLIGRAM(S): at 00:10

## 2019-05-01 RX ADMIN — Medication 600 MILLIGRAM(S): at 15:50

## 2019-05-01 RX ADMIN — Medication 600 MILLIGRAM(S): at 15:20

## 2019-05-01 RX ADMIN — Medication 600 MILLIGRAM(S): at 06:54

## 2019-05-01 RX ADMIN — Medication 1 TABLET(S): at 15:20

## 2019-05-01 NOTE — DISCHARGE NOTE OB - CARE PROVIDER_API CALL
Viridiana Patrick)  HematologyOncology; HospicePalliative Medicine; Internal Medicine  [address] [place] [address]  Phone: [phone]  Fax: [phone]  Follow Up Time:     [facility],   [address] [place] [address]  [address]  Phone: [phone]  Fax: (   )    -  Follow Up Time:

## 2019-05-01 NOTE — DISCHARGE NOTE OB - PLAN OF CARE
Recovery After discharge, please stay on pelvic rest for 6 weeks, meaning no sexual intercourse, no tampons and no douching.  No driving for 2 weeks as women can loose a lot of blood during delivery and there is a possibility of being lightheaded/fainting.  No lifting objects heavier than baby for two weeks.  Expect to have vaginal bleeding/spotting for up to six weeks.  The bleeding should get lighter and more white/light brown with time.  For bleeding soaking more than a pad an hour or passing clots greater than the size of your fist, come in to the emergency department.    Follow up in clinic in 6 weeks for routine postpartum visit.  Follow up with Dr. Patrick, in oncology clinic, in 2 weeks. After discharge, please stay on pelvic rest for 6 weeks, meaning no sexual intercourse, no tampons and no douching.  No driving for 2 weeks as women can loose a lot of blood during delivery and there is a possibility of being lightheaded/fainting.  No lifting objects heavier than baby for two weeks.  Expect to have vaginal bleeding/spotting for up to six weeks.  The bleeding should get lighter and more white/light brown with time.  For bleeding soaking more than a pad an hour or passing clots greater than the size of your fist, come in to the emergency department.    Follow up with Dr. Patrick, in oncology clinic, in 2 weeks. To follow up same day in high risk clinic for routine postpartum care.

## 2019-05-01 NOTE — DISCHARGE NOTE OB - PROVIDER TOKENS
PROVIDER:[TOKEN:[[id number]]],FREE:[LAST:[[facility]],PHONE:[phone]],FAX:[(   )    -],ADDRESS:[[address] [place] [address]]]

## 2019-05-01 NOTE — DIETITIAN INITIAL EVALUATION ADULT. - OTHER INFO
Nutrition consult received for h/o GDM this pregnancy. Pt was offered the  phone but declined it and preferred to speak English. Pt is a 40 y/o  s/p  at 38 weeks gestation. Pt has triple negative breast cancer s/p mastectomy during pregnancy. Plans on bottle feeding  male. Pt reports good appetite, denies any nausea/vomiting. +BM since delivery. Pt unsure what her prepregnancy Wt was and how much weight she gained during pregnancy. Current dosing Wt is 220 pounds

## 2019-05-01 NOTE — DISCHARGE NOTE OB - PRINCIPAL DIAGNOSIS
(normal spontaneous vaginal delivery)
patient feeling better - seen by ophtho - no signs of orbital cellulitis - no visual changes. pt given clinda-  offered cdu but wants to go home - pt has an ophtho who will see patient this week - will take clinda at home- given strict return precautions. Yusef Del Cid M.D., Attending Physician

## 2019-05-01 NOTE — PROGRESS NOTE ADULT - ASSESSMENT
A/P: 38yo PPD#1 s/p . Antepartum c/b stage 2A invasive ductal carcinoma (triple neg) s/p R mastectomy (2018). Patient s/p Adriamycin and 4x cytoxan (last dose ). Patient is doing well postpartum.

## 2019-05-01 NOTE — PROGRESS NOTE ADULT - PROBLEM SELECTOR PLAN 1
- CT C/A/P: neg for metastatic disease   - Appreciate heme recommendations for plans for postpartum taxol   - Patient for bone scan outpatient   - Pain well controlled, continue current pain regimen  - Increase ambulation, SCDs when not ambulating  - Continue regular diet  - Standing colace

## 2019-05-01 NOTE — PROGRESS NOTE ADULT - SUBJECTIVE AND OBJECTIVE BOX
OB Progress Note:  PPD#1   ID# [id number]    S: 40yo  PPD#1 s/p . Patient feels well. Pain is well controlled. She is tolerating a regular diet and passing flatus. She is voiding spontaneously, and ambulating without difficulty. Denies CP/SOB. Denies lightheadedness/dizziness. Denies N/V.    O:  Vitals:  Vital Signs Last 24 Hrs  T(C): 36.9 (2019 21:05), Max: 37 (2019 09:00)  T(F): 98.4 (2019 21:05), Max: 98.6 (2019 09:00)  HR: 79 (2019 21:05) (58 - 81)  BP: 98/65 (2019 21:05) (98/65 - 119/62)  BP(mean): 72 (2019 11:00) (72 - 84)  RR: 18 (2019 21:05) (15 - 18)  SpO2: 98% (2019 21:05) (98% - 100%)      MEDICATIONS  (STANDING):  acetaminophen   Tablet .. 975 milliGRAM(s) Oral every 6 hours  diphtheria/tetanus/pertussis (acellular) Vaccine (ADAcel) 0.5 milliLiter(s) IntraMuscular once  ibuprofen  Tablet. 600 milliGRAM(s) Oral every 6 hours  oxytocin Infusion 333.333 milliUNIT(s)/Min (1000 mL/Hr) IV Continuous <Continuous>  prenatal multivitamin 1 Tablet(s) Oral daily  sodium chloride 0.9% lock flush 10 milliLiter(s) IV Push every 8 hours      Labs:  Blood type: B Positive  Rubella IgG: RPR: Negative                          11.9   8.1 >-----------< 236    (  @ 01:40 )             33.4<L>          Physical Exam:  General: NAD  Abdomen: soft, non-tender, non-distended, fundus firm  Vaginal: Lochia wnl  Extremities: No erythema/edema

## 2019-05-01 NOTE — DISCHARGE NOTE OB - CARE PLAN
Principal Discharge DX:	 (normal spontaneous vaginal delivery)  Goal:	Recovery  Assessment and plan of treatment:	After discharge, please stay on pelvic rest for 6 weeks, meaning no sexual intercourse, no tampons and no douching.  No driving for 2 weeks as women can loose a lot of blood during delivery and there is a possibility of being lightheaded/fainting.  No lifting objects heavier than baby for two weeks.  Expect to have vaginal bleeding/spotting for up to six weeks.  The bleeding should get lighter and more white/light brown with time.  For bleeding soaking more than a pad an hour or passing clots greater than the size of your fist, come in to the emergency department.    Follow up in clinic in 6 weeks for routine postpartum visit.  Follow up with Dr. Patrick, in oncology clinic, in 2 weeks. Principal Discharge DX:	 (normal spontaneous vaginal delivery)  Goal:	Recovery  Assessment and plan of treatment:	After discharge, please stay on pelvic rest for 6 weeks, meaning no sexual intercourse, no tampons and no douching.  No driving for 2 weeks as women can loose a lot of blood during delivery and there is a possibility of being lightheaded/fainting.  No lifting objects heavier than baby for two weeks.  Expect to have vaginal bleeding/spotting for up to six weeks.  The bleeding should get lighter and more white/light brown with time.  For bleeding soaking more than a pad an hour or passing clots greater than the size of your fist, come in to the emergency department.    Follow up with Dr. Patrick, in oncology clinic, in 2 weeks. To follow up same day in high risk clinic for routine postpartum care.

## 2019-05-01 NOTE — DISCHARGE NOTE OB - MEDICATION SUMMARY - MEDICATIONS TO STOP TAKING
I will STOP taking the medications listed below when I get home from the hospital:    insulin lispro  -- 8 unit(s) subcutaneous at breakfast  10 units at lunch   8 units at dinner

## 2019-05-01 NOTE — DIETITIAN INITIAL EVALUATION ADULT. - ENERGY NEEDS
ht: 65 inches, dosing Wt: 220 pounds BMI: 36.6 kg/m2, IBW: 125 pounds (+/- 10%)  Edema: none noted Skin per nursing documentation: no pressure ulcers noted.

## 2019-05-01 NOTE — DISCHARGE NOTE OB - PATIENT PORTAL LINK FT
You can access the [facility] Patient Portal, offered by [facility], by registering with the following website: [email or web address]

## 2019-05-01 NOTE — DISCHARGE NOTE OB - HOSPITAL COURSE
Patient delivered a liveborn male via , uncomplicated. Prenatal course c/b triple negative stage IIa invasive ductal carcinoma. S/p R mastectomy, shigella, and c. difficile infection this pregnancy. Last dose of chemotherapy, adriamycin and cytoxan, on 2019. Followed by Dr. Viridiana patrick, oncology. CT chest, abdomen, and pelvis performed immediately postpartum (2019), showed no evidence of metastatic disease. The patient met all appropriate post partum milestones. The patient was able to void, tolerated a regular diet, and ambulated on her own.  The patient was discharged on PPD#2 afebrile, with stable vital signs, and appropriate pain control. To follow up in 6 weeks with [facility] for routine PP visit. To follow up with Dr. Patrick in 2 weeks. To start taxol at that visit, per oncology. Will get nuclear medicine bone scan outpatient. To be coordinated by oncology. Patient delivered a liveborn male via , uncomplicated. Prenatal course c/b triple negative stage IIa invasive ductal carcinoma. S/p R mastectomy, shigella, and c. difficile infection this pregnancy. Last dose of chemotherapy, adriamycin and cytoxan, on 2019. Followed by Dr. Viridiana patrick, oncology. Prenatal course further c/b GDMA2, on NPH 22uQHS and humalog 14u w/meals. CT chest, abdomen, and pelvis performed immediately postpartum (2019), showed no evidence of metastatic disease. The patient met all appropriate post partum milestones. The patient was able to void, tolerated a regular diet, and ambulated on her own.  The patient was discharged on PPD#2 afebrile, with stable vital signs, and appropriate pain control. To follow up in 6 weeks with [facility] for routine PP visit. To follow up with Dr. Patrick in 2 weeks. To start taxol at that visit, per oncology. Will get nuclear medicine bone scan outpatient. To be coordinated by oncology. Patient delivered a liveborn male via , uncomplicated. Prenatal course c/b triple negative stage IIa invasive ductal carcinoma. S/p R mastectomy, shigella, and c. difficile infection this pregnancy. Last dose of chemotherapy, adriamycin and cytoxan, on 2019. Followed by Dr. Viridiana patrick, oncology. Prenatal course further c/b GDMA2, on NPH 22uQHS and humalog 14u w/meals. CT chest, abdomen, and pelvis performed immediately postpartum (2019), showed no evidence of metastatic disease. The patient met all appropriate post partum milestones. The patient was able to void, tolerated a regular diet, and ambulated on her own.  The patient was discharged on PPD#2 afebrile, with stable vital signs, and appropriate pain control. To follow up in 2 weeks with [facility] for routine PP visit. To follow up with Dr. Patrick in 2 weeks. To start taxol at that visit, per oncology. Will get nuclear medicine bone scan outpatient. To be coordinated by oncology.

## 2019-05-01 NOTE — DIETITIAN INITIAL EVALUATION ADULT. - ADHERENCE
Pt reports following a consistent CHO diet during pregnancy. Took insulin for glycemic control. Confirms NKFA. Took a prenatal Multivitamin PTA./good

## 2019-05-01 NOTE — DISCHARGE NOTE OB - CARE PROVIDERS DIRECT ADDRESSES
,[email or web address],DirectAddress_Unknown

## 2019-05-01 NOTE — DIETITIAN INITIAL EVALUATION ADULT. - NS AS NUTRI INTERV ED CONTENT
Pt educated on postpartum dietary recommendations including: risk of development of T2DM, ways to reduce risk of developing T2DM and reinforced importance of DM screening 4-12 weeks postpartum. Pt verbalized good understanding. Written materials left at bedside./Purpose of the nutrition education/Nutrition relationship to health/disease/Recommended modifications/Priority modifications

## 2019-05-02 VITALS
OXYGEN SATURATION: 100 % | HEART RATE: 64 BPM | RESPIRATION RATE: 18 BRPM | DIASTOLIC BLOOD PRESSURE: 86 MMHG | TEMPERATURE: 98 F | SYSTOLIC BLOOD PRESSURE: 128 MMHG

## 2019-05-02 DIAGNOSIS — O99.213 OBESITY COMPLICATING PREGNANCY, THIRD TRIMESTER: ICD-10-CM

## 2019-05-02 DIAGNOSIS — O24.414 GESTATIONAL DIABETES MELLITUS IN PREGNANCY, INSULIN CONTROLLED: ICD-10-CM

## 2019-05-02 DIAGNOSIS — O09.899 SUPERVISION OF OTHER HIGH RISK PREGNANCIES, UNSPECIFIED TRIMESTER: ICD-10-CM

## 2019-05-02 PROCEDURE — 59050 FETAL MONITOR W/REPORT: CPT

## 2019-05-02 PROCEDURE — 86901 BLOOD TYPING SEROLOGIC RH(D): CPT

## 2019-05-02 PROCEDURE — 59025 FETAL NON-STRESS TEST: CPT

## 2019-05-02 PROCEDURE — 86780 TREPONEMA PALLIDUM: CPT

## 2019-05-02 PROCEDURE — 85018 HEMOGLOBIN: CPT

## 2019-05-02 PROCEDURE — 71260 CT THORAX DX C+: CPT

## 2019-05-02 PROCEDURE — 82962 GLUCOSE BLOOD TEST: CPT

## 2019-05-02 PROCEDURE — 85027 COMPLETE CBC AUTOMATED: CPT

## 2019-05-02 PROCEDURE — 85014 HEMATOCRIT: CPT

## 2019-05-02 PROCEDURE — 88307 TISSUE EXAM BY PATHOLOGIST: CPT

## 2019-05-02 PROCEDURE — 86900 BLOOD TYPING SEROLOGIC ABO: CPT

## 2019-05-02 PROCEDURE — 86850 RBC ANTIBODY SCREEN: CPT

## 2019-05-02 PROCEDURE — 74177 CT ABD & PELVIS W/CONTRAST: CPT

## 2019-05-02 NOTE — PROGRESS NOTE ADULT - ASSESSMENT
A/P: 38yo PPD#2 s/p . Antepartum c/b stage 2A invasive ductal carcinoma (triple neg) s/p R mastectomy (2018). Patient s/p Adriamycin and 4x cytoxan (last dose ). Patient is doing well postpartum.

## 2019-05-02 NOTE — PROGRESS NOTE ADULT - ATTENDING COMMENTS
Agree with above. patient S/P  PPD#2 + Breast CA S/P Mastectomy on chemo, patient to continue chemo postpartum. F/u in the OB/GYN clinic in 2 weeks. Patient stable and discharged home.

## 2019-05-02 NOTE — PROGRESS NOTE ADULT - PROBLEM SELECTOR PLAN 1
- Patient to start postpartum taxol treatment outpatient   - Patient for outpatient bone scan   - Pain well controlled, continue current pain regimen  - Increase ambulation, SCDs when not ambulating  - Continue regular diet  - Discharge Planning

## 2019-05-02 NOTE — PROGRESS NOTE ADULT - SUBJECTIVE AND OBJECTIVE BOX
OB Progress Note:  PPD#2    S: 40yo PPD#2 s/p . Patient feels well. Pain is well controlled. She is tolerating a regular diet and passing flatus. She is voiding spontaneously, and ambulating without difficulty. She is breastfeeding. Denies CP/SOB. Denies lightheadedness/dizziness. Denies N/V.    O:  Vitals:  Vital Signs Last 24 Hrs  T(C): 36.6 (01 May 2019 17:49), Max: 36.6 (01 May 2019 17:49)  T(F): 97.9 (01 May 2019 17:49), Max: 97.9 (01 May 2019 17:49)  HR: 73 (01 May 2019 17:49) (73 - 73)  BP: 109/68 (01 May 2019 17:49) (109/68 - 109/68)  BP(mean): --  RR: 18 (01 May 2019 17:49) (18 - 18)  SpO2: 98% (01 May 2019 17:49) (98% - 98%)    MEDICATIONS  (STANDING):  acetaminophen   Tablet .. 975 milliGRAM(s) Oral every 6 hours  diphtheria/tetanus/pertussis (acellular) Vaccine (ADAcel) 0.5 milliLiter(s) IntraMuscular once  ibuprofen  Tablet. 600 milliGRAM(s) Oral every 6 hours  oxytocin Infusion 333.333 milliUNIT(s)/Min (1000 mL/Hr) IV Continuous <Continuous>  prenatal multivitamin 1 Tablet(s) Oral daily  sodium chloride 0.9% lock flush 10 milliLiter(s) IV Push every 8 hours      Labs:  Blood type: B Positive  Rubella IgG: RPR: Negative                          10.0<L>   -- >-----------< --    (  @ 08:57 )             32.5<L>                        11.9   8.1 >-----------< 236    (  @ 01:40 )             33.4<L>        Physical Exam:  General: NAD  Abdomen: soft, non-tender, non-distended, fundus firm  Vaginal: Lochia wnl  Extremities: No erythema/edema

## 2019-05-06 ENCOUNTER — CLINICAL ADVICE (OUTPATIENT)
Age: 39
End: 2019-05-06

## 2019-05-07 ENCOUNTER — INBOUND DOCUMENT (OUTPATIENT)
Age: 39
End: 2019-05-07

## 2019-05-07 DIAGNOSIS — C50.919 MALIGNANT NEOPLASM OF UNSPECIFIED SITE OF UNSPECIFIED FEMALE BREAST: ICD-10-CM

## 2019-05-13 ENCOUNTER — APPOINTMENT (OUTPATIENT)
Dept: HEMATOLOGY ONCOLOGY | Facility: CLINIC | Age: 39
End: 2019-05-13

## 2019-05-13 ENCOUNTER — OUTPATIENT (OUTPATIENT)
Dept: OUTPATIENT SERVICES | Facility: HOSPITAL | Age: 39
LOS: 1 days | Discharge: HOME | End: 2019-05-13

## 2019-05-13 ENCOUNTER — APPOINTMENT (OUTPATIENT)
Dept: MATERNAL FETAL MEDICINE | Facility: CLINIC | Age: 39
End: 2019-05-13

## 2019-05-13 DIAGNOSIS — C50.919 MALIGNANT NEOPLASM OF UNSPECIFIED SITE OF UNSPECIFIED FEMALE BREAST: ICD-10-CM

## 2019-05-13 DIAGNOSIS — Z90.11 ACQUIRED ABSENCE OF RIGHT BREAST AND NIPPLE: Chronic | ICD-10-CM

## 2019-05-14 ENCOUNTER — RX RENEWAL (OUTPATIENT)
Age: 39
End: 2019-05-14

## 2019-05-15 ENCOUNTER — APPOINTMENT (OUTPATIENT)
Dept: INFUSION THERAPY | Facility: HOSPITAL | Age: 39
End: 2019-05-15

## 2019-05-15 ENCOUNTER — APPOINTMENT (OUTPATIENT)
Dept: HEMATOLOGY ONCOLOGY | Facility: CLINIC | Age: 39
End: 2019-05-15
Payer: MEDICAID

## 2019-05-15 ENCOUNTER — RESULT REVIEW (OUTPATIENT)
Age: 39
End: 2019-05-15

## 2019-05-15 ENCOUNTER — APPOINTMENT (OUTPATIENT)
Dept: MATERNAL FETAL MEDICINE | Facility: CLINIC | Age: 39
End: 2019-05-15
Payer: MEDICAID

## 2019-05-15 ENCOUNTER — NON-APPOINTMENT (OUTPATIENT)
Age: 39
End: 2019-05-15

## 2019-05-15 VITALS
BODY MASS INDEX: 42.3 KG/M2 | HEIGHT: 59 IN | DIASTOLIC BLOOD PRESSURE: 88 MMHG | SYSTOLIC BLOOD PRESSURE: 128 MMHG | TEMPERATURE: 98.3 F

## 2019-05-15 VITALS
RESPIRATION RATE: 15 BRPM | SYSTOLIC BLOOD PRESSURE: 123 MMHG | WEIGHT: 209.44 LBS | TEMPERATURE: 98.4 F | HEART RATE: 80 BPM | OXYGEN SATURATION: 100 % | DIASTOLIC BLOOD PRESSURE: 83 MMHG | BODY MASS INDEX: 42.3 KG/M2

## 2019-05-15 DIAGNOSIS — Z34.90 ENCOUNTER FOR SUPERVISION OF NORMAL PREGNANCY, UNSPECIFIED, UNSPECIFIED TRIMESTER: ICD-10-CM

## 2019-05-15 DIAGNOSIS — R10.2 OTHER SPECIFIED PREGNANCY RELATED CONDITIONS, UNSPECIFIED TRIMESTER: ICD-10-CM

## 2019-05-15 DIAGNOSIS — O09.90 SUPERVISION OF HIGH RISK PREGNANCY, UNSPECIFIED, UNSPECIFIED TRIMESTER: ICD-10-CM

## 2019-05-15 DIAGNOSIS — O26.899 OTHER SPECIFIED PREGNANCY RELATED CONDITIONS, UNSPECIFIED TRIMESTER: ICD-10-CM

## 2019-05-15 DIAGNOSIS — O26.849 UTERINE SIZE-DATE DISCREPANCY, UNSPECIFIED TRIMESTER: ICD-10-CM

## 2019-05-15 LAB
BASOPHILS # BLD AUTO: 0 K/UL — SIGNIFICANT CHANGE UP (ref 0–0.2)
BASOPHILS NFR BLD AUTO: 0.1 % — SIGNIFICANT CHANGE UP (ref 0–2)
EOSINOPHIL # BLD AUTO: 0.1 K/UL — SIGNIFICANT CHANGE UP (ref 0–0.5)
EOSINOPHIL NFR BLD AUTO: 1.8 % — SIGNIFICANT CHANGE UP (ref 0–6)
HCT VFR BLD CALC: 36.9 % — SIGNIFICANT CHANGE UP (ref 34.5–45)
HGB BLD-MCNC: 13 G/DL — SIGNIFICANT CHANGE UP (ref 11.5–15.5)
LYMPHOCYTES # BLD AUTO: 0.8 K/UL — LOW (ref 1–3.3)
LYMPHOCYTES # BLD AUTO: 12.7 % — LOW (ref 13–44)
MCHC RBC-ENTMCNC: 28.6 PG — SIGNIFICANT CHANGE UP (ref 27–34)
MCHC RBC-ENTMCNC: 35.4 G/DL — SIGNIFICANT CHANGE UP (ref 32–36)
MCV RBC AUTO: 81 FL — SIGNIFICANT CHANGE UP (ref 80–100)
MONOCYTES # BLD AUTO: 0 K/UL — SIGNIFICANT CHANGE UP (ref 0–0.9)
MONOCYTES NFR BLD AUTO: 0.3 % — LOW (ref 2–14)
NEUTROPHILS # BLD AUTO: 5 K/UL — SIGNIFICANT CHANGE UP (ref 1.8–7.4)
NEUTROPHILS NFR BLD AUTO: 85.1 % — HIGH (ref 43–77)
PLATELET # BLD AUTO: 323 K/UL — SIGNIFICANT CHANGE UP (ref 150–400)
RBC # BLD: 4.55 M/UL — SIGNIFICANT CHANGE UP (ref 3.8–5.2)
RBC # FLD: 13.5 % — SIGNIFICANT CHANGE UP (ref 10.3–14.5)
WBC # BLD: 5.9 K/UL — SIGNIFICANT CHANGE UP (ref 3.8–10.5)
WBC # FLD AUTO: 5.9 K/UL — SIGNIFICANT CHANGE UP (ref 3.8–10.5)

## 2019-05-15 PROCEDURE — 99215 OFFICE O/P EST HI 40 MIN: CPT

## 2019-05-15 PROCEDURE — 99213 OFFICE O/P EST LOW 20 MIN: CPT

## 2019-05-16 DIAGNOSIS — Z51.11 ENCOUNTER FOR ANTINEOPLASTIC CHEMOTHERAPY: ICD-10-CM

## 2019-05-16 DIAGNOSIS — R11.2 NAUSEA WITH VOMITING, UNSPECIFIED: ICD-10-CM

## 2019-05-16 NOTE — PHYSICAL EXAM
[Fully active, able to carry on all pre-disease performance without restriction] : Status 0 - Fully active, able to carry on all pre-disease performance without restriction [Obese] : obese [Normal] : affect appropriate [de-identified] : S/p right mastectomy healing well. No CW or axillary nodules

## 2019-05-16 NOTE — CONSULT LETTER
[Dear  ___] : Dear  [unfilled], [Consult Letter:] : I had the pleasure of evaluating your patient, [unfilled]. [Please see my note below.] : Please see my note below. [Consult Closing:] : Thank you very much for allowing me to participate in the care of this patient.  If you have any questions, please do not hesitate to contact me. [Sincerely,] : Sincerely, [DrRonnie  ___] : Dr. DE ANDA [DrRonnie ___] : Dr. DE ANDA [FreeTextEntry2] : Vesna Granger MD [FreeTextEntry3] : Viridiana Patrick M.D.\par  of Medicine\par [facility]\par [facility] [place]\par [address] [place] [address]\par [address]\par Tele # [phone]; Fax [phone]

## 2019-05-16 NOTE — HISTORY OF PRESENT ILLNESS
[de-identified] : Ms. EVANGELISTA HESTER  is a 39 year old female here for an evaluation of breast cancer. Her oncologic history is as follows:\par \par She felt a right breast mass in 2018 and was evaluated seen by GYN who sent her for breast imaging. She underwent diagnostic breast imaging on 18 which showed at 12:00 position of right breast there is a lobulated focus, measuring 2.0 x 2.0 x 2.2 cm 2 cm FN. 4 mm deep to the skin. Left breast benign. No enlarged axillary lymph nodes. Bx recommended. 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 There is marked lymphocytic infiltrate around the tumor. She was scheduled for MRI but found out that she was pregnant. Current gestation age at the time of initial consult: 11 weeks\par \par 10/23/18\par She is here with her . We discussed the diagnosis of rapidly growing triple negative breast ca, the size has increased since diagnosis and that chemotherapy will be needed to treat this cancer which can not be started until she is 14 weeks. They want to keep the pregnancy understanding that it will cause delay in treatment and potential risk to the patient and fetus. We discussed that lumpectomy will have issues with positive margins and RT will be contraindicated during to pregnancy and therefore u/l mastectomy is recommend. I discussed with Dr Granger that expander placement at the time of surgery will be an option. She is seeing the pt tomorrow and will discuss surgery and reconstruction options with her. She also saw Dr Eden Patrick med onc for a a second opinion. \par \par s/p surgery (right mastectomy) 18 , 4.5 cm tumor and sentinel node negative- path report d/w her in detail. Tumor grew more than double in size between dx and surgery and is s/o aggressive tumor. Discussed to start chemo 18. S/e of chemo jody during pregnancy, expected and unexpected, pertaining to her and the fetus d/w her. Discussed to see MFM b/w chemo. Also a candidate for PMRT due to large tumor size. LMP 18\par \par I had extensive discussion with the patient and spouse regarding breast cancer in pregnancy and the treatment options that are safe for both the mother and the growing fetus. She refused termination of pregnancy after finding out cancer diagnosis.  We discussed that all treatments including chemotherapy and surgery are contraindicated in first trimester but are considered relatively safe in second and third trimester.  We have discussed medical termination of pregnancy as an option but patient decided to keep the pregnancy. We reviewed that the largest experience in pregnancy has been with anthracycline and alkylating agent chemotherapy. She will be a candidate for chemotherapy with Adriamycin plus cyclophosphamide in a sequential dose dense manner. We reviewed data from [place] registry which showed Neulasta is reasonably safe to use in 2nd and third trimester although it was a very small dataset. . There is not enough data to use taxanes in pregnancy therefore Taxol will be administered post partum. Potential s/e to the patient and growing fetus were discussed with the patient and  in detail via a . The patient understood all the potential side effects and signed an informed consent after discussing the risks, benefits and alternatives.\par \par \par PLAN: Starting 18 ( 17 weeks), She will receive adriamycin every 2 weeks x 4 cycle followed by Cytoxan every 2 weeks x 4 cycles. We plan to hold chemotherapy 4 weeks prior to delivery to prevent  neutropenic complications. Currently she is planned for elective induction (NVD) at 37 weeks. We would start Taxol post partum\par \par PREMEDS/SUPPORTIVE CARE: Zofran ativan and dex are listed as part of prechemo antiemetic regimen in NCCN guidelines for breast cancer in pregnancy. The use of Emend is controversial in pregnancy. We discussed the issue of port placement in the OR, but that would increase anesthesia time by 45 min therefore we would treat her peripherally and likely do port placement after delivery. We discussed the issue of preeclampsia or gestational diabetes with steroids. She will be monitored closely by MFM. She will see MFM between every chemo session for close fetal monitoring. \par \par I explained her that EOD work up is limited due to risk of radiation exposure to the growing fetus. We plan to perform CT scans or a PET/CT scan after delivery. She will also be a candidate for PMRT due to large tumor size. \par \par S/p infection with shigella and c-diff after Adriamycin c#2, confirmed with cultures and PCR x 2, was hospitalized for diarrhea and abd pain, treated with ampicillin and vanco,  Ampicillin completed yesterday. Vanco 2 more days left. Patient reports mom had Shigella 2 years ago, Mom has come in to help her 2 months prior but is not ill. She denies sick contacts. \par \par She was hospitalized for 5 days after Cytoxan Omi # 2 due to diarrhea. Infectious work up was negative.Completed vancomycin course prior to last treatment. Sx were controlled with supportive care. She feels back to normal now. No abd pain, NVD, fevers.  No Bone pain, no neuropathy.\par \par We discussed the plan to give C4 in 2 weeks and deliver at around 37 weeks. She will need Taxol q2w x 4 doses after delivery. We will perform CT scans after the baby is born. [de-identified] : Ms. EVANGELISTA RUBIO is here for f/u for right breast cancer dx in 10/2018, s/p sequential Adriamycin and Cytoxan x 4 in second and third trimester of pregnancy. Adriamycin course complicated by Shigella, and C-diff causing delays of cycle 3 and 4. She delivered healthy baby boy 2 weeks ago at 37 weeks gestation, uncomplicated vag delivery. She is doing well. No breast feeding. Mild vag bleeding/lochia. \par We discussed weekly Taxol or Q2W. She has significant toxicity with sequential AC and had several dose delays. She might not  be able to tolerate Q2W Taxol therefore we will proceed with low dose weekly Taxol. She will get RT after completing Taxol. \par She is starting weekly Taxol today, She took Dex 20 last night and in the am as instructed to do so. She reports mild gastritis after dex today. Reminded to take omeprazole. Denies diarrhea, BRBPR, nausea or vomiting. S/E of Taxol reviewed.\par CT chest/ abd/ pelvis: 4/2019: normal\par We discussed to do bone scan.

## 2019-05-16 NOTE — ASSESSMENT
[Curative] : Goals of care discussed with patient: Curative [FreeTextEntry1] : In summary, this is a very pleasant 38-year-old Mohawk-speaking lady who is diagnosed with T2 N0 stage IIA poorly differentiated ER/AK/HER-2/rajni NEGATIVE IDC of the right breast. She is currently 22 weeks pregnant. A CXR and abdominal sono didn’t show distant mets.  BRCA negative. ddACT sequential started 12/7/18. \par \par - Breast ca: s/p sequential Adriamycin x 4 and Cytoxan x 4 in second and third trimester of pregnancy. Adriamycin course complicated by Shigella and C-diff causing delays of cycle 3 and 4. She delivered healthy baby boy 2 weeks ago at 37 weeks gestation, uncomplicated vag delivery. She is doing well. No breast feeding. Mild vag bleeding/lochia. \par - We discussed weekly Taxol or Q2W. She has significant toxicity with sequential AC and had several dose delays. She might not  be able to tolerate Q2W Taxol therefore we will proceed with low dose weekly Taxol. She will get RT after completing Taxol. \par - I discussed the expected and unexpected side effects of Taxol chemotherapy, including but not limited to hair loss, fatigue, change in taste, mouth sores, nausea, vomiting, diarrhea, infusion reaction, allergic reaction, significant myelosuppression, need for blood transfusion, infection, bleeding, neuropathy, kidney injury, nerve pain, muscle pain and detrimental effect on liver and lung function. \par - She is starting weekly Taxol today. She took Dex 20 last night and in the am as instructed to do so. She reports mild gastritis after dex today. Reminded to take omeprazole. Denies diarrhea, BRBPR, nausea or vomiting. \par - CT chest/ abd/ pelvis: 4/2019: normal\par - Elevated alk phos: Likely reactive. Check bone scan to r/o bone mets\par - C diff and shigella infection: Completed long course abx. No diarrhea. Continue f/u with  Dr Glover. \par - Chemo induced N/V- She will use dex and zofran today, Reglan as needed. I\par - Chemo induced dysgeusia and fatigue: Encourage p.o. fluids, small frequent meals.\par - Instructed to call office and go directly to the emergency room with fever more than 100.4, shaking chills, productive cough, sore throat, shortness of breath or urinary symptoms. Patient verbalized understanding and agreement.\par - Emotional support provided, all questions answered.\par \par Taxol weekly and MD visit in 2 weeks

## 2019-05-16 NOTE — REASON FOR VISIT
[Follow-Up Visit] : a follow-up [[employer] ] : provided by [employer]   [Spouse] : spouse [FreeTextEntry1] : [id number] [FreeTextEntry2] : Christin

## 2019-05-19 ENCOUNTER — FORM ENCOUNTER (OUTPATIENT)
Age: 39
End: 2019-05-19

## 2019-05-20 ENCOUNTER — APPOINTMENT (OUTPATIENT)
Dept: NUCLEAR MEDICINE | Facility: IMAGING CENTER | Age: 39
End: 2019-05-20
Payer: MEDICAID

## 2019-05-20 ENCOUNTER — OUTPATIENT (OUTPATIENT)
Dept: OUTPATIENT SERVICES | Facility: HOSPITAL | Age: 39
LOS: 1 days | End: 2019-05-20
Payer: MEDICAID

## 2019-05-20 ENCOUNTER — APPOINTMENT (OUTPATIENT)
Dept: MATERNAL FETAL MEDICINE | Facility: CLINIC | Age: 39
End: 2019-05-20
Payer: MEDICAID

## 2019-05-20 ENCOUNTER — APPOINTMENT (OUTPATIENT)
Dept: ULTRASOUND IMAGING | Facility: IMAGING CENTER | Age: 39
End: 2019-05-20
Payer: MEDICAID

## 2019-05-20 VITALS
OXYGEN SATURATION: 99 % | DIASTOLIC BLOOD PRESSURE: 70 MMHG | HEIGHT: 59 IN | WEIGHT: 208.38 LBS | HEART RATE: 95 BPM | SYSTOLIC BLOOD PRESSURE: 120 MMHG | BODY MASS INDEX: 42.01 KG/M2

## 2019-05-20 DIAGNOSIS — Z90.11 ACQUIRED ABSENCE OF RIGHT BREAST AND NIPPLE: Chronic | ICD-10-CM

## 2019-05-20 DIAGNOSIS — C50.919 MALIGNANT NEOPLASM OF UNSPECIFIED SITE OF UNSPECIFIED FEMALE BREAST: ICD-10-CM

## 2019-05-20 DIAGNOSIS — O09.899 SUPERVISION OF OTHER HIGH RISK PREGNANCIES, UNSPECIFIED TRIMESTER: ICD-10-CM

## 2019-05-20 LAB
BILIRUB UR QL STRIP: NORMAL
GLUCOSE UR-MCNC: NORMAL
HCG UR QL: 0.2 EU/DL
HGB UR QL STRIP.AUTO: NORMAL
KETONES UR-MCNC: NORMAL
LEUKOCYTE ESTERASE UR QL STRIP: NORMAL
NITRITE UR QL STRIP: NORMAL
PH UR STRIP: 6
PROT UR STRIP-MCNC: 30
SP GR UR STRIP: 1.02

## 2019-05-20 PROCEDURE — 76856 US EXAM PELVIC COMPLETE: CPT | Mod: 26

## 2019-05-20 PROCEDURE — 78306 BONE IMAGING WHOLE BODY: CPT | Mod: 26

## 2019-05-20 PROCEDURE — 78306 BONE IMAGING WHOLE BODY: CPT

## 2019-05-20 PROCEDURE — 99214 OFFICE O/P EST MOD 30 MIN: CPT | Mod: 25

## 2019-05-20 PROCEDURE — 76830 TRANSVAGINAL US NON-OB: CPT | Mod: 26

## 2019-05-20 PROCEDURE — 76856 US EXAM PELVIC COMPLETE: CPT

## 2019-05-20 PROCEDURE — 81003 URINALYSIS AUTO W/O SCOPE: CPT | Mod: NC,QW

## 2019-05-20 PROCEDURE — 76830 TRANSVAGINAL US NON-OB: CPT

## 2019-05-20 PROCEDURE — G0463: CPT

## 2019-05-20 PROCEDURE — A9561: CPT

## 2019-05-20 PROCEDURE — 81003 URINALYSIS AUTO W/O SCOPE: CPT

## 2019-05-20 RX ORDER — INSULIN HUMAN 100 [IU]/ML
100 INJECTION, SUSPENSION SUBCUTANEOUS
Qty: 4 | Refills: 3 | Status: DISCONTINUED | COMMUNITY
Start: 2019-02-04 | End: 2019-05-20

## 2019-05-20 RX ORDER — BLOOD-GLUCOSE METER
W/DEVICE EACH MISCELLANEOUS
Qty: 1 | Refills: 0 | Status: DISCONTINUED | COMMUNITY
Start: 2019-02-20 | End: 2019-05-20

## 2019-05-20 RX ORDER — LANCETS 30 GAUGE
EACH MISCELLANEOUS
Qty: 2 | Refills: 2 | Status: DISCONTINUED | COMMUNITY
Start: 2018-12-24 | End: 2019-05-20

## 2019-05-20 RX ORDER — PEN NEEDLE, DIABETIC 29 G X1/2"
32G X 4 MM NEEDLE, DISPOSABLE MISCELLANEOUS
Qty: 1 | Refills: 0 | Status: DISCONTINUED | COMMUNITY
Start: 2019-02-04 | End: 2019-05-20

## 2019-05-20 RX ORDER — ISOPROPYL ALCOHOL 0.7 ML/ML
SWAB TOPICAL
Qty: 2 | Refills: 0 | Status: DISCONTINUED | COMMUNITY
Start: 2019-02-04 | End: 2019-05-20

## 2019-05-20 RX ORDER — INSULIN LISPRO 100 U/ML
100 INJECTION, SOLUTION INTRAVENOUS; SUBCUTANEOUS
Qty: 2 | Refills: 1 | Status: DISCONTINUED | COMMUNITY
Start: 2019-03-13 | End: 2019-05-20

## 2019-05-20 RX ORDER — LANCETS 30 GAUGE
EACH MISCELLANEOUS
Qty: 100 | Refills: 10 | Status: DISCONTINUED | COMMUNITY
Start: 2019-02-20 | End: 2019-05-20

## 2019-05-22 ENCOUNTER — APPOINTMENT (OUTPATIENT)
Dept: INFUSION THERAPY | Facility: HOSPITAL | Age: 39
End: 2019-05-22

## 2019-05-22 RX ORDER — BLOOD SUGAR DIAGNOSTIC
STRIP MISCELLANEOUS
Qty: 2 | Refills: 0 | Status: DISCONTINUED | COMMUNITY
Start: 2018-12-24 | End: 2019-05-20

## 2019-05-22 RX ORDER — SYRING-NEEDL,DISP,INSUL,0.3 ML 31 GX5/16"
31G X 5/16" SYRINGE, EMPTY DISPOSABLE MISCELLANEOUS
Qty: 2 | Refills: 2 | Status: DISCONTINUED | COMMUNITY
Start: 2019-02-11 | End: 2019-05-20

## 2019-05-24 ENCOUNTER — RESULT REVIEW (OUTPATIENT)
Age: 39
End: 2019-05-24

## 2019-05-24 ENCOUNTER — APPOINTMENT (OUTPATIENT)
Dept: INFUSION THERAPY | Facility: HOSPITAL | Age: 39
End: 2019-05-24

## 2019-05-24 ENCOUNTER — LABORATORY RESULT (OUTPATIENT)
Age: 39
End: 2019-05-24

## 2019-05-24 LAB
BASOPHILS # BLD AUTO: 0 K/UL — SIGNIFICANT CHANGE UP (ref 0–0.2)
BASOPHILS NFR BLD AUTO: 0.2 % — SIGNIFICANT CHANGE UP (ref 0–2)
EOSINOPHIL # BLD AUTO: 0.2 K/UL — SIGNIFICANT CHANGE UP (ref 0–0.5)
EOSINOPHIL NFR BLD AUTO: 4.4 % — SIGNIFICANT CHANGE UP (ref 0–6)
HCT VFR BLD CALC: 34.5 % — SIGNIFICANT CHANGE UP (ref 34.5–45)
HGB BLD-MCNC: 12 G/DL — SIGNIFICANT CHANGE UP (ref 11.5–15.5)
LYMPHOCYTES # BLD AUTO: 1.7 K/UL — SIGNIFICANT CHANGE UP (ref 1–3.3)
LYMPHOCYTES # BLD AUTO: 38.3 % — SIGNIFICANT CHANGE UP (ref 13–44)
MCHC RBC-ENTMCNC: 28 PG — SIGNIFICANT CHANGE UP (ref 27–34)
MCHC RBC-ENTMCNC: 34.9 G/DL — SIGNIFICANT CHANGE UP (ref 32–36)
MCV RBC AUTO: 80.2 FL — SIGNIFICANT CHANGE UP (ref 80–100)
MONOCYTES # BLD AUTO: 0.3 K/UL — SIGNIFICANT CHANGE UP (ref 0–0.9)
MONOCYTES NFR BLD AUTO: 6.5 % — SIGNIFICANT CHANGE UP (ref 2–14)
NEUTROPHILS # BLD AUTO: 2.3 K/UL — SIGNIFICANT CHANGE UP (ref 1.8–7.4)
NEUTROPHILS NFR BLD AUTO: 50.6 % — SIGNIFICANT CHANGE UP (ref 43–77)
PLATELET # BLD AUTO: 282 K/UL — SIGNIFICANT CHANGE UP (ref 150–400)
RBC # BLD: 4.3 M/UL — SIGNIFICANT CHANGE UP (ref 3.8–5.2)
RBC # FLD: 13.9 % — SIGNIFICANT CHANGE UP (ref 10.3–14.5)
WBC # BLD: 4.4 K/UL — SIGNIFICANT CHANGE UP (ref 3.8–10.5)
WBC # FLD AUTO: 4.4 K/UL — SIGNIFICANT CHANGE UP (ref 3.8–10.5)

## 2019-05-27 NOTE — HISTORY OF PRESENT ILLNESS
[Postpartum Follow Up] : postpartum follow up [Complications:___] : complications include: [unfilled] [Delivery Date: ___] : on [unfilled] [] : delivered by vaginal delivery [Pertussis Vaccine] : Pertussis vaccine administered [Intended Contraception] : Intended Contraception: [Partner Vasectomy] : has a partner with a vasectomy [Fatigue] : fatigue [Normal] : cervix [Pink Rugae] : pink rugae [Moderate] : there was moderate vaginal bleeding [Discharge] : had a ~M discharge [Motion Tenderness] : there was cervical motion tenderness [Tenderness] : tender [No Tenderness] : no rectal tenderness [Rhogam] : Rhogam was not administered [Breastfeeding] : not currently nursing [Rubella Vaccine] : Rubella vaccine was not administered [BTL] : no tubal ligation [Resumed Axtell] : has not resumed intercourse [Chills] : no chills [Resumed Menses] : has not resumed her menses [Fever] : no fever [Dysuria] : no dysuria [Nausea] : no nausea [Mass ___ cm] : no uterine mass was palpated [Adnexa Tenderness] : were not tender [Enlarged ___ wks] : not enlarged [Ovarian Mass (___ Cm)] : there were no adnexal masses [de-identified] : Suprapubic pain   [FreeTextEntry5] : b

## 2019-05-29 ENCOUNTER — APPOINTMENT (OUTPATIENT)
Dept: HEMATOLOGY ONCOLOGY | Facility: CLINIC | Age: 39
End: 2019-05-29

## 2019-05-29 ENCOUNTER — APPOINTMENT (OUTPATIENT)
Dept: INFUSION THERAPY | Facility: HOSPITAL | Age: 39
End: 2019-05-29

## 2019-05-31 ENCOUNTER — APPOINTMENT (OUTPATIENT)
Dept: INFUSION THERAPY | Facility: HOSPITAL | Age: 39
End: 2019-05-31

## 2019-05-31 ENCOUNTER — APPOINTMENT (OUTPATIENT)
Dept: HEMATOLOGY ONCOLOGY | Facility: CLINIC | Age: 39
End: 2019-05-31
Payer: MEDICAID

## 2019-05-31 ENCOUNTER — RESULT REVIEW (OUTPATIENT)
Age: 39
End: 2019-05-31

## 2019-05-31 LAB
BASOPHILS # BLD AUTO: 0 K/UL — SIGNIFICANT CHANGE UP (ref 0–0.2)
BASOPHILS NFR BLD AUTO: 0.6 % — SIGNIFICANT CHANGE UP (ref 0–2)
EOSINOPHIL # BLD AUTO: 0.3 K/UL — SIGNIFICANT CHANGE UP (ref 0–0.5)
EOSINOPHIL NFR BLD AUTO: 6.1 % — HIGH (ref 0–6)
HCT VFR BLD CALC: 35.6 % — SIGNIFICANT CHANGE UP (ref 34.5–45)
HGB BLD-MCNC: 12.3 G/DL — SIGNIFICANT CHANGE UP (ref 11.5–15.5)
LYMPHOCYTES # BLD AUTO: 1.5 K/UL — SIGNIFICANT CHANGE UP (ref 1–3.3)
LYMPHOCYTES # BLD AUTO: 33.9 % — SIGNIFICANT CHANGE UP (ref 13–44)
MCHC RBC-ENTMCNC: 27.3 PG — SIGNIFICANT CHANGE UP (ref 27–34)
MCHC RBC-ENTMCNC: 34.6 G/DL — SIGNIFICANT CHANGE UP (ref 32–36)
MCV RBC AUTO: 79.1 FL — LOW (ref 80–100)
MONOCYTES # BLD AUTO: 0.2 K/UL — SIGNIFICANT CHANGE UP (ref 0–0.9)
MONOCYTES NFR BLD AUTO: 5.4 % — SIGNIFICANT CHANGE UP (ref 2–14)
NEUTROPHILS # BLD AUTO: 2.3 K/UL — SIGNIFICANT CHANGE UP (ref 1.8–7.4)
NEUTROPHILS NFR BLD AUTO: 54 % — SIGNIFICANT CHANGE UP (ref 43–77)
PLATELET # BLD AUTO: 287 K/UL — SIGNIFICANT CHANGE UP (ref 150–400)
RBC # BLD: 4.5 M/UL — SIGNIFICANT CHANGE UP (ref 3.8–5.2)
RBC # FLD: 14 % — SIGNIFICANT CHANGE UP (ref 10.3–14.5)
WBC # BLD: 4.3 K/UL — SIGNIFICANT CHANGE UP (ref 3.8–10.5)
WBC # FLD AUTO: 4.3 K/UL — SIGNIFICANT CHANGE UP (ref 3.8–10.5)

## 2019-05-31 PROCEDURE — 99215 OFFICE O/P EST HI 40 MIN: CPT

## 2019-05-31 NOTE — DISCHARGE NOTE ADULT - FUNCTIONAL SCREEN CURRENT LEVEL: DRESSING, MLM
Chronic back pain. Patient has known wedge fracture in Thoracic spine, recently seen on x-ray.   Percocet 10 mg tablets, 5 times daily  Current pain control: good, 4/10  Adverse effects: none.  Physical functioning: good  Mood: fair  Family and social relationships: fair  Sleep pattern: good  Overall functioning: fair  Nonnarcotic treatments that are being used: nothing right now, has tried topical OTC pain relievers    Pain management agreement initiated and signed on: January 2018  Last dose of narcotic medication: this morning  Most recent urine drug screen done: November 2018, which was reviewed today and consistent. No longer on Xanax, weaned off.  Aberrant Behaviors: None  I have reviewed the medical records, the Prescription Monitoring Program and I have determined that percocet 10, 5 times daily, is medically indicated.    I have advised patient to keep medication in a safe place and to not drive with medication.   2 = assistive person

## 2019-06-01 NOTE — HISTORY OF PRESENT ILLNESS
[de-identified] : Ms. EVANGELISTA HESTER  is a 39 year old female here for an evaluation of breast cancer. Her oncologic history is as follows:\par \par She felt a right breast mass in 2018 and was evaluated seen by GYN who sent her for breast imaging. She underwent diagnostic breast imaging on 18 which showed at 12:00 position of right breast there is a lobulated focus, measuring 2.0 x 2.0 x 2.2 cm 2 cm FN. 4 mm deep to the skin. Left breast benign. No enlarged axillary lymph nodes. Bx recommended. 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 There is marked lymphocytic infiltrate around the tumor. She was scheduled for MRI but found out that she was pregnant. Current gestation age at the time of initial consult: 11 weeks\par \par 10/23/18\par She is here with her . We discussed the diagnosis of rapidly growing triple negative breast ca, the size has increased since diagnosis and that chemotherapy will be needed to treat this cancer which can not be started until she is 14 weeks. They want to keep the pregnancy understanding that it will cause delay in treatment and potential risk to the patient and fetus. We discussed that lumpectomy will have issues with positive margins and RT will be contraindicated during to pregnancy and therefore u/l mastectomy is recommend. I discussed with Dr Granger that expander placement at the time of surgery will be an option. She is seeing the pt tomorrow and will discuss surgery and reconstruction options with her. She also saw Dr Eden Patrick med onc for a a second opinion. \par \par s/p surgery (right mastectomy) 18 , 4.5 cm tumor and sentinel node negative- path report d/w her in detail. Tumor grew more than double in size between dx and surgery and is s/o aggressive tumor. Discussed to start chemo 18. S/e of chemo jody during pregnancy, expected and unexpected, pertaining to her and the fetus d/w her. Discussed to see MFM b/w chemo. Also a candidate for PMRT due to large tumor size. LMP 18\par \par I had extensive discussion with the patient and spouse regarding breast cancer in pregnancy and the treatment options that are safe for both the mother and the growing fetus. She refused termination of pregnancy after finding out cancer diagnosis.  We discussed that all treatments including chemotherapy and surgery are contraindicated in first trimester but are considered relatively safe in second and third trimester.  We have discussed medical termination of pregnancy as an option but patient decided to keep the pregnancy. We reviewed that the largest experience in pregnancy has been with anthracycline and alkylating agent chemotherapy. She will be a candidate for chemotherapy with Adriamycin plus cyclophosphamide in a sequential dose dense manner. We reviewed data from [place] registry which showed Neulasta is reasonably safe to use in 2nd and third trimester although it was a very small dataset. . There is not enough data to use taxanes in pregnancy therefore Taxol will be administered post partum. Potential s/e to the patient and growing fetus were discussed with the patient and  in detail via a . The patient understood all the potential side effects and signed an informed consent after discussing the risks, benefits and alternatives.\par \par \par PLAN: Starting 18 ( 17 weeks), She will receive adriamycin every 2 weeks x 4 cycle followed by Cytoxan every 2 weeks x 4 cycles. We plan to hold chemotherapy 4 weeks prior to delivery to prevent  neutropenic complications. Currently she is planned for elective induction (NVD) at 37 weeks. We would start Taxol post partum\par \par PREMEDS/SUPPORTIVE CARE: Zofran ativan and dex are listed as part of prechemo antiemetic regimen in NCCN guidelines for breast cancer in pregnancy. The use of Emend is controversial in pregnancy. We discussed the issue of port placement in the OR, but that would increase anesthesia time by 45 min therefore we would treat her peripherally and likely do port placement after delivery. We discussed the issue of preeclampsia or gestational diabetes with steroids. She will be monitored closely by MFM. She will see MFM between every chemo session for close fetal monitoring. \par \par I explained her that EOD work up is limited due to risk of radiation exposure to the growing fetus. We plan to perform CT scans or a PET/CT scan after delivery. She will also be a candidate for PMRT due to large tumor size. \par \par S/p infection with shigella and c-diff after Adriamycin c#2, confirmed with cultures and PCR x 2, was hospitalized for diarrhea and abd pain, treated with ampicillin and vanco,  Ampicillin completed yesterday. Vanco 2 more days left. Patient reports mom had Shigella 2 years ago, Mom has come in to help her 2 months prior but is not ill. She denies sick contacts. \par \par She was hospitalized for 5 days after Cytoxan Omi # 2 due to diarrhea. Infectious work up was negative.Completed vancomycin course prior to last treatment. Sx were controlled with supportive care. She feels back to normal now. No abd pain, NVD, fevers.  No Bone pain, no neuropathy.\par \par We discussed the plan to give C4 in 2 weeks and deliver at around 37 weeks. She will need Taxol q2w x 4 doses after delivery. We will perform CT scans after the baby is born. [de-identified] : Ms. EVANGELISTA RUBIO is here for f/u for right breast cancer dx in 10/2018, during pregnancy. S/p sequential Adriamycin and Cytoxan x 4 in second and third trimester of pregnancy. Adriamycin course complicated by Shigella, and C-diff causing delays of cycle 3 and 4. She delivered healthy baby boy 4 weeks ago at 37 weeks gestation, uncomplicated vag delivery. On 5/20/19, she was seen by Dr. Olaf Babin and was treated with ABX for endometritis, now resolved. She does have intermittent periods that are mild last for a couple of days with some clots. She is f/u 6/3 with OB/GYN.  She is doing well. No breast feeding. She started weekly Taxol on 5/15. today C3D1. RT after completion of Taxol.\par She c/o mild flushing and Sob briefly with cycle 2 did not tell the nurse at the time. Today she did not have RXN. no fevers or chills she is doing well. No neuropathy, had some mild bone aches. Denies diarrhea, BRBPR, nausea or vomiting. \par CT chest/ abd/ pelvis: 4/2019: normal\par Bone Scan: 5/20/19: Normal, mild degenerative Dz.\par

## 2019-06-01 NOTE — ASSESSMENT
[FreeTextEntry1] : In summary, this is a very pleasant 38-year-old Mongolian-speaking lady who is diagnosed with T2 N0 stage IIA poorly differentiated ER/PA/HER-2/rajni NEGATIVE IDC of the right breast. She is currently 22 weeks pregnant. A CXR and abdominal sono didn’t show distant mets.  BRCA negative. ddACT sequential started 12/7/18. \par \par - Breast ca: s/p sequential Adriamycin x 4 and Cytoxan x 4 in second and third trimester of pregnancy. Adriamycin course complicated by Shigella and C-diff causing delays of cycle 3 and 4. She delivered healthy baby boy 4 weeks ago at 37 weeks gestation, uncomplicated vag delivery. S/p treatment for endometritis. Now periods are coming back every couple of days and are light with some clots, Hgb stable she will f/u Dr. Olaf Babin on Monday 6/3. Ct scans, Bone Scan: Normal. results d/w patient and .\par - Taxol cycle 3 today. Tolerating well. Mild RXN to Taxol with C2, no RXN today patient to get premeds with next Treatment.  No neuropathy, mild arthralgias, no LFT abnormalities or pulm sx. Counts good. Continue low dose dexamethasone for premedication. She will use Reglan as needed. She is instructed to call my office if febrile or any unusual s/e. \par - Will need adjuvant radiation therapy after completing chemo. \par -She will use non-hormonal barrier contraception while on chemotherapy once cleared for sexual activity with OB/GYN. Patient and  reminded.\par .- Chemo induced N/V- She will get dex and zofran with chemo, Reglan as needed. IVF PRN.\par - Chemo induced dysgeusia and fatigue: Encourage p.o. fluids, small frequent meals.\par - Instructed to call office and go directly to the emergency room with fever more than 100.4, shaking chills, productive cough, sore throat, shortness of breath or urinary symptoms. Patient verbalized understanding and agreement.\par - Emotional support provided, all questions answered.\par \par RTO: Taxol weekly and MD visit in 2 weeks

## 2019-06-01 NOTE — CONSULT LETTER
[FreeTextEntry2] : Vesna Granger MD [FreeTextEntry3] : Viridiana Patrick M.D.\par  of Medicine\par [facility]\par [facility] [place]\par [address] [place] [address]\par [address]\par Tele # [phone]; Fax [phone]

## 2019-06-03 ENCOUNTER — OUTPATIENT (OUTPATIENT)
Dept: OUTPATIENT SERVICES | Facility: HOSPITAL | Age: 39
LOS: 1 days | End: 2019-06-03
Payer: MEDICAID

## 2019-06-03 ENCOUNTER — APPOINTMENT (OUTPATIENT)
Dept: MATERNAL FETAL MEDICINE | Facility: CLINIC | Age: 39
End: 2019-06-03
Payer: MEDICAID

## 2019-06-03 VITALS
DIASTOLIC BLOOD PRESSURE: 70 MMHG | SYSTOLIC BLOOD PRESSURE: 102 MMHG | OXYGEN SATURATION: 98 % | HEIGHT: 59 IN | HEART RATE: 84 BPM

## 2019-06-03 DIAGNOSIS — C50.919 MALIGNANT NEOPLASM OF UNSPECIFIED SITE OF UNSPECIFIED FEMALE BREAST: ICD-10-CM

## 2019-06-03 DIAGNOSIS — E66.9 OBESITY, UNSPECIFIED: ICD-10-CM

## 2019-06-03 DIAGNOSIS — Z90.11 ACQUIRED ABSENCE OF RIGHT BREAST AND NIPPLE: Chronic | ICD-10-CM

## 2019-06-03 PROCEDURE — 99214 OFFICE O/P EST MOD 30 MIN: CPT

## 2019-06-03 PROCEDURE — G0463: CPT

## 2019-06-04 NOTE — HISTORY OF PRESENT ILLNESS
[Postpartum Follow Up] : postpartum follow up [Complications:___] : complications include: [unfilled] [Delivery Date: ___] : on [unfilled] [Male] : Delivery History: baby boy [Intended Contraception] : Intended Contraception: [Partner Vasectomy] : has a partner with a vasectomy [None] : No associated symptoms are reported [Back to Normal] : is back to normal in size [Mild] : mild vaginal bleeding [Awake] : awake [Alert] : alert [No Discharge] : no discharge [The Left Breast Was Examined] : a normal appearance [Breast Mass Left Breast ___cm] : no mass was palpable [No Masses] : no breast masses were palpable [No Lesions] : no genitalia lesions [Labia Majora] : labia major [Labia Minora] : labia minora [Normal] : clitoris [Pink Rugae] : pink rugae [Normal Position] : in a normal position [Doing Well] : is doing well [No Sign of Infection] : is showing no signs of infection [Resumed Menses] : has not resumed her menses [Breastfeeding] : not currently nursing [Cervix Sample Taken] : cervical sample not taken for a Pap smear [Resumed [place]] : has not resumed intercourse [LAD] : no lymphadenopathy [Acute Distress] : no acute distress [Thyroid Nodule] : no thyroid nodule [Goiter] : no goiter [Diffuse Fibrous Tissue In The Left Breast] : no fibrocystic changes [Atrophy Of The Left Breast] : no atrophy present [Dimpling Of The Left Breast] : no dimpling [Breast Hypertrophy Of The Left Breast] : no hypertrophy [Superficial Veins Dilated In The Left Breast] : no dilated superficial veins [Breast Palpation Implant ___cm In The Left Breast] : no implant [Enlargement Of The Left Breast] : no swelling [Tenderness Of The Left Breast] : no tenderness [Left Breast Absent] : no mastectomy [___] : no mastectomy scar [Breast Reconstruction Left] : no breast reconstruction [Breast Nipple Inversion Left] : not inverted [Breast Nipple Flattening Left] : not flattened [Breast Nipple Retraction Left] : not retracted [Breast Abnormal Lactation (Galactorrhea) Left] : no galactorrhea [Breast Nipple Fissures Left] : not fissured [Breast Abnormal Secretion Opalescent Fluid Left] : no milky discharge [Breast Abnormal Secretion Serous Fluid Left] : no serous discharge [Breast Abnormal Secretion Bloody Fluid Left] : no bloody discharge [Discharge] : had no discharge [IUD String] : did not have an IUD string protruding out [Motion Tenderness] : there was no cervical motion tenderness [Pap Obtained] : a Pap smear was not performed [Enlarged ___ wks] : not enlarged [Tenderness] : nontender [Mass ___ cm] : no uterine mass was palpated [Adnexa Tenderness] : were not tender [Ovarian Mass (___ Cm)] : there were no adnexal masses [de-identified] : Sx of endometritis resolved

## 2019-06-05 ENCOUNTER — APPOINTMENT (OUTPATIENT)
Dept: INFUSION THERAPY | Facility: HOSPITAL | Age: 39
End: 2019-06-05

## 2019-06-07 ENCOUNTER — RESULT REVIEW (OUTPATIENT)
Age: 39
End: 2019-06-07

## 2019-06-07 ENCOUNTER — LABORATORY RESULT (OUTPATIENT)
Age: 39
End: 2019-06-07

## 2019-06-07 ENCOUNTER — APPOINTMENT (OUTPATIENT)
Dept: INFUSION THERAPY | Facility: HOSPITAL | Age: 39
End: 2019-06-07

## 2019-06-07 LAB
BASOPHILS # BLD AUTO: 0 K/UL — SIGNIFICANT CHANGE UP (ref 0–0.2)
BASOPHILS NFR BLD AUTO: 0.4 % — SIGNIFICANT CHANGE UP (ref 0–2)
EOSINOPHIL # BLD AUTO: 0.2 K/UL — SIGNIFICANT CHANGE UP (ref 0–0.5)
EOSINOPHIL NFR BLD AUTO: 3.2 % — SIGNIFICANT CHANGE UP (ref 0–6)
HCT VFR BLD CALC: 35 % — SIGNIFICANT CHANGE UP (ref 34.5–45)
HGB BLD-MCNC: 12.3 G/DL — SIGNIFICANT CHANGE UP (ref 11.5–15.5)
LYMPHOCYTES # BLD AUTO: 1.8 K/UL — SIGNIFICANT CHANGE UP (ref 1–3.3)
LYMPHOCYTES # BLD AUTO: 27.3 % — SIGNIFICANT CHANGE UP (ref 13–44)
MCHC RBC-ENTMCNC: 28.1 PG — SIGNIFICANT CHANGE UP (ref 27–34)
MCHC RBC-ENTMCNC: 35.1 G/DL — SIGNIFICANT CHANGE UP (ref 32–36)
MCV RBC AUTO: 80.1 FL — SIGNIFICANT CHANGE UP (ref 80–100)
MONOCYTES # BLD AUTO: 0.4 K/UL — SIGNIFICANT CHANGE UP (ref 0–0.9)
MONOCYTES NFR BLD AUTO: 5.6 % — SIGNIFICANT CHANGE UP (ref 2–14)
NEUTROPHILS # BLD AUTO: 4.2 K/UL — SIGNIFICANT CHANGE UP (ref 1.8–7.4)
NEUTROPHILS NFR BLD AUTO: 63.5 % — SIGNIFICANT CHANGE UP (ref 43–77)
PLATELET # BLD AUTO: 294 K/UL — SIGNIFICANT CHANGE UP (ref 150–400)
RBC # BLD: 4.37 M/UL — SIGNIFICANT CHANGE UP (ref 3.8–5.2)
RBC # FLD: 14.5 % — SIGNIFICANT CHANGE UP (ref 10.3–14.5)
WBC # BLD: 6.6 K/UL — SIGNIFICANT CHANGE UP (ref 3.8–10.5)
WBC # FLD AUTO: 6.6 K/UL — SIGNIFICANT CHANGE UP (ref 3.8–10.5)

## 2019-06-10 ENCOUNTER — APPOINTMENT (OUTPATIENT)
Dept: BREAST CENTER | Facility: CLINIC | Age: 39
End: 2019-06-10
Payer: MEDICAID

## 2019-06-10 VITALS
HEART RATE: 89 BPM | SYSTOLIC BLOOD PRESSURE: 113 MMHG | WEIGHT: 208 LBS | BODY MASS INDEX: 48.14 KG/M2 | HEIGHT: 55 IN | TEMPERATURE: 98 F | DIASTOLIC BLOOD PRESSURE: 76 MMHG

## 2019-06-10 DIAGNOSIS — Z87.898 PERSONAL HISTORY OF OTHER SPECIFIED CONDITIONS: ICD-10-CM

## 2019-06-10 PROCEDURE — 99213 OFFICE O/P EST LOW 20 MIN: CPT

## 2019-06-10 RX ORDER — METRONIDAZOLE 500 MG/1
500 TABLET ORAL
Qty: 20 | Refills: 0 | Status: DISCONTINUED | COMMUNITY
Start: 2019-05-20 | End: 2019-06-10

## 2019-06-10 RX ORDER — METOCLOPRAMIDE 5 MG/1
5 TABLET ORAL
Qty: 30 | Refills: 4 | Status: DISCONTINUED | COMMUNITY
Start: 2018-12-04 | End: 2019-06-10

## 2019-06-10 RX ORDER — LEVOFLOXACIN 500 MG/1
500 TABLET, FILM COATED ORAL DAILY
Qty: 10 | Refills: 0 | Status: DISCONTINUED | COMMUNITY
Start: 2019-05-20 | End: 2019-06-10

## 2019-06-10 NOTE — PHYSICAL EXAM
[de-identified] : gravid 32 wks [Atraumatic] : atraumatic [Normocephalic] : normocephalic [Supple] : supple [No Supraclavicular Adenopathy] : no supraclavicular adenopathy [No Thyromegaly] : no thyromegaly [Examined in the supine and seated position] : examined in the supine and seated position [No dominant masses] : no dominant masses in right breast  [No dominant masses] : no dominant masses left breast [No Nipple Retraction] : no left nipple retraction [No Nipple Discharge] : no left nipple discharge [No Axillary Lymphadenopathy] : no left axillary lymphadenopathy [No Edema] : no edema [No Swelling] : no swelling [Full ROM] : full range of motion [No Rashes] : no rashes [No Ulceration] : no ulceration [Breast Nipple Inversion Left] : nipple not inverted [Breast Nipple Retraction Left] : nipple not retracted [Breast Nipple Flattening Left] : nipple not flattened [Breast Nipple Fissures Left] : nipple not fissured [Breast Abnormal Lactation (Galactorrhea) Left] : no galactorrhea [Breast Abnormal Secretion Bloody Fluid Left] : no bloody discharge [Breast Abnormal Secretion Serous Fluid Left] : no serous discharge [Breast Abnormal Secretion Opalescent Fluid Left] : no milky discharge [de-identified] : Right mastectomy scar healed well. No seroma.  [de-identified] : FLORI

## 2019-06-10 NOTE — DATA REVIEWED
[FreeTextEntry1] : 4/30/19 NW CT Chest: No evidence of metastatic disease.\par 5/20/19 NW Bone scan:No evidence of osseous mets.

## 2019-06-10 NOTE — PAST MEDICAL HISTORY
[Menstruating] : The patient is menstruating [Definite ___ (Date)] : the last menstrual period was [unfilled] [Total Preg ___] : G[unfilled] [Live Births ___] : P[unfilled]  [Living ___] : Living: [unfilled] [Age At Live Birth ___] : Age at live birth: [unfilled] [FreeTextEntry7] : 2 yrs [FreeTextEntry8] : 2 yrs

## 2019-06-10 NOTE — PHYSICAL EXAM
[de-identified] : gravid 32 wks [Atraumatic] : atraumatic [Normocephalic] : normocephalic [Supple] : supple [No Supraclavicular Adenopathy] : no supraclavicular adenopathy [Examined in the supine and seated position] : examined in the supine and seated position [No Thyromegaly] : no thyromegaly [No dominant masses] : no dominant masses in right breast  [No dominant masses] : no dominant masses left breast [No Nipple Retraction] : no left nipple retraction [No Nipple Discharge] : no left nipple discharge [No Axillary Lymphadenopathy] : no left axillary lymphadenopathy [No Swelling] : no swelling [No Edema] : no edema [Full ROM] : full range of motion [No Rashes] : no rashes [No Ulceration] : no ulceration [Breast Nipple Inversion Left] : nipple not inverted [Breast Nipple Retraction Left] : nipple not retracted [Breast Nipple Flattening Left] : nipple not flattened [Breast Nipple Fissures Left] : nipple not fissured [Breast Abnormal Lactation (Galactorrhea) Left] : no galactorrhea [Breast Abnormal Secretion Bloody Fluid Left] : no bloody discharge [Breast Abnormal Secretion Serous Fluid Left] : no serous discharge [Breast Abnormal Secretion Opalescent Fluid Left] : no milky discharge [de-identified] : Right mastectomy scar healed well. No seroma.  [de-identified] : FLORI

## 2019-06-10 NOTE — ASSESSMENT
[FreeTextEntry1] : 38 y/o  female, Ambry negative here for follow up. 18 Right mastectomy with SLNBx. \par S/P  19 with healthy male infant, bottle feeding.  pregnancy complicated by GDMA2, C-Diff during pregnancy and endometritis PP. Currently 6 wks PP.  Monitoring BS, no meds needed for DM. Partner with vasectomy.\par On Taxol weekly with Dr. Viridiana Patrick. Next appt . Has 8 treatments left.\par 19 CT chest and  19 PET bone scan no evidence of disease.\par 18 NW Surgical path: R breast: IDC with apocrine features, G3 (4.5cm) with negative margins (3.2cm nearest margin posterior), R SLN negative for metastatic disease 0/3, LVI neg. Path stage IIA. \par Doing well. No LE. Full ROM.  \par Office core biopsy 9/10/18 [facility]: R 12:00 2.2X 2.0cm- Invasive mammary carcinoma with medullary features. G2/3, No comedonecrosis, marked lyphatic infiltrate around the tumor. ER/ND/Mnr1vkc (-); Ki67 (70%), p53 (-); S100 (focally +); CD3 (+); CD68 (+); CK5/6 D5/16 B4 Squamous and mesothelial cells (+); E-Caderin Epithelial, pronormoblasts (+). \par 18 NF Rad [facility] US: R 12:00 N4 2.0X 2.0X 2.2cm lobulated hypoechoic focus seen 4mm deep to the skin. L breast no remarkable findings. BR4 US guided Bx rec. U/s of axilla was done and was negative. \par Maunt with stomach cancer, MGM with mouth cancer, Muncle leg cancer. No breast or ovarian cancer.\par CBE: FLROI, R mastectomy.\par Reviewed CT and bone scan results. Discussed consult for RTx following chemo. Contact information for Dr. Hilliard given.

## 2019-06-10 NOTE — HISTORY OF PRESENT ILLNESS
[FreeTextEntry1] : 38 y/o  female, Ambry negative here for follow up. 18 Right mastectomy with SLNBx. \par S/P  19 with healthy male infant, bottle feeding.  pregnancy complicated by GDMA2, C-Diff during pregnancy and endometritis PP. Currently monitoring BS, no meds needed for DM.\par Currently 6 wks PP.  On Taxol weekly with Dr. Viridiana Patrick. Next appt . Has 8 treatments left.\par 19 CT chest and  19 PET bone scan no evidence of disease.\par 18 NW Surgical path: R breast: IDC with apocrine features, G3 (4.5cm) with negative margins (3.2cm nearest margin posterior), R SLN negative for metastatic disease 0/3, LVI neg. Path stage IIA. \par Doing well. Mild fatigue. No LE. Full ROM.  \par Office core biopsy 9/10/18 [facility]: R 12:00 2.2X 2.0cm- Invasive mammary carcinoma with medullary features. G2/3, No comedonecrosis, marked lyphatic infiltrate around the tumor. ER/UT/Boa5xcq (-); Ki67 (70%), p53 (-); S100 (focally +); CD3 (+); CD68 (+); CK5/6 D5/16 B4 Squamous and mesothelial cells (+); E-Caderin Epithelial, pronormoblasts (+). \par 18 NF Rad [facility] US: R 12:00 N4 2.0X 2.0X 2.2cm lobulated hypoechoic focus seen 4mm deep to the skin. L breast no remarkable findings. BR4 US guided Bx rec. U/s of axilla was done and was negative. \par Maunt with stomach cancer, MGM with mouth cancer, Muncle leg cancer. No breast or ovarian cancer.

## 2019-06-10 NOTE — ASSESSMENT
[FreeTextEntry1] : 38 y/o  female, Ambry negative here for follow up. 18 Right mastectomy with SLNBx. \par S/P  19 with healthy male infant, bottle feeding.  pregnancy complicated by GDMA2, C-Diff during pregnancy and endometritis PP. Currently 6 wks PP.  Monitoring BS, no meds needed for DM. Partner with vasectomy.\par On Taxol weekly with Dr. Viridiana Patrick. Next appt . Has 8 treatments left.\par 19 CT chest and  19 PET bone scan no evidence of disease.\par 18 NW Surgical path: R breast: IDC with apocrine features, G3 (4.5cm) with negative margins (3.2cm nearest margin posterior), R SLN negative for metastatic disease 0/3, LVI neg. Path stage IIA. \par Doing well. No LE. Full ROM.  \par Office core biopsy 9/10/18 [facility]: R 12:00 2.2X 2.0cm- Invasive mammary carcinoma with medullary features. G2/3, No comedonecrosis, marked lyphatic infiltrate around the tumor. ER/SC/Bqk6zor (-); Ki67 (70%), p53 (-); S100 (focally +); CD3 (+); CD68 (+); CK5/6 D5/16 B4 Squamous and mesothelial cells (+); E-Caderin Epithelial, pronormoblasts (+). \par 18 NF Rad [facility] US: R 12:00 N4 2.0X 2.0X 2.2cm lobulated hypoechoic focus seen 4mm deep to the skin. L breast no remarkable findings. BR4 US guided Bx rec. U/s of axilla was done and was negative. \par Maunt with stomach cancer, MGM with mouth cancer, Muncle leg cancer. No breast or ovarian cancer.\par CBE: FLORI, R mastectomy.\par Reviewed CT and bone scan results. Discussed consult for RTx following chemo. Contact information for Dr. Hilliard given.

## 2019-06-10 NOTE — HISTORY OF PRESENT ILLNESS
[FreeTextEntry1] : 40 y/o  female, Ambry negative here for follow up. 18 Right mastectomy with SLNBx. \par S/P  19 with healthy male infant, bottle feeding.  pregnancy complicated by GDMA2, C-Diff during pregnancy and endometritis PP. Currently monitoring BS, no meds needed for DM.\par Currently 6 wks PP.  On Taxol weekly with Dr. Viridiana Patrick. Next appt . Has 8 treatments left.\par 19 CT chest and  19 PET bone scan no evidence of disease.\par 18 NW Surgical path: R breast: IDC with apocrine features, G3 (4.5cm) with negative margins (3.2cm nearest margin posterior), R SLN negative for metastatic disease 0/3, LVI neg. Path stage IIA. \par Doing well. Mild fatigue. No LE. Full ROM.  \par Office core biopsy 9/10/18 [facility]: R 12:00 2.2X 2.0cm- Invasive mammary carcinoma with medullary features. G2/3, No comedonecrosis, marked lyphatic infiltrate around the tumor. ER/HI/Jst1cqr (-); Ki67 (70%), p53 (-); S100 (focally +); CD3 (+); CD68 (+); CK5/6 D5/16 B4 Squamous and mesothelial cells (+); E-Caderin Epithelial, pronormoblasts (+). \par 18 NF Rad [facility] US: R 12:00 N4 2.0X 2.0X 2.2cm lobulated hypoechoic focus seen 4mm deep to the skin. L breast no remarkable findings. BR4 US guided Bx rec. U/s of axilla was done and was negative. \par Maunt with stomach cancer, MGM with mouth cancer, Muncle leg cancer. No breast or ovarian cancer.

## 2019-06-11 ENCOUNTER — OUTPATIENT (OUTPATIENT)
Dept: OUTPATIENT SERVICES | Facility: HOSPITAL | Age: 39
LOS: 1 days | Discharge: HOME | End: 2019-06-11

## 2019-06-11 DIAGNOSIS — Z90.11 ACQUIRED ABSENCE OF RIGHT BREAST AND NIPPLE: Chronic | ICD-10-CM

## 2019-06-11 DIAGNOSIS — O09.899 SUPERVISION OF OTHER HIGH RISK PREGNANCIES, UNSPECIFIED TRIMESTER: ICD-10-CM

## 2019-06-11 DIAGNOSIS — C50.919 MALIGNANT NEOPLASM OF UNSPECIFIED SITE OF UNSPECIFIED FEMALE BREAST: ICD-10-CM

## 2019-06-12 ENCOUNTER — APPOINTMENT (OUTPATIENT)
Dept: INFUSION THERAPY | Facility: HOSPITAL | Age: 39
End: 2019-06-12

## 2019-06-14 ENCOUNTER — RESULT REVIEW (OUTPATIENT)
Age: 39
End: 2019-06-14

## 2019-06-14 ENCOUNTER — APPOINTMENT (OUTPATIENT)
Dept: INFUSION THERAPY | Facility: HOSPITAL | Age: 39
End: 2019-06-14

## 2019-06-14 ENCOUNTER — APPOINTMENT (OUTPATIENT)
Dept: HEMATOLOGY ONCOLOGY | Facility: CLINIC | Age: 39
End: 2019-06-14
Payer: MEDICAID

## 2019-06-14 ENCOUNTER — LABORATORY RESULT (OUTPATIENT)
Age: 39
End: 2019-06-14

## 2019-06-14 LAB
BASOPHILS # BLD AUTO: 0 K/UL — SIGNIFICANT CHANGE UP (ref 0–0.2)
BASOPHILS NFR BLD AUTO: 0.7 % — SIGNIFICANT CHANGE UP (ref 0–2)
EOSINOPHIL # BLD AUTO: 0.2 K/UL — SIGNIFICANT CHANGE UP (ref 0–0.5)
EOSINOPHIL NFR BLD AUTO: 3.7 % — SIGNIFICANT CHANGE UP (ref 0–6)
HCT VFR BLD CALC: 33.7 % — LOW (ref 34.5–45)
HGB BLD-MCNC: 11.9 G/DL — SIGNIFICANT CHANGE UP (ref 11.5–15.5)
LYMPHOCYTES # BLD AUTO: 2.4 K/UL — SIGNIFICANT CHANGE UP (ref 1–3.3)
LYMPHOCYTES # BLD AUTO: 39.2 % — SIGNIFICANT CHANGE UP (ref 13–44)
MCHC RBC-ENTMCNC: 28.4 PG — SIGNIFICANT CHANGE UP (ref 27–34)
MCHC RBC-ENTMCNC: 35.3 G/DL — SIGNIFICANT CHANGE UP (ref 32–36)
MCV RBC AUTO: 80.6 FL — SIGNIFICANT CHANGE UP (ref 80–100)
MONOCYTES # BLD AUTO: 0.4 K/UL — SIGNIFICANT CHANGE UP (ref 0–0.9)
MONOCYTES NFR BLD AUTO: 7.1 % — SIGNIFICANT CHANGE UP (ref 2–14)
NEUTROPHILS # BLD AUTO: 3 K/UL — SIGNIFICANT CHANGE UP (ref 1.8–7.4)
NEUTROPHILS NFR BLD AUTO: 49.3 % — SIGNIFICANT CHANGE UP (ref 43–77)
PLATELET # BLD AUTO: 303 K/UL — SIGNIFICANT CHANGE UP (ref 150–400)
RBC # BLD: 4.18 M/UL — SIGNIFICANT CHANGE UP (ref 3.8–5.2)
RBC # FLD: 15.2 % — HIGH (ref 10.3–14.5)
WBC # BLD: 6.1 K/UL — SIGNIFICANT CHANGE UP (ref 3.8–10.5)
WBC # FLD AUTO: 6.1 K/UL — SIGNIFICANT CHANGE UP (ref 3.8–10.5)

## 2019-06-14 PROCEDURE — 99215 OFFICE O/P EST HI 40 MIN: CPT

## 2019-06-17 DIAGNOSIS — R11.2 NAUSEA WITH VOMITING, UNSPECIFIED: ICD-10-CM

## 2019-06-17 DIAGNOSIS — Z51.11 ENCOUNTER FOR ANTINEOPLASTIC CHEMOTHERAPY: ICD-10-CM

## 2019-06-19 ENCOUNTER — APPOINTMENT (OUTPATIENT)
Dept: INFUSION THERAPY | Facility: HOSPITAL | Age: 39
End: 2019-06-19

## 2019-06-20 ENCOUNTER — RESULT REVIEW (OUTPATIENT)
Age: 39
End: 2019-06-20

## 2019-06-20 ENCOUNTER — APPOINTMENT (OUTPATIENT)
Dept: HEMATOLOGY ONCOLOGY | Facility: CLINIC | Age: 39
End: 2019-06-20
Payer: MEDICAID

## 2019-06-20 VITALS
WEIGHT: 208.98 LBS | SYSTOLIC BLOOD PRESSURE: 113 MMHG | HEART RATE: 96 BPM | DIASTOLIC BLOOD PRESSURE: 72 MMHG | OXYGEN SATURATION: 98 % | RESPIRATION RATE: 16 BRPM | BODY MASS INDEX: 61.2 KG/M2 | TEMPERATURE: 98.9 F

## 2019-06-20 LAB
BASOPHILS # BLD AUTO: 0 K/UL — SIGNIFICANT CHANGE UP (ref 0–0.2)
BASOPHILS NFR BLD AUTO: 0.3 % — SIGNIFICANT CHANGE UP (ref 0–2)
EOSINOPHIL # BLD AUTO: 0 K/UL — SIGNIFICANT CHANGE UP (ref 0–0.5)
EOSINOPHIL NFR BLD AUTO: 0.8 % — SIGNIFICANT CHANGE UP (ref 0–6)
HCT VFR BLD CALC: 33 % — LOW (ref 34.5–45)
HGB BLD-MCNC: 11.5 G/DL — SIGNIFICANT CHANGE UP (ref 11.5–15.5)
LYMPHOCYTES # BLD AUTO: 1.2 K/UL — SIGNIFICANT CHANGE UP (ref 1–3.3)
LYMPHOCYTES # BLD AUTO: 35 % — SIGNIFICANT CHANGE UP (ref 13–44)
MCHC RBC-ENTMCNC: 28.1 PG — SIGNIFICANT CHANGE UP (ref 27–34)
MCHC RBC-ENTMCNC: 34.7 G/DL — SIGNIFICANT CHANGE UP (ref 32–36)
MCV RBC AUTO: 80.9 FL — SIGNIFICANT CHANGE UP (ref 80–100)
MONOCYTES # BLD AUTO: 0.2 K/UL — SIGNIFICANT CHANGE UP (ref 0–0.9)
MONOCYTES NFR BLD AUTO: 5.9 % — SIGNIFICANT CHANGE UP (ref 2–14)
NEUTROPHILS # BLD AUTO: 1.9 K/UL — SIGNIFICANT CHANGE UP (ref 1.8–7.4)
NEUTROPHILS NFR BLD AUTO: 58 % — SIGNIFICANT CHANGE UP (ref 43–77)
PLATELET # BLD AUTO: 268 K/UL — SIGNIFICANT CHANGE UP (ref 150–400)
RBC # BLD: 4.08 M/UL — SIGNIFICANT CHANGE UP (ref 3.8–5.2)
RBC # FLD: 15.7 % — HIGH (ref 10.3–14.5)
WBC # BLD: 3.3 K/UL — LOW (ref 3.8–10.5)
WBC # FLD AUTO: 3.3 K/UL — LOW (ref 3.8–10.5)

## 2019-06-20 PROCEDURE — 99215 OFFICE O/P EST HI 40 MIN: CPT

## 2019-06-21 ENCOUNTER — APPOINTMENT (OUTPATIENT)
Dept: INFUSION THERAPY | Facility: HOSPITAL | Age: 39
End: 2019-06-21

## 2019-06-24 ENCOUNTER — APPOINTMENT (OUTPATIENT)
Dept: INFECTIOUS DISEASE | Facility: CLINIC | Age: 39
End: 2019-06-24

## 2019-06-25 LAB
ALBUMIN SERPL ELPH-MCNC: 4.1 G/DL
ALP BLD-CCNC: 108 U/L
ALT SERPL-CCNC: 41 U/L
ANION GAP SERPL CALC-SCNC: 13 MMOL/L
AST SERPL-CCNC: 36 U/L
BACTERIA STL CULT: NORMAL
BILIRUB SERPL-MCNC: 0.2 MG/DL
BUN SERPL-MCNC: 5 MG/DL
C DIFF TOX GENS STL QL NAA+PROBE: NORMAL
CALCIUM SERPL-MCNC: 9 MG/DL
CDIFF BY PCR: NOT DETECTED
CHLORIDE SERPL-SCNC: 104 MMOL/L
CO2 SERPL-SCNC: 21 MMOL/L
CREAT SERPL-MCNC: 0.4 MG/DL
DEPRECATED O AND P PREP STL: NORMAL
GI PCR PANEL, STOOL: ABNORMAL
GLUCOSE SERPL-MCNC: 117 MG/DL
MAGNESIUM SERPL-MCNC: 1.9 MG/DL
PHOSPHATE SERPL-MCNC: 3 MG/DL
POTASSIUM SERPL-SCNC: 3.7 MMOL/L
PROT SERPL-MCNC: 6.6 G/DL
SODIUM SERPL-SCNC: 138 MMOL/L

## 2019-06-26 ENCOUNTER — APPOINTMENT (OUTPATIENT)
Dept: INFUSION THERAPY | Facility: HOSPITAL | Age: 39
End: 2019-06-26

## 2019-06-28 ENCOUNTER — RESULT REVIEW (OUTPATIENT)
Age: 39
End: 2019-06-28

## 2019-06-28 ENCOUNTER — LABORATORY RESULT (OUTPATIENT)
Age: 39
End: 2019-06-28

## 2019-06-28 ENCOUNTER — APPOINTMENT (OUTPATIENT)
Dept: HEMATOLOGY ONCOLOGY | Facility: CLINIC | Age: 39
End: 2019-06-28
Payer: MEDICAID

## 2019-06-28 ENCOUNTER — APPOINTMENT (OUTPATIENT)
Dept: INFECTIOUS DISEASE | Facility: CLINIC | Age: 39
End: 2019-06-28
Payer: MEDICAID

## 2019-06-28 ENCOUNTER — APPOINTMENT (OUTPATIENT)
Dept: INFUSION THERAPY | Facility: HOSPITAL | Age: 39
End: 2019-06-28

## 2019-06-28 VITALS
TEMPERATURE: 97.8 F | BODY MASS INDEX: 39.65 KG/M2 | HEIGHT: 61 IN | WEIGHT: 210 LBS | OXYGEN SATURATION: 99 % | HEART RATE: 88 BPM | SYSTOLIC BLOOD PRESSURE: 105 MMHG | DIASTOLIC BLOOD PRESSURE: 68 MMHG

## 2019-06-28 LAB
BASOPHILS # BLD AUTO: 0.1 K/UL — SIGNIFICANT CHANGE UP (ref 0–0.2)
BASOPHILS NFR BLD AUTO: 0.9 % — SIGNIFICANT CHANGE UP (ref 0–2)
EOSINOPHIL # BLD AUTO: 0.2 K/UL — SIGNIFICANT CHANGE UP (ref 0–0.5)
EOSINOPHIL NFR BLD AUTO: 3.1 % — SIGNIFICANT CHANGE UP (ref 0–6)
HCT VFR BLD CALC: 34 % — LOW (ref 34.5–45)
HGB BLD-MCNC: 11.6 G/DL — SIGNIFICANT CHANGE UP (ref 11.5–15.5)
LYMPHOCYTES # BLD AUTO: 1.8 K/UL — SIGNIFICANT CHANGE UP (ref 1–3.3)
LYMPHOCYTES # BLD AUTO: 28.8 % — SIGNIFICANT CHANGE UP (ref 13–44)
MCHC RBC-ENTMCNC: 27.3 PG — SIGNIFICANT CHANGE UP (ref 27–34)
MCHC RBC-ENTMCNC: 34 G/DL — SIGNIFICANT CHANGE UP (ref 32–36)
MCV RBC AUTO: 80.2 FL — SIGNIFICANT CHANGE UP (ref 80–100)
MONOCYTES # BLD AUTO: 0.3 K/UL — SIGNIFICANT CHANGE UP (ref 0–0.9)
MONOCYTES NFR BLD AUTO: 5.4 % — SIGNIFICANT CHANGE UP (ref 2–14)
NEUTROPHILS # BLD AUTO: 3.8 K/UL — SIGNIFICANT CHANGE UP (ref 1.8–7.4)
NEUTROPHILS NFR BLD AUTO: 61.8 % — SIGNIFICANT CHANGE UP (ref 43–77)
PLATELET # BLD AUTO: 339 K/UL — SIGNIFICANT CHANGE UP (ref 150–400)
RBC # BLD: 4.25 M/UL — SIGNIFICANT CHANGE UP (ref 3.8–5.2)
RBC # FLD: 15.2 % — HIGH (ref 10.3–14.5)
WBC # BLD: 6.2 K/UL — SIGNIFICANT CHANGE UP (ref 3.8–10.5)
WBC # FLD AUTO: 6.2 K/UL — SIGNIFICANT CHANGE UP (ref 3.8–10.5)

## 2019-06-28 PROCEDURE — 99215 OFFICE O/P EST HI 40 MIN: CPT

## 2019-06-28 PROCEDURE — 99214 OFFICE O/P EST MOD 30 MIN: CPT

## 2019-06-28 RX ORDER — DEXAMETHASONE 4 MG/1
4 TABLET ORAL
Qty: 10 | Refills: 0 | Status: DISCONTINUED | COMMUNITY
Start: 2019-05-14 | End: 2019-06-28

## 2019-06-28 NOTE — HISTORY OF PRESENT ILLNESS
[FreeTextEntry1] : This is a 40 yo F with breast cancer who is undergoing chemo.  Known to my associate from prior hospital admission when she was treated for C diff with long course of po vanco and also found to have shigella at that time.  Last seen by ID as outpt 3/25/19.\par \par Recently with diarrheal illness again.\par Stool studies from 6/20/19 revealed a normal stool culture, c diff negative, O&P negative, but GI PCR had norovirus and Enterotoxigenic E coli (ETEC).\par \par The pt reported diarrhea and abdominal pain. \par Denied travel.\par Reports her 2 month old baby also had diarrhea and now her  has vomiting and diarrhea.\par \par She started azithromycin on 6/21/19 and took for 5 days.\par She finished it 6/25/19 (Tuesday).  Her last episode of loose stool was Wednesday 6/26/19.\par She does not have fevers.\par She feels back to normal now with solid stool.\par Only abnormal finding on ROS is some throat discomfort.  Otherwise ROS is negative\par Does not have a port. \par

## 2019-06-28 NOTE — CONSULT LETTER
[Dear  ___] : Dear  [unfilled], [Please see my note below.] : Please see my note below. [Consult Closing:] : Thank you very much for allowing me to participate in the care of this patient.  If you have any questions, please do not hesitate to contact me. [Consult Letter:] : I had the pleasure of evaluating your patient, [unfilled]. [Sincerely,] : Sincerely, [DrRonnie  ___] : Dr. DE ANDA [DrRonnie ___] : Dr. DE ANDA [FreeTextEntry2] : Vesna Granger MD [FreeTextEntry3] : Viridiana Patrick M.D.\par  of Medicine\par [facility]\par [facility]\par [address] [place] [address]\par [address]\par Tele # [phone]; Fax [phone]

## 2019-06-28 NOTE — HISTORY OF PRESENT ILLNESS
[de-identified] : Ms. EVANGELISTA HESTER  is a 39 year old female here for an evaluation of breast cancer. Her oncologic history is as follows:\par \par She felt a right breast mass in 2018 and was evaluated seen by GYN who sent her for breast imaging. She underwent diagnostic breast imaging on 18 which showed at 12:00 position of right breast there is a lobulated focus, measuring 2.0 x 2.0 x 2.2 cm 2 cm FN. 4 mm deep to the skin. Left breast benign. No enlarged axillary lymph nodes. Bx recommended. 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 There is marked lymphocytic infiltrate around the tumor. She was scheduled for MRI but found out that she was pregnant. Current gestation age at the time of initial consult: 11 weeks\par \par 10/23/18\par She is here with her . We discussed the diagnosis of rapidly growing triple negative breast ca, the size has increased since diagnosis and that chemotherapy will be needed to treat this cancer which can not be started until she is 14 weeks. They want to keep the pregnancy understanding that it will cause delay in treatment and potential risk to the patient and fetus. We discussed that lumpectomy will have issues with positive margins and RT will be contraindicated during to pregnancy and therefore u/l mastectomy is recommend. I discussed with Dr Granger that expander placement at the time of surgery will be an option. She is seeing the pt tomorrow and will discuss surgery and reconstruction options with her. She also saw Dr Eden Patrick med onc for a a second opinion. \par \par s/p surgery (right mastectomy) 18 , 4.5 cm tumor and sentinel node negative- path report d/w her in detail. Tumor grew more than double in size between dx and surgery and is s/o aggressive tumor. Discussed to start chemo 18. S/e of chemo jdoy during pregnancy, expected and unexpected, pertaining to her and the fetus d/w her. Discussed to see MFM b/w chemo. Also a candidate for PMRT due to large tumor size. LMP 18\par \par I had extensive discussion with the patient and spouse regarding breast cancer in pregnancy and the treatment options that are safe for both the mother and the growing fetus. She refused termination of pregnancy after finding out cancer diagnosis.  We discussed that all treatments including chemotherapy and surgery are contraindicated in first trimester but are considered relatively safe in second and third trimester.  We have discussed medical termination of pregnancy as an option but patient decided to keep the pregnancy. We reviewed that the largest experience in pregnancy has been with anthracycline and alkylating agent chemotherapy. She will be a candidate for chemotherapy with Adriamycin plus cyclophosphamide in a sequential dose dense manner. We reviewed data from [place] registry which showed Neulasta is reasonably safe to use in 2nd and third trimester although it was a very small dataset. . There is not enough data to use taxanes in pregnancy therefore Taxol will be administered post partum. Potential s/e to the patient and growing fetus were discussed with the patient and  in detail via a . The patient understood all the potential side effects and signed an informed consent after discussing the risks, benefits and alternatives.\par \par \par PLAN: Starting 18 ( 17 weeks), She will receive adriamycin every 2 weeks x 4 cycle followed by Cytoxan every 2 weeks x 4 cycles. We plan to hold chemotherapy 4 weeks prior to delivery to prevent  neutropenic complications. Currently she is planned for elective induction (NVD) at 37 weeks. We would start Taxol post partum\par \par PREMEDS/SUPPORTIVE CARE: Zofran ativan and dex are listed as part of prechemo antiemetic regimen in NCCN guidelines for breast cancer in pregnancy. The use of Emend is controversial in pregnancy. We discussed the issue of port placement in the OR, but that would increase anesthesia time by 45 min therefore we would treat her peripherally and likely do port placement after delivery. We discussed the issue of preeclampsia or gestational diabetes with steroids. She will be monitored closely by MFM. She will see MFM between every chemo session for close fetal monitoring. \par \par I explained her that EOD work up is limited due to risk of radiation exposure to the growing fetus. We plan to perform CT scans or a PET/CT scan after delivery. She will also be a candidate for PMRT due to large tumor size. \par \par S/p infection with shigella and c-diff after Adriamycin c#2, confirmed with cultures and PCR x 2, was hospitalized for diarrhea and abd pain, treated with ampicillin and vanco,  Ampicillin completed yesterday. Vanco 2 more days left. Patient reports mom had Shigella 2 years ago, Mom has come in to help her 2 months prior but is not ill. She denies sick contacts. \par \par She was hospitalized for 5 days after Cytoxan Omi # 2 due to diarrhea. Infectious work up was negative.Completed vancomycin course prior to last treatment. Sx were controlled with supportive care. She feels back to normal now. No abd pain, NVD, fevers.  No Bone pain, no neuropathy.\par \par We discussed the plan to give C4 in 2 weeks and deliver at around 37 weeks. She will need Taxol q2w x 4 doses after delivery. We will perform CT scans after the baby is born. [de-identified] : Ms. EVANGELISTA RUBIO is here for f/u for right breast cancer dx in 10/2018 during pregnancy, on weekly Taxol C6D1 today. \par She is s/p sequential AC x 4 in 2nd and 3rd trimester of pregnancy. Justin course complicated my multiple episodes of C-diff and Shigella causing delays of Cycle 3 and 4. She delivered after sequential AC. Not breast feeding. Weekly Taxol started 5/15/19. \par She c/o diarrhea on 6/20/19. Stool positive for E.coli and and Norovirus, completed abx course. SHe was seen by infectious disease this morning and is no longer symptomatic. Abd pain and diarrhea resolved. Taxol held last week, cleared to proceed with chemo by infectious disease. She has mild nausea and denies vomiting. She denies dizziness or palpitations. C/o mild bone aches, did not need any pain meds. She has mild- moderate fatigue and altered taste. Able to function, eating well, weight stable, no neuropathy, no fevers or chills.\par \par CT chest/ abd/ pelvis: 4/2019: normal\par Bone Scan: 5/20/19: Normal, mild degenerative Dz.\par

## 2019-06-28 NOTE — ASSESSMENT
[FreeTextEntry1] : This is a 40 yo F with breast cancer undergoing chemotherapy who presents today for f/up of diarrheal illness, found to have norovirus  and ETEC.   Seems to possibly have sick contacts as baby has diarrhea and  is now sick with GI illness as well.\par \par I advised pt to follow good hand hygiene to prevent illness. Eat only cooked foods.\par \par She completed a course of azithromycin x 5 days and is feeling much better now and back to baseline. Diarrhea is completely resolved and she does not have any abdominal pain.\par \par She is due for chemotherapy today. Missed 1 week last Friday June 21st.  \par As she is much improved, it seems reasonable to proceed with chemo today.\par \par Pt expressed understanding.\par , Yakelin, present for the visit today.\par \par Pt can return as needed.\par If symptoms return and pt worsens, advised to go to ER.\par \par \par \par

## 2019-06-28 NOTE — ASSESSMENT
[Curative] : Goals of care discussed with patient: Curative [FreeTextEntry1] : In summary, this is a very pleasant 39-year-old Divehi-speaking lady who is diagnosed with T2 N0 stage IIA poorly differentiated ER/IN/HER-2/rajni NEGATIVE IDC of the right breast. She is currently 22 weeks pregnant. A CXR and abdominal sono didn’t show distant mets.  BRCA negative. ddACT sequential started 12/7/18. \par \par - Breast ca: s/p sequential Adriamycin x 4 and Cytoxan x 4 in second and third trimester of pregnancy. Adriamycin course complicated by Shigella and C-diff causing delays of cycle 3 and 4. She delivered healthy baby boy at 36 weeks gestation, uncomplicated vag delivery. \par - Taxol C6D1 today,  Counts stable, Taxol held last week secondary to infectious diarrhea( E.coli, Norovirus). She was seen by Infectious disease s/p abx course. Symptoms resolved. She will f/u with Infectious disease as needed. She will proceed with chemo today.\par -Mild RXN to Taxol with C2, premeds changed to IV and tolerating well. no LFT abnormalities or pulm sx\par - Mild arthralgias, will cont to monitor. Tylenol PRN.\par -Chemo induced neuropathy: Mild Grade 1 previously has since resolved.\par - Will need adjuvant radiation therapy after completing chemo. \par - She will use non-hormonal barrier contraception while on chemotherapy once cleared for sexual activity with OB/GYN. Patient again reminded.\par - Chemo induced N/V- She will get Dex and Zofran with chemo, Reglan as needed. IVF PRN.\par - Chemo induced dysgeusia and fatigue: Encourage p.o. fluids, small frequent meals.\par - Instructed to call office and go directly to the emergency room with fever more than 100.4, shaking chills, productive cough, sore throat, shortness of breath or urinary symptoms. Patient verbalized understanding and agreement.\par - Emotional support provided, all questions answered.\par -Obesity: Lifestyle modifications reviewed. \par \par RTO: Taxol weekly and MD visit in 2 weeks\par \par

## 2019-06-28 NOTE — PHYSICAL EXAM
[Sclera] : the sclera and conjunctiva were normal [PERRL With Normal Accommodation] : pupils were equal in size, round, reactive to light [Outer Ear] : the ears and nose were normal in appearance [Both Tympanic Membranes Were Examined] : both tympanic membranes were normal [Neck Cervical Mass (___cm)] : no neck mass was observed [Neck Appearance] : the appearance of the neck was normal [Jugular Venous Distention Increased] : there was no jugular-venous distention [FreeTextEntry1] : No thrush noted [Heart Rate And Rhythm] : heart rate was normal and rhythm regular [Heart Sounds] : normal S1 and S2 [Heart Sounds Gallop] : no gallops [Murmurs] : no murmurs [Heart Sounds Pericardial Friction Rub] : no pericardial rub [Edema] : there was no peripheral edema [Bowel Sounds] : normal bowel sounds [Abdomen Tenderness] : non-tender [Abdomen Soft] : soft [] : no hepato-splenomegaly [Abdomen Mass (___ Cm)] : no abdominal mass palpated [No Focal Deficits] : no focal deficits [Oriented To Time, Place, And Person] : oriented to person, place, and time [Affect] : the affect was normal

## 2019-06-28 NOTE — PHYSICAL EXAM
[Fully active, able to carry on all pre-disease performance without restriction] : Status 0 - Fully active, able to carry on all pre-disease performance without restriction [Obese] : obese [Normal] : normoactive bowel sounds, soft and nontender, no hepatosplenomegaly or masses appreciated [de-identified] : S/p right mastectomy healing well. No CW or axillary nodules

## 2019-06-28 NOTE — REASON FOR VISIT
[Follow-Up Visit] : a follow-up [Other: _____] : [unfilled] [[employer] ] : provided by [employer]   [FreeTextEntry1] : Zuri [FreeTextEntry2] : [id number]

## 2019-06-30 NOTE — PHYSICAL EXAM
[Fully active, able to carry on all pre-disease performance without restriction] : Status 0 - Fully active, able to carry on all pre-disease performance without restriction [Obese] : obese [Normal] : affect appropriate [de-identified] : S/p right mastectomy healing well. No CW or axillary nodules

## 2019-06-30 NOTE — HISTORY OF PRESENT ILLNESS
[de-identified] : Ms. EVANGELISTA RUBIO is here for f/u for right breast cancer dx in 10/2018, during pregnancy. S/p sequential Adriamycin and Cytoxan x 4 in second and third trimester of pregnancy. Adriamycin course complicated by Shigella, and C-diff causing delays of cycle 3 and 4. She delivered healthy baby boy 4 weeks ago at 36 weeks gestation, uncomplicated vag delivery. On 5/20/19, she was seen by Dr. Olaf Babin and was treated with ABX for endometritis, now resolved.  ( recent f/u on 6/3, no further signs and sxs of endometritis, uterus back to normal size at that exam.) She is doing well. Not breast feeding. She started weekly Taxol on 5/15. Today C5D1. RT after completion of Taxol.\par She c/o mild flushing and Sob briefly with cycle 2 did not tell the nurse at the time.( no further treatment reactions)  No fevers or chills she is doing well. C/o mild interment  numbness occasionally. C/o  mild bone aches  did not need any pain meds. Denies nausea,  diarrhea, BRBPR, nausea or vomiting. Mild- moderate fatigue and altered taste. Able to function, eating well, weight stable.\par \par CT chest/ abd/ pelvis: 4/2019: normal\par Bone Scan: 5/20/19: Normal, mild degenerative Dz.\par  [de-identified] : Ms. EVANGELISTA HESTER  is a 39 year old female here for an evaluation of breast cancer. Her oncologic history is as follows:\par \par She felt a right breast mass in 2018 and was evaluated seen by GYN who sent her for breast imaging. She underwent diagnostic breast imaging on 18 which showed at 12:00 position of right breast there is a lobulated focus, measuring 2.0 x 2.0 x 2.2 cm 2 cm FN. 4 mm deep to the skin. Left breast benign. No enlarged axillary lymph nodes. Bx recommended. 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 There is marked lymphocytic infiltrate around the tumor. She was scheduled for MRI but found out that she was pregnant. Current gestation age at the time of initial consult: 11 weeks\par \par 10/23/18\par She is here with her . We discussed the diagnosis of rapidly growing triple negative breast ca, the size has increased since diagnosis and that chemotherapy will be needed to treat this cancer which can not be started until she is 14 weeks. They want to keep the pregnancy understanding that it will cause delay in treatment and potential risk to the patient and fetus. We discussed that lumpectomy will have issues with positive margins and RT will be contraindicated during to pregnancy and therefore u/l mastectomy is recommend. I discussed with Dr Granger that expander placement at the time of surgery will be an option. She is seeing the pt tomorrow and will discuss surgery and reconstruction options with her. She also saw Dr Eden [facility] med onc for a a second opinion. \par \par s/p surgery (right mastectomy) 18 , 4.5 cm tumor and sentinel node negative- path report d/w her in detail. Tumor grew more than double in size between dx and surgery and is s/o aggressive tumor. Discussed to start chemo 18. S/e of chemo jody during pregnancy, expected and unexpected, pertaining to her and the fetus d/w her. Discussed to see MFM b/w chemo. Also a candidate for PMRT due to large tumor size. LMP 18\par \par I had extensive discussion with the patient and spouse regarding breast cancer in pregnancy and the treatment options that are safe for both the mother and the growing fetus. She refused termination of pregnancy after finding out cancer diagnosis.  We discussed that all treatments including chemotherapy and surgery are contraindicated in first trimester but are considered relatively safe in second and third trimester.  We have discussed medical termination of pregnancy as an option but patient decided to keep the pregnancy. We reviewed that the largest experience in pregnancy has been with anthracycline and alkylating agent chemotherapy. She will be a candidate for chemotherapy with Adriamycin plus cyclophosphamide in a sequential dose dense manner. We reviewed data from [place] registry which showed Neulasta is reasonably safe to use in 2nd and third trimester although it was a very small dataset. . There is not enough data to use taxanes in pregnancy therefore Taxol will be administered post partum. Potential s/e to the patient and growing fetus were discussed with the patient and  in detail via a . The patient understood all the potential side effects and signed an informed consent after discussing the risks, benefits and alternatives.\par \par \par PLAN: Starting 18 ( 17 weeks), She will receive adriamycin every 2 weeks x 4 cycle followed by Cytoxan every 2 weeks x 4 cycles. We plan to hold chemotherapy 4 weeks prior to delivery to prevent  neutropenic complications. Currently she is planned for elective induction (NVD) at 37 weeks. We would start Taxol post partum\par \par PREMEDS/SUPPORTIVE CARE: Zofran ativan and dex are listed as part of prechemo antiemetic regimen in NCCN guidelines for breast cancer in pregnancy. The use of Emend is controversial in pregnancy. We discussed the issue of port placement in the OR, but that would increase anesthesia time by 45 min therefore we would treat her peripherally and likely do port placement after delivery. We discussed the issue of preeclampsia or gestational diabetes with steroids. She will be monitored closely by MFM. She will see MFM between every chemo session for close fetal monitoring. \par \par I explained her that EOD work up is limited due to risk of radiation exposure to the growing fetus. We plan to perform CT scans or a PET/CT scan after delivery. She will also be a candidate for PMRT due to large tumor size. \par \par S/p infection with shigella and c-diff after Adriamycin c#2, confirmed with cultures and PCR x 2, was hospitalized for diarrhea and abd pain, treated with ampicillin and vanco,  Ampicillin completed yesterday. Vanco 2 more days left. Patient reports mom had Shigella 2 years ago, Mom has come in to help her 2 months prior but is not ill. She denies sick contacts. \par \par She was hospitalized for 5 days after Cytoxan Omi # 2 due to diarrhea. Infectious work up was negative.Completed vancomycin course prior to last treatment. Sx were controlled with supportive care. She feels back to normal now. No abd pain, NVD, fevers.  No Bone pain, no neuropathy.\par \par We discussed the plan to give C4 in 2 weeks and deliver at around 37 weeks. She will need Taxol q2w x 4 doses after delivery. We will perform CT scans after the baby is born.

## 2019-06-30 NOTE — CONSULT LETTER
[Dear  ___] : Dear  [unfilled], [Please see my note below.] : Please see my note below. [Consult Letter:] : I had the pleasure of evaluating your patient, [unfilled]. [Sincerely,] : Sincerely, [Consult Closing:] : Thank you very much for allowing me to participate in the care of this patient.  If you have any questions, please do not hesitate to contact me. [DrRonnie ___] : Dr. DE ANDA [DrRonnie  ___] : Dr. DE ANDA [FreeTextEntry2] : Vesna Granger MD [FreeTextEntry3] : Viridiana Patrick M.D.\par  of Medicine\par [facility]\par [facility] [place]\par [address] [place] [address]\par [address]\par Tele # [phone]; Fax [phone]

## 2019-06-30 NOTE — REASON FOR VISIT
[Follow-Up Visit] : a follow-up [[employer] ] : provided by [employer]   [Other: _____] : [unfilled] [FreeTextEntry1] : [id number] [FreeTextEntry2] : Diallo

## 2019-06-30 NOTE — ASSESSMENT
[Curative] : Goals of care discussed with patient: Curative [FreeTextEntry1] : In summary, this is a very pleasant 38-year-old Turkmen-speaking lady who is diagnosed with T2 N0 stage IIA poorly differentiated ER/AK/HER-2/rajni NEGATIVE IDC of the right breast. She is currently 22 weeks pregnant. A CXR and abdominal sono didn’t show distant mets.  BRCA negative. ddACT sequential started 12/7/18. \par \par - Breast ca: s/p sequential Adriamycin x 4 and Cytoxan x 4 in second and third trimester of pregnancy. Adriamycin course complicated by Shigella and C-diff causing delays of cycle 3 and 4. She delivered healthy baby boy at 36 weeks gestation, uncomplicated vag delivery. S/p treatment for endometritis. Now resolved. Ct scans, Bone Scan: Normal. results d/w patient and .\par - Taxol cycle 5 today. Tolerating well. Mild RXN to Taxol with C2, premeds changed to IV and tolerating well. Mild arthralgias, no LFT abnormalities or pulm sx. Counts good. Continue low dose dexamethasone for premedication. She will use Reglan as needed. She is instructed to call my office if febrile or any unusual s/e. 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 \par -She will use non-hormonal barrier contraception while on chemotherapy once cleared for sexual activity with OB/GYN. Patient and  reminded.\par .- Chemo induced N/V- She will get dex and zofran with chemo, Reglan as needed. IVF PRN.\par - Chemo induced dysgeusia and fatigue: Encourage p.o. fluids, small frequent meals.\par - Instructed to call office and go directly to the emergency room with fever more than 100.4, shaking chills, productive cough, sore throat, shortness of breath or urinary symptoms. Patient verbalized understanding and agreement.\par - Emotional support provided, all questions answered.\par -Obesity: Lifestyle modifications reviewed. \par \par RTO: Taxol weekly and MD visit in 2 weeks\par Schedule reviewed: Taxol 6/21, 6/28. 7/5, 7/12, 7/19, 7/26, 8/2 MD anointment 6/38, 7/12, 7/26.

## 2019-07-02 NOTE — PHYSICAL EXAM
[Fully active, able to carry on all pre-disease performance without restriction] : Status 0 - Fully active, able to carry on all pre-disease performance without restriction [Obese] : obese [Normal] : affect appropriate [de-identified] : S/p right mastectomy healing well. No CW or axillary nodules [de-identified] : BSx 4, abd soft, tenderness to deep palpation below umbilicus.no hepatosplenomegaly, no masses appreciated.

## 2019-07-02 NOTE — REASON FOR VISIT
[Other: _____] : [unfilled] [[employer] ] : provided by [employer]   [FreeTextEntry1] : cornelia [FreeTextEntry2] : [id number]

## 2019-07-02 NOTE — ASSESSMENT
[Curative] : Goals of care discussed with patient: Curative [FreeTextEntry1] : In summary, this is a very pleasant 39-year-old Estonian-speaking lady who is diagnosed with T2 N0 stage IIA poorly differentiated ER/IA/HER-2/rajni NEGATIVE IDC of the right breast. She is currently 22 weeks pregnant. A CXR and abdominal sono didn’t show distant mets.  BRCA negative. ddACT sequential started 12/7/18. \par \par - Breast ca: s/p sequential Adriamycin x 4 and Cytoxan x 4 in second and third trimester of pregnancy. Adriamycin course complicated by Shigella and C-diff causing delays of cycle 3 and 4. She delivered healthy baby boy at 36 weeks gestation, uncomplicated vag delivery. S/p treatment for endometritis. Now resolved. Ct scans, Bone Scan: Normal. results d/w patient and .\par - S/p Taxol Cycle 5, She presents today for urgent visit because she is having watery stools with associated abdominal cramping. Diarrhea unlikely to be 2/2 Taxol, concern for infectious cause given her Hx of C-diff and shigella on prolonged vanco in the past. We will send off stool cultures included a thorough Gi panel, and c-diff.  Bw sent off as well. She will continue with po fluids. I have contacted Dr. Tamika FONTAINE to follow up with her. We advised her to stop Imodium at this time. She was advised if symptoms worsen or she feels dizzy or weak to go to ED.  \par -Mild RXN to Taxol with C2, premeds changed to IV and tolerating well. no LFT abnormalities or pulm sx\par - Mild arthralgias, will cont to monitor. 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 \par - She will use non-hormonal barrier contraception while on chemotherapy once cleared for sexual activity with OB/GYN. Patient and  reminded.\par - Chemo induced N/V- She will get dex and zofran with chemo, Reglan as needed. IVF PRN.\par - Chemo induced dysgeusia and fatigue: Encourage p.o. fluids, small frequent meals.\par - Instructed to call office and go directly to the emergency room with fever more than 100.4, shaking chills, productive cough, sore throat, shortness of breath or urinary symptoms. Patient verbalized understanding and agreement.\par - Emotional support provided, all questions answered.\par -Obesity: Lifestyle modifications reviewed. \par \par RTO: Taxol weekly and MD visit in 2 weeks\par We will hold Taxol for jevon. on 6/21\par Case d/w Dr. Viridiana Patrick

## 2019-07-02 NOTE — CONSULT LETTER
[Dear  ___] : Dear  [unfilled], [Consult Letter:] : I had the pleasure of evaluating your patient, [unfilled]. [Please see my note below.] : Please see my note below. [Consult Closing:] : Thank you very much for allowing me to participate in the care of this patient.  If you have any questions, please do not hesitate to contact me. [Sincerely,] : Sincerely, [DrRonnie  ___] : Dr. DE ANDA [DrRonnie ___] : Dr. DE ANDA [FreeTextEntry2] : Vesna Granger MD [FreeTextEntry3] : Viridiana Patrick M.D.\par  of Medicine\par [facility]\par [facility] [place]\par [address] [place] [address]\par [address]\par Tele # [phone]; Fax [phone]

## 2019-07-02 NOTE — HISTORY OF PRESENT ILLNESS
[de-identified] : Ms. EVANGELISTA HESTER  is a 39 year old female here for an evaluation of breast cancer. Her oncologic history is as follows:\par \par She felt a right breast mass in 2018 and was evaluated seen by GYN who sent her for breast imaging. She underwent diagnostic breast imaging on 18 which showed at 12:00 position of right breast there is a lobulated focus, measuring 2.0 x 2.0 x 2.2 cm 2 cm FN. 4 mm deep to the skin. Left breast benign. No enlarged axillary lymph nodes. Bx recommended. 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 There is marked lymphocytic infiltrate around the tumor. She was scheduled for MRI but found out that she was pregnant. Current gestation age at the time of initial consult: 11 weeks\par \par 10/23/18\par She is here with her . We discussed the diagnosis of rapidly growing triple negative breast ca, the size has increased since diagnosis and that chemotherapy will be needed to treat this cancer which can not be started until she is 14 weeks. They want to keep the pregnancy understanding that it will cause delay in treatment and potential risk to the patient and fetus. We discussed that lumpectomy will have issues with positive margins and RT will be contraindicated during to pregnancy and therefore u/l mastectomy is recommend. I discussed with Dr Granger that expander placement at the time of surgery will be an option. She is seeing the pt tomorrow and will discuss surgery and reconstruction options with her. She also saw Dr Eden Patrick med onc for a a second opinion. \par \par s/p surgery (right mastectomy) 18 , 4.5 cm tumor and sentinel node negative- path report d/w her in detail. Tumor grew more than double in size between dx and surgery and is s/o aggressive tumor. Discussed to start chemo 18. S/e of chemo jody during pregnancy, expected and unexpected, pertaining to her and the fetus d/w her. Discussed to see MFM b/w chemo. Also a candidate for PMRT due to large tumor size. LMP 18\par \par I had extensive discussion with the patient and spouse regarding breast cancer in pregnancy and the treatment options that are safe for both the mother and the growing fetus. She refused termination of pregnancy after finding out cancer diagnosis.  We discussed that all treatments including chemotherapy and surgery are contraindicated in first trimester but are considered relatively safe in second and third trimester.  We have discussed medical termination of pregnancy as an option but patient decided to keep the pregnancy. We reviewed that the largest experience in pregnancy has been with anthracycline and alkylating agent chemotherapy. She will be a candidate for chemotherapy with Adriamycin plus cyclophosphamide in a sequential dose dense manner. We reviewed data from [place] registry which showed Neulasta is reasonably safe to use in 2nd and third trimester although it was a very small dataset. . There is not enough data to use taxanes in pregnancy therefore Taxol will be administered post partum. Potential s/e to the patient and growing fetus were discussed with the patient and  in detail via a . The patient understood all the potential side effects and signed an informed consent after discussing the risks, benefits and alternatives.\par \par \par PLAN: Starting 18 ( 17 weeks), She will receive adriamycin every 2 weeks x 4 cycle followed by Cytoxan every 2 weeks x 4 cycles. We plan to hold chemotherapy 4 weeks prior to delivery to prevent  neutropenic complications. Currently she is planned for elective induction (NVD) at 37 weeks. We would start Taxol post partum\par \par PREMEDS/SUPPORTIVE CARE: Zofran ativan and dex are listed as part of prechemo antiemetic regimen in NCCN guidelines for breast cancer in pregnancy. The use of Emend is controversial in pregnancy. We discussed the issue of port placement in the OR, but that would increase anesthesia time by 45 min therefore we would treat her peripherally and likely do port placement after delivery. We discussed the issue of preeclampsia or gestational diabetes with steroids. She will be monitored closely by MFM. She will see MFM between every chemo session for close fetal monitoring. \par \par I explained her that EOD work up is limited due to risk of radiation exposure to the growing fetus. We plan to perform CT scans or a PET/CT scan after delivery. She will also be a candidate for PMRT due to large tumor size. \par \par S/p infection with shigella and c-diff after Adriamycin c#2, confirmed with cultures and PCR x 2, was hospitalized for diarrhea and abd pain, treated with ampicillin and vanco,  Ampicillin completed yesterday. Vanco 2 more days left. Patient reports mom had Shigella 2 years ago, Mom has come in to help her 2 months prior but is not ill. She denies sick contacts. \par \par She was hospitalized for 5 days after Cytoxan Omi # 2 due to diarrhea. Infectious work up was negative.Completed vancomycin course prior to last treatment. Sx were controlled with supportive care. She feels back to normal now. No abd pain, NVD, fevers.  No Bone pain, no neuropathy.\par \par We discussed the plan to give C4 in 2 weeks and deliver at around 37 weeks. She will need Taxol q2w x 4 doses after delivery. We will perform CT scans after the baby is born.\par \par She delivered healthy baby boy at 36 weeks gestation, uncomplicated vag delivery. On 19, she was seen by Dr. Olaf Babin and was treated with ABX for endometritis, now resolved.  (recent f/u on 6/3, no further signs and sxs of endometritis, uterus back to normal size at that exam.) Not breast feeding. \par She c/o mild flushing and Sob briefly with cycle 2 did not tell the nurse at the time.( no further treatment reactions)  C/o mild interment  numbness occasionally. C/o  mild bone aches  did not need any pain meds.  Mild- moderate fatigue and altered taste. Able to function, eating well, weight stable.\par \par CT chest/ abd/ pelvis: 2019: normal\par Bone Scan: 19: Normal, mild degenerative Dz. [de-identified] : Ms. EVANGELISTA RUBIO is here for urgent visit for right breast cancer dx in 10/2018 during pregnancy, on weekly Taxol C5D7. ( S/p sequential AC x 4 in 2nd and 3rd trimester of pregnancy Justin course complicated my multiple episodes of C-diff and Shigella causing delays of Cycle 3 and 4, Weekly Taxol started 5/15/19)\par She comes in today, c/o moderate abd cramping and 7 watery stools in the past 24 hours. She denies any fevers, chills or blood in the stools. She has been taking Imodium with each loose bowel movement, and continues to drink po fluids. She has mild nausea and denies vomiting. She denies dizziness or palpitations. \par \par

## 2019-07-05 ENCOUNTER — RESULT REVIEW (OUTPATIENT)
Age: 39
End: 2019-07-05

## 2019-07-05 ENCOUNTER — APPOINTMENT (OUTPATIENT)
Dept: INFUSION THERAPY | Facility: HOSPITAL | Age: 39
End: 2019-07-05

## 2019-07-05 LAB
BASOPHILS # BLD AUTO: 0 K/UL — SIGNIFICANT CHANGE UP (ref 0–0.2)
BASOPHILS NFR BLD AUTO: 0.3 % — SIGNIFICANT CHANGE UP (ref 0–2)
EOSINOPHIL # BLD AUTO: 0.3 K/UL — SIGNIFICANT CHANGE UP (ref 0–0.5)
EOSINOPHIL NFR BLD AUTO: 3.9 % — SIGNIFICANT CHANGE UP (ref 0–6)
HCT VFR BLD CALC: 34.6 % — SIGNIFICANT CHANGE UP (ref 34.5–45)
HGB BLD-MCNC: 11.8 G/DL — SIGNIFICANT CHANGE UP (ref 11.5–15.5)
LYMPHOCYTES # BLD AUTO: 2.2 K/UL — SIGNIFICANT CHANGE UP (ref 1–3.3)
LYMPHOCYTES # BLD AUTO: 32.8 % — SIGNIFICANT CHANGE UP (ref 13–44)
MCHC RBC-ENTMCNC: 27.7 PG — SIGNIFICANT CHANGE UP (ref 27–34)
MCHC RBC-ENTMCNC: 34 G/DL — SIGNIFICANT CHANGE UP (ref 32–36)
MCV RBC AUTO: 81.4 FL — SIGNIFICANT CHANGE UP (ref 80–100)
MONOCYTES # BLD AUTO: 0.4 K/UL — SIGNIFICANT CHANGE UP (ref 0–0.9)
MONOCYTES NFR BLD AUTO: 6 % — SIGNIFICANT CHANGE UP (ref 2–14)
NEUTROPHILS # BLD AUTO: 3.9 K/UL — SIGNIFICANT CHANGE UP (ref 1.8–7.4)
NEUTROPHILS NFR BLD AUTO: 57 % — SIGNIFICANT CHANGE UP (ref 43–77)
PLATELET # BLD AUTO: 348 K/UL — SIGNIFICANT CHANGE UP (ref 150–400)
RBC # BLD: 4.25 M/UL — SIGNIFICANT CHANGE UP (ref 3.8–5.2)
RBC # FLD: 15.4 % — HIGH (ref 10.3–14.5)
WBC # BLD: 6.8 K/UL — SIGNIFICANT CHANGE UP (ref 3.8–10.5)
WBC # FLD AUTO: 6.8 K/UL — SIGNIFICANT CHANGE UP (ref 3.8–10.5)

## 2019-07-09 ENCOUNTER — OUTPATIENT (OUTPATIENT)
Dept: OUTPATIENT SERVICES | Facility: HOSPITAL | Age: 39
LOS: 1 days | Discharge: HOME | End: 2019-07-09

## 2019-07-09 DIAGNOSIS — C50.919 MALIGNANT NEOPLASM OF UNSPECIFIED SITE OF UNSPECIFIED FEMALE BREAST: ICD-10-CM

## 2019-07-09 DIAGNOSIS — Z90.11 ACQUIRED ABSENCE OF RIGHT BREAST AND NIPPLE: Chronic | ICD-10-CM

## 2019-07-10 PROCEDURE — 81003 URINALYSIS AUTO W/O SCOPE: CPT

## 2019-07-10 PROCEDURE — 80048 BASIC METABOLIC PNL TOTAL CA: CPT

## 2019-07-10 PROCEDURE — 85730 THROMBOPLASTIN TIME PARTIAL: CPT

## 2019-07-10 PROCEDURE — 87507 IADNA-DNA/RNA PROBE TQ 12-25: CPT

## 2019-07-10 PROCEDURE — 80053 COMPREHEN METABOLIC PANEL: CPT

## 2019-07-10 PROCEDURE — 59025 FETAL NON-STRESS TEST: CPT

## 2019-07-10 PROCEDURE — 84100 ASSAY OF PHOSPHORUS: CPT

## 2019-07-10 PROCEDURE — 86900 BLOOD TYPING SEROLOGIC ABO: CPT

## 2019-07-10 PROCEDURE — 85027 COMPLETE CBC AUTOMATED: CPT

## 2019-07-10 PROCEDURE — 87045 FECES CULTURE AEROBIC BACT: CPT

## 2019-07-10 PROCEDURE — G0463: CPT

## 2019-07-10 PROCEDURE — 86901 BLOOD TYPING SEROLOGIC RH(D): CPT

## 2019-07-10 PROCEDURE — 87040 BLOOD CULTURE FOR BACTERIA: CPT

## 2019-07-10 PROCEDURE — 87086 URINE CULTURE/COLONY COUNT: CPT

## 2019-07-10 PROCEDURE — 82962 GLUCOSE BLOOD TEST: CPT

## 2019-07-10 PROCEDURE — 83735 ASSAY OF MAGNESIUM: CPT

## 2019-07-10 PROCEDURE — 86780 TREPONEMA PALLIDUM: CPT

## 2019-07-10 PROCEDURE — 85610 PROTHROMBIN TIME: CPT

## 2019-07-10 PROCEDURE — 86850 RBC ANTIBODY SCREEN: CPT

## 2019-07-10 PROCEDURE — 85384 FIBRINOGEN ACTIVITY: CPT

## 2019-07-10 PROCEDURE — 87046 STOOL CULTR AEROBIC BACT EA: CPT

## 2019-07-10 PROCEDURE — 87186 SC STD MICRODIL/AGAR DIL: CPT

## 2019-07-12 ENCOUNTER — LABORATORY RESULT (OUTPATIENT)
Age: 39
End: 2019-07-12

## 2019-07-12 ENCOUNTER — RESULT REVIEW (OUTPATIENT)
Age: 39
End: 2019-07-12

## 2019-07-12 ENCOUNTER — APPOINTMENT (OUTPATIENT)
Dept: HEMATOLOGY ONCOLOGY | Facility: CLINIC | Age: 39
End: 2019-07-12
Payer: MEDICAID

## 2019-07-12 ENCOUNTER — APPOINTMENT (OUTPATIENT)
Dept: INFUSION THERAPY | Facility: HOSPITAL | Age: 39
End: 2019-07-12

## 2019-07-12 VITALS
HEART RATE: 80 BPM | WEIGHT: 214.73 LBS | TEMPERATURE: 98.5 F | BODY MASS INDEX: 40.57 KG/M2 | SYSTOLIC BLOOD PRESSURE: 110 MMHG | DIASTOLIC BLOOD PRESSURE: 78 MMHG | OXYGEN SATURATION: 100 % | RESPIRATION RATE: 18 BRPM

## 2019-07-12 LAB
BASOPHILS # BLD AUTO: 0 K/UL — SIGNIFICANT CHANGE UP (ref 0–0.2)
BASOPHILS NFR BLD AUTO: 0.7 % — SIGNIFICANT CHANGE UP (ref 0–2)
EOSINOPHIL # BLD AUTO: 0.2 K/UL — SIGNIFICANT CHANGE UP (ref 0–0.5)
EOSINOPHIL NFR BLD AUTO: 4.5 % — SIGNIFICANT CHANGE UP (ref 0–6)
HCT VFR BLD CALC: 34.8 % — SIGNIFICANT CHANGE UP (ref 34.5–45)
HGB BLD-MCNC: 11.8 G/DL — SIGNIFICANT CHANGE UP (ref 11.5–15.5)
LYMPHOCYTES # BLD AUTO: 2.2 K/UL — SIGNIFICANT CHANGE UP (ref 1–3.3)
LYMPHOCYTES # BLD AUTO: 41.6 % — SIGNIFICANT CHANGE UP (ref 13–44)
MCHC RBC-ENTMCNC: 27.8 PG — SIGNIFICANT CHANGE UP (ref 27–34)
MCHC RBC-ENTMCNC: 33.9 G/DL — SIGNIFICANT CHANGE UP (ref 32–36)
MCV RBC AUTO: 82.1 FL — SIGNIFICANT CHANGE UP (ref 80–100)
MONOCYTES # BLD AUTO: 0.3 K/UL — SIGNIFICANT CHANGE UP (ref 0–0.9)
MONOCYTES NFR BLD AUTO: 5 % — SIGNIFICANT CHANGE UP (ref 2–14)
NEUTROPHILS # BLD AUTO: 2.6 K/UL — SIGNIFICANT CHANGE UP (ref 1.8–7.4)
NEUTROPHILS NFR BLD AUTO: 48.3 % — SIGNIFICANT CHANGE UP (ref 43–77)
PLATELET # BLD AUTO: 329 K/UL — SIGNIFICANT CHANGE UP (ref 150–400)
RBC # BLD: 4.24 M/UL — SIGNIFICANT CHANGE UP (ref 3.8–5.2)
RBC # FLD: 16.5 % — HIGH (ref 10.3–14.5)
WBC # BLD: 5.4 K/UL — SIGNIFICANT CHANGE UP (ref 3.8–10.5)
WBC # FLD AUTO: 5.4 K/UL — SIGNIFICANT CHANGE UP (ref 3.8–10.5)

## 2019-07-12 PROCEDURE — 99215 OFFICE O/P EST HI 40 MIN: CPT

## 2019-07-14 RX ORDER — AZITHROMYCIN 250 MG/1
250 TABLET, FILM COATED ORAL
Qty: 6 | Refills: 0 | Status: DISCONTINUED | COMMUNITY
Start: 2019-06-21 | End: 2019-07-14

## 2019-07-14 RX ORDER — LOPERAMIDE HCL 2 MG
2 CAPSULE ORAL
Refills: 0 | Status: DISCONTINUED | COMMUNITY
End: 2019-07-14

## 2019-07-15 DIAGNOSIS — R11.2 NAUSEA WITH VOMITING, UNSPECIFIED: ICD-10-CM

## 2019-07-15 DIAGNOSIS — Z51.11 ENCOUNTER FOR ANTINEOPLASTIC CHEMOTHERAPY: ICD-10-CM

## 2019-07-17 NOTE — HISTORY OF PRESENT ILLNESS
[de-identified] : Ms. EVANGELISTA HESTER  is a 39 year old female here for an evaluation of breast cancer. Her oncologic history is as follows:\par \par She felt a right breast mass in 2018 and was evaluated seen by GYN who sent her for breast imaging. She underwent diagnostic breast imaging on 18 which showed at 12:00 position of right breast there is a lobulated focus, measuring 2.0 x 2.0 x 2.2 cm 2 cm FN. 4 mm deep to the skin. Left breast benign. No enlarged axillary lymph nodes. Bx recommended. 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 There is marked lymphocytic infiltrate around the tumor. She was scheduled for MRI but found out that she was pregnant. Current gestation age at the time of initial consult: 11 weeks\par \par 10/23/18\par She is here with her . We discussed the diagnosis of rapidly growing triple negative breast ca, the size has increased since diagnosis and that chemotherapy will be needed to treat this cancer which can not be started until she is 14 weeks. They want to keep the pregnancy understanding that it will cause delay in treatment and potential risk to the patient and fetus. We discussed that lumpectomy will have issues with positive margins and RT will be contraindicated during to pregnancy and therefore u/l mastectomy is recommend. I discussed with Dr Granger that expander placement at the time of surgery will be an option. She is seeing the pt tomorrow and will discuss surgery and reconstruction options with her. She also saw Dr Eden Patrick med onc for a a second opinion. \par \par s/p surgery (right mastectomy) 18 , 4.5 cm tumor and sentinel node negative- path report d/w her in detail. Tumor grew more than double in size between dx and surgery and is s/o aggressive tumor. Discussed to start chemo 18. S/e of chemo jody during pregnancy, expected and unexpected, pertaining to her and the fetus d/w her. Discussed to see MFM b/w chemo. Also a candidate for PMRT due to large tumor size. LMP 18\par \par I had extensive discussion with the patient and spouse regarding breast cancer in pregnancy and the treatment options that are safe for both the mother and the growing fetus. She refused termination of pregnancy after finding out cancer diagnosis.  We discussed that all treatments including chemotherapy and surgery are contraindicated in first trimester but are considered relatively safe in second and third trimester.  We have discussed medical termination of pregnancy as an option but patient decided to keep the pregnancy. We reviewed that the largest experience in pregnancy has been with anthracycline and alkylating agent chemotherapy. She will be a candidate for chemotherapy with Adriamycin plus cyclophosphamide in a sequential dose dense manner. We reviewed data from [place] registry which showed Neulasta is reasonably safe to use in 2nd and third trimester although it was a very small dataset. . There is not enough data to use taxanes in pregnancy therefore Taxol will be administered post partum. Potential s/e to the patient and growing fetus were discussed with the patient and  in detail via a . The patient understood all the potential side effects and signed an informed consent after discussing the risks, benefits and alternatives.\par \par \par PLAN: Starting 18 ( 17 weeks), She will receive adriamycin every 2 weeks x 4 cycle followed by Cytoxan every 2 weeks x 4 cycles. We plan to hold chemotherapy 4 weeks prior to delivery to prevent  neutropenic complications. Currently she is planned for elective induction (NVD) at 37 weeks. We would start Taxol post partum\par \par PREMEDS/SUPPORTIVE CARE: Zofran ativan and dex are listed as part of prechemo antiemetic regimen in NCCN guidelines for breast cancer in pregnancy. The use of Emend is controversial in pregnancy. We discussed the issue of port placement in the OR, but that would increase anesthesia time by 45 min therefore we would treat her peripherally and likely do port placement after delivery. We discussed the issue of preeclampsia or gestational diabetes with steroids. She will be monitored closely by MFM. She will see MFM between every chemo session for close fetal monitoring. \par \par I explained her that EOD work up is limited due to risk of radiation exposure to the growing fetus. We plan to perform CT scans or a PET/CT scan after delivery. She will also be a candidate for PMRT due to large tumor size. \par \par S/p infection with shigella and c-diff after Adriamycin c#2, confirmed with cultures and PCR x 2, was hospitalized for diarrhea and abd pain, treated with ampicillin and vanco,  Ampicillin completed yesterday. Vanco 2 more days left. Patient reports mom had Shigella 2 years ago, Mom has come in to help her 2 months prior but is not ill. She denies sick contacts. \par \par She was hospitalized for 5 days after Cytoxan Omi # 2 due to diarrhea. Infectious work up was negative.Completed vancomycin course prior to last treatment. Sx were controlled with supportive care. She feels back to normal now. No abd pain, NVD, fevers.  No Bone pain, no neuropathy.\par \par We discussed the plan to give C4 in 2 weeks and deliver at around 37 weeks. She will need Taxol q2w x 4 doses after delivery. We will perform CT scans after the baby is born.\par \par CT chest/ abd/ pelvis: 2019: normal\par Bone Scan: 19: Normal, mild degenerative Dz. [de-identified] : Ms. EVANGELISTA RUBIO is here for f/u for right breast cancer dx in 10/2018 during pregnancy, on weekly Taxol C8D1 today. \par She is s/p sequential AC x 4 in 2nd and 3rd trimester of pregnancy. Justin course complicated my multiple episodes of C-diff and Shigella causing delays of Cycle 3 and 4. She delivered after sequential AC. Not breast feeding. Weekly Taxol started 5/15/19. She c/o diarrhea on 6/20/19. Stool positive for E.coli and and Norovirus, completed abx . No further loose stools or abd pain since completion of abx.  She has mild nausea, did not need Reglan, and denies vomiting. She denies dizziness or palpitations. C/o mild bone aches, did not need any pain meds. She has mild fatigue and altered taste x 3 days. Able to function, eating well, weight stable, no fevers or chills. Patient reports mild occasional tingling and numbness of toes and feet. Symptoms are not affecting activities of daily living. We discussed to monitor for now.  She denies having a PCP and is requesting a referral, Was dx with gestational diabetes, and will f/u with PCP to monitor blood glucose, Blood glucose mildly elevated in our bw, but was not fasting.\par \par \par

## 2019-07-17 NOTE — PHYSICAL EXAM
[Fully active, able to carry on all pre-disease performance without restriction] : Status 0 - Fully active, able to carry on all pre-disease performance without restriction [Obese] : obese [Normal] : grossly intact [de-identified] : S/p right mastectomy healing well. No CW or axillary nodules.

## 2019-07-17 NOTE — CONSULT LETTER
[Dear  ___] : Dear  [unfilled], [Please see my note below.] : Please see my note below. [Consult Closing:] : Thank you very much for allowing me to participate in the care of this patient.  If you have any questions, please do not hesitate to contact me. [Consult Letter:] : I had the pleasure of evaluating your patient, [unfilled]. [Sincerely,] : Sincerely, [DrRonnie  ___] : Dr. DE ANDA [DrRonnie ___] : Dr. DE ANDA [FreeTextEntry2] : Vesna Granger MD [FreeTextEntry3] : Viridiana Patrick M.D.\par  of Medicine\par [facility]\par [facility]\par [address] [place] [address]\par [address]\par Tele # [phone]; Fax [phone]

## 2019-07-17 NOTE — ASSESSMENT
[Curative] : Goals of care discussed with patient: Curative [FreeTextEntry1] : In summary, this is a very pleasant 39-year-old German-speaking lady who is diagnosed with T2 N0 stage IIA poorly differentiated ER/CA/HER-2/rajni NEGATIVE IDC of the right breast. She is currently 22 weeks pregnant. A CXR and abdominal sono didn’t show distant mets.  BRCA negative. ddACT sequential started 12/7/18. \par \par - Breast ca: s/p sequential Adriamycin x 4 and Cytoxan x 4 in second and third trimester of pregnancy. Adriamycin course complicated by Shigella and C-diff causing delays of cycle 3 and 4. She delivered healthy baby boy at 36 weeks gestation, uncomplicated vag delivery. \par - Taxol C8D1 today,  Counts stable, Taxol # 6 delayed by one week 2/2   infectious diarrhea( E.coli, Norovirus). She was seen by Infectious disease s/p abx course. Symptoms resolved. \par -Mild RXN to Taxol with C2, premeds changed to IV and tolerating well. no LFT abnormalities or pulm sx\par - Mild arthralgias, will cont to monitor. Tylenol PRN.\par -Chemo induced neuropathy: Mild Grade 1 will cont to monitor.\par - Will need adjuvant radiation therapy after completing chemo. \par - She will use non-hormonal barrier contraception while on chemotherapy once cleared for sexual activity with OB/GYN. Patient again reminded.\par - Chemo induced N/V- She will get Dex and Zofran with chemo, Reglan as needed. IVF PRN.\par - Chemo induced dysgeusia and fatigue: Encourage p.o. fluids, small frequent meals.\par - Instructed to call office and go directly to the emergency room with fever more than 100.4, shaking chills, productive cough, sore throat, shortness of breath or urinary symptoms. Patient verbalized understanding and agreement.\par - Emotional support provided, all questions answered.\par -Obesity: Lifestyle modifications reviewed, will refer to PCP.\par \par RTO: Taxol weekly and MD visit in 2 weeks\par \par

## 2019-07-17 NOTE — REASON FOR VISIT
[Follow-Up Visit] : a follow-up [[employer] ] : provided by [employer]   [Other: _____] : [unfilled] [FreeTextEntry1] : Mallika [FreeTextEntry2] : [id number]

## 2019-07-19 ENCOUNTER — RESULT REVIEW (OUTPATIENT)
Age: 39
End: 2019-07-19

## 2019-07-19 ENCOUNTER — APPOINTMENT (OUTPATIENT)
Dept: INFUSION THERAPY | Facility: HOSPITAL | Age: 39
End: 2019-07-19

## 2019-07-19 LAB
BASOPHILS # BLD AUTO: 0 K/UL — SIGNIFICANT CHANGE UP (ref 0–0.2)
BASOPHILS NFR BLD AUTO: 0.2 % — SIGNIFICANT CHANGE UP (ref 0–2)
EOSINOPHIL # BLD AUTO: 0.2 K/UL — SIGNIFICANT CHANGE UP (ref 0–0.5)
EOSINOPHIL NFR BLD AUTO: 3.9 % — SIGNIFICANT CHANGE UP (ref 0–6)
HCT VFR BLD CALC: 34.1 % — LOW (ref 34.5–45)
HGB BLD-MCNC: 11.7 G/DL — SIGNIFICANT CHANGE UP (ref 11.5–15.5)
LYMPHOCYTES # BLD AUTO: 2.2 K/UL — SIGNIFICANT CHANGE UP (ref 1–3.3)
LYMPHOCYTES # BLD AUTO: 34.1 % — SIGNIFICANT CHANGE UP (ref 13–44)
MCHC RBC-ENTMCNC: 28.2 PG — SIGNIFICANT CHANGE UP (ref 27–34)
MCHC RBC-ENTMCNC: 34.2 G/DL — SIGNIFICANT CHANGE UP (ref 32–36)
MCV RBC AUTO: 82.3 FL — SIGNIFICANT CHANGE UP (ref 80–100)
MONOCYTES # BLD AUTO: 0.2 K/UL — SIGNIFICANT CHANGE UP (ref 0–0.9)
MONOCYTES NFR BLD AUTO: 3.8 % — SIGNIFICANT CHANGE UP (ref 2–14)
NEUTROPHILS # BLD AUTO: 3.7 K/UL — SIGNIFICANT CHANGE UP (ref 1.8–7.4)
NEUTROPHILS NFR BLD AUTO: 58.1 % — SIGNIFICANT CHANGE UP (ref 43–77)
PLATELET # BLD AUTO: 310 K/UL — SIGNIFICANT CHANGE UP (ref 150–400)
RBC # BLD: 4.15 M/UL — SIGNIFICANT CHANGE UP (ref 3.8–5.2)
RBC # FLD: 16.6 % — HIGH (ref 10.3–14.5)
WBC # BLD: 6.4 K/UL — SIGNIFICANT CHANGE UP (ref 3.8–10.5)
WBC # FLD AUTO: 6.4 K/UL — SIGNIFICANT CHANGE UP (ref 3.8–10.5)

## 2019-07-23 ENCOUNTER — APPOINTMENT (OUTPATIENT)
Dept: FAMILY MEDICINE | Facility: CLINIC | Age: 39
End: 2019-07-23

## 2019-07-26 ENCOUNTER — APPOINTMENT (OUTPATIENT)
Dept: INFUSION THERAPY | Facility: HOSPITAL | Age: 39
End: 2019-07-26

## 2019-07-26 ENCOUNTER — APPOINTMENT (OUTPATIENT)
Dept: HEMATOLOGY ONCOLOGY | Facility: CLINIC | Age: 39
End: 2019-07-26
Payer: MEDICAID

## 2019-07-26 ENCOUNTER — RESULT REVIEW (OUTPATIENT)
Age: 39
End: 2019-07-26

## 2019-07-26 ENCOUNTER — OTHER (OUTPATIENT)
Age: 39
End: 2019-07-26

## 2019-07-26 ENCOUNTER — LABORATORY RESULT (OUTPATIENT)
Age: 39
End: 2019-07-26

## 2019-07-26 VITALS
BODY MASS INDEX: 40.57 KG/M2 | SYSTOLIC BLOOD PRESSURE: 123 MMHG | WEIGHT: 214.73 LBS | HEART RATE: 75 BPM | DIASTOLIC BLOOD PRESSURE: 85 MMHG | TEMPERATURE: 97.7 F | OXYGEN SATURATION: 99 % | RESPIRATION RATE: 16 BRPM

## 2019-07-26 LAB
BASOPHILS # BLD AUTO: 0 K/UL — SIGNIFICANT CHANGE UP (ref 0–0.2)
BASOPHILS NFR BLD AUTO: 0.4 % — SIGNIFICANT CHANGE UP (ref 0–2)
EOSINOPHIL # BLD AUTO: 0.3 K/UL — SIGNIFICANT CHANGE UP (ref 0–0.5)
EOSINOPHIL NFR BLD AUTO: 4.2 % — SIGNIFICANT CHANGE UP (ref 0–6)
HCT VFR BLD CALC: 34.4 % — LOW (ref 34.5–45)
HGB BLD-MCNC: 12 G/DL — SIGNIFICANT CHANGE UP (ref 11.5–15.5)
LYMPHOCYTES # BLD AUTO: 2.2 K/UL — SIGNIFICANT CHANGE UP (ref 1–3.3)
LYMPHOCYTES # BLD AUTO: 37 % — SIGNIFICANT CHANGE UP (ref 13–44)
MCHC RBC-ENTMCNC: 29 PG — SIGNIFICANT CHANGE UP (ref 27–34)
MCHC RBC-ENTMCNC: 34.8 G/DL — SIGNIFICANT CHANGE UP (ref 32–36)
MCV RBC AUTO: 83.4 FL — SIGNIFICANT CHANGE UP (ref 80–100)
MONOCYTES # BLD AUTO: 0.5 K/UL — SIGNIFICANT CHANGE UP (ref 0–0.9)
MONOCYTES NFR BLD AUTO: 7.7 % — SIGNIFICANT CHANGE UP (ref 2–14)
NEUTROPHILS # BLD AUTO: 3 K/UL — SIGNIFICANT CHANGE UP (ref 1.8–7.4)
NEUTROPHILS NFR BLD AUTO: 50.6 % — SIGNIFICANT CHANGE UP (ref 43–77)
PLATELET # BLD AUTO: 260 K/UL — SIGNIFICANT CHANGE UP (ref 150–400)
RBC # BLD: 4.13 M/UL — SIGNIFICANT CHANGE UP (ref 3.8–5.2)
RBC # FLD: 16.5 % — HIGH (ref 10.3–14.5)
WBC # BLD: 6 K/UL — SIGNIFICANT CHANGE UP (ref 3.8–10.5)
WBC # FLD AUTO: 6 K/UL — SIGNIFICANT CHANGE UP (ref 3.8–10.5)

## 2019-07-26 PROCEDURE — 99215 OFFICE O/P EST HI 40 MIN: CPT

## 2019-07-30 ENCOUNTER — OUTPATIENT (OUTPATIENT)
Dept: OUTPATIENT SERVICES | Facility: HOSPITAL | Age: 39
LOS: 1 days | Discharge: HOME | End: 2019-07-30
Payer: MEDICAID

## 2019-07-30 DIAGNOSIS — Z90.11 ACQUIRED ABSENCE OF RIGHT BREAST AND NIPPLE: Chronic | ICD-10-CM

## 2019-08-01 ENCOUNTER — APPOINTMENT (OUTPATIENT)
Dept: INFECTIOUS DISEASE | Facility: CLINIC | Age: 39
End: 2019-08-01

## 2019-08-02 ENCOUNTER — RESULT REVIEW (OUTPATIENT)
Age: 39
End: 2019-08-02

## 2019-08-02 ENCOUNTER — APPOINTMENT (OUTPATIENT)
Dept: INFUSION THERAPY | Facility: HOSPITAL | Age: 39
End: 2019-08-02

## 2019-08-02 LAB
BASOPHILS # BLD AUTO: 0 K/UL — SIGNIFICANT CHANGE UP (ref 0–0.2)
BASOPHILS NFR BLD AUTO: 0.8 % — SIGNIFICANT CHANGE UP (ref 0–2)
EOSINOPHIL # BLD AUTO: 0.3 K/UL — SIGNIFICANT CHANGE UP (ref 0–0.5)
EOSINOPHIL NFR BLD AUTO: 4.2 % — SIGNIFICANT CHANGE UP (ref 0–6)
HCT VFR BLD CALC: 35.2 % — SIGNIFICANT CHANGE UP (ref 34.5–45)
HGB BLD-MCNC: 12.1 G/DL — SIGNIFICANT CHANGE UP (ref 11.5–15.5)
LYMPHOCYTES # BLD AUTO: 2.4 K/UL — SIGNIFICANT CHANGE UP (ref 1–3.3)
LYMPHOCYTES # BLD AUTO: 39.5 % — SIGNIFICANT CHANGE UP (ref 13–44)
MCHC RBC-ENTMCNC: 29 PG — SIGNIFICANT CHANGE UP (ref 27–34)
MCHC RBC-ENTMCNC: 34.4 G/DL — SIGNIFICANT CHANGE UP (ref 32–36)
MCV RBC AUTO: 84.4 FL — SIGNIFICANT CHANGE UP (ref 80–100)
MONOCYTES # BLD AUTO: 0.3 K/UL — SIGNIFICANT CHANGE UP (ref 0–0.9)
MONOCYTES NFR BLD AUTO: 5.4 % — SIGNIFICANT CHANGE UP (ref 2–14)
NEUTROPHILS # BLD AUTO: 3.1 K/UL — SIGNIFICANT CHANGE UP (ref 1.8–7.4)
NEUTROPHILS NFR BLD AUTO: 50.1 % — SIGNIFICANT CHANGE UP (ref 43–77)
PLATELET # BLD AUTO: 309 K/UL — SIGNIFICANT CHANGE UP (ref 150–400)
RBC # BLD: 4.17 M/UL — SIGNIFICANT CHANGE UP (ref 3.8–5.2)
RBC # FLD: 16.9 % — HIGH (ref 10.3–14.5)
WBC # BLD: 6.2 K/UL — SIGNIFICANT CHANGE UP (ref 3.8–10.5)
WBC # FLD AUTO: 6.2 K/UL — SIGNIFICANT CHANGE UP (ref 3.8–10.5)

## 2019-08-04 NOTE — CONSULT LETTER
[Dear  ___] : Dear  [unfilled], [Consult Letter:] : I had the pleasure of evaluating your patient, [unfilled]. [Please see my note below.] : Please see my note below. [Consult Closing:] : Thank you very much for allowing me to participate in the care of this patient.  If you have any questions, please do not hesitate to contact me. [Sincerely,] : Sincerely, [DrRonnie  ___] : Dr. DE ANDA [DrRonnie ___] : Dr. DE ANDA [FreeTextEntry2] : Vesna Granger MD [FreeTextEntry3] : Viridiana Patrick M.D.\par  of Medicine\par [facility]\par [facility] [place]\par [address] [place] [address]\par [address]\par Tele # [phone]; Fax [phone]

## 2019-08-04 NOTE — REASON FOR VISIT
[Follow-Up Visit] : a follow-up [Other: _____] : [unfilled] [[employer] ] : provided by [employer]   [FreeTextEntry1] : Fran [[facility]2] : [phone]

## 2019-08-04 NOTE — HISTORY OF PRESENT ILLNESS
[de-identified] : Ms. EVANGELISTA HESTER  is a 39 year old female here for an evaluation of breast cancer. Her oncologic history is as follows:\par \par She felt a right breast mass in 2018 and was evaluated seen by GYN who sent her for breast imaging. She underwent diagnostic breast imaging on 18 which showed at 12:00 position of right breast there is a lobulated focus, measuring 2.0 x 2.0 x 2.2 cm 2 cm FN. 4 mm deep to the skin. Left breast benign. No enlarged axillary lymph nodes. Bx recommended. 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 There is marked lymphocytic infiltrate around the tumor. She was scheduled for MRI but found out that she was pregnant. Current gestation age at the time of initial consult: 11 weeks\par \par 10/23/18\par She is here with her . We discussed the diagnosis of rapidly growing triple negative breast ca, the size has increased since diagnosis and that chemotherapy will be needed to treat this cancer which can not be started until she is 14 weeks. They want to keep the pregnancy understanding that it will cause delay in treatment and potential risk to the patient and fetus. We discussed that lumpectomy will have issues with positive margins and RT will be contraindicated during to pregnancy and therefore u/l mastectomy is recommend. I discussed with Dr Granger that expander placement at the time of surgery will be an option. She is seeing the pt tomorrow and will discuss surgery and reconstruction options with her. She also saw Dr Eden Patrick med onc for a a second opinion. \par \par s/p surgery (right mastectomy) 18 , 4.5 cm tumor and sentinel node negative- path report d/w her in detail. Tumor grew more than double in size between dx and surgery and is s/o aggressive tumor. Discussed to start chemo 18. S/e of chemo jody during pregnancy, expected and unexpected, pertaining to her and the fetus d/w her. Discussed to see MFM b/w chemo. Also a candidate for PMRT due to large tumor size. LMP 18\par \par I had extensive discussion with the patient and spouse regarding breast cancer in pregnancy and the treatment options that are safe for both the mother and the growing fetus. She refused termination of pregnancy after finding out cancer diagnosis.  We discussed that all treatments including chemotherapy and surgery are contraindicated in first trimester but are considered relatively safe in second and third trimester.  We have discussed medical termination of pregnancy as an option but patient decided to keep the pregnancy. We reviewed that the largest experience in pregnancy has been with anthracycline and alkylating agent chemotherapy. She will be a candidate for chemotherapy with Adriamycin plus cyclophosphamide in a sequential dose dense manner. We reviewed data from [place] registry which showed Neulasta is reasonably safe to use in 2nd and third trimester although it was a very small dataset. . There is not enough data to use taxanes in pregnancy therefore Taxol will be administered post partum. Potential s/e to the patient and growing fetus were discussed with the patient and  in detail via a . The patient understood all the potential side effects and signed an informed consent after discussing the risks, benefits and alternatives.\par \par \par PLAN: Starting 18 ( 17 weeks), She will receive adriamycin every 2 weeks x 4 cycle followed by Cytoxan every 2 weeks x 4 cycles. We plan to hold chemotherapy 4 weeks prior to delivery to prevent  neutropenic complications. Currently she is planned for elective induction (NVD) at 37 weeks. We would start Taxol post partum\par \par PREMEDS/SUPPORTIVE CARE: Zofran ativan and dex are listed as part of prechemo antiemetic regimen in NCCN guidelines for breast cancer in pregnancy. The use of Emend is controversial in pregnancy. We discussed the issue of port placement in the OR, but that would increase anesthesia time by 45 min therefore we would treat her peripherally and likely do port placement after delivery. We discussed the issue of preeclampsia or gestational diabetes with steroids. She will be monitored closely by MFM. She will see MFM between every chemo session for close fetal monitoring. \par \par I explained her that EOD work up is limited due to risk of radiation exposure to the growing fetus. We plan to perform CT scans or a PET/CT scan after delivery. She will also be a candidate for PMRT due to large tumor size. \par \par S/p infection with shigella and c-diff after Adriamycin c#2, confirmed with cultures and PCR x 2, was hospitalized for diarrhea and abd pain, treated with ampicillin and vanco,  Ampicillin completed yesterday. Vanco 2 more days left. Patient reports mom had Shigella 2 years ago, Mom has come in to help her 2 months prior but is not ill. She denies sick contacts. \par \par She was hospitalized for 5 days after Cytoxan Omi # 2 due to diarrhea. Infectious work up was negative.Completed vancomycin course prior to last treatment. Sx were controlled with supportive care. She feels back to normal now. No abd pain, NVD, fevers.  No Bone pain, no neuropathy.\par \par We discussed the plan to give C4 in 2 weeks and deliver at around 37 weeks. She will need Taxol q2w x 4 doses after delivery. We will perform CT scans after the baby is born.\par \par CT chest/ abd/ pelvis: 2019: normal\par Bone Scan: 19: Normal, mild degenerative Dz. [de-identified] : Ms. EVANGELISTA RUBIO is here for f/u for right breast cancer dx in 10/2018 during pregnancy, on weekly Taxol C10D1 today. \par She is s/p sequential AC x 4 in 2nd and 3rd trimester of pregnancy. Justin course complicated my multiple episodes of C-diff and Shigella causing delays of Cycle 3 and 4. She delivered after sequential AC. Not breast feeding. Weekly Taxol started 5/15/19. She c/o diarrhea on 6/20/19. Stool positive for E.coli and and Norovirus, completed abx . No further loose stools or abd pain since completion of abx.  She has mild nausea, did not need Reglan, and denies vomiting. She denies dizziness or palpitations. C/o mild bone aches, did not need any pain meds. She has mild fatigue and altered taste x 3 days. Able to function, eating well, weight stable, no fevers or chills. She had mild numbness and tingling of the feet have now resolved, no further neuropathy.  She denies having a PCP and is requesting a referral, Was dx with gestational diabetes, and will f/u with PCP to monitor blood glucose, Blood glucose level stable on cmp. She has not had period since start of chemo\par  got vasectomy last week.

## 2019-08-04 NOTE — PHYSICAL EXAM
[Fully active, able to carry on all pre-disease performance without restriction] : Status 0 - Fully active, able to carry on all pre-disease performance without restriction [Obese] : obese [Normal] : affect appropriate [de-identified] : S/p right mastectomy healing well. No CW or axillary nodules

## 2019-08-04 NOTE — ASSESSMENT
[Curative] : Goals of care discussed with patient: Curative [FreeTextEntry1] : In summary, this is a very pleasant 39-year-old [place]ayan-speaking lady who is diagnosed with T2 N0 stage IIA poorly differentiated ER/ND/HER-2/rajni NEGATIVE IDC of the right breast. She is currently 22 weeks pregnant. A CXR and abdominal sono didn’t show distant mets.  BRCA negative. ddACT sequential started 12/7/18. \par \par - Breast ca: s/p sequential Adriamycin x 4 and Cytoxan x 4 in second and third trimester of pregnancy. Adriamycin course complicated by Shigella and C-diff causing delays of cycle 3 and 4. She delivered healthy baby boy at 36 weeks gestation, uncomplicated vag delivery. \par - Taxol C10D1 today,  Counts stable, Taxol # 6 delayed by one week 2/2   infectious diarrhea( E.coli, Norovirus). She was seen by Infectious disease s/p abx course. Symptoms resolved. \par -Mild RXN to Taxol with C2, premeds changed to IV and tolerating well. no pulm sxs.\par -Elevated LFTs: Mild, reactive to chemo, will monitor closely.\par - Mild arthralgias, will cont to monitor. Tylenol PRN.\par -Chemo induced neuropathy: Mild Grade 1 resolved.\par - Will need adjuvant radiation therapy after completing chemo., referral and appointment made.\par - She will use non-hormonal barrier contraception while on chemotherapy once cleared for sexual activity with OB/GYN. Patient again reminded. had vasectomy last week.\par - Chemo induced N/V- She will get Dex and Zofran with chemo, Reglan as needed. IVF PRN.\par - Chemo induced dysgeusia and fatigue: Encourage p.o. fluids, small frequent meals.\par - Instructed to call office and go directly to the emergency room with fever more than 100.4, shaking chills, productive cough, sore throat, shortness of breath or urinary symptoms. Patient verbalized understanding and agreement.\par - Emotional support provided, all questions answered.\par -Obesity: Lifestyle modifications reviewed, will refer to PCP.\par \par RTO: Taxol weekly and MD visit in 2 weeks\par Patient seen and examined along with Dr. Viridiana Patrick.\par \par

## 2019-08-06 ENCOUNTER — OUTPATIENT (OUTPATIENT)
Dept: OUTPATIENT SERVICES | Facility: HOSPITAL | Age: 39
LOS: 1 days | Discharge: HOME | End: 2019-08-06

## 2019-08-06 DIAGNOSIS — C50.919 MALIGNANT NEOPLASM OF UNSPECIFIED SITE OF UNSPECIFIED FEMALE BREAST: ICD-10-CM

## 2019-08-06 DIAGNOSIS — Z90.11 ACQUIRED ABSENCE OF RIGHT BREAST AND NIPPLE: Chronic | ICD-10-CM

## 2019-08-07 ENCOUNTER — APPOINTMENT (OUTPATIENT)
Dept: RADIATION ONCOLOGY | Facility: CLINIC | Age: 39
End: 2019-08-07
Payer: MEDICAID

## 2019-08-07 VITALS
OXYGEN SATURATION: 99 % | DIASTOLIC BLOOD PRESSURE: 74 MMHG | HEART RATE: 92 BPM | BODY MASS INDEX: 40.84 KG/M2 | WEIGHT: 216.16 LBS | TEMPERATURE: 36.8 F | SYSTOLIC BLOOD PRESSURE: 106 MMHG

## 2019-08-07 PROCEDURE — 99204 OFFICE O/P NEW MOD 45 MIN: CPT | Mod: 25

## 2019-08-09 ENCOUNTER — RESULT REVIEW (OUTPATIENT)
Age: 39
End: 2019-08-09

## 2019-08-09 ENCOUNTER — APPOINTMENT (OUTPATIENT)
Dept: INFUSION THERAPY | Facility: HOSPITAL | Age: 39
End: 2019-08-09

## 2019-08-09 ENCOUNTER — LABORATORY RESULT (OUTPATIENT)
Age: 39
End: 2019-08-09

## 2019-08-09 ENCOUNTER — APPOINTMENT (OUTPATIENT)
Dept: HEMATOLOGY ONCOLOGY | Facility: CLINIC | Age: 39
End: 2019-08-09
Payer: MEDICAID

## 2019-08-09 VITALS
SYSTOLIC BLOOD PRESSURE: 122 MMHG | DIASTOLIC BLOOD PRESSURE: 81 MMHG | WEIGHT: 217.16 LBS | RESPIRATION RATE: 16 BRPM | OXYGEN SATURATION: 99 % | TEMPERATURE: 97.4 F | BODY MASS INDEX: 41.03 KG/M2 | HEART RATE: 73 BPM

## 2019-08-09 LAB
BASOPHILS # BLD AUTO: 0 K/UL — SIGNIFICANT CHANGE UP (ref 0–0.2)
BASOPHILS NFR BLD AUTO: 0.2 % — SIGNIFICANT CHANGE UP (ref 0–2)
EOSINOPHIL # BLD AUTO: 0.2 K/UL — SIGNIFICANT CHANGE UP (ref 0–0.5)
EOSINOPHIL NFR BLD AUTO: 3.6 % — SIGNIFICANT CHANGE UP (ref 0–6)
HCT VFR BLD CALC: 34 % — LOW (ref 34.5–45)
HGB BLD-MCNC: 11.7 G/DL — SIGNIFICANT CHANGE UP (ref 11.5–15.5)
LYMPHOCYTES # BLD AUTO: 2 K/UL — SIGNIFICANT CHANGE UP (ref 1–3.3)
LYMPHOCYTES # BLD AUTO: 36.1 % — SIGNIFICANT CHANGE UP (ref 13–44)
MCHC RBC-ENTMCNC: 29.2 PG — SIGNIFICANT CHANGE UP (ref 27–34)
MCHC RBC-ENTMCNC: 34.5 G/DL — SIGNIFICANT CHANGE UP (ref 32–36)
MCV RBC AUTO: 84.6 FL — SIGNIFICANT CHANGE UP (ref 80–100)
MONOCYTES # BLD AUTO: 0.4 K/UL — SIGNIFICANT CHANGE UP (ref 0–0.9)
MONOCYTES NFR BLD AUTO: 6.8 % — SIGNIFICANT CHANGE UP (ref 2–14)
NEUTROPHILS # BLD AUTO: 2.9 K/UL — SIGNIFICANT CHANGE UP (ref 1.8–7.4)
NEUTROPHILS NFR BLD AUTO: 53.3 % — SIGNIFICANT CHANGE UP (ref 43–77)
PLATELET # BLD AUTO: 300 K/UL — SIGNIFICANT CHANGE UP (ref 150–400)
RBC # BLD: 4.02 M/UL — SIGNIFICANT CHANGE UP (ref 3.8–5.2)
RBC # FLD: 16.6 % — HIGH (ref 10.3–14.5)
WBC # BLD: 5.5 K/UL — SIGNIFICANT CHANGE UP (ref 3.8–10.5)
WBC # FLD AUTO: 5.5 K/UL — SIGNIFICANT CHANGE UP (ref 3.8–10.5)

## 2019-08-09 PROCEDURE — 99215 OFFICE O/P EST HI 40 MIN: CPT

## 2019-08-09 NOTE — ASSESSMENT
[Curative] : Goals of care discussed with patient: Curative [FreeTextEntry1] : In summary, this is a very pleasant 39-year-old Yoruba-speaking lady who is diagnosed with T2 N0 stage IIA poorly differentiated ER/AL/HER-2/rajni NEGATIVE IDC of the right breast. She is currently 22 weeks pregnant. A CXR and abdominal sono didn’t show distant mets.  BRCA negative. ddACT sequential started 12/7/18. \par \par - Breast ca: s/p sequential Adriamycin x 4 and Cytoxan x 4 in second and third trimester of pregnancy. Adriamycin course complicated by Shigella and C-diff causing delays of cycle 3 and 4. She delivered healthy baby boy at 36 weeks gestation, uncomplicated vag delivery. \par - Taxol C12D1 today,  Counts stable, Taxol # 6 delayed by one week 2/2   infectious diarrhea( E.coli, Norovirus). She was seen by Infectious disease s/p abx course. Symptoms resolved. \par -Mild RXN to Taxol with C2, premeds changed to IV and tolerating well. no pulm sxs.\par -Elevated LFTs: Mild, reactive to chemo, will monitor closely.\par - Mild arthralgias, will cont to monitor. Tylenol PRN.\par - Will need adjuvant radiation therapy after completing chemo., referral and appointment made.\par - She will use non-hormonal barrier contraception while on chemotherapy once cleared for sexual activity with OB/GYN.  had vasectomy.\par - Chemo induced N/V- She will get Dex and Zofran with chemo, Reglan as needed. IVF PRN.\par - Chemo induced dysgeusia and fatigue: Encourage p.o. fluids, small frequent meals.\par - Instructed to call office and go directly to the emergency room with fever more than 100.4, shaking chills, productive cough, sore throat, shortness of breath or urinary symptoms. Patient verbalized understanding and agreement.\par - Emotional support provided, all questions answered.\par -Obesity: Lifestyle modifications reviewed, will refer to PCP.\par \par RTO: after completion of RT\par \par \par \par

## 2019-08-09 NOTE — PHYSICAL EXAM
[Fully active, able to carry on all pre-disease performance without restriction] : Status 0 - Fully active, able to carry on all pre-disease performance without restriction [Obese] : obese [Normal] : affect appropriate [de-identified] : S/p right mastectomy healing well. No CW or axillary nodules.

## 2019-08-09 NOTE — CONSULT LETTER
[Dear  ___] : Dear  [unfilled], [Consult Letter:] : I had the pleasure of evaluating your patient, [unfilled]. [Please see my note below.] : Please see my note below. [Sincerely,] : Sincerely, [Consult Closing:] : Thank you very much for allowing me to participate in the care of this patient.  If you have any questions, please do not hesitate to contact me. [DrRonnie  ___] : Dr. DE ANDA [DrRonnie ___] : Dr. DE ANDA [FreeTextEntry3] : Viridiana Patrick M.D.\par  of Medicine\par [facility]\par [facility] [place]\par [address] [place] [address]\par [address]\par Tele # [phone]; Fax [phone] [FreeTextEntry2] : Vesna Granger MD

## 2019-08-09 NOTE — REASON FOR VISIT
[Follow-Up Visit] : a follow-up [Other: _____] : [unfilled] [[employer] ] : provided by [employer]   [FreeTextEntry1] : Fran [[facility]En[facility]2] : [phone]

## 2019-08-09 NOTE — HISTORY OF PRESENT ILLNESS
[de-identified] : Ms. EVANGELISTA HESTER  is a 39 year old female here for an evaluation of breast cancer. Her oncologic history is as follows:\par \par She felt a right breast mass in 2018 and was evaluated seen by GYN who sent her for breast imaging. She underwent diagnostic breast imaging on 18 which showed at 12:00 position of right breast there is a lobulated focus, measuring 2.0 x 2.0 x 2.2 cm 2 cm FN. 4 mm deep to the skin. Left breast benign. No enlarged axillary lymph nodes. Bx recommended. 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 There is marked lymphocytic infiltrate around the tumor. She was scheduled for MRI but found out that she was pregnant. Current gestation age at the time of initial consult: 11 weeks\par \par 10/23/18\par She is here with her . We discussed the diagnosis of rapidly growing triple negative breast ca, the size has increased since diagnosis and that chemotherapy will be needed to treat this cancer which can not be started until she is 14 weeks. They want to keep the pregnancy understanding that it will cause delay in treatment and potential risk to the patient and fetus. We discussed that lumpectomy will have issues with positive margins and RT will be contraindicated during to pregnancy and therefore u/l mastectomy is recommend. I discussed with Dr Granger that expander placement at the time of surgery will be an option. She is seeing the pt tomorrow and will discuss surgery and reconstruction options with her. She also saw Dr Eden Patrick med onc for a a second opinion. \par \par s/p surgery (right mastectomy) 18 , 4.5 cm tumor and sentinel node negative- path report d/w her in detail. Tumor grew more than double in size between dx and surgery and is s/o aggressive tumor. Discussed to start chemo 18. S/e of chemo jody during pregnancy, expected and unexpected, pertaining to her and the fetus d/w her. Discussed to see MFM b/w chemo. Also a candidate for PMRT due to large tumor size. LMP 18\par \par I had extensive discussion with the patient and spouse regarding breast cancer in pregnancy and the treatment options that are safe for both the mother and the growing fetus. She refused termination of pregnancy after finding out cancer diagnosis.  We discussed that all treatments including chemotherapy and surgery are contraindicated in first trimester but are considered relatively safe in second and third trimester.  We have discussed medical termination of pregnancy as an option but patient decided to keep the pregnancy. We reviewed that the largest experience in pregnancy has been with anthracycline and alkylating agent chemotherapy. She will be a candidate for chemotherapy with Adriamycin plus cyclophosphamide in a sequential dose dense manner. We reviewed data from [place] registry which showed Neulasta is reasonably safe to use in 2nd and third trimester although it was a very small dataset. . There is not enough data to use taxanes in pregnancy therefore Taxol will be administered post partum. Potential s/e to the patient and growing fetus were discussed with the patient and  in detail via a . The patient understood all the potential side effects and signed an informed consent after discussing the risks, benefits and alternatives.\par \par \par PLAN: Starting 18 ( 17 weeks), She will receive adriamycin every 2 weeks x 4 cycle followed by Cytoxan every 2 weeks x 4 cycles. We plan to hold chemotherapy 4 weeks prior to delivery to prevent  neutropenic complications. Currently she is planned for elective induction (NVD) at 37 weeks. We would start Taxol post partum\par \par PREMEDS/SUPPORTIVE CARE: Zofran ativan and dex are listed as part of prechemo antiemetic regimen in NCCN guidelines for breast cancer in pregnancy. The use of Emend is controversial in pregnancy. We discussed the issue of port placement in the OR, but that would increase anesthesia time by 45 min therefore we would treat her peripherally and likely do port placement after delivery. We discussed the issue of preeclampsia or gestational diabetes with steroids. She will be monitored closely by MFM. She will see MFM between every chemo session for close fetal monitoring. \par \par I explained her that EOD work up is limited due to risk of radiation exposure to the growing fetus. We plan to perform CT scans or a PET/CT scan after delivery. She will also be a candidate for PMRT due to large tumor size. \par \par S/p infection with shigella and c-diff after Adriamycin c#2, confirmed with cultures and PCR x 2, was hospitalized for diarrhea and abd pain, treated with ampicillin and vanco,  Ampicillin completed yesterday. Vanco 2 more days left. Patient reports mom had Shigella 2 years ago, Mom has come in to help her 2 months prior but is not ill. She denies sick contacts. \par \par She was hospitalized for 5 days after Cytoxan Omi # 2 due to diarrhea. Infectious work up was negative.Completed vancomycin course prior to last treatment. Sx were controlled with supportive care. She feels back to normal now. No abd pain, NVD, fevers.  No Bone pain, no neuropathy.\par \par We discussed the plan to give C4 in 2 weeks and deliver at around 37 weeks. She will need Taxol q2w x 4 doses after delivery. We will perform CT scans after the baby is born.\par \par CT chest/ abd/ pelvis: 2019: normal\par Bone Scan: 19: Normal, mild degenerative Dz. [de-identified] : Ms. EVANGELISTA RUBIO is here for f/u for right breast cancer dx in 10/2018 during pregnancy, on weekly Taxol C12D1 today. \par She is s/p sequential AC x 4 in 2nd and 3rd trimester of pregnancy. Justin course complicated my multiple episodes of C-diff and Shigella causing delays of Cycle 3 and 4. She delivered after sequential AC. Not breast feeding. Weekly Taxol started 5/15/19. She c/o diarrhea on 6/20/19. Stool positive for E.coli and and Norovirus, completed abx . No further loose stools or abd pain since completion of abx.  She has mild nausea, did not need Reglan, and denies vomiting. She denies dizziness or palpitations. C/o mild bone aches, did not need any pain meds. She has mild fatigue and altered taste x 3 days. Able to function, eating well, weight stable, no fevers or chills. She had mild numbness and tingling of the feet have now resolved, no further neuropathy.  She was dx with gestational diabetes, and will f/u with PCP to monitor blood glucose, Blood glucose level stable on cmp. She has not had period since start of chemo.\par  got vasectomy.

## 2019-08-12 DIAGNOSIS — Z51.11 ENCOUNTER FOR ANTINEOPLASTIC CHEMOTHERAPY: ICD-10-CM

## 2019-08-12 DIAGNOSIS — R11.2 NAUSEA WITH VOMITING, UNSPECIFIED: ICD-10-CM

## 2019-08-13 ENCOUNTER — APPOINTMENT (OUTPATIENT)
Dept: MAMMOGRAPHY | Facility: CLINIC | Age: 39
End: 2019-08-13
Payer: MEDICAID

## 2019-08-13 ENCOUNTER — APPOINTMENT (OUTPATIENT)
Dept: ULTRASOUND IMAGING | Facility: CLINIC | Age: 39
End: 2019-08-13

## 2019-08-13 PROCEDURE — 77065 DX MAMMO INCL CAD UNI: CPT | Mod: LT

## 2019-08-13 PROCEDURE — 76641 ULTRASOUND BREAST COMPLETE: CPT | Mod: LT

## 2019-08-13 PROCEDURE — G0279: CPT | Mod: LT

## 2019-08-14 NOTE — HISTORY OF PRESENT ILLNESS
[FreeTextEntry1] : Ms. Winnie Escamilla is a 39-year-old woman who presented at the age of 38 with a palpable right breast mass. Diagnostic mammography and ultrasound on 8/21/18 showed focal asymmetry in the upper aspects of the right breast and a punctate microcalcifications. Ultrasound revealed a lobulated hypoechoic focus measuring 2.0 x 2.0 x 2.2 cm at the 12:00 position of the right breast, 2 cm from the nipple. Core biopsy of the right breast mass on 9/10/18 revealed invasive mammary carcinoma with medullary features, moderately differentiated, ER negative, AZ negative and HER-2/rajni negative. She was scheduled for MRI but then found that she was pregnant. \par \par She underwent right mastectomy and sentinel lymph node biopsies on 11/7/18. 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 The invasive carcinoma measured 4.5 cm. There is no evidence of lymphovascular or dermal lymphatic invasion. DCIS was not present. There was no skin involvement. Three right sentinel lymph nodes were negative for metastatic carcinoma.\par \par She received adjuvant chemotherapy with Adriamycin followed by Cytoxan, starting at 17 weeks gestation. After elective induction at the 37 weeks, she received Taxol. Extent of disease workup was performed after her delivery. A bone scan on 5/20/19 showed no evidence of osseous metastasis. CT scan of the chest abdomen and pelvis on 4/30/19 showed no evidence of metastatic disease.

## 2019-08-14 NOTE — PHYSICAL EXAM
[Sclera] : the sclera and conjunctiva were normal [Outer Ear] : the ears and nose were normal in appearance [] : no respiratory distress [Heart Rate And Rhythm] : heart rate and rhythm were normal [No UE Edema] : there is no upper extremity edema [Supraclavicular Lymph Nodes Enlarged Bilaterally] : supraclavicular [Axillary Lymph Nodes Enlarged Bilaterally] : axillary [Normal] : oriented to person, place and time, the affect was normal, the mood was normal and not anxious [de-identified] : right chest wall with well-healed mastectomy scar, no skin lesions or soft tissue nodules

## 2019-08-14 NOTE — LETTER CLOSING
[Consult Closing:] : Thank you for allowing me to participate in the care of this patient.  If you have any questions, please do not hesitate to contact me. [Sincerely yours,] : Sincerely yours, [FreeTextEntry3] : Wendy Granger MD\par \par

## 2019-08-14 NOTE — VITALS
[Maximal Pain Intensity: 0/10] : 0/10 [Least Pain Intensity: 0/10] : 0/10 [90: Able to carry normal activity; minor signs or symptoms of disease.] : 90: Able to carry normal activity; minor signs or symptoms of disease.  [Date: ____________] : Patient's last distress assessment performed on [unfilled]. [ECOG Performance Status: 0 - Fully active, able to carry on all pre-disease performance without restriction] : Performance Status: 0 - Fully active, able to carry on all pre-disease performance without restriction

## 2019-08-14 NOTE — REASON FOR VISIT
[Consideration of Curative Therapy] : consideration of curative therapy for [Breast Cancer] : breast cancer [FreeTextEntry1] : [id number]

## 2019-08-21 ENCOUNTER — OTHER (OUTPATIENT)
Age: 39
End: 2019-08-21

## 2019-09-03 PROCEDURE — 77263 THER RADIOLOGY TX PLNG CPLX: CPT

## 2019-09-04 PROCEDURE — 77290 THER RAD SIMULAJ FIELD CPLX: CPT | Mod: 26

## 2019-09-04 PROCEDURE — 77333 RADIATION TREATMENT AID(S): CPT | Mod: 26

## 2019-09-16 PROCEDURE — 77301 RADIOTHERAPY DOSE PLAN IMRT: CPT | Mod: 26

## 2019-09-16 PROCEDURE — 77300 RADIATION THERAPY DOSE PLAN: CPT | Mod: 26

## 2019-09-16 PROCEDURE — 77338 DESIGN MLC DEVICE FOR IMRT: CPT | Mod: 26

## 2019-09-19 ENCOUNTER — OTHER (OUTPATIENT)
Age: 39
End: 2019-09-19

## 2019-09-20 ENCOUNTER — APPOINTMENT (OUTPATIENT)
Dept: BREAST CENTER | Facility: CLINIC | Age: 39
End: 2019-09-20
Payer: MEDICAID

## 2019-09-20 VITALS
WEIGHT: 220.6 LBS | HEIGHT: 61 IN | BODY MASS INDEX: 41.65 KG/M2 | HEART RATE: 88 BPM | SYSTOLIC BLOOD PRESSURE: 105 MMHG | TEMPERATURE: 97.1 F | DIASTOLIC BLOOD PRESSURE: 69 MMHG

## 2019-09-20 PROCEDURE — 99214 OFFICE O/P EST MOD 30 MIN: CPT

## 2019-09-20 NOTE — DATA REVIEWED
[FreeTextEntry1] : 8/13/19 L DmmgT/US, compared to 2018, dense, no susp findings. BR1\par 8/13/19 L US, compared to 2018, no susp findings. BR1

## 2019-09-20 NOTE — REASON FOR VISIT
[Follow-Up: _____] : a [unfilled] follow-up visit [FreeTextEntry1] : S/P R mastectomy R SLNBx 11/7/18 and review mmg/us results.

## 2019-09-20 NOTE — PHYSICAL EXAM
[Atraumatic] : atraumatic [Normocephalic] : normocephalic [No Thyromegaly] : no thyromegaly [Supple] : supple [No Supraclavicular Adenopathy] : no supraclavicular adenopathy [Asymmetrical] : asymmetrical [Examined in the supine and seated position] : examined in the supine and seated position [No dominant masses] : no dominant masses left breast [No dominant masses] : no dominant masses in right breast  [No Nipple Retraction] : no left nipple retraction [No Nipple Discharge] : no left nipple discharge [No Axillary Lymphadenopathy] : no left axillary lymphadenopathy [No Swelling] : no swelling [No Edema] : no edema [Full ROM] : full range of motion [No Rashes] : no rashes [No Ulceration] : no ulceration [de-identified] : right mastectomy

## 2019-09-20 NOTE — HISTORY OF PRESENT ILLNESS
[FreeTextEntry1] : 40 y/o  female, Ambry negative here for follow up. 18 Right mastectomy with SLNBx. \par \par 19 NFR L DmmgT/US, compared to 2018, dense, no susp findings. BR1\par 19 NFR L US, compared to 2018, no susp findings. BR1\par \par Completed Taxol 19  Dr. Viridiana Patrick. \par Started EBRT with DR. Anika Granger at [facility].\par S/P  19 with healthy male infant, bottle feeding.  Pregnancy complicated by GDMA2, C-Diff during pregnancy and endometritis PP. \par \par 19 CT chest and  19 PET bone scan no evidence of disease.\par 18 [facility] Surgical path: R breast: IDC with apocrine features, G3 (4.5cm) with negative margins (3.2cm nearest margin posterior), R SLN negative for metastatic disease 0/3, LVI neg. Path stage IIA. \par Doing well. Mild fatigue. No LE. Full ROM.  \par Office core biopsy 9/10/18 [facility]: R 12:00 2.2X 2.0cm- Invasive mammary carcinoma with medullary features. G2/3, No comedonecrosis, marked lyphatic infiltrate around the tumor. ER/[place]/Bgz4pzw (-); Ki67 (70%), p53 (-); S100 (focally +); CD3 (+); CD68 (+); CK5/6 D5/16 B4 Squamous and mesothelial cells (+); E-Caderin Epithelial, pronormoblasts (+). \par \par Maunt with stomach cancer, MGM with mouth cancer, Muncle leg cancer. No breast or ovarian cancer.

## 2019-09-20 NOTE — ASSESSMENT
[FreeTextEntry1] : 38 y/o  female, Ambry negative here for follow up. 18 Right mastectomy with SLNBx. \par \par 19 NFR L DmmgT/US, compared to 2018, dense, no susp findings. BR1\par 19 NFR L US, compared to 2018, no susp findings. BR1\par \par Completed Taxol 19  Dr. Viridiana Patrick. \par Started EBRT with DR. Anika Granger at [facility]. x2 treatments. \par S/P  19 with healthy male infant, bottle feeding.  Pregnancy complicated by GDMA2, C-Diff during pregnancy and endometritis PP. \par \par 19 CT chest and  19 PET bone scan no evidence of disease.\par 18 [facility] Surgical path: R breast: IDC with apocrine features, G3 (4.5cm) with negative margins (3.2cm nearest margin posterior), R SLN negative for metastatic disease 0/3, LVI neg. Path stage IIA. \par Doing well. Mild fatigue. No LE. Full ROM.  \par Office core biopsy 9/10/18 [facility]: R 12:00 2.2X 2.0cm- Invasive mammary carcinoma with medullary features. G2/3, No comedonecrosis, marked lyphatic infiltrate around the tumor. ER/[place]/Ztd7rbd (-); Ki67 (70%), p53 (-); S100 (focally +); CD3 (+); CD68 (+); CK5/6 D5/16 B4 Squamous and mesothelial cells (+); E-Caderin Epithelial, pronormoblasts (+). \par \par Maunt with stomach cancer, MGM with mouth cancer, Muncle leg cancer. No breast or ovarian cancer.\par CBE: FLORI, right mastectomy, left FLORI. \par Reviewed mmg, left, neg. Also Pt to see PT. Pt to continue with EBRT.

## 2019-09-25 PROCEDURE — 77427 RADIATION TX MANAGEMENT X5: CPT

## 2019-09-26 NOTE — VITALS
[Maximal Pain Intensity: 0/10] : 0/10 [Least Pain Intensity: 0/10] : 0/10 [Pain Description/Quality: ___] : Pain description/quality: [unfilled] [Pain Duration: ___] : Pain duration: [unfilled] [Pain Location: ___] : Pain Location: [unfilled] [Pain Interferes with ADLs] : Pain interferes with activities of daily living. [NoTreatment Scheduled] : no treatment scheduled [90: Able to carry normal activity; minor signs or symptoms of disease.] : 90: Able to carry normal activity; minor signs or symptoms of disease.  [ECOG Performance Status: 0 - Fully active, able to carry on all pre-disease performance without restriction] : Performance Status: 0 - Fully active, able to carry on all pre-disease performance without restriction

## 2019-09-29 NOTE — DISEASE MANAGEMENT
[Pathological] : TNM Stage: p [IIB] : IIB [TTNM] : 2 [MTNM] : 0 [NTNM] : 0 [de-identified] : 6tx/1200cGy [de-identified] : Right chest wall  [de-identified] : [id number]

## 2019-09-29 NOTE — PHYSICAL EXAM
[General Appearance - Well Developed] : well developed [de-identified] : right mastectomy scar is well healed, faint erythema and hyperpigmentation

## 2019-09-29 NOTE — VITALS
[Maximal Pain Intensity: 4/10] : 4/10 [Least Pain Intensity: 0/10] : 0/10 [Pain Description/Quality: ___] : Pain description/quality: [unfilled] [Pain Duration: ___] : Pain duration: [unfilled] [Pain Location: ___] : Pain Location: [unfilled] [Pain Interferes with ADLs] : Pain interferes with activities of daily living. [NoTreatment Scheduled] : no treatment scheduled [ECOG Performance Status: 0 - Fully active, able to carry on all pre-disease performance without restriction] : Performance Status: 0 - Fully active, able to carry on all pre-disease performance without restriction [80: Normal activity with effort; some signs or symptoms of disease.] : 80: Normal activity with effort; some signs or symptoms of disease.

## 2019-09-29 NOTE — REVIEW OF SYSTEMS
[Alopecia: Grade 0] : Alopecia: Grade 0 [Pruritus: Grade 0] : Pruritus: Grade 0 [Skin Atrophy: Grade 0] : Skin Atrophy: Grade 0 [Skin Hyperpigmentation: Grade 1 - Hyperpigmentation covering <10% BSA; no psychosocial impact] : Skin Hyperpigmentation: Grade 1 - Hyperpigmentation covering <10% BSA; no psychosocial impact [Dermatitis Radiation: Grade 1 - Faint erythema or dry desquamation] : Dermatitis Radiation: Grade 1 - Faint erythema or dry desquamation

## 2019-09-29 NOTE — REVIEW OF SYSTEMS
[Alopecia: Grade 0] : Alopecia: Grade 0 [Skin Atrophy: Grade 0] : Skin Atrophy: Grade 0 [Pruritus: Grade 0] : Pruritus: Grade 0 [Skin Hyperpigmentation: Grade 1 - Hyperpigmentation covering <10% BSA; no psychosocial impact] : Skin Hyperpigmentation: Grade 1 - Hyperpigmentation covering <10% BSA; no psychosocial impact [Dermatitis Radiation: Grade 1 - Faint erythema or dry desquamation] : Dermatitis Radiation: Grade 1 - Faint erythema or dry desquamation

## 2019-09-29 NOTE — DISEASE MANAGEMENT
[Pathological] : TNM Stage: p [IIB] : IIB [TTNM] : 2 [NTNM] : 0 [MTNM] : 0 [de-identified] : 6tx/1200cGy [de-identified] : [id number] [de-identified] : Right chest wall

## 2019-09-29 NOTE — PHYSICAL EXAM
[General Appearance - Well Developed] : well developed [de-identified] : right mastectomy scar is well healed, faint erythema and hyperpigmentation

## 2019-09-29 NOTE — HISTORY OF PRESENT ILLNESS
[FreeTextEntry1] : Ms. 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 She underwent mastectomy and had a negative sentinel lymph node biopsy. She received adjuvant chemotherapy .She is currently receiving post mastectomy radiation therapy.\par \par She is feeling generally well. She denies fatigue and pain. She has not noted any skin reactions. She is using Aquaphor on the skin.\par

## 2019-09-29 NOTE — DISEASE MANAGEMENT
[Pathological] : TNM Stage: p [IIB] : IIB [TTNM] : 2 [NTNM] : 0 [de-identified] : 200 cGy [MTNM] : 0 [de-identified] : 5000 cGy [de-identified] : Right chest wall an nodes

## 2019-09-29 NOTE — HISTORY OF PRESENT ILLNESS
[FreeTextEntry1] : Ms. Winnie Escamilla is a 39-year-old woman with stage IIB T2N0M0 triple negative poorly differentiated ductal carcinoma of the right breast. She underwent mastectomy and had a negative sentinel lymph node biopsy. She received adjuvant chemotherapy .She is currently receiving post mastectomy radiation therapy.\par \par She is feeling generally well. She has not noticed any changes. She is busy with her family but has help from her daughter. She complains of tingling and numbness in her fingers on and off. She will start using Aquaphor on her skin.

## 2019-09-29 NOTE — PHYSICAL EXAM
[General Appearance - Well Developed] : well developed [Normal] : no palpable adenopathy [de-identified] : right mastectomy scar well healed, no skin lesions, no erythema or hyperpigmentation

## 2019-09-30 ENCOUNTER — OTHER (OUTPATIENT)
Age: 39
End: 2019-09-30

## 2019-10-02 PROCEDURE — 77427 RADIATION TX MANAGEMENT X5: CPT

## 2019-10-02 NOTE — VITALS
[Maximal Pain Intensity: 0/10] : 0/10 [Least Pain Intensity: 0/10] : 0/10 [Pain Duration: ___] : Pain duration: [unfilled] [Pain Location: ___] : Pain Location: [unfilled] [Pain Interferes with ADLs] : Pain interferes with activities of daily living. [ECOG Performance Status: 0 - Fully active, able to carry on all pre-disease performance without restriction] : Performance Status: 0 - Fully active, able to carry on all pre-disease performance without restriction [90: Able to carry normal activity; minor signs or symptoms of disease.] : 90: Able to carry normal activity; minor signs or symptoms of disease.

## 2019-10-03 NOTE — DISEASE MANAGEMENT
[Pathological] : TNM Stage: p [IIB] : IIB [TTNM] : 2 [NTNM] : 0 [MTNM] : 0 [de-identified] : 2000 cGy [de-identified] : [id number] [de-identified] : Right chest wall and nodes

## 2019-10-03 NOTE — HISTORY OF PRESENT ILLNESS
[FreeTextEntry1] : Ms. 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 She underwent mastectomy and had a negative sentinel lymph node biopsy. She received adjuvant chemotherapy .She is currently receiving post mastectomy radiation therapy.\par \par She is feeling generally well. She denies fatigue and pain. She notes some irritated in the lateral inframammary fold, which may be exacerbated by her clothing/bra. \par

## 2019-10-03 NOTE — PHYSICAL EXAM
[General Appearance - Well Developed] : well developed [de-identified] : right mastectomy scar is well healed, faint erythema and hyperpigmentation present with mild tenderness, erythema within inferior lateral chets wall fold

## 2019-10-04 ENCOUNTER — OTHER (OUTPATIENT)
Age: 39
End: 2019-10-04

## 2019-10-04 ENCOUNTER — OUTPATIENT (OUTPATIENT)
Dept: OUTPATIENT SERVICES | Facility: HOSPITAL | Age: 39
LOS: 1 days | End: 2019-10-04

## 2019-10-04 DIAGNOSIS — Z90.11 ACQUIRED ABSENCE OF RIGHT BREAST AND NIPPLE: Chronic | ICD-10-CM

## 2019-10-07 ENCOUNTER — APPOINTMENT (OUTPATIENT)
Dept: PHYSICAL MEDICINE AND REHAB | Facility: CLINIC | Age: 39
End: 2019-10-07

## 2019-10-10 PROCEDURE — 77427 RADIATION TX MANAGEMENT X5: CPT

## 2019-10-14 NOTE — DISEASE MANAGEMENT
[Pathological] : TNM Stage: p [IIB] : IIB [MTNM] : 0 [TTNM] : 2 [NTNM] : 0 [de-identified] : Right chest wall and nodes [de-identified] : [id number] [de-identified] : 2000 cGy

## 2019-10-14 NOTE — PHYSICAL EXAM
[General Appearance - Well Developed] : well developed [de-identified] : right mastectomy scar is well healed, mild erythema and hyperpigmentation

## 2019-10-14 NOTE — HISTORY OF PRESENT ILLNESS
[FreeTextEntry1] : Ms. 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 She underwent mastectomy and had a negative sentinel lymph node biopsy. She received adjuvant chemotherapy .She is currently receiving post mastectomy radiation therapy.\par \par She is feeling generally well. She denies fatigue and pain. The irritation in the lateral inframammary fold, is much improved. She continues using Aquaphor on the skin. \par

## 2019-10-16 ENCOUNTER — OUTPATIENT (OUTPATIENT)
Dept: OUTPATIENT SERVICES | Facility: HOSPITAL | Age: 39
LOS: 1 days | Discharge: HOME | End: 2019-10-16

## 2019-10-16 DIAGNOSIS — Z90.11 ACQUIRED ABSENCE OF RIGHT BREAST AND NIPPLE: Chronic | ICD-10-CM

## 2019-10-16 DIAGNOSIS — C50.919 MALIGNANT NEOPLASM OF UNSPECIFIED SITE OF UNSPECIFIED FEMALE BREAST: ICD-10-CM

## 2019-10-17 PROCEDURE — 77427 RADIATION TX MANAGEMENT X5: CPT

## 2019-10-17 NOTE — HISTORY OF PRESENT ILLNESS
[FreeTextEntry1] : Ms. 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 She underwent mastectomy and had a negative sentinel lymph node biopsy. She received adjuvant chemotherapy .She is currently receiving post mastectomy radiation therapy.\par \par She is feeling generally well. She denies fatigue and pain. The irritation in the lateral inframammary fold is increasing this week but no blisters. She continues using Aquaphor on the skin. \par

## 2019-10-17 NOTE — PHYSICAL EXAM
[General Appearance - Well Developed] : well developed [de-identified] : right mastectomy scar is well healed, mild erythema and hyperpigmentation, increasing dermatitis in right inframammary fold, no desquamation.

## 2019-10-17 NOTE — DISEASE MANAGEMENT
[Pathological] : TNM Stage: p [IIB] : IIB [TTNM] : 2 [NTNM] : 0 [MTNM] : 0 [de-identified] : 4000 cGy [de-identified] : 5000 cGy [de-identified] : Right chest wall and nodes

## 2019-10-18 ENCOUNTER — APPOINTMENT (OUTPATIENT)
Dept: HEMATOLOGY ONCOLOGY | Facility: CLINIC | Age: 39
End: 2019-10-18
Payer: MEDICAID

## 2019-10-18 ENCOUNTER — RESULT REVIEW (OUTPATIENT)
Age: 39
End: 2019-10-18

## 2019-10-18 VITALS
RESPIRATION RATE: 18 BRPM | HEART RATE: 103 BPM | TEMPERATURE: 98.1 F | SYSTOLIC BLOOD PRESSURE: 109 MMHG | BODY MASS INDEX: 40.95 KG/M2 | OXYGEN SATURATION: 99 % | WEIGHT: 216.71 LBS | DIASTOLIC BLOOD PRESSURE: 73 MMHG

## 2019-10-18 LAB
BASOPHILS # BLD AUTO: 0 K/UL — SIGNIFICANT CHANGE UP (ref 0–0.2)
BASOPHILS NFR BLD AUTO: 0.6 % — SIGNIFICANT CHANGE UP (ref 0–2)
EOSINOPHIL # BLD AUTO: 0.2 K/UL — SIGNIFICANT CHANGE UP (ref 0–0.5)
EOSINOPHIL NFR BLD AUTO: 3.5 % — SIGNIFICANT CHANGE UP (ref 0–6)
HCT VFR BLD CALC: 37.9 % — SIGNIFICANT CHANGE UP (ref 34.5–45)
HGB BLD-MCNC: 12.7 G/DL — SIGNIFICANT CHANGE UP (ref 11.5–15.5)
LYMPHOCYTES # BLD AUTO: 0.6 K/UL — LOW (ref 1–3.3)
LYMPHOCYTES # BLD AUTO: 12.8 % — LOW (ref 13–44)
MCHC RBC-ENTMCNC: 28.8 PG — SIGNIFICANT CHANGE UP (ref 27–34)
MCHC RBC-ENTMCNC: 33.6 G/DL — SIGNIFICANT CHANGE UP (ref 32–36)
MCV RBC AUTO: 85.9 FL — SIGNIFICANT CHANGE UP (ref 80–100)
MONOCYTES # BLD AUTO: 0.4 K/UL — SIGNIFICANT CHANGE UP (ref 0–0.9)
MONOCYTES NFR BLD AUTO: 9.4 % — SIGNIFICANT CHANGE UP (ref 2–14)
NEUTROPHILS # BLD AUTO: 3.4 K/UL — SIGNIFICANT CHANGE UP (ref 1.8–7.4)
NEUTROPHILS NFR BLD AUTO: 73.8 % — SIGNIFICANT CHANGE UP (ref 43–77)
PLATELET # BLD AUTO: 184 K/UL — SIGNIFICANT CHANGE UP (ref 150–400)
RBC # BLD: 4.42 M/UL — SIGNIFICANT CHANGE UP (ref 3.8–5.2)
RBC # FLD: 13.8 % — SIGNIFICANT CHANGE UP (ref 10.3–14.5)
WBC # BLD: 4.7 K/UL — SIGNIFICANT CHANGE UP (ref 3.8–10.5)
WBC # FLD AUTO: 4.7 K/UL — SIGNIFICANT CHANGE UP (ref 3.8–10.5)

## 2019-10-18 PROCEDURE — 99214 OFFICE O/P EST MOD 30 MIN: CPT

## 2019-10-18 NOTE — CONSULT LETTER
[Dear  ___] : Dear  [unfilled], [Consult Letter:] : I had the pleasure of evaluating your patient, [unfilled]. Pt continues to present with oropharyngeal dysphagia. Pt exhibits s/s of aspiration post swallow on thin liquids with slight throat clearing evident intermittently with nectar thickened liquids on larger sips.  Laryngeal elevation deemed to be adequate.  No s/s of aspiration evident with purees or honey thickened liquids.  Lower dentures did not fit appropriately and were loose despite use of adhesive and were therefore removed by SLP. [Please see my note below.] : Please see my note below. [Consult Closing:] : Thank you very much for allowing me to participate in the care of this patient.  If you have any questions, please do not hesitate to contact me. [Sincerely,] : Sincerely, [DrRonnie  ___] : Dr. DE ANDA [DrRonnie ___] : Dr. DE ANDA [FreeTextEntry2] : Vesna Granger MD [FreeTextEntry3] : Viridiana Patrick M.D.\par  of Medicine\par [facility]\par [facility]\par [address] [place] [address]\par [address]\par Tele # [phone]; Fax [phone]
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
## 2019-10-18 NOTE — ASSESSMENT
[Curative] : Goals of care discussed with patient: Curative [FreeTextEntry1] : In summary, this is a very pleasant 39-year-old Amharic-speaking lady who is diagnosed with T2 N0 stage IIA poorly differentiated ER/VT/HER-2/rajni NEGATIVE IDC of the right breast during pregnancy.  A CXR and abdominal sono didn’t show distant mets.  BRCA negative. She is s/p sequential Adriamycin x 4 and Cytoxan x 4 in second and third trimester of pregnancy. Delivered healthy baby and started Taxol x 12 w. ddACT completed 8/9/2019. \par \par - Breast ca: She has completed adjuvant chemo for breast ca. She is currently on RT. Doing well. No sx of recurrence\par - Mild LFT 2/2 Taxol- Resolved\par - No issues with neuropathy or lymphedema 2/2 treatment\par - Continue annual breast imaging.\par - Encourage healthy diet and exercise.\par - Continue followup with primary care. I recommend age-appropriate malignancy screening\par - Educated about s/sx of recurrence\par \par RTC q3m or sooner if any new sx

## 2019-10-18 NOTE — REASON FOR VISIT
[Follow-Up Visit] : a follow-up [Other: _____] : [unfilled] [[employer] ] : provided by [employer]   [FreeTextEntry2] : [id number], verito [TWNoteComboBox1] : Togolese

## 2019-10-18 NOTE — PHYSICAL EXAM
[Fully active, able to carry on all pre-disease performance without restriction] : Status 0 - Fully active, able to carry on all pre-disease performance without restriction [Obese] : obese [Normal] : affect appropriate [de-identified] : S/p right mastectomy healed well. No CW or axillary nodules. + RT changes. Left breast normal

## 2019-10-22 ENCOUNTER — RX RENEWAL (OUTPATIENT)
Age: 39
End: 2019-10-22

## 2019-10-22 LAB
25(OH)D3 SERPL-MCNC: 9 NG/ML
ALBUMIN SERPL ELPH-MCNC: 4.4 G/DL
ALP BLD-CCNC: 147 U/L
ALT SERPL-CCNC: 41 U/L
ANION GAP SERPL CALC-SCNC: 13 MMOL/L
AST SERPL-CCNC: 37 U/L
BILIRUB SERPL-MCNC: 0.2 MG/DL
BUN SERPL-MCNC: 10 MG/DL
CALCIUM SERPL-MCNC: 9.5 MG/DL
CHLORIDE SERPL-SCNC: 103 MMOL/L
CO2 SERPL-SCNC: 22 MMOL/L
CREAT SERPL-MCNC: 0.49 MG/DL
GLUCOSE SERPL-MCNC: 120 MG/DL
POTASSIUM SERPL-SCNC: 3.7 MMOL/L
PROT SERPL-MCNC: 7 G/DL
SODIUM SERPL-SCNC: 138 MMOL/L

## 2019-10-23 PROCEDURE — 77427 RADIATION TX MANAGEMENT X5: CPT

## 2019-10-24 ENCOUNTER — OTHER (OUTPATIENT)
Age: 39
End: 2019-10-24

## 2019-10-24 NOTE — DISEASE MANAGEMENT
[Pathological] : TNM Stage: p [IIB] : IIB [TTNM] : 2 [NTNM] : 0 [MTNM] : 0 [de-identified] : 5000 5000cGy [de-identified] : 5000 cGy [de-identified] : Right chest wall and nodes

## 2019-10-24 NOTE — REVIEW OF SYSTEMS
[Alopecia: Grade 0] : Alopecia: Grade 0 [Pruritus: Grade 0] : Pruritus: Grade 0 [Skin Atrophy: Grade 0] : Skin Atrophy: Grade 0 [Skin Hyperpigmentation: Grade 2 - Hyperpigmentation covering >10% BSA; associated psychosocial impact] : Skin Hyperpigmentation: Grade 2 - Hyperpigmentation covering >10% BSA; associated psychosocial impact [Skin Induration: Grade 0] : Skin Induration: Grade 0 [Dermatitis Radiation: Grade 2 - Moderate to brisk erythema; patchy moist desquamation, mostly confined to skin folds and creases; moderate edema] : Dermatitis Radiation: Grade 2 - Moderate to brisk erythema; patchy moist desquamation, mostly confined to skin folds and creases; moderate edema

## 2019-10-24 NOTE — PHYSICAL EXAM
[General Appearance - Well Developed] : well developed [de-identified] : right mastectomy scar is well healed, moderate erythema and hyperpigmentation, early desquamation in fold

## 2019-10-24 NOTE — VITALS
[Maximal Pain Intensity: 6/10] : 6/10 [Least Pain Intensity: 0/10] : 0/10 [Pain Description/Quality: ___] : Pain description/quality: [unfilled] [Pain Duration: ___] : Pain duration: [unfilled] [Pain Location: ___] : Pain Location: [unfilled] [OTC] : OTC [80: Normal activity with effort; some signs or symptoms of disease.] : 80: Normal activity with effort; some signs or symptoms of disease.  [ECOG Performance Status: 1 - Restricted in physically strenuous activity but ambulatory and able to carry out work of a light or sedentary nature] : Performance Status: 1 - Restricted in physically strenuous activity but ambulatory and able to carry out work of a light or sedentary nature, e.g., light house work, office work

## 2019-10-24 NOTE — HISTORY OF PRESENT ILLNESS
[FreeTextEntry1] : Ms. 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 She underwent mastectomy and had a negative sentinel lymph node biopsy. She received adjuvant chemotherapy .She is currently receiving post mastectomy radiation therapy.\par \par She completed treatment today. She is feeling generally well. She denies fatigue. Reports burning pain to the inframammary fold. She is taking Tylenol BID with relief. She continues using Aquaphor on the skin. Reports mild pruritus in treatment area.

## 2019-10-28 NOTE — DISCUSSION/SUMMARY
[Cancer Type / Location / Histology Subtype: ________] : Cancer Type / Location / Histology Subtype: [unfilled] [Diagnosis Date (year): ____] : Diagnosis Date (year): [unfilled] [II] : II [Surgery] : Surgery: Yes [Surgery Date(s) (year): ____] : Surgery Date(s) (year): [unfilled] [Surgical Procedure / Location / Findings: _________] : Surgical Procedure / Location / Findings: [unfilled] [Radiation] : Radiation: Yes [Body Area Treated: _________] : Body Area Treated: [unfilled] [End Date (year): ____] : End Date (year): [unfilled] [Systemic Therapy (chemotherapy, hormonal therapy, other)] : Systemic Therapy (chemotherapy, hormonal therapy, other): Yes [Persistent symptoms or side effects at completion of treatment?  If Yes, list types ___] : Persistent symptoms or side effects at completion of treatment? Yes, [unfilled] [Primary care physician] : primary care physician [Mammogram] : Mammogram [Skin Checks] : skin checks [PAP Test] : PAP test [Annual Flu Shot] : annual flu shot [Cardiac Toxicity] : cardiac toxicity [Anxiety and Depression] : anxiety and depression [Cognitive Function] : cognitive function [Sexual Function] : sexual function [Sleep Disorders] : sleep disorders [Emotional and mental health] : Emotional and mental health [Memory or Concentration Loss] : Memory or concentration loss [Fatigue] : Fatigue [Insurance] : Insurance [Parenting] : Parenting [Weight Changes] : Weight changes [Fertility] : Fertility [Alcohol use] : Alcohol use [Weigh Management (loss / gain)] : Weight management (loss / gain) [Diet] : Diet [Sun screen use] : Sun screen use [Path to Wellness Survivorship Program] : Path to Wellness Survivorship Program [Bridge to Survivorship Breast Cancer] : Bridge to Survivorship Breast Cancer [Psycho-social Emotional Support] : Psycho-social Emotional Support  (please call SW at [phone]) [Nutritional and Wellness Counseling] : Nutritional and Wellness Counseling (please call Nutrition office at [phone]) [[facility] Breast Cancer Hotline] : [facility] Breast Cancer Hotline [Cancer Care] : Cancer Care [American Cancer Society] : the American Cancer Society [Genetic Counseling] : Genetic Counseling: No [Need for onging (adjuvant) treatment for cancer?] : Need for onging (adjuvant) treatment for cancer? No [FreeTextEntry1] : doxorubicin q 2 weeks x 4 cycles initiated at 17 weeks gestation. 440mg cumulative dose/240mg/m2  12/2018  -  2/2019\par      Treatment interruption for hospitalization after cycle 2 for shigella and c-diff infection .\par cyclophosphamide q 2weeks x 4 cycles  2/2019  -  4/2019\par     Hospitalized after cycle 2 for GI symptoms but negative workup for infection.\par paclitaxel weekly x 12 cycles 5/2019  -  8/2019  following delivery at 37 weeks.

## 2019-10-29 ENCOUNTER — APPOINTMENT (OUTPATIENT)
Dept: OBGYN | Facility: CLINIC | Age: 39
End: 2019-10-29
Payer: MEDICAID

## 2019-10-29 VITALS
DIASTOLIC BLOOD PRESSURE: 80 MMHG | WEIGHT: 216 LBS | SYSTOLIC BLOOD PRESSURE: 122 MMHG | HEIGHT: 61 IN | BODY MASS INDEX: 40.78 KG/M2

## 2019-10-29 PROCEDURE — 58100 BIOPSY OF UTERUS LINING: CPT

## 2019-10-29 PROCEDURE — 99213 OFFICE O/P EST LOW 20 MIN: CPT | Mod: 25

## 2019-10-29 NOTE — PROCEDURE
[Endometrial Biopsy] : Endometrial biopsy [Irregular Bleeding] : irregular uterine bleeding [Risks] : risks [Benefits] : benefits [Alternatives] : alternatives [Patient] : patient [Infection] : infection [Bleeding] : bleeding [Uterine Perforation] : uterine perforation [Pain] : pain [CONSENT OBTAINED] : written consent was obtained prior to the procedure. [LMP ___] : LMP was [unfilled] [Neg Pregnancy Test] : a pregnancy test was negative [Betadine] : Betadine [None] : none [Easy Passage] : allowed easy passage of a uterine sound without dilation [Sounded to ___ cm] : sounded to [unfilled] ~Ucm [Anteverted] : anteverted [Pipelle] : a Pipelle endometrial suction curette [Abundant] : an abundant [Sent to Histology] : the specimen was place in buffered formalin and sent for pathlogy [Tolerated Well] : the patient tolerated the procedure well [No Complications] : there were no complications [de-identified] : hx breast ca in May 2019 with radiation and chemotherapy-no Tamoxifen [de-identified] : reviewed entire consent with interpretor

## 2019-10-29 NOTE — CHIEF COMPLAINT
[FreeTextEntry1] : Pt is 40 y/o P2 with triple negative breast cancer identified in pregnancy. She is s/p chemotherapy and radiation. She is not on Tamoxifen. She reports that she has had two periods since she delivered, one in september and one on October 11th and now she has begun bleeding again. She reports they were both very heavy necessitating using sanitary napkins q2 hours during day and soaking the bed linens at night. She has never had periods like this.

## 2019-10-29 NOTE — COUNSELING
[FreeTextEntry2] : discussed with pt possible causes of HMB/IMB. Pt will rto in 1 week for results review and subsequent management

## 2019-10-30 ENCOUNTER — OTHER (OUTPATIENT)
Age: 39
End: 2019-10-30

## 2019-11-04 ENCOUNTER — APPOINTMENT (OUTPATIENT)
Dept: PEDIATRIC MEDICAL GENETICS | Facility: CLINIC | Age: 39
End: 2019-11-04
Payer: MEDICAID

## 2019-11-04 LAB — CORE LAB BIOPSY: NORMAL

## 2019-11-04 PROCEDURE — 99203 OFFICE O/P NEW LOW 30 MIN: CPT

## 2019-11-04 PROCEDURE — 36415 COLL VENOUS BLD VENIPUNCTURE: CPT

## 2019-11-06 ENCOUNTER — APPOINTMENT (OUTPATIENT)
Dept: OBGYN | Facility: CLINIC | Age: 39
End: 2019-11-06
Payer: MEDICAID

## 2019-11-06 VITALS
BODY MASS INDEX: 40.78 KG/M2 | DIASTOLIC BLOOD PRESSURE: 80 MMHG | SYSTOLIC BLOOD PRESSURE: 120 MMHG | WEIGHT: 216 LBS | HEIGHT: 61 IN

## 2019-11-06 PROCEDURE — 99212 OFFICE O/P EST SF 10 MIN: CPT

## 2019-11-06 NOTE — CHIEF COMPLAINT
[FreeTextEntry1] : Reviewed lab results with Francisca carranza. Confirmed them with pathologist on phone on Monday-adequate sample and normal endometrium were noted. This was shared with the patient. Advised her to consider using an IUD to reduce menses.Pt stated that after she left the office when she had the biopsy the bleeding reduced dramatically and was bearable. Discussed options of progestrerone only methods to reduce HMB/IMB and pt declines stating that if bleeding worsens she will return to start a method
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
## 2019-11-25 ENCOUNTER — APPOINTMENT (OUTPATIENT)
Dept: RADIATION ONCOLOGY | Facility: CLINIC | Age: 39
End: 2019-11-25
Payer: MEDICAID

## 2019-11-26 ENCOUNTER — APPOINTMENT (OUTPATIENT)
Dept: RADIATION ONCOLOGY | Facility: CLINIC | Age: 39
End: 2019-11-26
Payer: MEDICAID

## 2019-11-26 VITALS
RESPIRATION RATE: 17 BRPM | DIASTOLIC BLOOD PRESSURE: 77 MMHG | HEART RATE: 85 BPM | WEIGHT: 221.56 LBS | BODY MASS INDEX: 41.86 KG/M2 | OXYGEN SATURATION: 99 % | TEMPERATURE: 97.3 F | SYSTOLIC BLOOD PRESSURE: 116 MMHG

## 2019-11-26 PROCEDURE — 99024 POSTOP FOLLOW-UP VISIT: CPT

## 2019-11-26 RX ORDER — OXYCODONE AND ACETAMINOPHEN 5; 325 MG/1; MG/1
5-325 TABLET ORAL
Qty: 40 | Refills: 0 | Status: DISCONTINUED | COMMUNITY
Start: 2019-10-23 | End: 2019-11-26

## 2019-11-26 RX ORDER — SILVER SULFADIAZINE 10 MG/G
1 CREAM TOPICAL TWICE DAILY
Qty: 1 | Refills: 2 | Status: DISCONTINUED | COMMUNITY
Start: 2019-10-23 | End: 2019-11-26

## 2019-12-02 NOTE — HISTORY OF PRESENT ILLNESS
[FreeTextEntry1] : Ms. 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 She underwent mastectomy and had a negative sentinel lymph node biopsy. She received adjuvant chemotherapy. She underwent adjuvant radiation therapy to the right chest wall and regional nodes. She received a dose of 5000cGy in standard fractionation to the chest wall and nodes, completed on 09/07/17. She returns today for the first post-treatment evaluation.\par \par The patient reports feeling generally well. She is involved in her usual activities. She has occasional fatigue but it seems to be improving. She denies cough or shortness of breath. There has been some improvement in the skin since completing radiation therapy. She continues regular skin care with Aquaphor. Her bra irritates the lateral chest wall, where she had a significant skin reaction. She has attempted to get new mastectomy bras but was unable. She complains of continued discomfort in her right arm only, with tightness extending down the arm. She had a physical therapy appointment but was unable to keep it. She does not require analgesics. She had a left breast mammogram and ultrasound on 8/13/19 which showed no evidence of malignancy.\par

## 2019-12-02 NOTE — PHYSICAL EXAM
[Sclera] : the sclera and conjunctiva were normal [General Appearance - Well Developed] : well developed [Heart Rate And Rhythm] : heart rate and rhythm were normal [] : no respiratory distress [Normal] : no palpable adenopathy [Musculoskeletal - Swelling] : no joint swelling [Axillary Lymph Nodes Enlarged Bilaterally] : axillary [Supraclavicular Lymph Nodes Enlarged Bilaterally] : supraclavicular [Motor Exam] : the motor exam was normal [de-identified] : s/p mastectomy right side, residual moderate hyperpigmentation, lateral inframammary fold tender to touch but no rash or desquamation, no skin lesions or soft tissue nodules, mild residual edema of chest wall tissues; mild RUE edema

## 2019-12-02 NOTE — HISTORY OF PRESENT ILLNESS
[FreeTextEntry1] : Ms. 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 She underwent mastectomy and had a negative sentinel lymph node biopsy. She received adjuvant chemotherapy .She received a course of post mastectomy radiation to the right breast from 9/19/19 through 10/23/19 she returns today for her post treatment evaluation

## 2019-12-02 NOTE — REVIEW OF SYSTEMS
[Negative] : Neurological [Skin Atrophy: Grade 0] : Skin Atrophy: Grade 0 [Alopecia: Grade 0] : Alopecia: Grade 0 [Pruritus: Grade 0] : Pruritus: Grade 0 [Skin Hyperpigmentation: Grade 1 - Hyperpigmentation covering <10% BSA; no psychosocial impact] : Skin Hyperpigmentation: Grade 1 - Hyperpigmentation covering <10% BSA; no psychosocial impact [Dermatitis Radiation: Grade 0] : Dermatitis Radiation: Grade 0 [Skin Induration: Grade 0] : Skin Induration: Grade 0

## 2019-12-02 NOTE — VITALS
[Maximal Pain Intensity: 0/10] : 0/10 [Least Pain Intensity: 0/10] : 0/10 [Pain Description/Quality: ___] : Pain description/quality: [unfilled] [NoTreatment Scheduled] : no treatment scheduled [80: Normal activity with effort; some signs or symptoms of disease.] : 80: Normal activity with effort; some signs or symptoms of disease.  [ECOG Performance Status: 1 - Restricted in physically strenuous activity but ambulatory and able to carry out work of a light or sedentary nature] : Performance Status: 1 - Restricted in physically strenuous activity but ambulatory and able to carry out work of a light or sedentary nature, e.g., light house work, office work

## 2019-12-02 NOTE — REASON FOR VISIT
[Breast Cancer] : breast cancer [Post-Treatment Evaluation] : post-treatment evaluation for [FreeTextEntry1] : [id number] [TWNoteComboBox1] : [place]

## 2019-12-05 ENCOUNTER — OTHER (OUTPATIENT)
Age: 39
End: 2019-12-05

## 2019-12-09 ENCOUNTER — OTHER (OUTPATIENT)
Age: 39
End: 2019-12-09

## 2019-12-16 ENCOUNTER — OUTPATIENT (OUTPATIENT)
Dept: OUTPATIENT SERVICES | Facility: HOSPITAL | Age: 39
LOS: 1 days | End: 2019-12-16

## 2019-12-16 DIAGNOSIS — Z90.11 ACQUIRED ABSENCE OF RIGHT BREAST AND NIPPLE: Chronic | ICD-10-CM

## 2019-12-19 ENCOUNTER — OTHER (OUTPATIENT)
Age: 39
End: 2019-12-19

## 2019-12-20 ENCOUNTER — OTHER (OUTPATIENT)
Age: 39
End: 2019-12-20

## 2019-12-30 ENCOUNTER — MESSAGE (OUTPATIENT)
Age: 39
End: 2019-12-30

## 2020-01-07 NOTE — HISTORY OF PRESENT ILLNESS
[FreeTextEntry1] : Ms. Mace is a 39-year-old woman with a personal history of breast cancer during pregnancy.  Her right breast biopsy on 9/10/18 showed invasive moderately-differentiated mammary carcinoma with medullary features carcinoma, grade 2/3, ER-negative, TX negative, HER-2neu negative. The CancerNext-Expanded panel, which analysis 67 genes associated with hereditary cancer performed by [employer], was negative.  In April 2019, she delivered a healthy baby boy at 36 weeks gestation, after uncomplicated vaginal delivery.\par \par Patient denied any cardiac symptoms, including shortness of breath, dizziness, chest pain, palpitation, or syncope. Ms. Mace  ECHO from October 2018 was normal. Her EKG from  October 2018 was normal (QT/QTc 440/442 ms).  \par \par Ms. Mace has a strong family history of Long QT Syndrome. Her 17-year-old son and 11-year-old daughter both have ICD’s in place for symptomatic ventricular arrhythmia and are both on anti-arrhythmia medications. Her 5-year-old daughter also has Long QT.  She has a seven months old healthy son with normal QT. \par \par Two of her daughters recently underwent genetic testing and were found positive for likely pathogenic variant in gene KCNQ1. 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 \par \par

## 2020-01-07 NOTE — REASON FOR VISIT
[FreeTextEntry3] : Francisca Mace is a 39-year-old woman seen for an initial consultation at the [facility] on 11/4/2019 due to her family history of Long QT syndrome.  (ID#[id number]) was used at this appointment.

## 2020-01-07 NOTE — FAMILY HISTORY
[FreeTextEntry1] : Ms. Mace is of [facility] ancestry.  The maternal and paternal family history was negative for the presence of arrhythmia or any other significant cardiac findings, sudden death, known genetic disorders or consanguinity.  \par \par Her 17-year-old son and 11-year-old daughter both have ICD’s in place for symptomatic ventricular arrhythmia and are both on anti-arrhythmia medications. Her 5-year-old daughter also has Long QT.  She also has a seven months old son healthy son with normal QT.\par  \par Two of her daughters recently underwent genetic testing and were found positive for likely pathogenic variant in gene KCNQ1. 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 \par \par

## 2020-02-01 ENCOUNTER — OUTPATIENT (OUTPATIENT)
Dept: OUTPATIENT SERVICES | Facility: HOSPITAL | Age: 40
LOS: 1 days | Discharge: HOME | End: 2020-02-01

## 2020-02-01 DIAGNOSIS — Z90.11 ACQUIRED ABSENCE OF RIGHT BREAST AND NIPPLE: Chronic | ICD-10-CM

## 2020-02-01 DIAGNOSIS — C50.919 MALIGNANT NEOPLASM OF UNSPECIFIED SITE OF UNSPECIFIED FEMALE BREAST: ICD-10-CM

## 2020-02-07 ENCOUNTER — RESULT REVIEW (OUTPATIENT)
Age: 40
End: 2020-02-07

## 2020-02-07 ENCOUNTER — APPOINTMENT (OUTPATIENT)
Dept: HEMATOLOGY ONCOLOGY | Facility: CLINIC | Age: 40
End: 2020-02-07
Payer: MEDICAID

## 2020-02-07 VITALS
HEART RATE: 72 BPM | BODY MASS INDEX: 40.22 KG/M2 | OXYGEN SATURATION: 100 % | WEIGHT: 212.83 LBS | SYSTOLIC BLOOD PRESSURE: 117 MMHG | DIASTOLIC BLOOD PRESSURE: 79 MMHG | RESPIRATION RATE: 18 BRPM | TEMPERATURE: 97.9 F

## 2020-02-07 LAB
BASOPHILS # BLD AUTO: 0 K/UL — SIGNIFICANT CHANGE UP (ref 0–0.2)
BASOPHILS NFR BLD AUTO: 0.3 % — SIGNIFICANT CHANGE UP (ref 0–2)
EOSINOPHIL # BLD AUTO: 0.3 K/UL — SIGNIFICANT CHANGE UP (ref 0–0.5)
EOSINOPHIL NFR BLD AUTO: 5.1 % — SIGNIFICANT CHANGE UP (ref 0–6)
HCT VFR BLD CALC: 36.7 % — SIGNIFICANT CHANGE UP (ref 34.5–45)
HGB BLD-MCNC: 12.5 G/DL — SIGNIFICANT CHANGE UP (ref 11.5–15.5)
LYMPHOCYTES # BLD AUTO: 1.3 K/UL — SIGNIFICANT CHANGE UP (ref 1–3.3)
LYMPHOCYTES # BLD AUTO: 25.1 % — SIGNIFICANT CHANGE UP (ref 13–44)
MCHC RBC-ENTMCNC: 28 PG — SIGNIFICANT CHANGE UP (ref 27–34)
MCHC RBC-ENTMCNC: 34.1 G/DL — SIGNIFICANT CHANGE UP (ref 32–36)
MCV RBC AUTO: 82.2 FL — SIGNIFICANT CHANGE UP (ref 80–100)
MONOCYTES # BLD AUTO: 0.4 K/UL — SIGNIFICANT CHANGE UP (ref 0–0.9)
MONOCYTES NFR BLD AUTO: 6.9 % — SIGNIFICANT CHANGE UP (ref 2–14)
NEUTROPHILS # BLD AUTO: 3.2 K/UL — SIGNIFICANT CHANGE UP (ref 1.8–7.4)
NEUTROPHILS NFR BLD AUTO: 62.7 % — SIGNIFICANT CHANGE UP (ref 43–77)
PLATELET # BLD AUTO: 251 K/UL — SIGNIFICANT CHANGE UP (ref 150–400)
RBC # BLD: 4.47 M/UL — SIGNIFICANT CHANGE UP (ref 3.8–5.2)
RBC # FLD: 13.1 % — SIGNIFICANT CHANGE UP (ref 10.3–14.5)
WBC # BLD: 5.2 K/UL — SIGNIFICANT CHANGE UP (ref 3.8–10.5)
WBC # FLD AUTO: 5.2 K/UL — SIGNIFICANT CHANGE UP (ref 3.8–10.5)

## 2020-02-07 PROCEDURE — 99215 OFFICE O/P EST HI 40 MIN: CPT

## 2020-02-09 RX ORDER — ERGOCALCIFEROL 1.25 MG/1
1.25 MG CAPSULE, LIQUID FILLED ORAL
Qty: 8 | Refills: 0 | Status: ACTIVE | COMMUNITY
Start: 2020-02-09 | End: 1900-01-01

## 2020-02-09 NOTE — CONSULT LETTER
[Dear  ___] : Dear  [unfilled], [Consult Letter:] : I had the pleasure of evaluating your patient, [unfilled]. [Please see my note below.] : Please see my note below. [Consult Closing:] : Thank you very much for allowing me to participate in the care of this patient.  If you have any questions, please do not hesitate to contact me. [Sincerely,] : Sincerely, [DrRonnie  ___] : Dr. DE ANDA [DrRonnie ___] : Dr. DE ANDA [FreeTextEntry3] : Viridiana Patrick M.D.\par  of Medicine\par [facility]\par [facility] [place]\par [address] [place] [address]\par [address]\par Tele # [phone]; Fax [phone] [FreeTextEntry2] : Vesna Granger MD

## 2020-02-09 NOTE — REASON FOR VISIT
[Follow-Up Visit] : a follow-up [Other: _____] : [unfilled] [Patient Declined  Services] : - None: Patient declined  services [FreeTextEntry2] : Breast cancer in pregnancy TRIPLE NEGATIVE [FreeTextEntry3] : Casie RIOS translating [TWNoteComboBox1] : Cypriot

## 2020-02-09 NOTE — PHYSICAL EXAM
[Fully active, able to carry on all pre-disease performance without restriction] : Status 0 - Fully active, able to carry on all pre-disease performance without restriction [Obese] : obese [Normal] : affect appropriate [de-identified] : S/p right mastectomy healed well. No CW or axillary nodules. + RT changes. Left breast normal

## 2020-02-09 NOTE — ASSESSMENT
[Curative] : Goals of care discussed with patient: Curative [FreeTextEntry1] : In summary, this is a very pleasant 39-year-old Kinyarwanda-speaking lady who is diagnosed with T2 N0 stage IIA poorly differentiated ER/OR/HER-2/rajni NEGATIVE IDC of the right breast during pregnancy.  A CXR and abdominal sono didn’t show distant mets.  BRCA negative. She is s/p sequential Adriamycin x 4 and Cytoxan x 4 in second and third trimester of pregnancy. Delivered healthy baby and started Taxol x 12 w. ddACT completed 8/9/2019. \par \par - Breast ca: She has completed adjuvant chemo for breast ca. She is s/p RT. Doing well. No sx of recurrence\par - Mild LFT 2/2 Taxol- Resolved\par - No issues with neuropathy or lymphedema 2/2 treatment\par - Continue annual breast imaging.\par - Encourage healthy diet and exercise.\par - Continue followup with primary care. I recommend age-appropriate malignancy screening\par - Educated about s/sx of recurrence\par - Low vit D- start suppls\par \par RTC q3m or sooner if any new sx

## 2020-02-09 NOTE — HISTORY OF PRESENT ILLNESS
[de-identified] : Ms. EVANGELISTA HESTER  is a 39 year old female here for an evaluation of breast cancer. Her oncologic history is as follows:\par \par She felt a right breast mass in 2018 and was evaluated seen by GYN who sent her for breast imaging. She underwent diagnostic breast imaging on 18 which showed at 12:00 position of right breast there is a lobulated focus, measuring 2.0 x 2.0 x 2.2 cm 2 cm FN. 4 mm deep to the skin. Left breast benign. No enlarged axillary lymph nodes. Bx recommended. 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 There is marked lymphocytic infiltrate around the tumor. She was scheduled for MRI but found out that she was pregnant. Current gestation age at the time of initial consult: 11 weeks\par \par 10/23/18\par She is here with her . We discussed the diagnosis of rapidly growing triple negative breast ca, the size has increased since diagnosis and that chemotherapy will be needed to treat this cancer which can not be started until she is 14 weeks. They want to keep the pregnancy understanding that it will cause delay in treatment and potential risk to the patient and fetus. We discussed that lumpectomy will have issues with positive margins and RT will be contraindicated during to pregnancy and therefore u/l mastectomy is recommend. I discussed with Dr Granger that expander placement at the time of surgery will be an option. She is seeing the pt tomorrow and will discuss surgery and reconstruction options with her. She also saw Dr Eden Patrick med onc for a a second opinion. \par \par s/p surgery (right mastectomy) 18 , 4.5 cm tumor and sentinel node negative- path report d/w her in detail. Tumor grew more than double in size between dx and surgery and is s/o aggressive tumor. Discussed to start chemo 18. S/e of chemo jody during pregnancy, expected and unexpected, pertaining to her and the fetus d/w her. Discussed to see MFM b/w chemo. Also a candidate for PMRT due to large tumor size. LMP 18\par \par I had extensive discussion with the patient and spouse regarding breast cancer in pregnancy and the treatment options that are safe for both the mother and the growing fetus. She refused termination of pregnancy after finding out cancer diagnosis.  We discussed that all treatments including chemotherapy and surgery are contraindicated in first trimester but are considered relatively safe in second and third trimester.  We have discussed medical termination of pregnancy as an option but patient decided to keep the pregnancy. We reviewed that the largest experience in pregnancy has been with anthracycline and alkylating agent chemotherapy. She will be a candidate for chemotherapy with Adriamycin plus cyclophosphamide in a sequential dose dense manner. We reviewed data from [place] registry which showed Neulasta is reasonably safe to use in 2nd and third trimester although it was a very small dataset. . There is not enough data to use taxanes in pregnancy therefore Taxol will be administered post partum. Potential s/e to the patient and growing fetus were discussed with the patient and  in detail via a . The patient understood all the potential side effects and signed an informed consent after discussing the risks, benefits and alternatives.\par \par \par PLAN: Starting 18 ( 17 weeks), She will receive adriamycin every 2 weeks x 4 cycle followed by Cytoxan every 2 weeks x 4 cycles. We plan to hold chemotherapy 4 weeks prior to delivery to prevent  neutropenic complications. Currently she is planned for elective induction (NVD) at 37 weeks. We would start Taxol post partum\par \par PREMEDS/SUPPORTIVE CARE: Zofran ativan and dex are listed as part of prechemo antiemetic regimen in NCCN guidelines for breast cancer in pregnancy. The use of Emend is controversial in pregnancy. We discussed the issue of port placement in the OR, but that would increase anesthesia time by 45 min therefore we would treat her peripherally and likely do port placement after delivery. We discussed the issue of preeclampsia or gestational diabetes with steroids. She will be monitored closely by MFM. She will see MFM between every chemo session for close fetal monitoring. \par \par I explained her that EOD work up is limited due to risk of radiation exposure to the growing fetus. We plan to perform CT scans or a PET/CT scan after delivery. She will also be a candidate for PMRT due to large tumor size. \par \par S/p infection with shigella and c-diff after Adriamycin c#2, confirmed with cultures and PCR x 2, was hospitalized for diarrhea and abd pain, treated with ampicillin and vanco,  Ampicillin completed yesterday. Vanco 2 more days left. Patient reports mom had Shigella 2 years ago, Mom has come in to help her 2 months prior but is not ill. She denies sick contacts. \par \par She was hospitalized for 5 days after Cytoxan Omi # 2 due to diarrhea. Infectious work up was negative.Completed vancomycin course prior to last treatment. Sx were controlled with supportive care. She feels back to normal now. No abd pain, NVD, fevers.  No Bone pain, no neuropathy.\par \par We discussed the plan to give C4 in 2 weeks and deliver at around 37 weeks. She will need Taxol q2w x 4 doses after delivery. We will perform CT scans after the baby is born.\par \par CT chest/ abd/ pelvis: 2019: normal\par Bone Scan: 19: Normal, mild degenerative Dz. [de-identified] : Ms. EVANGELISTA RUBIO is here for f/u for right breast cancer dx in 10/2018 during pregnancy. She completed sequential AC during second and trimester of pregnancy. Taxol completed after delivery. Here for routine f/u. \par She has completed RT and has mild cording. She is planning to start PT. Neuropathy has improved. Appetite and energy good, wt stable. No diarrhea\par LMP 2/5/2020.  got vasectomy.\par Left breast imaging 8/2019

## 2020-02-11 LAB
25(OH)D3 SERPL-MCNC: 11.4 NG/ML
ALBUMIN SERPL ELPH-MCNC: 4.3 G/DL
ALP BLD-CCNC: 113 U/L
ALT SERPL-CCNC: 35 U/L
ANION GAP SERPL CALC-SCNC: 12 MMOL/L
AST SERPL-CCNC: 29 U/L
BILIRUB SERPL-MCNC: 0.2 MG/DL
BUN SERPL-MCNC: 8 MG/DL
CALCIUM SERPL-MCNC: 9.2 MG/DL
CHLORIDE SERPL-SCNC: 105 MMOL/L
CO2 SERPL-SCNC: 22 MMOL/L
CREAT SERPL-MCNC: 0.41 MG/DL
GLUCOSE SERPL-MCNC: 90 MG/DL
POTASSIUM SERPL-SCNC: 4.4 MMOL/L
PROT SERPL-MCNC: 6.9 G/DL
SODIUM SERPL-SCNC: 139 MMOL/L

## 2020-03-10 ENCOUNTER — APPOINTMENT (OUTPATIENT)
Dept: RADIOLOGY | Facility: CLINIC | Age: 40
End: 2020-03-10
Payer: MEDICAID

## 2020-03-10 ENCOUNTER — APPOINTMENT (OUTPATIENT)
Dept: FAMILY MEDICINE | Facility: CLINIC | Age: 40
End: 2020-03-10
Payer: MEDICAID

## 2020-03-10 ENCOUNTER — NON-APPOINTMENT (OUTPATIENT)
Age: 40
End: 2020-03-10

## 2020-03-10 VITALS
HEIGHT: 58 IN | SYSTOLIC BLOOD PRESSURE: 104 MMHG | DIASTOLIC BLOOD PRESSURE: 82 MMHG | RESPIRATION RATE: 16 BRPM | WEIGHT: 214 LBS | TEMPERATURE: 97.7 F | HEART RATE: 73 BPM | BODY MASS INDEX: 44.92 KG/M2 | OXYGEN SATURATION: 99 %

## 2020-03-10 DIAGNOSIS — Z00.00 ENCOUNTER FOR GENERAL ADULT MEDICAL EXAMINATION W/OUT ABNORMAL FINDINGS: ICD-10-CM

## 2020-03-10 LAB
ALBUMIN: 10
BILIRUB UR QL STRIP: NEGATIVE
CLARITY UR: CLEAR
COLLECTION METHOD: NORMAL
CREATININE: 300
GLUCOSE UR-MCNC: NEGATIVE
HCG UR QL: 0.2 EU/DL
HGB UR QL STRIP.AUTO: NORMAL
KETONES UR-MCNC: NEGATIVE
LEUKOCYTE ESTERASE UR QL STRIP: NORMAL
MICROALBUMIN/CREAT UR TEST STR-RTO: <30
NITRITE UR QL STRIP: NEGATIVE
PH UR STRIP: 5.5
PROT UR STRIP-MCNC: NEGATIVE
SP GR UR STRIP: 1.02

## 2020-03-10 PROCEDURE — 36415 COLL VENOUS BLD VENIPUNCTURE: CPT

## 2020-03-10 PROCEDURE — 99385 PREV VISIT NEW AGE 18-39: CPT | Mod: 25

## 2020-03-10 PROCEDURE — 82044 UR ALBUMIN SEMIQUANTITATIVE: CPT | Mod: QW

## 2020-03-10 PROCEDURE — 93000 ELECTROCARDIOGRAM COMPLETE: CPT

## 2020-03-10 PROCEDURE — 81003 URINALYSIS AUTO W/O SCOPE: CPT | Mod: QW

## 2020-03-10 PROCEDURE — 73564 X-RAY EXAM KNEE 4 OR MORE: CPT | Mod: RT

## 2020-03-10 NOTE — PHYSICAL EXAM
[Normal] : affect was normal and insight and judgment were intact [de-identified] : obesity [de-identified] : right breast absent s/p mastectomy, well healed scar [de-identified] : truncal adiposity

## 2020-03-10 NOTE — HISTORY OF PRESENT ILLNESS
[FreeTextEntry1] : CPE \par would like to discuss pain in right knee for three weeks \par [facility] \par [facility]  [de-identified] : Francisca c/o pain in right knee x 3 weeks and soreness/tingling right upper extremity.\par She has a personal history of right breast CA with right  mastectomy n2019. She states she was diagnosed with breast cancer during her last pregnancy

## 2020-03-10 NOTE — HEALTH RISK ASSESSMENT
[No] : In the past 12 months have you used drugs other than those required for medical reasons? No [0] : 2) Feeling down, depressed, or hopeless: Not at all (0) [] : No [Audit-CScore] : 0

## 2020-03-11 LAB
25(OH)D3 SERPL-MCNC: 19.3 NG/ML
ALBUMIN SERPL ELPH-MCNC: 4.1 G/DL
ALP BLD-CCNC: 113 U/L
ALT SERPL-CCNC: 35 U/L
ANION GAP SERPL CALC-SCNC: 13 MMOL/L
AST SERPL-CCNC: 33 U/L
BASOPHILS # BLD AUTO: 0.04 K/UL
BASOPHILS NFR BLD AUTO: 0.7 %
BILIRUB SERPL-MCNC: 0.2 MG/DL
BUN SERPL-MCNC: 9 MG/DL
CALCIUM SERPL-MCNC: 9.3 MG/DL
CHLORIDE SERPL-SCNC: 104 MMOL/L
CHOLEST SERPL-MCNC: 205 MG/DL
CHOLEST/HDLC SERPL: 5 RATIO
CO2 SERPL-SCNC: 22 MMOL/L
CREAT SERPL-MCNC: 0.42 MG/DL
EOSINOPHIL # BLD AUTO: 0.25 K/UL
EOSINOPHIL NFR BLD AUTO: 4.5 %
ERYTHROCYTE [SEDIMENTATION RATE] IN BLOOD BY WESTERGREN METHOD: 44 MM/HR
ESTIMATED AVERAGE GLUCOSE: 117 MG/DL
GLUCOSE SERPL-MCNC: 96 MG/DL
HBA1C MFR BLD HPLC: 5.7 %
HCT VFR BLD CALC: 37.5 %
HDLC SERPL-MCNC: 41 MG/DL
HGB BLD-MCNC: 11.7 G/DL
IMM GRANULOCYTES NFR BLD AUTO: 0.4 %
LDLC SERPL CALC-MCNC: 130 MG/DL
LYMPHOCYTES # BLD AUTO: 1.23 K/UL
LYMPHOCYTES NFR BLD AUTO: 22.4 %
MAN DIFF?: NORMAL
MCHC RBC-ENTMCNC: 26.3 PG
MCHC RBC-ENTMCNC: 31.2 GM/DL
MCV RBC AUTO: 84.3 FL
MONOCYTES # BLD AUTO: 0.39 K/UL
MONOCYTES NFR BLD AUTO: 7.1 %
NEUTROPHILS # BLD AUTO: 3.57 K/UL
NEUTROPHILS NFR BLD AUTO: 64.9 %
PLATELET # BLD AUTO: 290 K/UL
POTASSIUM SERPL-SCNC: 4.1 MMOL/L
PROT SERPL-MCNC: 6.9 G/DL
RBC # BLD: 4.45 M/UL
RBC # FLD: 14.3 %
RHEUMATOID FACT SER QL: <10 IU/ML
SODIUM SERPL-SCNC: 139 MMOL/L
TRIGL SERPL-MCNC: 171 MG/DL
WBC # FLD AUTO: 5.5 K/UL

## 2020-03-12 LAB
ANA SER IF-ACNC: NEGATIVE
B BURGDOR AB SER-IMP: NEGATIVE
B BURGDOR IGG+IGM SER QL: 0.08 INDEX
BACTERIA UR CULT: NORMAL

## 2020-03-17 ENCOUNTER — APPOINTMENT (OUTPATIENT)
Dept: FAMILY MEDICINE | Facility: CLINIC | Age: 40
End: 2020-03-17
Payer: MEDICAID

## 2020-03-17 VITALS
TEMPERATURE: 98.2 F | SYSTOLIC BLOOD PRESSURE: 110 MMHG | DIASTOLIC BLOOD PRESSURE: 66 MMHG | HEART RATE: 70 BPM | RESPIRATION RATE: 14 BRPM

## 2020-03-17 DIAGNOSIS — Z82.49 FAMILY HISTORY OF ISCHEMIC HEART DISEASE AND OTHER DISEASES OF THE CIRCULATORY SYSTEM: ICD-10-CM

## 2020-03-17 PROCEDURE — 99214 OFFICE O/P EST MOD 30 MIN: CPT

## 2020-03-17 NOTE — HISTORY OF PRESENT ILLNESS
[de-identified] : Followup on blood work and Francisca c/o persistence of right knee and right shoulder pain x 4 weeks.she also c/o soreness of right upper chest. Francisca has 4 children ages10 to infant.  She has not taken any otc NSAID because its " not that bad" Her xray of right knee was NML 1 week ago

## 2020-03-17 NOTE — PHYSICAL EXAM
[No Joint Swelling] : no joint swelling [Grossly Normal Strength/Tone] : grossly normal strength/tone [Normal] : no rash [de-identified] : reproducible right costochondral tenderness

## 2020-03-17 NOTE — PLAN
[FreeTextEntry1] : Recommend Advil OTC\par Diet low in sugar and fat reviewed with pt\par \par RTO 3 months for repeat FBW

## 2020-04-27 ENCOUNTER — OUTPATIENT (OUTPATIENT)
Dept: OUTPATIENT SERVICES | Facility: HOSPITAL | Age: 40
LOS: 1 days | Discharge: HOME | End: 2020-04-27

## 2020-04-27 DIAGNOSIS — C50.919 MALIGNANT NEOPLASM OF UNSPECIFIED SITE OF UNSPECIFIED FEMALE BREAST: ICD-10-CM

## 2020-04-27 DIAGNOSIS — Z90.11 ACQUIRED ABSENCE OF RIGHT BREAST AND NIPPLE: Chronic | ICD-10-CM

## 2020-05-04 ENCOUNTER — APPOINTMENT (OUTPATIENT)
Dept: HEMATOLOGY ONCOLOGY | Facility: CLINIC | Age: 40
End: 2020-05-04

## 2020-05-04 ENCOUNTER — APPOINTMENT (OUTPATIENT)
Dept: HEMATOLOGY ONCOLOGY | Facility: CLINIC | Age: 40
End: 2020-05-04
Payer: MEDICAID

## 2020-05-04 PROCEDURE — 99214 OFFICE O/P EST MOD 30 MIN: CPT | Mod: 95

## 2020-06-01 ENCOUNTER — APPOINTMENT (OUTPATIENT)
Dept: FAMILY MEDICINE | Facility: CLINIC | Age: 40
End: 2020-06-01

## 2020-06-07 NOTE — PHYSICAL EXAM
[Fully active, able to carry on all pre-disease performance without restriction] : Status 0 - Fully active, able to carry on all pre-disease performance without restriction [Obese] : obese [Normal] : affect appropriate [de-identified] : S/p right mastectomy [de-identified] : No visible respiratory distress, cough or SOB noted. No audible wheezes noted [de-identified] : Full ROM to head/neck

## 2020-06-07 NOTE — ASSESSMENT
[Curative] : Goals of care discussed with patient: Curative [FreeTextEntry1] : In summary, this is a very pleasant 39-year-old Kazakh-speaking lady who is diagnosed with T2 N0 stage IIA poorly differentiated ER/OR/HER-2/rajni NEGATIVE IDC of the right breast during pregnancy.  A CXR and abdominal sono didn’t show distant mets.  BRCA negative. She is s/p sequential Adriamycin x 4 and Cytoxan x 4 in second and third trimester of pregnancy. Delivered healthy baby and started Taxol x 12 w. ddACT completed 8/9/2019. \par \par - Breast ca: She has completed adjuvant chemo for breast ca. She is s/p RT. Doing well. No sx of recurrence\par - Mild LFT 2/2 Taxol- Resolved\par - No issues with neuropathy or lymphedema 2/2 treatment\par - Continue annual breast imaging. DUe 8/2020\par -  Encourage healthy diet and exercise.\par - Low Vit D/deficiency, continue Vit D 5000IU daily will repeat level in 3 months\par - Continue followup with primary care. I recommend age-appropriate malignancy screening\par -Bilateral knee stiffness monitor symptoms,encouraged weight reduction/exercise\par - Educated about s/sx of recurrence\par \par RTC q3m or sooner if any new sx

## 2020-06-07 NOTE — CONSULT LETTER
[Dear  ___] : Dear  [unfilled], [Consult Letter:] : I had the pleasure of evaluating your patient, [unfilled]. [Consult Closing:] : Thank you very much for allowing me to participate in the care of this patient.  If you have any questions, please do not hesitate to contact me. [Please see my note below.] : Please see my note below. [Sincerely,] : Sincerely, [DrRonnie ___] : Dr. DE ANDA [DrRonnie  ___] : Dr. DE ANDA [FreeTextEntry2] : Vesna Granger MD [FreeTextEntry3] : Viridiana Patrick M.D.\par  of Medicine\par [facility]\par [facility] [place]\par [address] [place] [address]\par [address]\par Tele # [phone]; Fax [phone]

## 2020-06-07 NOTE — HISTORY OF PRESENT ILLNESS
[Home] : at home, [unfilled] , at the time of the visit. [Medical Office: ([facility])___] : at the medical office located in  [Patient] : the patient [Self] : self [FreeTextEntry2] : Francisca Mace [de-identified] : Ms. EVANGELISTA HESTER  is a 39 year old female here for an evaluation of breast cancer. Her oncologic history is as follows:\par \par She felt a right breast mass in 2018 and was evaluated seen by GYN who sent her for breast imaging. She underwent diagnostic breast imaging on 18 which showed at 12:00 position of right breast there is a lobulated focus, measuring 2.0 x 2.0 x 2.2 cm 2 cm FN. 4 mm deep to the skin. Left breast benign. No enlarged axillary lymph nodes. Bx recommended. 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 There is marked lymphocytic infiltrate around the tumor. She was scheduled for MRI but found out that she was pregnant. Current gestation age at the time of initial consult: 11 weeks\par \par 10/23/18\par She is here with her . We discussed the diagnosis of rapidly growing triple negative breast ca, the size has increased since diagnosis and that chemotherapy will be needed to treat this cancer which can not be started until she is 14 weeks. They want to keep the pregnancy understanding that it will cause delay in treatment and potential risk to the patient and fetus. We discussed that lumpectomy will have issues with positive margins and RT will be contraindicated during to pregnancy and therefore u/l mastectomy is recommend. I discussed with Dr Granger that expander placement at the time of surgery will be an option. She is seeing the pt tomorrow and will discuss surgery and reconstruction options with her. She also saw Dr Eden Patrick med onc for a a second opinion. \par \par s/p surgery (right mastectomy) 18 , 4.5 cm tumor and sentinel node negative- path report d/w her in detail. Tumor grew more than double in size between dx and surgery and is s/o aggressive tumor. Discussed to start chemo 18. S/e of chemo jody during pregnancy, expected and unexpected, pertaining to her and the fetus d/w her. Discussed to see MFM b/w chemo. Also a candidate for PMRT due to large tumor size. LMP 18\par \par I had extensive discussion with the patient and spouse regarding breast cancer in pregnancy and the treatment options that are safe for both the mother and the growing fetus. She refused termination of pregnancy after finding out cancer diagnosis.  We discussed that all treatments including chemotherapy and surgery are contraindicated in first trimester but are considered relatively safe in second and third trimester.  We have discussed medical termination of pregnancy as an option but patient decided to keep the pregnancy. We reviewed that the largest experience in pregnancy has been with anthracycline and alkylating agent chemotherapy. She will be a candidate for chemotherapy with Adriamycin plus cyclophosphamide in a sequential dose dense manner. We reviewed data from [place] registry which showed Neulasta is reasonably safe to use in 2nd and third trimester although it was a very small dataset. . There is not enough data to use taxanes in pregnancy therefore Taxol will be administered post partum. Potential s/e to the patient and growing fetus were discussed with the patient and  in detail via a . The patient understood all the potential side effects and signed an informed consent after discussing the risks, benefits and alternatives.\par \par \par PLAN: Starting 18 ( 17 weeks), She will receive adriamycin every 2 weeks x 4 cycle followed by Cytoxan every 2 weeks x 4 cycles. We plan to hold chemotherapy 4 weeks prior to delivery to prevent  neutropenic complications. Currently she is planned for elective induction (NVD) at 37 weeks. We would start Taxol post partum\par \par PREMEDS/SUPPORTIVE CARE: Zofran ativan and dex are listed as part of prechemo antiemetic regimen in NCCN guidelines for breast cancer in pregnancy. The use of Emend is controversial in pregnancy. We discussed the issue of port placement in the OR, but that would increase anesthesia time by 45 min therefore we would treat her peripherally and likely do port placement after delivery. We discussed the issue of preeclampsia or gestational diabetes with steroids. She will be monitored closely by MFM. She will see MFM between every chemo session for close fetal monitoring. \par \par I explained her that EOD work up is limited due to risk of radiation exposure to the growing fetus. We plan to perform CT scans or a PET/CT scan after delivery. She will also be a candidate for PMRT due to large tumor size. \par \par S/p infection with shigella and c-diff after Adriamycin c#2, confirmed with cultures and PCR x 2, was hospitalized for diarrhea and abd pain, treated with ampicillin and vanco,  Ampicillin completed yesterday. Vanco 2 more days left. Patient reports mom had Shigella 2 years ago, Mom has come in to help her 2 months prior but is not ill. She denies sick contacts. \par \par She was hospitalized for 5 days after Cytoxan Omi # 2 due to diarrhea. Infectious work up was negative.Completed vancomycin course prior to last treatment. Sx were controlled with supportive care. She feels back to normal now. No abd pain, NVD, fevers.  No Bone pain, no neuropathy.\par \par We discussed the plan to give C4 in 2 weeks and deliver at around 37 weeks. She will need Taxol q2w x 4 doses after delivery. We will perform CT scans after the baby is born.\par \par CT chest/ abd/ pelvis: 2019: normal\par Bone Scan: 19: Normal, mild degenerative Dz. [de-identified] : Ms. EVANGELISTA RUBIO is having a telehealth visit for f/u for right breast cancer dx in 10/2018 during pregnancy. She completed sequential AC during second and trimester of pregnancy. Taxol completed after delivery. This is a routine f/u. \par She has completed RT and has mild cording. She is planning to start PT. Neuropathy has improved. Appetite and energy good, wt stable. No diarrhea. Vit D levels continue to be low Patient reports 3/2020 lab results from GYN vit D 10.1 Compliant with Vit D 71751 IU weekly from 2/7/20-4/3/20. C/o stiffness to knees bilaterally after sitting for prolonged periods,\par LMP 4/28/20.  got vasectomy.\par Left breast imaging 8/2019\par ROS neg

## 2020-06-07 NOTE — REASON FOR VISIT
[Other: _____] : [unfilled] [Follow-Up Visit] : a follow-up [Patient Declined  Services] : - None: Patient declined  services [FreeTextEntry3] : Casie RIOS translating [TWNoteComboBox1] : Panamanian [FreeTextEntry2] : Breast cancer in pregnancy TRIPLE NEGATIVE

## 2020-07-19 PROBLEM — Z85.3 HISTORY OF MALIGNANT NEOPLASM OF BREAST: Status: RESOLVED | Noted: 2018-09-12 | Resolved: 2020-07-19

## 2020-07-20 ENCOUNTER — APPOINTMENT (OUTPATIENT)
Dept: BREAST CENTER | Facility: CLINIC | Age: 40
End: 2020-07-20
Payer: MEDICAID

## 2020-07-20 VITALS
HEART RATE: 77 BPM | TEMPERATURE: 97.4 F | WEIGHT: 220 LBS | DIASTOLIC BLOOD PRESSURE: 64 MMHG | SYSTOLIC BLOOD PRESSURE: 110 MMHG | HEIGHT: 61 IN | BODY MASS INDEX: 41.54 KG/M2

## 2020-07-20 DIAGNOSIS — Z85.3 PERSONAL HISTORY OF MALIGNANT NEOPLASM OF BREAST: ICD-10-CM

## 2020-07-20 PROCEDURE — 99214 OFFICE O/P EST MOD 30 MIN: CPT

## 2020-08-21 ENCOUNTER — APPOINTMENT (OUTPATIENT)
Dept: ULTRASOUND IMAGING | Facility: CLINIC | Age: 40
End: 2020-08-21
Payer: MEDICAID

## 2020-08-21 ENCOUNTER — RESULT REVIEW (OUTPATIENT)
Age: 40
End: 2020-08-21

## 2020-08-21 ENCOUNTER — APPOINTMENT (OUTPATIENT)
Dept: MAMMOGRAPHY | Facility: CLINIC | Age: 40
End: 2020-08-21
Payer: MEDICAID

## 2020-08-21 PROCEDURE — 77065 DX MAMMO INCL CAD UNI: CPT | Mod: LT

## 2020-08-21 PROCEDURE — G0279: CPT | Mod: LT

## 2020-08-21 PROCEDURE — 76641 ULTRASOUND BREAST COMPLETE: CPT | Mod: LT

## 2020-08-27 ENCOUNTER — OUTPATIENT (OUTPATIENT)
Dept: OUTPATIENT SERVICES | Facility: HOSPITAL | Age: 40
LOS: 1 days | Discharge: HOME | End: 2020-08-27

## 2020-08-27 DIAGNOSIS — Z90.11 ACQUIRED ABSENCE OF RIGHT BREAST AND NIPPLE: Chronic | ICD-10-CM

## 2020-08-27 DIAGNOSIS — C50.919 MALIGNANT NEOPLASM OF UNSPECIFIED SITE OF UNSPECIFIED FEMALE BREAST: ICD-10-CM

## 2020-09-02 ENCOUNTER — LABORATORY RESULT (OUTPATIENT)
Age: 40
End: 2020-09-02

## 2020-09-02 ENCOUNTER — APPOINTMENT (OUTPATIENT)
Dept: HEMATOLOGY ONCOLOGY | Facility: CLINIC | Age: 40
End: 2020-09-02
Payer: MEDICAID

## 2020-09-02 ENCOUNTER — RESULT REVIEW (OUTPATIENT)
Age: 40
End: 2020-09-02

## 2020-09-02 LAB
BASOPHILS # BLD AUTO: 0.05 K/UL — SIGNIFICANT CHANGE UP (ref 0–0.2)
BASOPHILS NFR BLD AUTO: 0.7 % — SIGNIFICANT CHANGE UP (ref 0–2)
EOSINOPHIL # BLD AUTO: 0.25 K/UL — SIGNIFICANT CHANGE UP (ref 0–0.5)
EOSINOPHIL NFR BLD AUTO: 3.3 % — SIGNIFICANT CHANGE UP (ref 0–6)
HCT VFR BLD CALC: 36.8 % — SIGNIFICANT CHANGE UP (ref 34.5–45)
HGB BLD-MCNC: 11.7 G/DL — SIGNIFICANT CHANGE UP (ref 11.5–15.5)
IMM GRANULOCYTES NFR BLD AUTO: 0.3 % — SIGNIFICANT CHANGE UP (ref 0–1.5)
LYMPHOCYTES # BLD AUTO: 1.44 K/UL — SIGNIFICANT CHANGE UP (ref 1–3.3)
LYMPHOCYTES # BLD AUTO: 19.2 % — SIGNIFICANT CHANGE UP (ref 13–44)
MCHC RBC-ENTMCNC: 26.6 PG — LOW (ref 27–34)
MCHC RBC-ENTMCNC: 31.8 GM/DL — LOW (ref 32–36)
MCV RBC AUTO: 83.6 FL — SIGNIFICANT CHANGE UP (ref 80–100)
MONOCYTES # BLD AUTO: 0.48 K/UL — SIGNIFICANT CHANGE UP (ref 0–0.9)
MONOCYTES NFR BLD AUTO: 6.4 % — SIGNIFICANT CHANGE UP (ref 2–14)
NEUTROPHILS # BLD AUTO: 5.27 K/UL — SIGNIFICANT CHANGE UP (ref 1.8–7.4)
NEUTROPHILS NFR BLD AUTO: 70.1 % — SIGNIFICANT CHANGE UP (ref 43–77)
NRBC # BLD: 0 /100 WBCS — SIGNIFICANT CHANGE UP (ref 0–0)
PLATELET # BLD AUTO: 288 K/UL — SIGNIFICANT CHANGE UP (ref 150–400)
RBC # BLD: 4.4 M/UL — SIGNIFICANT CHANGE UP (ref 3.8–5.2)
RBC # FLD: 14.3 % — SIGNIFICANT CHANGE UP (ref 10.3–14.5)
WBC # BLD: 7.51 K/UL — SIGNIFICANT CHANGE UP (ref 3.8–10.5)
WBC # FLD AUTO: 7.51 K/UL — SIGNIFICANT CHANGE UP (ref 3.8–10.5)

## 2020-09-02 PROCEDURE — 99214 OFFICE O/P EST MOD 30 MIN: CPT | Mod: 95

## 2020-09-03 LAB
25(OH)D3 SERPL-MCNC: 22 NG/ML
ALBUMIN SERPL ELPH-MCNC: 4.3 G/DL
ALP BLD-CCNC: 99 U/L
ALT SERPL-CCNC: 31 U/L
ANION GAP SERPL CALC-SCNC: 12 MMOL/L
AST SERPL-CCNC: 25 U/L
BILIRUB SERPL-MCNC: 0.3 MG/DL
BUN SERPL-MCNC: 8 MG/DL
CALCIUM SERPL-MCNC: 9.3 MG/DL
CHLORIDE SERPL-SCNC: 105 MMOL/L
CO2 SERPL-SCNC: 20 MMOL/L
CREAT SERPL-MCNC: 0.46 MG/DL
GLUCOSE SERPL-MCNC: 108 MG/DL
POTASSIUM SERPL-SCNC: 4.1 MMOL/L
PROT SERPL-MCNC: 6.9 G/DL
SODIUM SERPL-SCNC: 138 MMOL/L

## 2020-09-08 ENCOUNTER — APPOINTMENT (OUTPATIENT)
Dept: NUCLEAR MEDICINE | Facility: CLINIC | Age: 40
End: 2020-09-08
Payer: MEDICAID

## 2020-09-08 ENCOUNTER — OUTPATIENT (OUTPATIENT)
Dept: OUTPATIENT SERVICES | Facility: HOSPITAL | Age: 40
LOS: 1 days | End: 2020-09-08

## 2020-09-08 ENCOUNTER — RESULT REVIEW (OUTPATIENT)
Age: 40
End: 2020-09-08

## 2020-09-08 DIAGNOSIS — C50.919 MALIGNANT NEOPLASM OF UNSPECIFIED SITE OF UNSPECIFIED FEMALE BREAST: ICD-10-CM

## 2020-09-08 DIAGNOSIS — Z90.11 ACQUIRED ABSENCE OF RIGHT BREAST AND NIPPLE: Chronic | ICD-10-CM

## 2020-09-08 PROCEDURE — 78306 BONE IMAGING WHOLE BODY: CPT | Mod: 26

## 2020-09-08 PROCEDURE — 78830 RP LOCLZJ TUM SPECT W/CT 1: CPT | Mod: 26

## 2020-09-20 NOTE — REASON FOR VISIT
[Follow-Up Visit] : a follow-up [Other: _____] : [unfilled] [Patient Declined  Services] : - None: Patient declined  services [FreeTextEntry2] : Breast cancer in pregnancy TRIPLE NEGATIVE [FreeTextEntry3] : Casie RIOS translating [TWNoteComboBox1] : Marshallese

## 2020-09-20 NOTE — PHYSICAL EXAM
[Fully active, able to carry on all pre-disease performance without restriction] : Status 0 - Fully active, able to carry on all pre-disease performance without restriction [de-identified] : Constitutional: well developed, well nourished, in no acute distress. \par Eyes: no icterus seen. no conjunctival injection\par ENT: no tongue swelling, no nasal discharge\par Neck: no visible masses or goitre \par Pulmonary: no audible wheeze,  respirations unlabored\par Musculoskeletal: full range of motion, walking in the house\par Skin: no rash visible on face or upper chest\par

## 2020-09-20 NOTE — HISTORY OF PRESENT ILLNESS
[Medical Office: ([facility])___] : at the medical office located in  [Home] : at home, [unfilled] , at the time of the visit. [Self] : self [Patient] : the patient [FreeTextEntry2] : Francisca Mace [de-identified] : Ms. EVANGELISTA HESTER  is a 39 year old female here for an evaluation of breast cancer. Her oncologic history is as follows:\par \par She felt a right breast mass in 2018 and was evaluated seen by GYN who sent her for breast imaging. She underwent diagnostic breast imaging on 18 which showed at 12:00 position of right breast there is a lobulated focus, measuring 2.0 x 2.0 x 2.2 cm 2 cm FN. 4 mm deep to the skin. Left breast benign. No enlarged axillary lymph nodes. Bx recommended. 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 There is marked lymphocytic infiltrate around the tumor. She was scheduled for MRI but found out that she was pregnant. Current gestation age at the time of initial consult: 11 weeks\par \par 10/23/18\par She is here with her . We discussed the diagnosis of rapidly growing triple negative breast ca, the size has increased since diagnosis and that chemotherapy will be needed to treat this cancer which can not be started until she is 14 weeks. They want to keep the pregnancy understanding that it will cause delay in treatment and potential risk to the patient and fetus. We discussed that lumpectomy will have issues with positive margins and RT will be contraindicated during to pregnancy and therefore u/l mastectomy is recommend. I discussed with Dr Granger that expander placement at the time of surgery will be an option. She is seeing the pt tomorrow and will discuss surgery and reconstruction options with her. She also saw Dr Eden Patrick med onc for a a second opinion. \par \par s/p surgery (right mastectomy) 18 , 4.5 cm tumor and sentinel node negative- path report d/w her in detail. Tumor grew more than double in size between dx and surgery and is s/o aggressive tumor. Discussed to start chemo 18. S/e of chemo jody during pregnancy, expected and unexpected, pertaining to her and the fetus d/w her. Discussed to see MFM b/w chemo. Also a candidate for PMRT due to large tumor size. LMP 18\par \par I had extensive discussion with the patient and spouse regarding breast cancer in pregnancy and the treatment options that are safe for both the mother and the growing fetus. She refused termination of pregnancy after finding out cancer diagnosis.  We discussed that all treatments including chemotherapy and surgery are contraindicated in first trimester but are considered relatively safe in second and third trimester.  We have discussed medical termination of pregnancy as an option but patient decided to keep the pregnancy. We reviewed that the largest experience in pregnancy has been with anthracycline and alkylating agent chemotherapy. She will be a candidate for chemotherapy with Adriamycin plus cyclophosphamide in a sequential dose dense manner. We reviewed data from [place] registry which showed Neulasta is reasonably safe to use in 2nd and third trimester although it was a very small dataset. . There is not enough data to use taxanes in pregnancy therefore Taxol will be administered post partum. Potential s/e to the patient and growing fetus were discussed with the patient and  in detail via a . The patient understood all the potential side effects and signed an informed consent after discussing the risks, benefits and alternatives.\par \par \par PLAN: Starting 18 ( 17 weeks), She will receive adriamycin every 2 weeks x 4 cycle followed by Cytoxan every 2 weeks x 4 cycles. We plan to hold chemotherapy 4 weeks prior to delivery to prevent  neutropenic complications. Currently she is planned for elective induction (NVD) at 37 weeks. We would start Taxol post partum\par \par PREMEDS/SUPPORTIVE CARE: Zofran ativan and dex are listed as part of prechemo antiemetic regimen in NCCN guidelines for breast cancer in pregnancy. The use of Emend is controversial in pregnancy. We discussed the issue of port placement in the OR, but that would increase anesthesia time by 45 min therefore we would treat her peripherally and likely do port placement after delivery. We discussed the issue of preeclampsia or gestational diabetes with steroids. She will be monitored closely by MFM. She will see MFM between every chemo session for close fetal monitoring. \par \par I explained her that EOD work up is limited due to risk of radiation exposure to the growing fetus. We plan to perform CT scans or a PET/CT scan after delivery. She will also be a candidate for PMRT due to large tumor size. \par \par S/p infection with shigella and c-diff after Adriamycin c#2, confirmed with cultures and PCR x 2, was hospitalized for diarrhea and abd pain, treated with ampicillin and vanco,  Ampicillin completed yesterday. Vanco 2 more days left. Patient reports mom had Shigella 2 years ago, Mom has come in to help her 2 months prior but is not ill. She denies sick contacts. \par \par She was hospitalized for 5 days after Cytoxan Omi # 2 due to diarrhea. Infectious work up was negative.Completed vancomycin course prior to last treatment. Sx were controlled with supportive care. She feels back to normal now. No abd pain, NVD, fevers.  No Bone pain, no neuropathy.\par \par We discussed the plan to give C4 in 2 weeks and deliver at around 37 weeks. She will need Taxol q2w x 4 doses after delivery. We will perform CT scans after the baby is born.\par \par CT chest/ abd/ pelvis: 2019: normal\par Bone Scan: 19: Normal, mild degenerative Dz. [de-identified] : Ms. EVANGELISTA RUBIO is having a telehealth visit for f/u for right breast cancer dx in 10/2018 during pregnancy. She completed sequential AC during second and trimester of pregnancy. Taxol completed after delivery. This is a routine f/u. \par She has completed RT and has mild cording. She is planning to start PT. Neuropathy has improved. Appetite and energy good, wt stable. No diarrhea. Vit D levels continue to be low Patient reports 3/2020 lab results from GYN vit D 10.1 Compliant with Vit D 13750 IU weekly from 2/7/20-4/3/20. C/o stiffness to knees bilaterally after sitting for prolonged periods,\par LMP 4/28/20.  got vasectomy.\par Left breast imaging 8/2020 BIRADS 1\par ROS neg\par Patient arrived for 9/2/2020 RPA with her 2 children and as per [facility] policy we were unable to see her in the office but accommodated her in the lab for BW. MD exam VIA TEB Reports post mastectomy pain and pain to right shoulder for last 2 months especially when wearing mastectomy bra with prosthetic.

## 2020-09-20 NOTE — ASSESSMENT
[Curative] : Goals of care discussed with patient: Curative [FreeTextEntry1] : In summary, this is a very pleasant 39-year-old Divehi-speaking lady who is diagnosed with T2 N0 stage IIA poorly differentiated ER/GA/HER-2/rajni NEGATIVE IDC of the right breast during pregnancy.  A CXR and abdominal sono didn’t show distant mets.  BRCA negative. She is s/p sequential Adriamycin x 4 and Cytoxan x 4 in second and third trimester of pregnancy. Delivered healthy baby and started Taxol x 12 w. ddACT completed 8/9/2019. \par \par - Breast ca: She has completed adjuvant chemo for breast ca. She is s/p RT. Doing well. No sx of recurrence\par - New onset right shoulder pain will order bone scan to r/o mets\par - Post mastectomy pain -  Start gabapentin 300 mg HS \par - Mild LFT 2/2 Taxol- Resolved\par - No issues with neuropathy or lymphedema 2/2 treatment\par - Continue annual breast imaging Done 8/2020\par -  Encourage healthy diet and exercise.\par - Low Vit D/deficiency, continue Vit D 5000IU daily will repeat level in 3 months\par - Continue followup with primary care. I recommend age-appropriate malignancy screening\par -Bilateral knee stiffness monitor symptoms,encouraged weight reduction/exercise\par - Educated about s/sx of recurrence\par \par RTC q3m or sooner if any new sx

## 2020-09-20 NOTE — CONSULT LETTER
[Dear  ___] : Dear  [unfilled], [Consult Letter:] : I had the pleasure of evaluating your patient, [unfilled]. [Please see my note below.] : Please see my note below. [Consult Closing:] : Thank you very much for allowing me to participate in the care of this patient.  If you have any questions, please do not hesitate to contact me. [Sincerely,] : Sincerely, [DrRonnie ___] : Dr. DE ANDA [DrRonnie  ___] : Dr. DE ANDA [FreeTextEntry2] : Vesna Granger MD [FreeTextEntry3] : Viridiana Patrick M.D.\par  of Medicine\par [facility]\par [facility]\par [address] [place] [address]\par [address]\par Tele # [phone]; Fax [phone]

## 2020-10-01 NOTE — HISTORY OF PRESENT ILLNESS
[FreeTextEntry1] : 39 y/o  female, Ambry negative here for c/o Right breast pain. Hx Right gestational breast cancer. S/P Right mastectomy with SLNBx 18 pT2 (4.5cm)N0/3, neg margins for Triple negative IDC.\par \par 19 NFR L DmmgT/US, compared to 2018, dense, no susp findings. BR1\par 19 NFR L US, compared to 2018, no susp findings. BR1\par \par Completed Taxol 19  Dr. Viridiana Patrick. \par EBRT with DR. Anika Granger at [facility].\par S/P  19 with healthy male infant.\par \par --19 CT chest and  19 PET bone scan no evidence of disease.\par 18 NW Surgical path: R breast: IDC with apocrine features, G3 (4.5cm) with negative margins (3.2cm nearest margin posterior), R SLN negative for metastatic disease 0/3, LVI neg. Path stage IIA. \par \par --9/10/18 office: R 12:00 2.2X 2.0cm- Invasive mammary carcinoma with medullary features. G2/3, No comedonecrosis, marked lyphatic infiltrate around the tumor. ER/DE/Nmz6bse (-); Ki67 (70%), p53 (-); S100 (focally +); CD3 (+); CD68 (+); CK5/6 D5/16 B4 Squamous and mesothelial cells (+); E-Caderin Epithelial, pronormoblasts (+). \par \par Maunt with stomach cancer, MGM with mouth cancer, Muncle leg cancer. No breast or ovarian cancer.

## 2020-10-01 NOTE — PHYSICAL EXAM
[Atraumatic] : atraumatic [Normocephalic] : normocephalic [Supple] : supple [No Supraclavicular Adenopathy] : no supraclavicular adenopathy [Asymmetrical] : asymmetrical [No dominant masses] : no dominant masses in right breast  [Examined in the supine and seated position] : examined in the supine and seated position [No dominant masses] : no dominant masses left breast [No Nipple Retraction] : no left nipple retraction [No Nipple Discharge] : no left nipple discharge [No Axillary Lymphadenopathy] : no left axillary lymphadenopathy [No Edema] : no edema [No Swelling] : no swelling [Full ROM] : full range of motion [No Rashes] : no rashes [No Ulceration] : no ulceration [de-identified] : right mastectomy

## 2020-10-01 NOTE — ASSESSMENT
[FreeTextEntry1] : 41 y/o  female, Ambry negative here for c/o Right breast pain. Hx Right gestational breast cancer. S/P Right mastectomy with SLNBx 18 pT2 (4.5cm)N0/3, neg margins for Triple negative IDC.\par Pt doing well, no c/o except since 1 mos ago, has been having some shoulder/neck discomfort.  No lymphedema.\par Pt denies any breast lesions, discharge or masses.\par Declines reconstruction on right. \par 19 NFR L DmmgT/US, compared to 2018, dense, no susp findings. BR1\par 19 NFR L US, compared to 2018, no susp findings. BR1\par \par Completed Taxol 19  Dr. Viridiana Patrick. \par EBRT with DR. Anika Gr[facility] at [facility].\par S/P  19 with healthy male infant.\par \par --19 CT chest and  19 PET bone scan no evidence of disease.\par 18 NW Surgical path: R breast: IDC with apocrine features, G3 (4.5cm) with negative margins (3.2cm nearest margin posterior), R SLN negative for metastatic disease 0/3, LVI neg. Path stage IIA. \par \par --9/10/18 office: R 12:00 2.2X 2.0cm- Invasive mammary carcinoma with medullary features. G2/3, No comedonecrosis, marked lymphatic infiltrate around the tumor. ER/PA/Vue4lpc (-); Ki67 (70%), p53 (-); S100 (focally +); CD3 (+); CD68 (+); CK5/6 D5/16 B4 Squamous and mesothelial cells (+); E-Caderin Epithelial, pronormoblasts (+). \par \par Maunt with stomach cancer, MGM with mouth cancer, Muncle leg cancer. No breast or ovarian cancer.\par CBE: FLORI. s/p right mastectomy. Left neg.  ROM is full but pt and no obvious lymphedema.\par Measurements today.\par Rec pt see her PCP. Rx for PT.  Also Rx for Left mmg and u/s for .

## 2020-11-12 ENCOUNTER — OUTPATIENT (OUTPATIENT)
Dept: OUTPATIENT SERVICES | Facility: HOSPITAL | Age: 40
LOS: 1 days | Discharge: HOME | End: 2020-11-12

## 2020-11-12 DIAGNOSIS — Z90.11 ACQUIRED ABSENCE OF RIGHT BREAST AND NIPPLE: Chronic | ICD-10-CM

## 2020-11-12 DIAGNOSIS — C50.919 MALIGNANT NEOPLASM OF UNSPECIFIED SITE OF UNSPECIFIED FEMALE BREAST: ICD-10-CM

## 2020-11-18 ENCOUNTER — APPOINTMENT (OUTPATIENT)
Dept: HEMATOLOGY ONCOLOGY | Facility: CLINIC | Age: 40
End: 2020-11-18
Payer: MEDICAID

## 2020-11-18 ENCOUNTER — RESULT REVIEW (OUTPATIENT)
Age: 40
End: 2020-11-18

## 2020-11-18 VITALS
HEART RATE: 65 BPM | RESPIRATION RATE: 16 BRPM | DIASTOLIC BLOOD PRESSURE: 73 MMHG | SYSTOLIC BLOOD PRESSURE: 110 MMHG | HEIGHT: 59.84 IN | WEIGHT: 220.46 LBS | OXYGEN SATURATION: 100 % | BODY MASS INDEX: 43.28 KG/M2 | TEMPERATURE: 97.9 F

## 2020-11-18 LAB
BASOPHILS # BLD AUTO: 0.04 K/UL — SIGNIFICANT CHANGE UP (ref 0–0.2)
BASOPHILS NFR BLD AUTO: 0.7 % — SIGNIFICANT CHANGE UP (ref 0–2)
EOSINOPHIL # BLD AUTO: 0.24 K/UL — SIGNIFICANT CHANGE UP (ref 0–0.5)
EOSINOPHIL NFR BLD AUTO: 3.9 % — SIGNIFICANT CHANGE UP (ref 0–6)
HCT VFR BLD CALC: 37.9 % — SIGNIFICANT CHANGE UP (ref 34.5–45)
HGB BLD-MCNC: 11.7 G/DL — SIGNIFICANT CHANGE UP (ref 11.5–15.5)
IMM GRANULOCYTES NFR BLD AUTO: 0.3 % — SIGNIFICANT CHANGE UP (ref 0–1.5)
LYMPHOCYTES # BLD AUTO: 1.44 K/UL — SIGNIFICANT CHANGE UP (ref 1–3.3)
LYMPHOCYTES # BLD AUTO: 23.6 % — SIGNIFICANT CHANGE UP (ref 13–44)
MCHC RBC-ENTMCNC: 25.2 PG — LOW (ref 27–34)
MCHC RBC-ENTMCNC: 30.9 G/DL — LOW (ref 32–36)
MCV RBC AUTO: 81.5 FL — SIGNIFICANT CHANGE UP (ref 80–100)
MONOCYTES # BLD AUTO: 0.36 K/UL — SIGNIFICANT CHANGE UP (ref 0–0.9)
MONOCYTES NFR BLD AUTO: 5.9 % — SIGNIFICANT CHANGE UP (ref 2–14)
NEUTROPHILS # BLD AUTO: 4 K/UL — SIGNIFICANT CHANGE UP (ref 1.8–7.4)
NEUTROPHILS NFR BLD AUTO: 65.6 % — SIGNIFICANT CHANGE UP (ref 43–77)
NRBC # BLD: 0 /100 WBCS — SIGNIFICANT CHANGE UP (ref 0–0)
PLATELET # BLD AUTO: 262 K/UL — SIGNIFICANT CHANGE UP (ref 150–400)
RBC # BLD: 4.65 M/UL — SIGNIFICANT CHANGE UP (ref 3.8–5.2)
RBC # FLD: 15.6 % — HIGH (ref 10.3–14.5)
WBC # BLD: 6.1 K/UL — SIGNIFICANT CHANGE UP (ref 3.8–10.5)
WBC # FLD AUTO: 6.1 K/UL — SIGNIFICANT CHANGE UP (ref 3.8–10.5)

## 2020-11-18 PROCEDURE — 99214 OFFICE O/P EST MOD 30 MIN: CPT

## 2020-11-19 LAB
ALBUMIN SERPL ELPH-MCNC: 4.2 G/DL
ALP BLD-CCNC: 97 U/L
ALT SERPL-CCNC: 18 U/L
ANION GAP SERPL CALC-SCNC: 12 MMOL/L
AST SERPL-CCNC: 23 U/L
BILIRUB SERPL-MCNC: 0.3 MG/DL
BUN SERPL-MCNC: 5 MG/DL
CALCIUM SERPL-MCNC: 9.3 MG/DL
CHLORIDE SERPL-SCNC: 102 MMOL/L
CO2 SERPL-SCNC: 22 MMOL/L
CREAT SERPL-MCNC: 0.4 MG/DL
GLUCOSE SERPL-MCNC: 94 MG/DL
POTASSIUM SERPL-SCNC: 4.4 MMOL/L
PROT SERPL-MCNC: 6.7 G/DL
SODIUM SERPL-SCNC: 137 MMOL/L

## 2020-11-23 ENCOUNTER — APPOINTMENT (OUTPATIENT)
Dept: OBGYN | Facility: CLINIC | Age: 40
End: 2020-11-23
Payer: MEDICAID

## 2020-11-23 VITALS
WEIGHT: 220 LBS | HEIGHT: 59 IN | TEMPERATURE: 98.7 F | BODY MASS INDEX: 44.35 KG/M2 | SYSTOLIC BLOOD PRESSURE: 118 MMHG | DIASTOLIC BLOOD PRESSURE: 78 MMHG

## 2020-11-23 LAB
HCG UR QL: POSITIVE
QUALITY CONTROL: YES

## 2020-11-23 PROCEDURE — 99213 OFFICE O/P EST LOW 20 MIN: CPT

## 2020-11-23 NOTE — PHYSICAL EXAM
[Appropriately responsive] : appropriately responsive [Alert] : alert [No Acute Distress] : no acute distress [No Lymphadenopathy] : no lymphadenopathy [Regular Rate Rhythm] : regular rate rhythm [No Murmurs] : no murmurs [Clear to Auscultation B/L] : clear to auscultation bilaterally [Soft] : soft [Non-tender] : non-tender [Non-distended] : non-distended [No Lesions] : no lesions [No Mass] : no mass [Oriented x3] : oriented x3

## 2020-11-23 NOTE — PLAN
[FreeTextEntry1] : NOB labs\par US for dates\par Refer to MFM for further evaluation and discussion for pregnancy s/p right mastectomy and chemo/radiation therapy\par follow up with Oncology for further discussion on risks for breast cancer recurrence/surveillance \par follow up 4 weeks

## 2020-11-25 ENCOUNTER — ASOB RESULT (OUTPATIENT)
Age: 40
End: 2020-11-25

## 2020-11-25 ENCOUNTER — APPOINTMENT (OUTPATIENT)
Dept: OBGYN | Facility: CLINIC | Age: 40
End: 2020-11-25
Payer: MEDICAID

## 2020-11-25 PROCEDURE — 99072 ADDL SUPL MATRL&STAF TM PHE: CPT

## 2020-11-25 PROCEDURE — 36416 COLLJ CAPILLARY BLOOD SPEC: CPT

## 2020-11-25 PROCEDURE — 76813 OB US NUCHAL MEAS 1 GEST: CPT

## 2020-11-27 LAB
ABO + RH PNL BLD: NORMAL
B19V IGG SER QL IA: 1.71 INDEX
B19V IGG+IGM SER-IMP: NORMAL
B19V IGG+IGM SER-IMP: POSITIVE
B19V IGM FLD-ACNC: 0.11 INDEX
B19V IGM SER-ACNC: NEGATIVE
BACTERIA UR CULT: NORMAL
BLD GP AB SCN SERPL QL: NORMAL
CMV IGG SERPL QL: >10 U/ML
CMV IGG SERPL-IMP: POSITIVE
CMV IGM SERPL QL: <8 AU/ML
CMV IGM SERPL QL: NEGATIVE
HBV SURFACE AG SER QL: NONREACTIVE
HCV AB SER QL: NONREACTIVE
HCV S/CO RATIO: 0.05 S/CO
HGB A MFR BLD: 97.8 %
HGB A2 MFR BLD: 2.2 %
HGB FRACT BLD-IMP: NORMAL
HIV1+2 AB SPEC QL IA.RAPID: NONREACTIVE
MEV IGG FLD QL IA: >300 AU/ML
MEV IGG+IGM SER-IMP: POSITIVE
MEV IGM SER QL: NEGATIVE
RUBV IGG FLD-ACNC: 1.5 INDEX
RUBV IGG SER-IMP: POSITIVE
RUBV IGM FLD-ACNC: <20 AU/ML
T GONDII AB SER-IMP: POSITIVE
T GONDII IGG SER QL: 35.6 IU/ML
T PALLIDUM AB SER QL IA: NEGATIVE
VZV AB TITR SER: POSITIVE
VZV IGG SER IF-ACNC: >4000 INDEX

## 2020-12-02 LAB
ADDENDUM DOC: NORMAL
AFP PNL SERPL: NORMAL
AFP SERPL-ACNC: NORMAL
BP MEAN: NORMAL
CLINICAL BIOCHEMIST REVIEW: NORMAL
EARLY ONSET PREECLAMPSIA: NORMAL
FREE BETA HCG 1ST TRIMESTER: NORMAL
INHIBIN-A 1ST TRIMESTER: NORMAL
Lab: NORMAL
NOTES NTD: NORMAL
NT: NORMAL
PAPP-A SERPL-ACNC: NORMAL
PIGF SER-MCNC: NORMAL
TRISOMY 18/3: NORMAL
UTERINE ARTERY DOPPLER PI (UTAD-PI): NORMAL

## 2020-12-03 ENCOUNTER — NON-APPOINTMENT (OUTPATIENT)
Age: 40
End: 2020-12-03

## 2020-12-03 LAB — CFTR MUT TESTED BLD/T: NEGATIVE

## 2020-12-04 LAB — FMR1 GENE MUT ANL BLD/T: NORMAL

## 2020-12-06 NOTE — ASSESSMENT
[Curative] : Goals of care discussed with patient: Curative [FreeTextEntry1] : In summary, this is a very pleasant 39-year-old Setswana-speaking lady who is diagnosed with T2 N0 stage IIA poorly differentiated ER/[place]/HER-2/rajni NEGATIVE IDC of the right breast during pregnancy.  A CXR and abdominal sono didn’t show distant mets.  BRCA negative. She is s/p sequential Adriamycin x 4 and Cytoxan x 4 in second and third trimester of pregnancy. Delivered healthy baby and started Taxol x 12 w. ddACT completed 8/9/2019. \par \par - Breast ca: She has completed adjuvant chemo for breast ca. She is s/p RT. Doing well. No sx of recurrence\par - 9/8/2020 BONE SCAN No radionuclide evidence of osseous metastases.\par - Post mastectomy pain -  Start gabapentin 300 mg HS \par - Mild LFT 2/2 Taxol- Resolved\par - No issues with neuropathy or lymphedema 2/2 treatment\par - Continue annual breast imaging Done 8/2020\par -  Encourage healthy diet and exercise.\par - Low Vit D/deficiency, continue Vit D 5000IU daily will repeat level in 3 months\par - Continue followup with primary care. I recommend age-appropriate malignancy screening\par -Bilateral knee stiffness monitor symptoms,encouraged weight reduction/exercise\par - Educated about s/sx of recurrence\par \par RTC q3m or sooner if any new sx\par D/w and seen in collaboration with Dr. Viridiana Patrick\par

## 2020-12-06 NOTE — HISTORY OF PRESENT ILLNESS
[de-identified] : Ms. EVANGELISTA HESTER  is a 39 year old female here for an evaluation of breast cancer. Her oncologic history is as follows:\par \par She felt a right breast mass in 2018 and was evaluated seen by GYN who sent her for breast imaging. She underwent diagnostic breast imaging on 18 which showed at 12:00 position of right breast there is a lobulated focus, measuring 2.0 x 2.0 x 2.2 cm 2 cm FN. 4 mm deep to the skin. Left breast benign. No enlarged axillary lymph nodes. Bx recommended. 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 There is marked lymphocytic infiltrate around the tumor. She was scheduled for MRI but found out that she was pregnant. Current gestation age at the time of initial consult: 11 weeks\par \par 10/23/18\par She is here with her . We discussed the diagnosis of rapidly growing triple negative breast ca, the size has increased since diagnosis and that chemotherapy will be needed to treat this cancer which can not be started until she is 14 weeks. They want to keep the pregnancy understanding that it will cause delay in treatment and potential risk to the patient and fetus. We discussed that lumpectomy will have issues with positive margins and RT will be contraindicated during to pregnancy and therefore u/l mastectomy is recommend. I discussed with Dr Granger that expander placement at the time of surgery will be an option. She is seeing the pt tomorrow and will discuss surgery and reconstruction options with her. She also saw Dr Eden Patrick med onc for a a second opinion. \par \par s/p surgery (right mastectomy) 18 , 4.5 cm tumor and sentinel node negative- path report d/w her in detail. Tumor grew more than double in size between dx and surgery and is s/o aggressive tumor. Discussed to start chemo 18. S/e of chemo jody during pregnancy, expected and unexpected, pertaining to her and the fetus d/w her. Discussed to see MFM b/w chemo. Also a candidate for PMRT due to large tumor size. LMP 18\par \par I had extensive discussion with the patient and spouse regarding breast cancer in pregnancy and the treatment options that are safe for both the mother and the growing fetus. She refused termination of pregnancy after finding out cancer diagnosis.  We discussed that all treatments including chemotherapy and surgery are contraindicated in first trimester but are considered relatively safe in second and third trimester.  We have discussed medical termination of pregnancy as an option but patient decided to keep the pregnancy. We reviewed that the largest experience in pregnancy has been with anthracycline and alkylating agent chemotherapy. She will be a candidate for chemotherapy with Adriamycin plus cyclophosphamide in a sequential dose dense manner. We reviewed data from [place] registry which showed Neulasta is reasonably safe to use in 2nd and third trimester although it was a very small dataset. . There is not enough data to use taxanes in pregnancy therefore Taxol will be administered post partum. Potential s/e to the patient and growing fetus were discussed with the patient and  in detail via a . The patient understood all the potential side effects and signed an informed consent after discussing the risks, benefits and alternatives.\par \par \par PLAN: Starting 18 ( 17 weeks), She will receive adriamycin every 2 weeks x 4 cycle followed by Cytoxan every 2 weeks x 4 cycles. We plan to hold chemotherapy 4 weeks prior to delivery to prevent  neutropenic complications. Currently she is planned for elective induction (NVD) at 37 weeks. We would start Taxol post partum\par \par PREMEDS/SUPPORTIVE CARE: Zofran ativan and dex are listed as part of prechemo antiemetic regimen in NCCN guidelines for breast cancer in pregnancy. The use of Emend is controversial in pregnancy. We discussed the issue of port placement in the OR, but that would increase anesthesia time by 45 min therefore we would treat her peripherally and likely do port placement after delivery. We discussed the issue of preeclampsia or gestational diabetes with steroids. She will be monitored closely by MFM. She will see MFM between every chemo session for close fetal monitoring. \par \par I explained her that EOD work up is limited due to risk of radiation exposure to the growing fetus. We plan to perform CT scans or a PET/CT scan after delivery. She will also be a candidate for PMRT due to large tumor size. \par \par S/p infection with shigella and c-diff after Adriamycin c#2, confirmed with cultures and PCR x 2, was hospitalized for diarrhea and abd pain, treated with ampicillin and vanco,  Ampicillin completed yesterday. Vanco 2 more days left. Patient reports mom had Shigella 2 years ago, Mom has come in to help her 2 months prior but is not ill. She denies sick contacts. \par \par She was hospitalized for 5 days after Cytoxan Omi # 2 due to diarrhea. Infectious work up was negative.Completed vancomycin course prior to last treatment. Sx were controlled with supportive care. She feels back to normal now. No abd pain, NVD, fevers.  No Bone pain, no neuropathy.\par \par We discussed the plan to give C4 in 2 weeks and deliver at around 37 weeks. She will need Taxol q2w x 4 doses after delivery. We will perform CT scans after the baby is born.\par \par CT chest/ abd/ pelvis: 2019: normal\par Bone Scan: 19: Normal, mild degenerative Dz. [de-identified] : Ms. EVANGELISTA RUBIO is here for f/u for  triple negative right breast cancer dx in 10/2018 during pregnancy. She completed sequential AC during second and trimester of pregnancy. Taxol completed after delivery. This is a routine f/u. \par She is s/p RT and has mild cording. She is planning to start PT. Neuropathy has improved. Appetite and energy good, wt stable. No diarrhea. Vit D levels continue to be low Patient reports 3/2020 lab results from GYN vit D 10.1 Compliant with Vit D 03537 IU weekly from 2/7/20-4/3/20. C/o stiffness to knees bilaterally after sitting for prolonged periods,\par LMP 8/2020.  got vasectomy.\par Left breast imaging 8/2020 BIRADS 1\par ROS neg\par 9/8/2020 BONE SCAN  No radionuclide evidence of osseous metastases. No significant interval change since the previous study of 5/20/2019.

## 2020-12-06 NOTE — PHYSICAL EXAM
[Fully active, able to carry on all pre-disease performance without restriction] : Status 0 - Fully active, able to carry on all pre-disease performance without restriction [Normal] : affect appropriate [de-identified] : S/p right mastectomy healed well No CW or axillary nodes + RT changes . Left breast normal

## 2020-12-06 NOTE — REASON FOR VISIT
[Follow-Up Visit] : a follow-up [Other: _____] : [unfilled] [Patient Declined  Services] : - None: Patient declined  services [FreeTextEntry2] : Breast cancer in pregnancy TRIPLE NEGATIVE [FreeTextEntry3] : Casie RIOS translating [TWNoteComboBox1] : Afghan

## 2020-12-07 ENCOUNTER — APPOINTMENT (OUTPATIENT)
Dept: OBGYN | Facility: CLINIC | Age: 40
End: 2020-12-07
Payer: MEDICAID

## 2020-12-07 ENCOUNTER — NON-APPOINTMENT (OUTPATIENT)
Age: 40
End: 2020-12-07

## 2020-12-07 PROCEDURE — 59426 ANTEPARTUM CARE ONLY: CPT

## 2020-12-07 PROCEDURE — 99072 ADDL SUPL MATRL&STAF TM PHE: CPT

## 2020-12-07 PROCEDURE — 0500F INITIAL PRENATAL CARE VISIT: CPT

## 2020-12-08 ENCOUNTER — APPOINTMENT (OUTPATIENT)
Dept: MATERNAL FETAL MEDICINE | Facility: CLINIC | Age: 40
End: 2020-12-08
Payer: MEDICAID

## 2020-12-08 ENCOUNTER — APPOINTMENT (OUTPATIENT)
Dept: ANTEPARTUM | Facility: CLINIC | Age: 40
End: 2020-12-08

## 2020-12-08 VITALS
OXYGEN SATURATION: 99 % | SYSTOLIC BLOOD PRESSURE: 104 MMHG | RESPIRATION RATE: 18 BRPM | BODY MASS INDEX: 44.55 KG/M2 | HEART RATE: 60 BPM | DIASTOLIC BLOOD PRESSURE: 76 MMHG | WEIGHT: 221 LBS | HEIGHT: 59 IN

## 2020-12-08 DIAGNOSIS — L58.9 RADIODERMATITIS, UNSPECIFIED: ICD-10-CM

## 2020-12-08 DIAGNOSIS — Z85.3 PERSONAL HISTORY OF MALIGNANT NEOPLASM OF BREAST: ICD-10-CM

## 2020-12-08 DIAGNOSIS — T45.1X5A DRUG-INDUCED POLYNEUROPATHY: ICD-10-CM

## 2020-12-08 DIAGNOSIS — R70.0 ELEVATED ERYTHROCYTE SEDIMENTATION RATE: ICD-10-CM

## 2020-12-08 DIAGNOSIS — T45.1X5A TOXIC GASTROENTERITIS AND COLITIS: ICD-10-CM

## 2020-12-08 DIAGNOSIS — R82.90 UNSPECIFIED ABNORMAL FINDINGS IN URINE: ICD-10-CM

## 2020-12-08 DIAGNOSIS — Z86.2 PERSONAL HISTORY OF DISEASES OF THE BLOOD AND BLOOD-FORMING ORGANS AND CERTAIN DISORDERS INVOLVING THE IMMUNE MECHANISM: ICD-10-CM

## 2020-12-08 DIAGNOSIS — Z13.79 ENCOUNTER FOR OTHER SCREENING FOR GENETIC AND CHROMOSOMAL ANOMALIES: ICD-10-CM

## 2020-12-08 DIAGNOSIS — D64.81 ANEMIA DUE TO ANTINEOPLASTIC CHEMOTHERAPY: ICD-10-CM

## 2020-12-08 DIAGNOSIS — Z17.1 PERSONAL HISTORY OF MALIGNANT NEOPLASM OF BREAST: ICD-10-CM

## 2020-12-08 DIAGNOSIS — R92.2 INCONCLUSIVE MAMMOGRAM: ICD-10-CM

## 2020-12-08 DIAGNOSIS — T45.1X5A NAUSEA: ICD-10-CM

## 2020-12-08 DIAGNOSIS — R74.8 ABNORMAL LEVELS OF OTHER SERUM ENZYMES: ICD-10-CM

## 2020-12-08 DIAGNOSIS — M89.8X9 OTHER SPECIFIED DISORDERS OF BONE, UNSPECIFIED SITE: ICD-10-CM

## 2020-12-08 DIAGNOSIS — Z87.440 PERSONAL HISTORY OF URINARY (TRACT) INFECTIONS: ICD-10-CM

## 2020-12-08 DIAGNOSIS — Z86.19 PERSONAL HISTORY OF OTHER INFECTIOUS AND PARASITIC DISEASES: ICD-10-CM

## 2020-12-08 DIAGNOSIS — Z87.898 PERSONAL HISTORY OF OTHER SPECIFIED CONDITIONS: ICD-10-CM

## 2020-12-08 DIAGNOSIS — Z79.899 OTHER LONG TERM (CURRENT) DRUG THERAPY: ICD-10-CM

## 2020-12-08 DIAGNOSIS — B37.2 CANDIDIASIS OF SKIN AND NAIL: ICD-10-CM

## 2020-12-08 DIAGNOSIS — K52.1 TOXIC GASTROENTERITIS AND COLITIS: ICD-10-CM

## 2020-12-08 DIAGNOSIS — Z87.42 PERSONAL HISTORY OF OTHER DISEASES OF THE FEMALE GENITAL TRACT: ICD-10-CM

## 2020-12-08 DIAGNOSIS — R07.89 OTHER CHEST PAIN: ICD-10-CM

## 2020-12-08 DIAGNOSIS — Z86.32 PERSONAL HISTORY OF GESTATIONAL DIABETES: ICD-10-CM

## 2020-12-08 DIAGNOSIS — M25.561 PAIN IN RIGHT KNEE: ICD-10-CM

## 2020-12-08 DIAGNOSIS — A03.9 SHIGELLOSIS, UNSPECIFIED: ICD-10-CM

## 2020-12-08 DIAGNOSIS — Z36.9 ENCOUNTER FOR ANTENATAL SCREENING, UNSPECIFIED: ICD-10-CM

## 2020-12-08 DIAGNOSIS — R73.03 PREDIABETES.: ICD-10-CM

## 2020-12-08 DIAGNOSIS — Z11.3 ENCOUNTER FOR SCREENING FOR INFECTIONS WITH A PREDOMINANTLY SEXUAL MODE OF TRANSMISSION: ICD-10-CM

## 2020-12-08 DIAGNOSIS — Z3A.13 13 WEEKS GESTATION OF PREGNANCY: ICD-10-CM

## 2020-12-08 DIAGNOSIS — A04.72 ENTEROCOLITIS DUE TO CLOSTRIDIUM DIFFICILE, NOT SPECIFIED AS RECURRENT: ICD-10-CM

## 2020-12-08 DIAGNOSIS — R82.71 BACTERIURIA: ICD-10-CM

## 2020-12-08 DIAGNOSIS — Z86.39 PERSONAL HISTORY OF OTHER ENDOCRINE, NUTRITIONAL AND METABOLIC DISEASE: ICD-10-CM

## 2020-12-08 DIAGNOSIS — M25.662 STIFFNESS OF RIGHT KNEE, NOT ELSEWHERE CLASSIFIED: ICD-10-CM

## 2020-12-08 DIAGNOSIS — M25.511 PAIN IN RIGHT SHOULDER: ICD-10-CM

## 2020-12-08 DIAGNOSIS — G89.18 OTHER ACUTE POSTPROCEDURAL PAIN: ICD-10-CM

## 2020-12-08 DIAGNOSIS — R79.89 OTHER SPECIFIED ABNORMAL FINDINGS OF BLOOD CHEMISTRY: ICD-10-CM

## 2020-12-08 DIAGNOSIS — T45.1X5A ADVERSE EFFECT OF ANTINEOPLASTIC AND IMMUNOSUPPRESSIVE DRUGS, INITIAL ENCOUNTER: ICD-10-CM

## 2020-12-08 DIAGNOSIS — Z87.39 PERSONAL HISTORY OF OTHER DISEASES OF THE MUSCULOSKELETAL SYSTEM AND CONNECTIVE TISSUE: ICD-10-CM

## 2020-12-08 DIAGNOSIS — A09 INFECTIOUS GASTROENTERITIS AND COLITIS, UNSPECIFIED: ICD-10-CM

## 2020-12-08 DIAGNOSIS — Z34.92 ENCOUNTER FOR SUPERVISION OF NORMAL PREGNANCY, UNSPECIFIED, SECOND TRIMESTER: ICD-10-CM

## 2020-12-08 DIAGNOSIS — N92.4 EXCESSIVE BLEEDING IN THE PREMENOPAUSAL PERIOD: ICD-10-CM

## 2020-12-08 DIAGNOSIS — Z12.4 ENCOUNTER FOR SCREENING FOR MALIGNANT NEOPLASM OF CERVIX: ICD-10-CM

## 2020-12-08 DIAGNOSIS — R11.0 NAUSEA: ICD-10-CM

## 2020-12-08 DIAGNOSIS — G62.0 DRUG-INDUCED POLYNEUROPATHY: ICD-10-CM

## 2020-12-08 DIAGNOSIS — R43.2 PARAGEUSIA: ICD-10-CM

## 2020-12-08 DIAGNOSIS — T45.1X5A ANEMIA DUE TO ANTINEOPLASTIC CHEMOTHERAPY: ICD-10-CM

## 2020-12-08 DIAGNOSIS — Z64.1 PROBLEMS RELATED TO MULTIPARITY: ICD-10-CM

## 2020-12-08 DIAGNOSIS — M25.661 STIFFNESS OF RIGHT KNEE, NOT ELSEWHERE CLASSIFIED: ICD-10-CM

## 2020-12-08 PROCEDURE — 99215 OFFICE O/P EST HI 40 MIN: CPT

## 2020-12-08 PROCEDURE — 99072 ADDL SUPL MATRL&STAF TM PHE: CPT

## 2020-12-08 RX ORDER — GABAPENTIN 300 MG/1
300 CAPSULE ORAL
Qty: 30 | Refills: 1 | Status: DISCONTINUED | COMMUNITY
Start: 2020-09-03 | End: 2020-12-08

## 2020-12-08 RX ORDER — CHROMIUM 200 MCG
TABLET ORAL
Refills: 0 | Status: DISCONTINUED | COMMUNITY
End: 2020-12-08

## 2020-12-08 RX ORDER — ERGOCALCIFEROL 1.25 MG/1
1.25 MG CAPSULE, LIQUID FILLED ORAL
Qty: 8 | Refills: 0 | Status: DISCONTINUED | COMMUNITY
Start: 2019-10-22 | End: 2020-12-08

## 2020-12-08 RX ORDER — ERGOCALCIFEROL 1.25 MG/1
1.25 MG CAPSULE, LIQUID FILLED ORAL
Qty: 8 | Refills: 0 | Status: DISCONTINUED | COMMUNITY
Start: 2020-09-03 | End: 2020-12-08

## 2020-12-08 RX ORDER — ASPIRIN 81 MG/1
81 TABLET, CHEWABLE ORAL
Qty: 30 | Refills: 3 | Status: ACTIVE | COMMUNITY
Start: 2020-12-08 | End: 1900-01-01

## 2020-12-08 NOTE — OB HISTORY
[[facility]] : [facility] [___] : pregnancy complications occured [Spontaneous] : Spontaneous conception [Sonogram] : sonogram [at ___ wks] : at [unfilled] weeks [Definite:  ___ (Date)] : the last menstrual period was [unfilled] [Pregnancy History] : patient did not receive anesthesia [FreeTextEntry1] : Her prenatal record was not available for review.

## 2020-12-08 NOTE — DISCUSSION/SUMMARY
[FreeTextEntry1] : She is 13 weeks and one day gestation by early ultrasound dating.\par \par She is overweight and obesity has been associated with a number of maternal complications such as gestational diabetes, pre-eclampsia, thrombophlebitis, labor abnormalities, post-term pregnancies,  delivery, and operative complications. Obesity has been associated with adverse fetal outcomes such as late stillbirth and  deliveries.  Obese women also have a two to three-fold increased incidence in congenital anomalies. \par \par Regarding her advanced maternal age, I informed her of the association between advanced maternal age and fetal chromosomal disorders such as Down syndrome and structural birth defects. She was told that all pregnancies have a 2 to 3% risk of having a baby born with a birth defect, and a 1 to 2 % risk of having a baby born with developmental problems that cannot be diagnosed during pregnancy. I told her that there are two types of birth defects, structural and chromosomal. She was made aware that prenatal diagnosis is available to determine whether the fetus she is carrying has normal or abnormal chromosomes,and normal or abnormal anatomy. I discussed the various screening and diagnostic tests used for prenatal diagnosis.  I explained the difference between screening and diagnostic tests.  She was offered diagnostic testing with either chorionic villus sampling or genetic amniocentesis.  She was made aware of the small risk of miscarriage associated with the diagnostic procedures, the limitations of the procedures, and the alternative of not having the procedures.  All of her questions were answered. She decided not to have a diagnostic procedure at this time. She told me that she had a First Trimester screen test done approximately 2 weeks ago. She was offered a referral for genetic counseling and consented to have genetic counseling. She was advised to have a detailed fetal anatomy ultrasound examination at approximately 20 weeks of gestation. She was given a referral to have genetic counseling using telehealth. She was scheduled to have a comprehensive fetal anatomy ultrasound examination in 7 weeks.\par \par I told her that advanced maternal age has been associated with a higher incidence of gestational diabetes, and preeclampsia /eclampsia. She should be closely monitored for these conditions during pregnancy.  I also told her that advanced maternal age has been associated with an increased risk of stillbirth, and therefore, I recommend delivery during the 39 week of gestation in the event she has not given birth by 39 weeks of gestation. \par \par She told me that she was diagnosed with vitamin D deficiency last year. The vitamin D level done on 10/18/20 was 9.0 ng/mL. She is taking vitamin D supplementation daily. Her last vitamin D level done on 20 was 22.0 ng/mL.  I told her that vitamin D deficiency during pregnancy has been associated with an increased risk for gestational diabetes, pre-eclampsia, small for gestational age infants, low birth weight infants, bacterial vaginosis, and  births.  She was advised to continue taking the daily vitamin D supplementation.\par \par She has various risk factors for having preeclampsia during this pregnancy.  I told her that taking a baby aspirin during pregnancy has been found to reduce the frequency of preeclampsia,  delivery, and growth restricted infants. She was advised to take one baby aspirin daily. \par \par She is multiparous and we discussed the various methods available for contraception after delivery. She told me that her  had a vasectomy, and yet she became pregnant. She told me that now she wants to have an operative sterilization procedure after she gives birth. \par \par She had breast cancer during her last pregnancy in 2018.  She had a right breast mastectomy during the pregnancy. The tumor was ER negative, HI negative and HER-2/rajni negative. She received chemotherapy during pregnancy. She also had postpartum chemotherapy and radiation. Her cancer treatment has been completed. She states that her cancer is in remission. I told her that breast cancer survivors who subsequently become pregnant are at risk for having pregnancy complications. I told her that a systemic review and meta-analysis of obstetrical outcomes in women with a history of breast cancer and subsequent pregnancies reported an increased risk for  birth, low birthweight infants,  deliveries, fetal demise, and birth defects when compared to control groups (Breast Cancer Res Treat. 2019; 178(3): 485 - 492). Regarding the effect of pregnancy on recurrence, I told her that subsequent pregnancies do not appear to affect recurrence irrespective of estrogen receptor status when compared to a non-pregnant control group (J Natl Cancer Inst. 2018; 110(4): 426 - 429). The reported data suggests that pregnancy should not be discouraged after breast cancer, even in women with history of estrogen receptor positive disease. This study suggested that timing of pregnancy does not appear to to have a major impact on disease free survival. Previously, a trend toward increased risk of recurrence for patients conceiving 6 and 12 months after diagnosis was reported. This study also reported no impact of breastfeeding on disease free survival. Therefore, breastfeeding after breast cancer appears to be safe.  \par \par I recommend a glucola challenge test to screen for gestational diabetes between 24 and 28 weeks gestation. I also recommend the Tdap vaccine between 27 and 36 weeks of gestation to prevent pertussis infection in the  infant. I recommend the flu vaccine during flu season (October through May). She should have serial ultrasounds to evaluate fetal growth and development.  I also suggest fetal surveillance during the third trimester of pregnancy with weekly NSTs or BPPs starting at 36 weeks gestation.  She can also perform daily fetal movement counts as an adjunct to the NSTs or BPPs.\par

## 2020-12-08 NOTE — SURGICAL HISTORY
[Fibroids] : fibroids [Last Pap: ___] : Last Pap: [unfilled] [Last Mammo: ___] : Last Mammo: [unfilled] [Abn Paps] : no abnormal pap smears [Breast Disease] : no breast disease [STI's] : no STI's [Infertility] : no infertility [Cysts] : no cysts [OC Use] : no OC use

## 2020-12-08 NOTE — FAMILY HISTORY
[Age 35+ During Pregnancy] : not 35 or over during pregnancy [Reported Family History Of Birth Defects] : no neural tube defect [Fernando-Sachs Carrier] : no Fernando-Sachs [Family History] : no mental retardation/autism [Reported Family History Of Genetic Disease] : no history of child defect in child of baby father [FreeTextEntry1] : 3 of patients 4 children have Long QT

## 2020-12-08 NOTE — VITALS
[US Date: ___] : ultrasound performed on [unfilled]. [GA= ___ Weeks] : Results were GA of [unfilled] weeks [GA= ___ Days] : and [unfilled] day(s) [ABHIJEET by US (date): ___] : The calculated ABHIJEET by US is [unfilled] [By US] : this is the final ABHIJEET

## 2020-12-08 NOTE — CHIEF COMPLAINT
[G ___] : G [unfilled] [P ___] : P [unfilled] [de-identified] : having a history of breast cancer during her prior pregnancy in 2018

## 2020-12-10 ENCOUNTER — NON-APPOINTMENT (OUTPATIENT)
Age: 40
End: 2020-12-10

## 2020-12-10 LAB
C TRACH RRNA SPEC QL NAA+PROBE: NOT DETECTED
LEAD BLD-MCNC: 1 UG/DL
M TB IFN-G BLD-IMP: NEGATIVE
N GONORRHOEA RRNA SPEC QL NAA+PROBE: NOT DETECTED
QUANTIFERON TB PLUS MITOGEN MINUS NIL: 5.5 IU/ML
QUANTIFERON TB PLUS NIL: 0.01 IU/ML
QUANTIFERON TB PLUS TB1 MINUS NIL: 0 IU/ML
QUANTIFERON TB PLUS TB2 MINUS NIL: 0.01 IU/ML
SOURCE TP AMPLIFICATION: NORMAL

## 2020-12-14 ENCOUNTER — NON-APPOINTMENT (OUTPATIENT)
Age: 40
End: 2020-12-14

## 2020-12-14 LAB
CLARIM 15Q11.2: NORMAL
CLARIM 1P36: NORMAL
CLARIM 22Q11.2: NORMAL
CLARIM 4P-/WOLF-HIRSCHHORN: NORMAL
CLARIM 5P-/CRI DU CHAT: NORMAL
CLARIM ADDITIONAL INFO: NORMAL
CLARIM CHROMOSOME 13: NORMAL
CLARIM CHROMOSOME 18: NORMAL
CLARIM CHROMOSOME 21: NORMAL
CLARIM SEX CHROMOSOMES: NORMAL
CLARITEST NIPT W/MICRO: NORMAL
CYTOLOGY CVX/VAG DOC THIN PREP: NORMAL

## 2020-12-18 ENCOUNTER — APPOINTMENT (OUTPATIENT)
Dept: MATERNAL FETAL MEDICINE | Facility: CLINIC | Age: 40
End: 2020-12-18
Payer: MEDICAID

## 2020-12-18 ENCOUNTER — ASOB RESULT (OUTPATIENT)
Age: 40
End: 2020-12-18

## 2020-12-18 PROCEDURE — 99212 OFFICE O/P EST SF 10 MIN: CPT | Mod: 95

## 2020-12-31 ENCOUNTER — NON-APPOINTMENT (OUTPATIENT)
Age: 40
End: 2020-12-31

## 2021-01-04 ENCOUNTER — APPOINTMENT (OUTPATIENT)
Dept: OBGYN | Facility: CLINIC | Age: 41
End: 2021-01-04
Payer: MEDICAID

## 2021-01-04 ENCOUNTER — NON-APPOINTMENT (OUTPATIENT)
Age: 41
End: 2021-01-04

## 2021-01-04 ENCOUNTER — LABORATORY RESULT (OUTPATIENT)
Age: 41
End: 2021-01-04

## 2021-01-04 VITALS
HEIGHT: 59 IN | DIASTOLIC BLOOD PRESSURE: 78 MMHG | BODY MASS INDEX: 45.56 KG/M2 | SYSTOLIC BLOOD PRESSURE: 130 MMHG | WEIGHT: 226 LBS

## 2021-01-04 PROCEDURE — 0502F SUBSEQUENT PRENATAL CARE: CPT

## 2021-01-07 LAB
ALBUMIN SERPL ELPH-MCNC: 3.6 G/DL
ALP BLD-CCNC: 96 U/L
ALT SERPL-CCNC: 9 U/L
ANION GAP SERPL CALC-SCNC: 16 MMOL/L
AST SERPL-CCNC: 13 U/L
BASOPHILS # BLD AUTO: 0.03 K/UL
BASOPHILS NFR BLD AUTO: 0.4 %
BILIRUB SERPL-MCNC: <0.2 MG/DL
BILIRUB UR QL STRIP: NORMAL
BUN SERPL-MCNC: 7 MG/DL
CALCIUM SERPL-MCNC: 9.4 MG/DL
CHLORIDE SERPL-SCNC: 103 MMOL/L
CLARITY UR: CLEAR
CO2 SERPL-SCNC: 20 MMOL/L
COLLECTION METHOD: NORMAL
CREAT SERPL-MCNC: 0.65 MG/DL
EOSINOPHIL # BLD AUTO: 0.1 K/UL
EOSINOPHIL NFR BLD AUTO: 1.3 %
GLUCOSE SERPL-MCNC: 60 MG/DL
GLUCOSE UR-MCNC: NORMAL
HCG UR QL: 0.2 EU/DL
HCT VFR BLD CALC: 33.8 %
HGB BLD-MCNC: 10.2 G/DL
HGB UR QL STRIP.AUTO: NORMAL
HPV HIGH+LOW RISK DNA PNL CVX: NOT DETECTED
IMM GRANULOCYTES NFR BLD AUTO: 0.4 %
KETONES UR-MCNC: NORMAL
LEUKOCYTE ESTERASE UR QL STRIP: NORMAL
LYMPHOCYTES # BLD AUTO: 1.49 K/UL
LYMPHOCYTES NFR BLD AUTO: 18.8 %
MAN DIFF?: NORMAL
MCHC RBC-ENTMCNC: 25.7 PG
MCHC RBC-ENTMCNC: 30.2 GM/DL
MCV RBC AUTO: 85.1 FL
MONOCYTES # BLD AUTO: 0.54 K/UL
MONOCYTES NFR BLD AUTO: 6.8 %
NEUTROPHILS # BLD AUTO: 5.74 K/UL
NEUTROPHILS NFR BLD AUTO: 72.3 %
NITRITE UR QL STRIP: NORMAL
PH UR STRIP: 7
PLATELET # BLD AUTO: 307 K/UL
POTASSIUM SERPL-SCNC: 4.8 MMOL/L
PROT SERPL-MCNC: 6.4 G/DL
PROT UR STRIP-MCNC: NORMAL
RBC # BLD: 3.97 M/UL
RBC # FLD: 16.8 %
SODIUM SERPL-SCNC: 138 MMOL/L
SP GR UR STRIP: 1.02
WBC # FLD AUTO: 7.93 K/UL

## 2021-01-14 LAB — AR GENE MUT ANL BLD/T: NORMAL

## 2021-01-22 ENCOUNTER — NON-APPOINTMENT (OUTPATIENT)
Age: 41
End: 2021-01-22

## 2021-01-25 ENCOUNTER — APPOINTMENT (OUTPATIENT)
Dept: OBGYN | Facility: CLINIC | Age: 41
End: 2021-01-25
Payer: MEDICAID

## 2021-01-25 ENCOUNTER — NON-APPOINTMENT (OUTPATIENT)
Age: 41
End: 2021-01-25

## 2021-01-25 VITALS — TEMPERATURE: 97.3 F

## 2021-01-25 VITALS
HEIGHT: 59 IN | BODY MASS INDEX: 45.96 KG/M2 | DIASTOLIC BLOOD PRESSURE: 62 MMHG | WEIGHT: 228 LBS | SYSTOLIC BLOOD PRESSURE: 100 MMHG | TEMPERATURE: 97.3 F

## 2021-01-25 LAB
BILIRUB UR QL STRIP: NORMAL
CLARITY UR: CLEAR
COLLECTION METHOD: NORMAL
GLUCOSE UR-MCNC: NORMAL
HCG UR QL: 0.2 EU/DL
HGB UR QL STRIP.AUTO: NORMAL
KETONES UR-MCNC: NORMAL
LEUKOCYTE ESTERASE UR QL STRIP: NORMAL
NITRITE UR QL STRIP: NORMAL
PH UR STRIP: 6
PROT UR STRIP-MCNC: NORMAL
SP GR UR STRIP: >=1.03

## 2021-01-25 PROCEDURE — 0502F SUBSEQUENT PRENATAL CARE: CPT

## 2021-01-27 ENCOUNTER — APPOINTMENT (OUTPATIENT)
Dept: OBGYN | Facility: CLINIC | Age: 41
End: 2021-01-27

## 2021-01-27 ENCOUNTER — NON-APPOINTMENT (OUTPATIENT)
Age: 41
End: 2021-01-27

## 2021-01-29 ENCOUNTER — NON-APPOINTMENT (OUTPATIENT)
Age: 41
End: 2021-01-29

## 2021-01-29 ENCOUNTER — APPOINTMENT (OUTPATIENT)
Dept: OBGYN | Facility: CLINIC | Age: 41
End: 2021-01-29

## 2021-02-02 ENCOUNTER — APPOINTMENT (OUTPATIENT)
Dept: ANTEPARTUM | Facility: CLINIC | Age: 41
End: 2021-02-02
Payer: MEDICAID

## 2021-02-02 ENCOUNTER — ASOB RESULT (OUTPATIENT)
Age: 41
End: 2021-02-02

## 2021-02-02 PROCEDURE — 76811 OB US DETAILED SNGL FETUS: CPT

## 2021-02-02 PROCEDURE — 99072 ADDL SUPL MATRL&STAF TM PHE: CPT

## 2021-02-04 ENCOUNTER — NON-APPOINTMENT (OUTPATIENT)
Age: 41
End: 2021-02-04

## 2021-02-20 ENCOUNTER — OUTPATIENT (OUTPATIENT)
Dept: OUTPATIENT SERVICES | Facility: HOSPITAL | Age: 41
LOS: 1 days | Discharge: HOME | End: 2021-02-20

## 2021-02-20 DIAGNOSIS — Z90.11 ACQUIRED ABSENCE OF RIGHT BREAST AND NIPPLE: Chronic | ICD-10-CM

## 2021-02-20 DIAGNOSIS — C50.919 MALIGNANT NEOPLASM OF UNSPECIFIED SITE OF UNSPECIFIED FEMALE BREAST: ICD-10-CM

## 2021-02-24 ENCOUNTER — NON-APPOINTMENT (OUTPATIENT)
Age: 41
End: 2021-02-24

## 2021-02-24 ENCOUNTER — APPOINTMENT (OUTPATIENT)
Dept: HEMATOLOGY ONCOLOGY | Facility: CLINIC | Age: 41
End: 2021-02-24
Payer: MEDICAID

## 2021-02-24 ENCOUNTER — RESULT REVIEW (OUTPATIENT)
Age: 41
End: 2021-02-24

## 2021-02-24 VITALS
DIASTOLIC BLOOD PRESSURE: 77 MMHG | RESPIRATION RATE: 16 BRPM | BODY MASS INDEX: 46.53 KG/M2 | OXYGEN SATURATION: 99 % | TEMPERATURE: 97.4 F | SYSTOLIC BLOOD PRESSURE: 121 MMHG | WEIGHT: 230.82 LBS | HEART RATE: 82 BPM | HEIGHT: 58.98 IN

## 2021-02-24 LAB
BASOPHILS # BLD AUTO: 0.04 K/UL — SIGNIFICANT CHANGE UP (ref 0–0.2)
BASOPHILS NFR BLD AUTO: 0.5 % — SIGNIFICANT CHANGE UP (ref 0–2)
EOSINOPHIL # BLD AUTO: 0.24 K/UL — SIGNIFICANT CHANGE UP (ref 0–0.5)
EOSINOPHIL NFR BLD AUTO: 3.3 % — SIGNIFICANT CHANGE UP (ref 0–6)
HCT VFR BLD CALC: 33.1 % — LOW (ref 34.5–45)
HGB BLD-MCNC: 10.7 G/DL — LOW (ref 11.5–15.5)
IMM GRANULOCYTES NFR BLD AUTO: 0.5 % — SIGNIFICANT CHANGE UP (ref 0–1.5)
LYMPHOCYTES # BLD AUTO: 1.46 K/UL — SIGNIFICANT CHANGE UP (ref 1–3.3)
LYMPHOCYTES # BLD AUTO: 20.1 % — SIGNIFICANT CHANGE UP (ref 13–44)
MCHC RBC-ENTMCNC: 26.7 PG — LOW (ref 27–34)
MCHC RBC-ENTMCNC: 32.3 G/DL — SIGNIFICANT CHANGE UP (ref 32–36)
MCV RBC AUTO: 82.5 FL — SIGNIFICANT CHANGE UP (ref 80–100)
MONOCYTES # BLD AUTO: 0.46 K/UL — SIGNIFICANT CHANGE UP (ref 0–0.9)
MONOCYTES NFR BLD AUTO: 6.3 % — SIGNIFICANT CHANGE UP (ref 2–14)
NEUTROPHILS # BLD AUTO: 5.04 K/UL — SIGNIFICANT CHANGE UP (ref 1.8–7.4)
NEUTROPHILS NFR BLD AUTO: 69.3 % — SIGNIFICANT CHANGE UP (ref 43–77)
NRBC # BLD: 0 /100 WBCS — SIGNIFICANT CHANGE UP (ref 0–0)
PLATELET # BLD AUTO: 286 K/UL — SIGNIFICANT CHANGE UP (ref 150–400)
RBC # BLD: 4.01 M/UL — SIGNIFICANT CHANGE UP (ref 3.8–5.2)
RBC # FLD: 15.7 % — HIGH (ref 10.3–14.5)
WBC # BLD: 7.28 K/UL — SIGNIFICANT CHANGE UP (ref 3.8–10.5)
WBC # FLD AUTO: 7.28 K/UL — SIGNIFICANT CHANGE UP (ref 3.8–10.5)

## 2021-02-24 PROCEDURE — 99213 OFFICE O/P EST LOW 20 MIN: CPT

## 2021-02-24 PROCEDURE — 99072 ADDL SUPL MATRL&STAF TM PHE: CPT

## 2021-02-25 ENCOUNTER — APPOINTMENT (OUTPATIENT)
Dept: OBGYN | Facility: CLINIC | Age: 41
End: 2021-02-25
Payer: MEDICAID

## 2021-02-25 ENCOUNTER — NON-APPOINTMENT (OUTPATIENT)
Age: 41
End: 2021-02-25

## 2021-02-25 VITALS
WEIGHT: 230 LBS | DIASTOLIC BLOOD PRESSURE: 78 MMHG | SYSTOLIC BLOOD PRESSURE: 120 MMHG | BODY MASS INDEX: 46.37 KG/M2 | HEIGHT: 59 IN

## 2021-02-25 DIAGNOSIS — Z33.1 PREGNANT STATE, INCIDENTAL: ICD-10-CM

## 2021-02-25 DIAGNOSIS — R73.09 OTHER ABNORMAL GLUCOSE: ICD-10-CM

## 2021-02-25 LAB
ALBUMIN SERPL ELPH-MCNC: 3.7 G/DL
ALP BLD-CCNC: 121 U/L
ALT SERPL-CCNC: 13 U/L
ANION GAP SERPL CALC-SCNC: 12 MMOL/L
AST SERPL-CCNC: 21 U/L
BILIRUB SERPL-MCNC: <0.2 MG/DL
BUN SERPL-MCNC: 7 MG/DL
CALCIUM SERPL-MCNC: 9.3 MG/DL
CHLORIDE SERPL-SCNC: 102 MMOL/L
CO2 SERPL-SCNC: 20 MMOL/L
CREAT SERPL-MCNC: 0.52 MG/DL
GLUCOSE SERPL-MCNC: 90 MG/DL
POTASSIUM SERPL-SCNC: 4 MMOL/L
PROT SERPL-MCNC: 6.3 G/DL
SODIUM SERPL-SCNC: 134 MMOL/L

## 2021-02-25 PROCEDURE — 0502F SUBSEQUENT PRENATAL CARE: CPT

## 2021-02-25 NOTE — PHYSICAL EXAM
[Fully active, able to carry on all pre-disease performance without restriction] : Status 0 - Fully active, able to carry on all pre-disease performance without restriction [Normal] : affect appropriate [de-identified] : S/p right mastectomy healed well No CW or axillary nodes + RT changes . Left breast normal

## 2021-02-25 NOTE — ASSESSMENT
[Curative] : Goals of care discussed with patient: Curative [FreeTextEntry1] : In summary, this is a very pleasant 39-year-old Georgian-speaking lady who is diagnosed with T2 N0 stage IIA poorly differentiated ER/[place]/HER-2/rajni NEGATIVE IDC of the right breast during pregnancy.  A CXR and abdominal sono didn’t show distant mets.  BRCA negative. She is s/p sequential Adriamycin x 4 and Cytoxan x 4 in second and third trimester of pregnancy. Delivered healthy baby and started Taxol x 12 w. ddACT completed 8/9/2019. \par \par - Breast ca: She has completed adjuvant chemo for breast ca. She is s/p RT. Doing well. No sx of recurrence\par - 9/8/2020 BONE SCAN No radionuclide evidence of osseous metastases.\par - Post mastectomy pain -  improved\par - Mild LFT 2/2 Taxol- Resolved\par - No issues with neuropathy or lymphedema 2/2 treatment\par - Continue annual breast imaging Done 8/2020\par -  Encourage healthy diet and exercise.\par - Continue followup with primary care. I recommend age-appropriate malignancy screening\par - Educated about s/sx of recurrence\par - Currently pregnant. She had TNBC and no increased risk of recurrence due to hormone surge during pregnancy. Continue OB f/u\par \par ABHIJEET 6/2021\par F/u with me 1-2 m after delivery

## 2021-02-25 NOTE — HISTORY OF PRESENT ILLNESS
[de-identified] : Ms. EVANGELISTA HESTER  is a 39 year old female here for an evaluation of breast cancer. Her oncologic history is as follows:\par \par She felt a right breast mass in 2018 and was evaluated seen by GYN who sent her for breast imaging. She underwent diagnostic breast imaging on 18 which showed at 12:00 position of right breast there is a lobulated focus, measuring 2.0 x 2.0 x 2.2 cm 2 cm FN. 4 mm deep to the skin. Left breast benign. No enlarged axillary lymph nodes. Bx recommended. 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 There is marked lymphocytic infiltrate around the tumor. She was scheduled for MRI but found out that she was pregnant. Current gestation age at the time of initial consult: 11 weeks\par \par 10/23/18\par She is here with her . We discussed the diagnosis of rapidly growing triple negative breast ca, the size has increased since diagnosis and that chemotherapy will be needed to treat this cancer which can not be started until she is 14 weeks. They want to keep the pregnancy understanding that it will cause delay in treatment and potential risk to the patient and fetus. We discussed that lumpectomy will have issues with positive margins and RT will be contraindicated during to pregnancy and therefore u/l mastectomy is recommend. I discussed with Dr Granger that expander placement at the time of surgery will be an option. She is seeing the pt tomorrow and will discuss surgery and reconstruction options with her. She also saw Dr Eden Patrick med onc for a a second opinion. \par \par s/p surgery (right mastectomy) 18 , 4.5 cm tumor and sentinel node negative- path report d/w her in detail. Tumor grew more than double in size between dx and surgery and is s/o aggressive tumor. Discussed to start chemo 18. S/e of chemo jody during pregnancy, expected and unexpected, pertaining to her and the fetus d/w her. Discussed to see MFM b/w chemo. Also a candidate for PMRT due to large tumor size. LMP 18\par \par I had extensive discussion with the patient and spouse regarding breast cancer in pregnancy and the treatment options that are safe for both the mother and the growing fetus. She refused termination of pregnancy after finding out cancer diagnosis.  We discussed that all treatments including chemotherapy and surgery are contraindicated in first trimester but are considered relatively safe in second and third trimester.  We have discussed medical termination of pregnancy as an option but patient decided to keep the pregnancy. We reviewed that the largest experience in pregnancy has been with anthracycline and alkylating agent chemotherapy. She will be a candidate for chemotherapy with Adriamycin plus cyclophosphamide in a sequential dose dense manner. We reviewed data from [place] registry which showed Neulasta is reasonably safe to use in 2nd and third trimester although it was a very small dataset. . There is not enough data to use taxanes in pregnancy therefore Taxol will be administered post partum. Potential s/e to the patient and growing fetus were discussed with the patient and  in detail via a . The patient understood all the potential side effects and signed an informed consent after discussing the risks, benefits and alternatives.\par \par \par PLAN: Starting 18 ( 17 weeks), She will receive adriamycin every 2 weeks x 4 cycle followed by Cytoxan every 2 weeks x 4 cycles. We plan to hold chemotherapy 4 weeks prior to delivery to prevent  neutropenic complications. Currently she is planned for elective induction (NVD) at 37 weeks. We would start Taxol post partum\par \par PREMEDS/SUPPORTIVE CARE: Zofran ativan and dex are listed as part of prechemo antiemetic regimen in NCCN guidelines for breast cancer in pregnancy. The use of Emend is controversial in pregnancy. We discussed the issue of port placement in the OR, but that would increase anesthesia time by 45 min therefore we would treat her peripherally and likely do port placement after delivery. We discussed the issue of preeclampsia or gestational diabetes with steroids. She will be monitored closely by MFM. She will see MFM between every chemo session for close fetal monitoring. \par \par I explained her that EOD work up is limited due to risk of radiation exposure to the growing fetus. We plan to perform CT scans or a PET/CT scan after delivery. She will also be a candidate for PMRT due to large tumor size. \par \par S/p infection with shigella and c-diff after Adriamycin c#2, confirmed with cultures and PCR x 2, was hospitalized for diarrhea and abd pain, treated with ampicillin and vanco,  Ampicillin completed yesterday. Vanco 2 more days left. Patient reports mom had Shigella 2 years ago, Mom has come in to help her 2 months prior but is not ill. She denies sick contacts. \par \par She was hospitalized for 5 days after Cytoxan Omi # 2 due to diarrhea. Infectious work up was negative.Completed vancomycin course prior to last treatment. Sx were controlled with supportive care. She feels back to normal now. No abd pain, NVD, fevers.  No Bone pain, no neuropathy.\par \par We discussed the plan to give C4 in 2 weeks and deliver at around 37 weeks. She will need Taxol q2w x 4 doses after delivery. We will perform CT scans after the baby is born.\par \par CT chest/ abd/ pelvis: 2019: normal\par Bone Scan: 19: Normal, mild degenerative Dz. [de-identified] : Ms. EVANGELISTA RUBIO is here for f/u for  triple negative right breast cancer dx in 10/2018 during pregnancy. She completed sequential AC during second and trimester of pregnancy. Taxol completed after delivery. This is a routine f/u. \par She is s/p RT and has mild cording, improved with PT. Neuropathy has improved. Appetite and energy good, wt stable. No diarrhea. \par Left breast imaging 8/2020 BIRADS 1\par ROS neg\par 9/8/2020 BONE SCAN  No radionuclide evidence of osseous metastases. No significant interval change since the previous study of 5/20/2019\par 2/24/21. She conceived unplanned pregnancy. Patient is 24 weeks pregnant, feels good , appetite good, taking prenatal vitamins.

## 2021-02-25 NOTE — REASON FOR VISIT
[Follow-Up Visit] : a follow-up [Other: _____] : [unfilled] [Patient Declined  Services] : - None: Patient declined  services [FreeTextEntry2] : Breast cancer in pregnancy TRIPLE NEGATIVE [FreeTextEntry3] : Casie RIOS translating [TWNoteComboBox1] : [place]

## 2021-03-03 ENCOUNTER — APPOINTMENT (OUTPATIENT)
Dept: PEDIATRIC CARDIOLOGY | Facility: CLINIC | Age: 41
End: 2021-03-03

## 2021-03-11 ENCOUNTER — NON-APPOINTMENT (OUTPATIENT)
Age: 41
End: 2021-03-11

## 2021-03-11 PROBLEM — R73.09 INCREASED BLOOD GLUCOSE: Status: ACTIVE | Noted: 2021-03-11

## 2021-03-12 ENCOUNTER — APPOINTMENT (OUTPATIENT)
Dept: PEDIATRIC CARDIOLOGY | Facility: CLINIC | Age: 41
End: 2021-03-12
Payer: MEDICAID

## 2021-03-12 DIAGNOSIS — O35.2XX0 MATERNAL CARE FOR (SUSPECTED) HEREDITARY DISEASE IN FETUS, NOT APPLICABLE OR UNSPECIFIED: ICD-10-CM

## 2021-03-12 PROCEDURE — 76820 UMBILICAL ARTERY ECHO: CPT

## 2021-03-12 PROCEDURE — 76821 MIDDLE CEREBRAL ARTERY ECHO: CPT

## 2021-03-12 PROCEDURE — 99203 OFFICE O/P NEW LOW 30 MIN: CPT | Mod: 25

## 2021-03-12 PROCEDURE — 99072 ADDL SUPL MATRL&STAF TM PHE: CPT

## 2021-03-12 PROCEDURE — 76827 ECHO EXAM OF FETAL HEART: CPT

## 2021-03-12 PROCEDURE — 99213 OFFICE O/P EST LOW 20 MIN: CPT | Mod: 25

## 2021-03-12 PROCEDURE — 93325 DOPPLER ECHO COLOR FLOW MAPG: CPT | Mod: 59

## 2021-03-12 PROCEDURE — 76825 ECHO EXAM OF FETAL HEART: CPT

## 2021-03-15 ENCOUNTER — NON-APPOINTMENT (OUTPATIENT)
Age: 41
End: 2021-03-15

## 2021-03-15 PROBLEM — O35.2XX0 HEREDITARY DISEASE IN FAMILY POSSIBLY AFFECTING FETUS: Status: ACTIVE | Noted: 2021-03-15

## 2021-03-18 ENCOUNTER — NON-APPOINTMENT (OUTPATIENT)
Age: 41
End: 2021-03-18

## 2021-03-22 ENCOUNTER — NON-APPOINTMENT (OUTPATIENT)
Age: 41
End: 2021-03-22

## 2021-03-22 ENCOUNTER — APPOINTMENT (OUTPATIENT)
Dept: OBGYN | Facility: CLINIC | Age: 41
End: 2021-03-22

## 2021-03-23 ENCOUNTER — APPOINTMENT (OUTPATIENT)
Dept: ANTEPARTUM | Facility: CLINIC | Age: 41
End: 2021-03-23
Payer: MEDICAID

## 2021-03-23 ENCOUNTER — ASOB RESULT (OUTPATIENT)
Age: 41
End: 2021-03-23

## 2021-03-23 PROCEDURE — 76819 FETAL BIOPHYS PROFIL W/O NST: CPT

## 2021-03-23 PROCEDURE — 99072 ADDL SUPL MATRL&STAF TM PHE: CPT

## 2021-03-23 PROCEDURE — 76816 OB US FOLLOW-UP PER FETUS: CPT

## 2021-03-24 ENCOUNTER — NON-APPOINTMENT (OUTPATIENT)
Age: 41
End: 2021-03-24

## 2021-03-24 ENCOUNTER — ASOB RESULT (OUTPATIENT)
Age: 41
End: 2021-03-24

## 2021-03-24 ENCOUNTER — APPOINTMENT (OUTPATIENT)
Dept: MATERNAL FETAL MEDICINE | Facility: CLINIC | Age: 41
End: 2021-03-24
Payer: MEDICAID

## 2021-03-24 PROCEDURE — G0108 DIAB MANAGE TRN  PER INDIV: CPT | Mod: 95

## 2021-03-24 RX ORDER — BLOOD SUGAR DIAGNOSTIC
STRIP MISCELLANEOUS
Qty: 2 | Refills: 3 | Status: ACTIVE | COMMUNITY
Start: 2021-03-24 | End: 1900-01-01

## 2021-03-24 RX ORDER — LANCETS 33 GAUGE
EACH MISCELLANEOUS
Qty: 1 | Refills: 3 | Status: ACTIVE | COMMUNITY
Start: 2021-03-24 | End: 1900-01-01

## 2021-03-24 RX ORDER — BLOOD-GLUCOSE METER
W/DEVICE EACH MISCELLANEOUS
Qty: 1 | Refills: 0 | Status: ACTIVE | COMMUNITY
Start: 2021-03-24 | End: 1900-01-01

## 2021-03-26 ENCOUNTER — NON-APPOINTMENT (OUTPATIENT)
Age: 41
End: 2021-03-26

## 2021-03-29 ENCOUNTER — APPOINTMENT (OUTPATIENT)
Dept: OBGYN | Facility: CLINIC | Age: 41
End: 2021-03-29
Payer: MEDICAID

## 2021-03-29 ENCOUNTER — NON-APPOINTMENT (OUTPATIENT)
Age: 41
End: 2021-03-29

## 2021-03-29 VITALS
WEIGHT: 235 LBS | SYSTOLIC BLOOD PRESSURE: 104 MMHG | BODY MASS INDEX: 47.37 KG/M2 | HEIGHT: 59 IN | DIASTOLIC BLOOD PRESSURE: 68 MMHG

## 2021-03-29 PROCEDURE — 0502F SUBSEQUENT PRENATAL CARE: CPT

## 2021-03-30 LAB
BILIRUB UR QL STRIP: NORMAL
CLARITY UR: CLEAR
COLLECTION METHOD: NORMAL
GLUCOSE UR-MCNC: NORMAL
HCG UR QL: 0.2 EU/DL
HGB UR QL STRIP.AUTO: NORMAL
KETONES UR-MCNC: NORMAL
LEUKOCYTE ESTERASE UR QL STRIP: NORMAL
NITRITE UR QL STRIP: NORMAL
PH UR STRIP: 7
PROT UR STRIP-MCNC: NORMAL
SP GR UR STRIP: 1.02

## 2021-04-08 ENCOUNTER — APPOINTMENT (OUTPATIENT)
Dept: MATERNAL FETAL MEDICINE | Facility: CLINIC | Age: 41
End: 2021-04-08
Payer: MEDICAID

## 2021-04-08 ENCOUNTER — ASOB RESULT (OUTPATIENT)
Age: 41
End: 2021-04-08

## 2021-04-08 DIAGNOSIS — O24.419 GESTATIONAL DIABETES MELLITUS IN PREGNANCY, UNSPECIFIED CONTROL: ICD-10-CM

## 2021-04-08 PROCEDURE — ZZZZZ: CPT

## 2021-04-08 RX ORDER — CALCIUM CARB/VITAMIN D3/VIT K1 500-100-40
31G X 5/16" TABLET,CHEWABLE ORAL
Qty: 1 | Refills: 3 | Status: ACTIVE | COMMUNITY
Start: 2021-04-08 | End: 1900-01-01

## 2021-04-08 RX ORDER — ISOPROPYL ALCOHOL 0.7 ML/ML
SWAB TOPICAL
Qty: 1 | Refills: 3 | Status: ACTIVE | COMMUNITY
Start: 2021-04-08 | End: 1900-01-01

## 2021-04-08 RX ORDER — INSULIN HUMAN 100 [IU]/ML
100 INJECTION, SUSPENSION SUBCUTANEOUS
Qty: 1 | Refills: 3 | Status: ACTIVE | COMMUNITY
Start: 2021-04-08 | End: 1900-01-01

## 2021-04-09 ENCOUNTER — NON-APPOINTMENT (OUTPATIENT)
Age: 41
End: 2021-04-09

## 2021-04-12 ENCOUNTER — APPOINTMENT (OUTPATIENT)
Dept: OBGYN | Facility: CLINIC | Age: 41
End: 2021-04-12

## 2021-04-15 ENCOUNTER — APPOINTMENT (OUTPATIENT)
Dept: MATERNAL FETAL MEDICINE | Facility: CLINIC | Age: 41
End: 2021-04-15
Payer: MEDICAID

## 2021-04-15 ENCOUNTER — ASOB RESULT (OUTPATIENT)
Age: 41
End: 2021-04-15

## 2021-04-15 PROCEDURE — G0108 DIAB MANAGE TRN  PER INDIV: CPT | Mod: 95

## 2021-04-20 ENCOUNTER — ASOB RESULT (OUTPATIENT)
Age: 41
End: 2021-04-20

## 2021-04-20 ENCOUNTER — APPOINTMENT (OUTPATIENT)
Dept: ANTEPARTUM | Facility: CLINIC | Age: 41
End: 2021-04-20

## 2021-04-20 ENCOUNTER — APPOINTMENT (OUTPATIENT)
Dept: ANTEPARTUM | Facility: CLINIC | Age: 41
End: 2021-04-20
Payer: MEDICAID

## 2021-04-20 ENCOUNTER — APPOINTMENT (OUTPATIENT)
Dept: MATERNAL FETAL MEDICINE | Facility: CLINIC | Age: 41
End: 2021-04-20
Payer: MEDICAID

## 2021-04-20 VITALS
RESPIRATION RATE: 18 BRPM | BODY MASS INDEX: 46.77 KG/M2 | OXYGEN SATURATION: 98 % | DIASTOLIC BLOOD PRESSURE: 68 MMHG | WEIGHT: 232 LBS | SYSTOLIC BLOOD PRESSURE: 100 MMHG | HEART RATE: 78 BPM | HEIGHT: 59 IN

## 2021-04-20 PROCEDURE — 99215 OFFICE O/P EST HI 40 MIN: CPT | Mod: 25

## 2021-04-20 PROCEDURE — 99072 ADDL SUPL MATRL&STAF TM PHE: CPT

## 2021-04-20 PROCEDURE — 76816 OB US FOLLOW-UP PER FETUS: CPT

## 2021-04-20 PROCEDURE — 93976 VASCULAR STUDY: CPT

## 2021-04-20 PROCEDURE — 76820 UMBILICAL ARTERY ECHO: CPT

## 2021-04-20 NOTE — HISTORY OF PRESENT ILLNESS
[FreeTextEntry1] : Francisca presents today for interval growth and evaluation of gestational diabetes. Her previous consultations for hisotry of breast cancer were reviewed, but adressed previously

## 2021-04-20 NOTE — DISCUSSION/SUMMARY
[FreeTextEntry1] : Weekly fetal testing. \par \par Continue Insulin as ordered. \par \par Followup with medical nutrition is scheduled on 5/6\par \par Repeat growth scan and MFM consult is scheduled in 4 weeks.

## 2021-04-20 NOTE — DATA REVIEWED
[FreeTextEntry1] : Sonogram today shows an accelerated fetal growth with the overall fetal size at the 99th percentile. \par \par She recently met with medical nutrition, and adjustments to the diet were made. \par \par She is taking 15u NPH insulin at bedtime, and her overall control is adequate with occasional elevations of the fasting values and post prandial values.  Her post dinner numbers are elevated, and we gave her some more dietary instruction. She understands that if she continues to have elevations post prandial, we will need to start insulin during the day as well. \par \par Weekly fetal testing has been reassuring and should be continued.

## 2021-04-23 ENCOUNTER — NON-APPOINTMENT (OUTPATIENT)
Age: 41
End: 2021-04-23

## 2021-04-26 ENCOUNTER — NON-APPOINTMENT (OUTPATIENT)
Age: 41
End: 2021-04-26

## 2021-04-26 ENCOUNTER — APPOINTMENT (OUTPATIENT)
Dept: OBGYN | Facility: CLINIC | Age: 41
End: 2021-04-26
Payer: MEDICAID

## 2021-04-26 VITALS
SYSTOLIC BLOOD PRESSURE: 96 MMHG | DIASTOLIC BLOOD PRESSURE: 64 MMHG | BODY MASS INDEX: 47.17 KG/M2 | WEIGHT: 234 LBS | HEIGHT: 59 IN

## 2021-04-26 DIAGNOSIS — E66.01 MORBID (SEVERE) OBESITY DUE TO EXCESS CALORIES: ICD-10-CM

## 2021-04-26 DIAGNOSIS — O09.93 SUPERVISION OF HIGH RISK PREGNANCY, UNSPECIFIED, THIRD TRIMESTER: ICD-10-CM

## 2021-04-26 DIAGNOSIS — Z3A.33 33 WEEKS GESTATION OF PREGNANCY: ICD-10-CM

## 2021-04-26 DIAGNOSIS — Z85.3 PERSONAL HISTORY OF MALIGNANT NEOPLASM OF BREAST: ICD-10-CM

## 2021-04-26 PROCEDURE — 59025 FETAL NON-STRESS TEST: CPT

## 2021-04-26 PROCEDURE — 0502F SUBSEQUENT PRENATAL CARE: CPT

## 2021-04-27 ENCOUNTER — APPOINTMENT (OUTPATIENT)
Dept: ANTEPARTUM | Facility: CLINIC | Age: 41
End: 2021-04-27
Payer: MEDICAID

## 2021-04-27 ENCOUNTER — ASOB RESULT (OUTPATIENT)
Age: 41
End: 2021-04-27

## 2021-04-27 PROCEDURE — 76820 UMBILICAL ARTERY ECHO: CPT

## 2021-04-27 PROCEDURE — 76818 FETAL BIOPHYS PROFILE W/NST: CPT

## 2021-04-27 PROCEDURE — 93976 VASCULAR STUDY: CPT

## 2021-04-27 PROCEDURE — 99072 ADDL SUPL MATRL&STAF TM PHE: CPT

## 2021-04-30 LAB
ABO + RH PNL BLD: NORMAL
BASOPHILS # BLD AUTO: 0.03 K/UL
BASOPHILS NFR BLD AUTO: 0.5 %
BILIRUB UR QL STRIP: NORMAL
BLD GP AB SCN SERPL QL: NORMAL
CLARITY UR: CLEAR
COLLECTION METHOD: NORMAL
EOSINOPHIL # BLD AUTO: 0.18 K/UL
EOSINOPHIL NFR BLD AUTO: 2.9 %
GLUCOSE UR-MCNC: NORMAL
HCG UR QL: 0.2 EU/DL
HCT VFR BLD CALC: 34.6 %
HGB BLD-MCNC: 10.7 G/DL
HGB UR QL STRIP.AUTO: NORMAL
HIV1+2 AB SPEC QL IA.RAPID: NONREACTIVE
IMM GRANULOCYTES NFR BLD AUTO: 0.3 %
KETONES UR-MCNC: NORMAL
LEUKOCYTE ESTERASE UR QL STRIP: NORMAL
LYMPHOCYTES # BLD AUTO: 1.31 K/UL
LYMPHOCYTES NFR BLD AUTO: 20.9 %
MAN DIFF?: NORMAL
MCHC RBC-ENTMCNC: 26 PG
MCHC RBC-ENTMCNC: 30.9 GM/DL
MCV RBC AUTO: 84 FL
MONOCYTES # BLD AUTO: 0.34 K/UL
MONOCYTES NFR BLD AUTO: 5.4 %
NEUTROPHILS # BLD AUTO: 4.4 K/UL
NEUTROPHILS NFR BLD AUTO: 70 %
NITRITE UR QL STRIP: NORMAL
PH UR STRIP: 7
PLATELET # BLD AUTO: 265 K/UL
PROT UR STRIP-MCNC: NORMAL
RBC # BLD: 4.12 M/UL
RBC # FLD: 15.4 %
SP GR UR STRIP: 1.02
T PALLIDUM AB SER QL IA: NEGATIVE
WBC # FLD AUTO: 6.28 K/UL

## 2021-05-03 ENCOUNTER — NON-APPOINTMENT (OUTPATIENT)
Age: 41
End: 2021-05-03

## 2021-05-04 ENCOUNTER — APPOINTMENT (OUTPATIENT)
Dept: OBGYN | Facility: CLINIC | Age: 41
End: 2021-05-04
Payer: MEDICAID

## 2021-05-04 ENCOUNTER — APPOINTMENT (OUTPATIENT)
Dept: ANTEPARTUM | Facility: CLINIC | Age: 41
End: 2021-05-04

## 2021-05-04 ENCOUNTER — NON-APPOINTMENT (OUTPATIENT)
Age: 41
End: 2021-05-04

## 2021-05-04 ENCOUNTER — ASOB RESULT (OUTPATIENT)
Age: 41
End: 2021-05-04

## 2021-05-04 VITALS
BODY MASS INDEX: 46.16 KG/M2 | HEIGHT: 59 IN | DIASTOLIC BLOOD PRESSURE: 58 MMHG | WEIGHT: 229 LBS | SYSTOLIC BLOOD PRESSURE: 98 MMHG

## 2021-05-04 DIAGNOSIS — Z3A.34 34 WEEKS GESTATION OF PREGNANCY: ICD-10-CM

## 2021-05-04 PROCEDURE — 0502F SUBSEQUENT PRENATAL CARE: CPT

## 2021-05-04 PROCEDURE — 76819 FETAL BIOPHYS PROFIL W/O NST: CPT

## 2021-05-04 PROCEDURE — 90715 TDAP VACCINE 7 YRS/> IM: CPT

## 2021-05-04 PROCEDURE — 76820 UMBILICAL ARTERY ECHO: CPT

## 2021-05-04 PROCEDURE — 90471 IMMUNIZATION ADMIN: CPT

## 2021-05-04 PROCEDURE — 59025 FETAL NON-STRESS TEST: CPT

## 2021-05-04 PROCEDURE — 99072 ADDL SUPL MATRL&STAF TM PHE: CPT

## 2021-05-06 ENCOUNTER — ASOB RESULT (OUTPATIENT)
Age: 41
End: 2021-05-06

## 2021-05-06 ENCOUNTER — APPOINTMENT (OUTPATIENT)
Dept: MATERNAL FETAL MEDICINE | Facility: CLINIC | Age: 41
End: 2021-05-06
Payer: MEDICAID

## 2021-05-06 PROCEDURE — G0108 DIAB MANAGE TRN  PER INDIV: CPT | Mod: 95

## 2021-05-10 ENCOUNTER — NON-APPOINTMENT (OUTPATIENT)
Age: 41
End: 2021-05-10

## 2021-05-10 ENCOUNTER — APPOINTMENT (OUTPATIENT)
Dept: OBGYN | Facility: CLINIC | Age: 41
End: 2021-05-10

## 2021-05-11 ENCOUNTER — APPOINTMENT (OUTPATIENT)
Dept: ANTEPARTUM | Facility: CLINIC | Age: 41
End: 2021-05-11

## 2021-05-11 ENCOUNTER — APPOINTMENT (OUTPATIENT)
Dept: OBGYN | Facility: CLINIC | Age: 41
End: 2021-05-11
Payer: MEDICAID

## 2021-05-11 ENCOUNTER — ASOB RESULT (OUTPATIENT)
Age: 41
End: 2021-05-11

## 2021-05-11 ENCOUNTER — NON-APPOINTMENT (OUTPATIENT)
Age: 41
End: 2021-05-11

## 2021-05-11 VITALS
HEIGHT: 59 IN | DIASTOLIC BLOOD PRESSURE: 70 MMHG | WEIGHT: 233 LBS | BODY MASS INDEX: 46.97 KG/M2 | SYSTOLIC BLOOD PRESSURE: 118 MMHG

## 2021-05-11 DIAGNOSIS — O36.60X0 MATERNAL CARE FOR EXCESSIVE FETAL GROWTH, UNSPECIFIED TRIMESTER, NOT APPLICABLE OR UNSPECIFIED: ICD-10-CM

## 2021-05-11 DIAGNOSIS — Z3A.35 35 WEEKS GESTATION OF PREGNANCY: ICD-10-CM

## 2021-05-11 DIAGNOSIS — E66.01 OBESITY COMPLICATING PREGNANCY, SECOND TRIMESTER: ICD-10-CM

## 2021-05-11 DIAGNOSIS — O99.212 OBESITY COMPLICATING PREGNANCY, SECOND TRIMESTER: ICD-10-CM

## 2021-05-11 DIAGNOSIS — O09.522 SUPERVISION OF ELDERLY MULTIGRAVIDA, SECOND TRIMESTER: ICD-10-CM

## 2021-05-11 PROCEDURE — 76819 FETAL BIOPHYS PROFIL W/O NST: CPT

## 2021-05-11 PROCEDURE — 59025 FETAL NON-STRESS TEST: CPT

## 2021-05-11 PROCEDURE — 0502F SUBSEQUENT PRENATAL CARE: CPT

## 2021-05-11 PROCEDURE — 81003 URINALYSIS AUTO W/O SCOPE: CPT | Mod: QW

## 2021-05-11 PROCEDURE — 76816 OB US FOLLOW-UP PER FETUS: CPT

## 2021-05-11 PROCEDURE — 99072 ADDL SUPL MATRL&STAF TM PHE: CPT

## 2021-05-11 PROCEDURE — 76820 UMBILICAL ARTERY ECHO: CPT

## 2021-05-12 LAB
BILIRUB UR QL STRIP: NORMAL
CLARITY UR: CLEAR
COLLECTION METHOD: NORMAL
GLUCOSE UR-MCNC: NORMAL
HCG UR QL: 0.2 EU/DL
HGB UR QL STRIP.AUTO: NORMAL
KETONES UR-MCNC: NORMAL
LEUKOCYTE ESTERASE UR QL STRIP: NORMAL
NITRITE UR QL STRIP: NORMAL
PH UR STRIP: 7.5
PROT UR STRIP-MCNC: NORMAL
SP GR UR STRIP: 1.02

## 2021-05-14 ENCOUNTER — NON-APPOINTMENT (OUTPATIENT)
Age: 41
End: 2021-05-14

## 2021-05-17 ENCOUNTER — APPOINTMENT (OUTPATIENT)
Dept: OBGYN | Facility: CLINIC | Age: 41
End: 2021-05-17

## 2021-05-18 ENCOUNTER — APPOINTMENT (OUTPATIENT)
Dept: ANTEPARTUM | Facility: CLINIC | Age: 41
End: 2021-05-18
Payer: MEDICAID

## 2021-05-18 ENCOUNTER — ASOB RESULT (OUTPATIENT)
Age: 41
End: 2021-05-18

## 2021-05-18 ENCOUNTER — APPOINTMENT (OUTPATIENT)
Dept: MATERNAL FETAL MEDICINE | Facility: CLINIC | Age: 41
End: 2021-05-18

## 2021-05-18 PROCEDURE — 76816 OB US FOLLOW-UP PER FETUS: CPT

## 2021-05-18 PROCEDURE — 76818 FETAL BIOPHYS PROFILE W/NST: CPT

## 2021-05-18 PROCEDURE — 99072 ADDL SUPL MATRL&STAF TM PHE: CPT

## 2021-05-19 ENCOUNTER — APPOINTMENT (OUTPATIENT)
Dept: OBGYN | Facility: CLINIC | Age: 41
End: 2021-05-19

## 2021-05-20 ENCOUNTER — NON-APPOINTMENT (OUTPATIENT)
Age: 41
End: 2021-05-20

## 2021-05-24 ENCOUNTER — APPOINTMENT (OUTPATIENT)
Dept: OBGYN | Facility: CLINIC | Age: 41
End: 2021-05-24

## 2021-05-25 ENCOUNTER — APPOINTMENT (OUTPATIENT)
Dept: ANTEPARTUM | Facility: CLINIC | Age: 41
End: 2021-05-25

## 2021-05-25 ENCOUNTER — APPOINTMENT (OUTPATIENT)
Dept: MATERNAL FETAL MEDICINE | Facility: CLINIC | Age: 41
End: 2021-05-25
Payer: MEDICAID

## 2021-05-25 ENCOUNTER — ASOB RESULT (OUTPATIENT)
Age: 41
End: 2021-05-25

## 2021-05-25 ENCOUNTER — APPOINTMENT (OUTPATIENT)
Dept: ANTEPARTUM | Facility: CLINIC | Age: 41
End: 2021-05-25
Payer: MEDICAID

## 2021-05-25 VITALS
BODY MASS INDEX: 47.28 KG/M2 | DIASTOLIC BLOOD PRESSURE: 82 MMHG | HEIGHT: 59 IN | WEIGHT: 234.56 LBS | HEART RATE: 80 BPM | RESPIRATION RATE: 18 BRPM | SYSTOLIC BLOOD PRESSURE: 118 MMHG | OXYGEN SATURATION: 99 %

## 2021-05-25 DIAGNOSIS — O24.419 GESTATIONAL DIABETES MELLITUS IN PREGNANCY, UNSPECIFIED CONTROL: ICD-10-CM

## 2021-05-25 DIAGNOSIS — O09.521 SUPERVISION OF ELDERLY MULTIGRAVIDA, FIRST TRIMESTER: ICD-10-CM

## 2021-05-25 PROCEDURE — 76818 FETAL BIOPHYS PROFILE W/NST: CPT

## 2021-05-25 PROCEDURE — 99214 OFFICE O/P EST MOD 30 MIN: CPT | Mod: 25

## 2021-05-25 PROCEDURE — 76820 UMBILICAL ARTERY ECHO: CPT

## 2021-05-25 PROCEDURE — 99072 ADDL SUPL MATRL&STAF TM PHE: CPT

## 2021-05-25 NOTE — DATA REVIEWED
[FreeTextEntry1] : Sonogram from last week shows a macrosomic fetus at the 98th percentile.\par \par She presents with reassuring fetal testing, both NST and BPP are category 1\par \par Her fasting glucose remains elevated and requires an increase to her nighttime insulin. \par \par Her post meal values remain adequate with occasional elevations.

## 2021-05-25 NOTE — DISCUSSION/SUMMARY
[FreeTextEntry1] : Weekly fetal testing. \par \par Followup with the medical nutrition team\par \par Delivery at 39 weeks.

## 2021-05-28 ENCOUNTER — APPOINTMENT (OUTPATIENT)
Dept: OBGYN | Facility: CLINIC | Age: 41
End: 2021-05-28

## 2021-06-01 ENCOUNTER — APPOINTMENT (OUTPATIENT)
Dept: OBGYN | Facility: CLINIC | Age: 41
End: 2021-06-01

## 2021-06-01 ENCOUNTER — OUTPATIENT (OUTPATIENT)
Dept: OUTPATIENT SERVICES | Facility: HOSPITAL | Age: 41
LOS: 1 days | End: 2021-06-01
Payer: MEDICAID

## 2021-06-01 ENCOUNTER — APPOINTMENT (OUTPATIENT)
Dept: ANTEPARTUM | Facility: CLINIC | Age: 41
End: 2021-06-01
Payer: MEDICAID

## 2021-06-01 ENCOUNTER — APPOINTMENT (OUTPATIENT)
Dept: ANTEPARTUM | Facility: CLINIC | Age: 41
End: 2021-06-01

## 2021-06-01 ENCOUNTER — ASOB RESULT (OUTPATIENT)
Age: 41
End: 2021-06-01

## 2021-06-01 DIAGNOSIS — Z90.11 ACQUIRED ABSENCE OF RIGHT BREAST AND NIPPLE: Chronic | ICD-10-CM

## 2021-06-01 PROCEDURE — 76820 UMBILICAL ARTERY ECHO: CPT

## 2021-06-01 PROCEDURE — 99072 ADDL SUPL MATRL&STAF TM PHE: CPT

## 2021-06-01 PROCEDURE — 76818 FETAL BIOPHYS PROFILE W/NST: CPT

## 2021-06-03 ENCOUNTER — RESULT REVIEW (OUTPATIENT)
Age: 41
End: 2021-06-03

## 2021-06-03 ENCOUNTER — TRANSCRIPTION ENCOUNTER (OUTPATIENT)
Age: 41
End: 2021-06-03

## 2021-06-03 ENCOUNTER — APPOINTMENT (OUTPATIENT)
Dept: MATERNAL FETAL MEDICINE | Facility: CLINIC | Age: 41
End: 2021-06-03

## 2021-06-03 ENCOUNTER — NON-APPOINTMENT (OUTPATIENT)
Age: 41
End: 2021-06-03

## 2021-06-03 ENCOUNTER — INPATIENT (INPATIENT)
Facility: HOSPITAL | Age: 41
LOS: 1 days | Discharge: HOME | End: 2021-06-05
Attending: OBSTETRICS & GYNECOLOGY | Admitting: OBSTETRICS & GYNECOLOGY
Payer: MEDICAID

## 2021-06-03 VITALS
DIASTOLIC BLOOD PRESSURE: 74 MMHG | TEMPERATURE: 98 F | SYSTOLIC BLOOD PRESSURE: 120 MMHG | HEART RATE: 76 BPM | RESPIRATION RATE: 18 BRPM

## 2021-06-03 DIAGNOSIS — O47.1 FALSE LABOR AT OR AFTER 37 COMPLETED WEEKS OF GESTATION: ICD-10-CM

## 2021-06-03 DIAGNOSIS — Z90.11 ACQUIRED ABSENCE OF RIGHT BREAST AND NIPPLE: Chronic | ICD-10-CM

## 2021-06-03 DIAGNOSIS — O26.93 PREGNANCY RELATED CONDITIONS, UNSPECIFIED, THIRD TRIMESTER: ICD-10-CM

## 2021-06-03 LAB
ALBUMIN SERPL ELPH-MCNC: 3.2 G/DL — LOW (ref 3.3–5.2)
ALP SERPL-CCNC: 220 U/L — HIGH (ref 40–120)
ALT FLD-CCNC: 11 U/L — SIGNIFICANT CHANGE UP
ANION GAP SERPL CALC-SCNC: 12 MMOL/L — SIGNIFICANT CHANGE UP (ref 5–17)
AST SERPL-CCNC: 19 U/L — SIGNIFICANT CHANGE UP
BASOPHILS # BLD AUTO: 0.03 K/UL — SIGNIFICANT CHANGE UP (ref 0–0.2)
BASOPHILS NFR BLD AUTO: 0.4 % — SIGNIFICANT CHANGE UP (ref 0–2)
BILIRUB SERPL-MCNC: 0.2 MG/DL — LOW (ref 0.4–2)
BLD GP AB SCN SERPL QL: SIGNIFICANT CHANGE UP
BUN SERPL-MCNC: 7 MG/DL — LOW (ref 8–20)
CALCIUM SERPL-MCNC: 9.1 MG/DL — SIGNIFICANT CHANGE UP (ref 8.6–10.2)
CHLORIDE SERPL-SCNC: 107 MMOL/L — SIGNIFICANT CHANGE UP (ref 98–107)
CO2 SERPL-SCNC: 19 MMOL/L — LOW (ref 22–29)
CREAT SERPL-MCNC: 0.31 MG/DL — LOW (ref 0.5–1.3)
EOSINOPHIL # BLD AUTO: 0.16 K/UL — SIGNIFICANT CHANGE UP (ref 0–0.5)
EOSINOPHIL NFR BLD AUTO: 2.4 % — SIGNIFICANT CHANGE UP (ref 0–6)
GLUCOSE BLDC GLUCOMTR-MCNC: 78 MG/DL — SIGNIFICANT CHANGE UP (ref 70–99)
GLUCOSE BLDC GLUCOMTR-MCNC: 83 MG/DL — SIGNIFICANT CHANGE UP (ref 70–99)
GLUCOSE BLDC GLUCOMTR-MCNC: 85 MG/DL — SIGNIFICANT CHANGE UP (ref 70–99)
GLUCOSE SERPL-MCNC: 72 MG/DL — SIGNIFICANT CHANGE UP (ref 70–99)
HCT VFR BLD CALC: 37 % — SIGNIFICANT CHANGE UP (ref 34.5–45)
HGB BLD-MCNC: 11.4 G/DL — LOW (ref 11.5–15.5)
IMM GRANULOCYTES NFR BLD AUTO: 0.3 % — SIGNIFICANT CHANGE UP (ref 0–1.5)
LYMPHOCYTES # BLD AUTO: 1.39 K/UL — SIGNIFICANT CHANGE UP (ref 1–3.3)
LYMPHOCYTES # BLD AUTO: 20.6 % — SIGNIFICANT CHANGE UP (ref 13–44)
MCHC RBC-ENTMCNC: 24.9 PG — LOW (ref 27–34)
MCHC RBC-ENTMCNC: 30.8 GM/DL — LOW (ref 32–36)
MCV RBC AUTO: 81 FL — SIGNIFICANT CHANGE UP (ref 80–100)
MONOCYTES # BLD AUTO: 0.42 K/UL — SIGNIFICANT CHANGE UP (ref 0–0.9)
MONOCYTES NFR BLD AUTO: 6.2 % — SIGNIFICANT CHANGE UP (ref 2–14)
NEUTROPHILS # BLD AUTO: 4.73 K/UL — SIGNIFICANT CHANGE UP (ref 1.8–7.4)
NEUTROPHILS NFR BLD AUTO: 70.1 % — SIGNIFICANT CHANGE UP (ref 43–77)
PLATELET # BLD AUTO: 239 K/UL — SIGNIFICANT CHANGE UP (ref 150–400)
POTASSIUM SERPL-MCNC: 3.9 MMOL/L — SIGNIFICANT CHANGE UP (ref 3.5–5.3)
POTASSIUM SERPL-SCNC: 3.9 MMOL/L — SIGNIFICANT CHANGE UP (ref 3.5–5.3)
PROT SERPL-MCNC: 6.8 G/DL — SIGNIFICANT CHANGE UP (ref 6.6–8.7)
RBC # BLD: 4.57 M/UL — SIGNIFICANT CHANGE UP (ref 3.8–5.2)
RBC # FLD: 16.2 % — HIGH (ref 10.3–14.5)
SARS-COV-2 RNA SPEC QL NAA+PROBE: SIGNIFICANT CHANGE UP
SODIUM SERPL-SCNC: 138 MMOL/L — SIGNIFICANT CHANGE UP (ref 135–145)
WBC # BLD: 6.75 K/UL — SIGNIFICANT CHANGE UP (ref 3.8–10.5)
WBC # FLD AUTO: 6.75 K/UL — SIGNIFICANT CHANGE UP (ref 3.8–10.5)

## 2021-06-03 RX ORDER — SODIUM CHLORIDE 9 MG/ML
3 INJECTION INTRAMUSCULAR; INTRAVENOUS; SUBCUTANEOUS EVERY 8 HOURS
Refills: 0 | Status: DISCONTINUED | OUTPATIENT
Start: 2021-06-03 | End: 2021-06-05

## 2021-06-03 RX ORDER — CITRIC ACID/SODIUM CITRATE 300-500 MG
30 SOLUTION, ORAL ORAL ONCE
Refills: 0 | Status: COMPLETED | OUTPATIENT
Start: 2021-06-03 | End: 2021-06-03

## 2021-06-03 RX ORDER — SODIUM CHLORIDE 9 MG/ML
1000 INJECTION, SOLUTION INTRAVENOUS ONCE
Refills: 0 | Status: COMPLETED | OUTPATIENT
Start: 2021-06-03 | End: 2021-06-03

## 2021-06-03 RX ORDER — SODIUM CHLORIDE 9 MG/ML
1000 INJECTION, SOLUTION INTRAVENOUS
Refills: 0 | Status: DISCONTINUED | OUTPATIENT
Start: 2021-06-03 | End: 2021-06-04

## 2021-06-03 RX ORDER — OXYTOCIN 10 UNIT/ML
333.33 VIAL (ML) INJECTION
Qty: 20 | Refills: 0 | Status: COMPLETED | OUTPATIENT
Start: 2021-06-03 | End: 2021-06-03

## 2021-06-03 RX ORDER — OXYTOCIN 10 UNIT/ML
VIAL (ML) INJECTION
Qty: 30 | Refills: 0 | Status: DISCONTINUED | OUTPATIENT
Start: 2021-06-03 | End: 2021-06-04

## 2021-06-03 RX ADMIN — Medication 2 MILLIUNIT(S)/MIN: at 20:18

## 2021-06-03 RX ADMIN — SODIUM CHLORIDE 125 MILLILITER(S): 9 INJECTION, SOLUTION INTRAVENOUS at 17:30

## 2021-06-03 RX ADMIN — SODIUM CHLORIDE 1000 MILLILITER(S): 9 INJECTION, SOLUTION INTRAVENOUS at 17:30

## 2021-06-03 RX ADMIN — SODIUM CHLORIDE 125 MILLILITER(S): 9 INJECTION, SOLUTION INTRAVENOUS at 18:00

## 2021-06-03 RX ADMIN — Medication 30 MILLILITER(S): at 17:28

## 2021-06-03 NOTE — OB PROVIDER H&P - ASSESSMENT
39 yo  here at 38.3 wks here for early labor. Contractions becoming stronger and making cervical change. FHT cat 1  - admit for labor  - consent  - continuous fetal monitoring    - fingersticks q 4 hrs  - routine admission labs + cmp  - patient plans for PP IUD placement   - consent       discussed with Dr. Astudillo

## 2021-06-03 NOTE — OB RN DELIVERY SUMMARY - NS_SEPSISRSKCALC_OBGYN_ALL_OB_FT
EOS calculated successfully. EOS Risk Factor: 0.5/1000 live births ([facility] national incidence); GA=38w4d; Temp=98.42; ROM=6.333; GBS='Unknown'; Antibiotics='No antibiotics or any antibiotics < 2 hrs prior to birth'

## 2021-06-03 NOTE — OB RN PATIENT PROFILE - BLOOD TYPED: DATE, OB PROFILE
Last office visit: 9-30-19   Next office visit: NONE     Disp Refills Start End    lamotrigine (LAMICTAL) 200 MG tablet 30 tablet 0 12/4/2019     Sig - Route: TAKE 1 TABLET BY MOUTH DAILY - Oral             26-Apr-2021

## 2021-06-03 NOTE — OB RN DELIVERY SUMMARY - NSSELHIDDEN_OBGYN_ALL_OB_FT
[NS_[email or web address]_ALL_OB_FT:[id number]],[NS_DeliveryAssist1_OBGYN_ALL_OB_FT:[id number]],[NS_DeliveryRN_OBGYN_ALL_OB_FT:[id number]]

## 2021-06-03 NOTE — OB PROVIDER H&P - ATTENDING COMMENTS
Patient admitted in early labor at 38 weeks 2days.  Pregnancy complicated by A2GDM - per office notes seems well-controlled.  FHRT reassuring  EFW 4200grams  GBS unknown  Using a , counseled patient about estimated weight of fetus and if labor abnormalities including failure to dilate or failure to descend may be a sign of the infant's size being too large. Also reviewed risk of shoulder dystocia, brachial nerve injury, clavicle fracture, hypoxia. Patient verbalizes understanding of the risks, and still desires a trial of labor.  All questions answered to the best of my ability.

## 2021-06-03 NOTE — OB PROVIDER TRIAGE NOTE - NSOBPROVIDERNOTE_OBGYN_ALL_OB_FT
Patient here for r/o labor. Not regularly rut. Will monitor for 1-2 hrs and reexamin.       discussed with DR. Astudillo

## 2021-06-03 NOTE — OB PROVIDER TRIAGE NOTE - NSHPPHYSICALEXAM_GEN_ALL_CORE
ICU Vital Signs Last 24 Hrs  T(C): 36.8 (03 Jun 2021 14:02), Max: 36.8 (03 Jun 2021 14:00)  T(F): 98.2 (03 Jun 2021 14:02), Max: 98.24 (03 Jun 2021 14:00)  HR: 76 (03 Jun 2021 14:02) (76 - 76)  BP: 120/74 (03 Jun 2021 14:02) (120/74 - 120/74)  RR: 18 (03 Jun 2021 14:02) (18 - 18)      Gen: well appearing, comfortable  ABd: gravid, obese  SVE: 4/60/-3    FHT: 120/ mod kaya/+accels no decels  [place]: q7-8 min

## 2021-06-03 NOTE — OB RN PATIENT PROFILE - AS [facility]ENSORY
[facility] INPATIENT ENCOUNTER  PULMONARY AND CRITICAL CARE DAILY PROGRESS NOTE      Patient: Myesha Knutson Date: 2017   : 1948 Attending: Parrish Kirkpatrick MD   68 year old female    CURRENT HOSPITAL DAY:  Hospital Day: 7    Chief Complaint:  Acute hypoxic and hypercapnic respiratory failure    Other active problems:  Severe COPD with exacerbation  Influenza B positive  Acute pulmonary edema  Takotsubo cardiomyopathy  Acute renal insufficiency  Agitation  Pulmonary nodule    Subjective:   Remains on a ventilator. Tolerated 5 hours of pressure support yesterday and then became more tachypneic with increased work of breathing and decreased tidal volumes. This morning when I placed her on pressure support, she is only taking 100-150 cc breaths with rapid gasping-type respirations. Tolerating tube feeding at 20 cc per hour.     Reviewed Pertinent: Laboratory studies, radiographic studies, medications, and recent progress notes.    Review of Systems:  Unable to obtain with the patient currently intubated on the ventilator.    Vital 24 Hour Range Last Value   Temperature Temp  Min: 98.2 °F (36.8 °C)  Max: 98.5 °F (36.9 °C) 98.5 °F (36.9 °C) (17 0500)   Pulse Pulse  Min: 79  Max: 113 101 (17 0500)   Respiratory No Data Recorded 24 (17 2130)   Non-Invasive  Blood Pressure BP  Min: 116/75  Max: 151/81 151/81 (17 0200)   Pulse Oximetry SpO2  Min: 86 %  Max: 98 % 95 % (17 0712)   Arterial   Blood Pressure No Data Recorded       Vital First Value Last Value   Weight Weight: 79.4 kg (17 1720) 87.7 kg (17 0535)   Height N/A 5' 1\" (154.9 cm) (17 1249)   BMI N/A 36.61 (17 0535)     Respiratory Therapy Last Value   Incentive Spirometry-Volume       Weight over the past 48 Hours:  Patient Vitals for the past 48 hrs:   Weight   17 0501 86.4 kg   17 0535 87.7 kg       Intake/Output:  I/O last 3 completed shifts:  In: 1819.7 [I.V.:598.7;  NG/GT:1221]  Out: 1550 [Urine:1550]    Rhythm: Sinus Rhythm    Physical Exam:   Visit Vitals   • /81   • Pulse 101   • Temp 98.5 °F (36.9 °C) (Axillary)   • Resp 24   • Ht 5' 1\" (1.549 m)   • Wt 87.7 kg   • SpO2 95%   • BMI 36.53 kg/m2     CONTINUOUS INFUSIONS:  • fentaNYL 1000 mcg in sodium chloride 0.9% 100 mL infusion premix 50 mcg/hr (04/29/17 0533)   • dextrose 5 % infusion     • MIDAZolam (VERSED) 100 mg in sodium chloride 0.9% 100 mL infusion 8 mg/hr (04/29/17 0929)   • sodium chloride 0.9% infusion     • sodium chloride 0.9% infusion       Vent Settings Last Value   FiO2 60 % (04/29/17 0928)   Mode A/C (04/29/17 0712)   Rate 26 (04/29/17 0712)   Tidal Volume 400 mL (04/29/17 0712)   Pressure Support 15 cm H20 (04/28/17 1127)   PEEP/CPAC/EPAP 5 cm H20 (04/29/17 0712)       General Appearance:  Sedated on the ventilator. Hiccups.  Head: Normocephalic, without obvious abnormality, atraumatic  Eyes: Conjuctiva/corneas clear  Throat: Lips and mucosa normal; tongue with a small eschar on the left lateral rim. Teeth and gums normal. Orally intubated.  Neck: Supple, symmetrical, trachea midline, no JVD; no masses  Chest Wall: No tenderness or deformity  Lungs: Clear but diminished to auscultation anteriorly and laterally, respirations unlabored on assist control but rapid and quickly terminated inspirations when on pressure support, no dullness to percussion, equal expansion, no accessory respiratory muscle use when on assist control but they are being used, although ineffectively, with pressure support ventilation.   Heart: Regular rate and rhythm, S1 and S2 normal, no murmur, rub or gallop  Abdomen: Soft, moderately obese, non-tender, bowel sounds hypoactive in all four quadrants, no masses, no organomegaly.   Extremities: normal, atraumatic, no cyanosis, clubbing. Right femoral arterial line in place. Edema has resolved.  Pulses: 2+ and symmetric  Skin: warm and dry, no rashes      Laboratory  Results:    Recent Labs  Lab 04/29/17  0500 04/28/17  0515 04/27/17  2030 04/27/17  1745 04/27/17  0515  04/26/17  0434  04/24/17  0500   SODIUM 152* 153*  --   --  140  --  151*  < > 152*   POTASSIUM 4.6 4.1 4.1  --  4.2  < > 4.7  < > 4.2   CHLORIDE 117* 116*  --   --  108*  --  117*  < > 119*   CO2 31 28  --   --  26  --  25  < > 22   BUN 43* 45*  --   --  40*  --  27*  < > 31*   CREATININE 0.89 1.12*  --   --  1.31*  --  1.35*  < > 1.36*   GLUCOSE 187* 191*  --   --  153*  --  153*  < > 118*   ALBUMIN  --  2.6*  --   --  2.5*  --  2.5*  < > 2.7*   PHOS  --  2.6  --  2.9  --   --   --   --  5.1*   AST  --  44*  --   --  47*  --  63*  < > 23   BILIRUBIN  --  0.1*  --   --  0.1*  --  0.1*  < > 0.2   < > = values in this interval not displayed.    Recent Labs  Lab 04/29/17  0500 04/28/17 0515 04/27/17  0515   WBC 12.5* 5.9 4.4   HGB 11.1* 10.4* 10.0*   HCT 36.8 34.7* 33.4*    195 173     INR (no units)   Date Value   04/23/2017 1.0       Imaging:  Today's chest x-ray personally reviewed and compared to previous. Alveolar and interstitial prominence has diminished compared to the previous films.    Diagnosis:  1. Acute hypoxic and hypercapnic respiratory failure  2. Severe COPD with exacerbation  3. Influenza B  4. Acute pulmonary edema  5. Acute renal insufficiency  6. Cardiomyopathy with elevated troponin level  7. Hypernatremia/nutrition  8. Agitation  9. Hyperglycemia  10. Pulmonary nodule    Plans:  1. Intubated and on the ventilator. Weaning parameters are adequate. Some toleration of pressure support yesterday but not today. In several days I believe we will be facing the decision point of tracheostomy tube versus comfort care/terminal ventilator weaning. Discontinued Ceftriaxone and azithromycin as recommended by Dr. Sahu.   2. Continue Solu-Medrol and nebulizer treatments. Holding Advair for now.  3. On Tamiflu   4. Defer diuretics to Dr. Ayala.  5. Creatinine is back to baseline. Bicarbonate level  has normalized.  6. She had a similar reaction with a hospitalization several years ago. Heparin drip has finished. She is on Lovenox currently. Cardiology involved. Blood pressure appears more stable and I would be fine with cardiology adding beta blocker at this time. ACE inhibitor has been started.  7. Free water added to her tube feedings. Sodium level has decreased slightly. We'll keep her at the current free water rates.  8. On fentanyl and Versed.   9. No history of diabetes. Adding scale increased slightly today.  10. Will need to be addressed after her current acute illness. Review of pulmonary function tests from several years ago as well as her clinical course since then suggests that she may not be a good candidate for any possible surgical intervention.    Discussed with or notes reviewed:  Discussed with the patient's nurse and with Dr. Kirkpatrick in the intensive care unit and with her significant other at the bedside. He has spoken with the patient's son who is power of  and he relates to me that the patient's son states she never wanted to be on a breathing machine. He should be kept abreast of her progress or lack thereof with regards to the decision regarding tracheostomy versus terminal weaning which we may need to address in several days if she continues to show no significant progress  A total of 35 minutes of critical care time was spent in evaluation and management and coordination of care this patient between the hours of 10:30 and 11:05.   (4) no impairment

## 2021-06-03 NOTE — OB PROVIDER H&P - HISTORY OF PRESENT ILLNESS
41 yo  here at 38.3 wks GA here for r/o labor. She reports contractions every 30 minutes. She reports it feels like labor. Plan to deliver at [facility] because of history of prior heart defects in prior pregnancies. Had fetal echo during this echo that was normal. + FM, no LOF. VB.     Pregnancy complicated by:   - GDM A2 - on insulin 24 U Humalin qhs  - class III obesity     PSH: right mastectomy for breast cancer  PMH: hx of right breast cancer     obhx:   G1:  -  FT, 6 lb - heart defect   G2:  SAB @ 6 wks  G3: -  FT, 6.5 lbs -  heart defect   G4:  -  FT - 7 lb -  heart defect   G5:  -  after IOL at 38 wks for breast cancer , 7 lb infant, no heart defect

## 2021-06-03 NOTE — OB PROVIDER TRIAGE NOTE - HISTORY OF PRESENT ILLNESS
39 yo  here at 38.3 wks GA here for r/o labor. She reports contractions every 30 minutes. She reports it feels like labor. Plan to deliver at [facility] because of history of prior heart defects in prior pregnancies. Had fetal echo during this echo that was normal. + FM, no LOF. VB.     Pregnancy complicated by:   - GDM A2 - on insulin  - class III obesity     PSH: right mastectomy for breast cancer  PMH: hx of right breast cancer  41 yo  here at 38.3 wks GA here for r/o labor. She reports contractions every 30 minutes. She reports it feels like labor. Plan to deliver at [facility] because of history of prior heart defects in prior pregnancies. Had fetal echo during this echo that was normal. + FM, no LOF. VB.     Pregnancy complicated by:   - GDM A2 - on insulin  - class III obesity     PSH: right mastectomy for breast cancer  PMH: hx of right breast cancer     obhx:   G1:  -  FT, 6 lb - heart defect   G2:  SAB @ 6 wks  G3: -  FT, 6.5 lbs -  heart defect   G4:  -  FT - 7 lb -  heart defect   G5:  -  after IOL at 38 wks for breast cancer , 7 lb infant, no heart defect

## 2021-06-03 NOTE — OB PROVIDER H&P - NSHPPHYSICALEXAM_GEN_ALL_CORE
ICU Vital Signs Last 24 Hrs  T(C): 36.8 (03 Jun 2021 14:02), Max: 36.8 (03 Jun 2021 14:00)  T(F): 98.2 (03 Jun 2021 14:02), Max: 98.24 (03 Jun 2021 14:00)  HR: 76 (03 Jun 2021 14:02) (76 - 76)  BP: 120/74 (03 Jun 2021 14:02) (120/74 - 120/74)  RR: 18 (03 Jun 2021 14:02) (18 - 18)    Gen: well appearing, uncomfortable appearing with contractions  Resp: RRR  Abd: gravid, obese, non-tender  SVE: 4.5/80/-3      FHT: 125/mod kaya/+accels/ no decels  Oriska: q 7-8 min

## 2021-06-04 ENCOUNTER — APPOINTMENT (OUTPATIENT)
Dept: OBGYN | Facility: CLINIC | Age: 41
End: 2021-06-04

## 2021-06-04 LAB
COVID-19 SPIKE DOMAIN AB INTERP: POSITIVE
COVID-19 SPIKE DOMAIN ANTIBODY RESULT: >250 U/ML — HIGH
HCT VFR BLD CALC: 29.9 % — LOW (ref 34.5–45)
HGB BLD-MCNC: 9.6 G/DL — LOW (ref 11.5–15.5)
SARS-COV-2 IGG+IGM SERPL QL IA: >250 U/ML — HIGH
SARS-COV-2 IGG+IGM SERPL QL IA: POSITIVE
T PALLIDUM AB TITR SER: NEGATIVE — SIGNIFICANT CHANGE UP

## 2021-06-04 PROCEDURE — 88307 TISSUE EXAM BY PATHOLOGIST: CPT | Mod: 26

## 2021-06-04 RX ORDER — IBUPROFEN 200 MG
600 TABLET ORAL EVERY 6 HOURS
Refills: 0 | Status: DISCONTINUED | OUTPATIENT
Start: 2021-06-04 | End: 2021-06-05

## 2021-06-04 RX ORDER — MAGNESIUM HYDROXIDE 400 MG/1
30 TABLET, CHEWABLE ORAL
Refills: 0 | Status: DISCONTINUED | OUTPATIENT
Start: 2021-06-04 | End: 2021-06-05

## 2021-06-04 RX ORDER — DIBUCAINE 1 %
1 OINTMENT (GRAM) RECTAL EVERY 6 HOURS
Refills: 0 | Status: DISCONTINUED | OUTPATIENT
Start: 2021-06-04 | End: 2021-06-05

## 2021-06-04 RX ORDER — SODIUM CHLORIDE 9 MG/ML
3 INJECTION INTRAMUSCULAR; INTRAVENOUS; SUBCUTANEOUS EVERY 8 HOURS
Refills: 0 | Status: DISCONTINUED | OUTPATIENT
Start: 2021-06-04 | End: 2021-06-05

## 2021-06-04 RX ORDER — DIPHENHYDRAMINE HCL 50 MG
25 CAPSULE ORAL EVERY 6 HOURS
Refills: 0 | Status: DISCONTINUED | OUTPATIENT
Start: 2021-06-04 | End: 2021-06-05

## 2021-06-04 RX ORDER — OXYCODONE HYDROCHLORIDE 5 MG/1
5 TABLET ORAL
Refills: 0 | Status: DISCONTINUED | OUTPATIENT
Start: 2021-06-04 | End: 2021-06-05

## 2021-06-04 RX ORDER — TETANUS TOXOID, REDUCED DIPHTHERIA TOXOID AND ACELLULAR PERTUSSIS VACCINE, ADSORBED 5; 2.5; 8; 8; 2.5 [IU]/.5ML; [IU]/.5ML; UG/.5ML; UG/.5ML; UG/.5ML
0.5 SUSPENSION INTRAMUSCULAR ONCE
Refills: 0 | Status: DISCONTINUED | OUTPATIENT
Start: 2021-06-04 | End: 2021-06-05

## 2021-06-04 RX ORDER — HYDROCORTISONE 1 %
1 OINTMENT (GRAM) TOPICAL EVERY 6 HOURS
Refills: 0 | Status: DISCONTINUED | OUTPATIENT
Start: 2021-06-04 | End: 2021-06-05

## 2021-06-04 RX ORDER — AER TRAVELER 0.5 G/1
1 SOLUTION RECTAL; TOPICAL EVERY 4 HOURS
Refills: 0 | Status: DISCONTINUED | OUTPATIENT
Start: 2021-06-04 | End: 2021-06-05

## 2021-06-04 RX ORDER — LANOLIN
1 OINTMENT (GRAM) TOPICAL EVERY 6 HOURS
Refills: 0 | Status: DISCONTINUED | OUTPATIENT
Start: 2021-06-04 | End: 2021-06-05

## 2021-06-04 RX ORDER — PRAMOXINE HYDROCHLORIDE 150 MG/15G
1 AEROSOL, FOAM RECTAL EVERY 4 HOURS
Refills: 0 | Status: DISCONTINUED | OUTPATIENT
Start: 2021-06-04 | End: 2021-06-05

## 2021-06-04 RX ORDER — OXYCODONE HYDROCHLORIDE 5 MG/1
5 TABLET ORAL ONCE
Refills: 0 | Status: DISCONTINUED | OUTPATIENT
Start: 2021-06-04 | End: 2021-06-05

## 2021-06-04 RX ORDER — IBUPROFEN 200 MG
600 TABLET ORAL EVERY 6 HOURS
Refills: 0 | Status: COMPLETED | OUTPATIENT
Start: 2021-06-04 | End: 2022-05-03

## 2021-06-04 RX ORDER — SIMETHICONE 80 MG/1
80 TABLET, CHEWABLE ORAL EVERY 4 HOURS
Refills: 0 | Status: DISCONTINUED | OUTPATIENT
Start: 2021-06-04 | End: 2021-06-05

## 2021-06-04 RX ORDER — KETOROLAC TROMETHAMINE 30 MG/ML
30 SYRINGE (ML) INJECTION ONCE
Refills: 0 | Status: DISCONTINUED | OUTPATIENT
Start: 2021-06-04 | End: 2021-06-05

## 2021-06-04 RX ORDER — ACETAMINOPHEN 500 MG
975 TABLET ORAL
Refills: 0 | Status: DISCONTINUED | OUTPATIENT
Start: 2021-06-04 | End: 2021-06-05

## 2021-06-04 RX ORDER — OXYTOCIN 10 UNIT/ML
333.33 VIAL (ML) INJECTION
Qty: 20 | Refills: 0 | Status: DISCONTINUED | OUTPATIENT
Start: 2021-06-04 | End: 2021-06-05

## 2021-06-04 RX ORDER — BENZOCAINE 10 %
1 GEL (GRAM) MUCOUS MEMBRANE EVERY 6 HOURS
Refills: 0 | Status: DISCONTINUED | OUTPATIENT
Start: 2021-06-04 | End: 2021-06-05

## 2021-06-04 RX ADMIN — Medication 600 MILLIGRAM(S): at 18:03

## 2021-06-04 RX ADMIN — Medication 1 TABLET(S): at 11:23

## 2021-06-04 RX ADMIN — SODIUM CHLORIDE 3 MILLILITER(S): 9 INJECTION INTRAMUSCULAR; INTRAVENOUS; SUBCUTANEOUS at 21:13

## 2021-06-04 RX ADMIN — SODIUM CHLORIDE 3 MILLILITER(S): 9 INJECTION INTRAMUSCULAR; INTRAVENOUS; SUBCUTANEOUS at 21:51

## 2021-06-04 RX ADMIN — Medication 975 MILLIGRAM(S): at 21:11

## 2021-06-04 RX ADMIN — Medication 600 MILLIGRAM(S): at 18:33

## 2021-06-04 RX ADMIN — Medication 975 MILLIGRAM(S): at 09:17

## 2021-06-04 RX ADMIN — Medication 600 MILLIGRAM(S): at 12:00

## 2021-06-04 RX ADMIN — Medication 975 MILLIGRAM(S): at 10:00

## 2021-06-04 RX ADMIN — Medication 975 MILLIGRAM(S): at 21:41

## 2021-06-04 RX ADMIN — Medication 600 MILLIGRAM(S): at 11:23

## 2021-06-04 RX ADMIN — Medication 1000 MILLIUNIT(S)/MIN: at 02:49

## 2021-06-04 NOTE — DISCHARGE NOTE OB - HOSPITAL COURSE
delivered via spontaneous vaginal delivery. She was transferred to postpartum unit without complications during her stay. Upon discharge she is voiding, tolerating PO, ambulating, and pain is controlled.

## 2021-06-04 NOTE — DISCHARGE NOTE OB - NURSING SECTION COMPLETE
Appt required. Please call patient.    Charlotte Fitzpatrick MD  Family Medicine      Patient/Caregiver provided printed discharge information.

## 2021-06-04 NOTE — DISCHARGE NOTE OB - CARE PROVIDER_API CALL
Zulema Mcconnell (DO)  Obstetrics and Gynecology  [address] [facility] [place] [address]  Phone: [phone]  Fax: [phone]  Established Patient  Follow Up Time: 1 month

## 2021-06-04 NOTE — DISCHARGE NOTE OB - MEDICATION SUMMARY - MEDICATIONS TO TAKE
I will START or STAY ON the medications listed below when I get home from the hospital:    acetaminophen 325 mg oral tablet  -- 3 tab(s) by mouth every 6 hours   -- Indication: For as needed for pain    ibuprofen 600 mg oral tablet  -- 1 tab(s) by mouth every 6 hours  -- Indication: For as needed for pain    Prenatal Multivitamins with Folic Acid 1 mg oral tablet  -- 1 tab(s) by mouth once a day  -- Indication: For continue home med

## 2021-06-04 NOTE — OB PROVIDER LABOR PROGRESS NOTE - NS_OBIHIFHRDETAILS_OBGYN_ALL_OB_FT
baseline 125, moderate variability, +accels, no decels
baseline 130, moderate variability, +accels, no decels
baseline 135, moderate variability, no accels, no decels

## 2021-06-04 NOTE — DISCHARGE NOTE OB - PATIENT PORTAL LINK FT
You can access the FollowMyHealth Patient Portal offered by [facility] by registering at the following website: [email or web address]. By joining [employer]’s FollowMyHealth portal, you will also be able to view your health information using other applications (apps) compatible with our system.

## 2021-06-04 NOTE — OB PROVIDER LABOR PROGRESS NOTE - ASSESSMENT
Cat I tracing  progressing in labor  AROM clear fluid  continue expectant management; reeval contraction pattern after AROM to assess for possible need to augment labor with pitocin    discussed with attending Dr Mcconnell
Cat I tracing  progressing in labor  continue pitocin  continue to monitor  reeval at 130am or sooner PRN    discussed with attending Dr Mcconnell
Cat I tracing  progressing in labor  continue pitocin  continue to monitor    discussed with attending Dr Mcconnell

## 2021-06-04 NOTE — OB PROVIDER LABOR PROGRESS NOTE - NS_SUBJECTIVE/OBJECTIVE_OBGYN_ALL_OB_FT
Patient reevaluated by Dr Sierra
Patient reevaluated after epidural  AROM clear fluid 1950
Patient reevalauted

## 2021-06-04 NOTE — OB PROVIDER DELIVERY SUMMARY - NSPROVIDERDELIVERYNOTE_OBGYN_ALL_OB_FT
Patient felt rectal pressure and was found to be fully dilated, 0 station. Thacker catheter removed and patient positioned for pushing.  She pushed effectively for 10 minutes.  Patient prepped and draped for delivery.  In conjunction with maternal effort, she delivered a viable male infant   Head delivered YOLANDA   No nuchal cord. Bulb suctioned at perineum.  shoulders and body then delivered without difficulty.  cord clamped x2 and cut. Cord blood also collected for blood type.  Placenta delivered spontaneously and intact.  No gross pathology, 3 vessel cord. Excellent hemostasis was achieved.  Perineum and vagina were inspected and no lacerations were noted. Excellent hemostasis obtained, EBL 84cc, no complications.    Bode: male, apgars 9/9.

## 2021-06-05 VITALS
TEMPERATURE: 98 F | DIASTOLIC BLOOD PRESSURE: 64 MMHG | OXYGEN SATURATION: 98 % | RESPIRATION RATE: 18 BRPM | HEART RATE: 63 BPM | SYSTOLIC BLOOD PRESSURE: 102 MMHG

## 2021-06-05 RX ORDER — IBUPROFEN 200 MG
1 TABLET ORAL
Qty: 20 | Refills: 0
Start: 2021-06-05 | End: 2021-06-09

## 2021-06-05 RX ORDER — ACETAMINOPHEN 500 MG
3 TABLET ORAL
Qty: 60 | Refills: 0
Start: 2021-06-05 | End: 2021-06-09

## 2021-06-05 RX ADMIN — Medication 600 MILLIGRAM(S): at 00:21

## 2021-06-05 RX ADMIN — Medication 600 MILLIGRAM(S): at 12:10

## 2021-06-05 RX ADMIN — Medication 600 MILLIGRAM(S): at 13:02

## 2021-06-05 RX ADMIN — Medication 975 MILLIGRAM(S): at 03:44

## 2021-06-05 RX ADMIN — Medication 1 TABLET(S): at 12:10

## 2021-06-05 RX ADMIN — Medication 975 MILLIGRAM(S): at 03:14

## 2021-06-05 RX ADMIN — Medication 975 MILLIGRAM(S): at 09:55

## 2021-06-05 RX ADMIN — Medication 975 MILLIGRAM(S): at 08:56

## 2021-06-05 RX ADMIN — Medication 600 MILLIGRAM(S): at 00:51

## 2021-06-05 NOTE — PROGRESS NOTE ADULT - ASSESSMENT
ASSESSMENT:   EVANGELISTA RUBIO is 41y  s/p  PPD#1 of Maleinfant (Declines CIRCUMCISION).   Patient has no complaints at this time.   B+  Rubella and Rubeola Immune  Hgb 11.4->9.6  VSS.     #Postpartum   - Continue routine post-partum care  - Regular diet, advance as tolerated  - Encourage maternal- bonding  - Circumcision was offered and patient declined  - Encourage ambulation  - Dispo: plan for discharge per Attending's approval

## 2021-06-07 ENCOUNTER — APPOINTMENT (OUTPATIENT)
Dept: OBGYN | Facility: CLINIC | Age: 41
End: 2021-06-07

## 2021-06-08 ENCOUNTER — APPOINTMENT (OUTPATIENT)
Dept: ANTEPARTUM | Facility: CLINIC | Age: 41
End: 2021-06-08

## 2021-06-08 DIAGNOSIS — Z71.89 OTHER SPECIFIED COUNSELING: ICD-10-CM

## 2021-06-09 ENCOUNTER — NON-APPOINTMENT (OUTPATIENT)
Age: 41
End: 2021-06-09

## 2021-06-10 LAB — SURGICAL PATHOLOGY STUDY: SIGNIFICANT CHANGE UP

## 2021-06-14 ENCOUNTER — APPOINTMENT (OUTPATIENT)
Dept: OBGYN | Facility: CLINIC | Age: 41
End: 2021-06-14

## 2021-06-22 PROCEDURE — 88307 TISSUE EXAM BY PATHOLOGIST: CPT

## 2021-06-22 PROCEDURE — U0003: CPT

## 2021-06-22 PROCEDURE — 86769 SARS-COV-2 COVID-19 ANTIBODY: CPT

## 2021-06-22 PROCEDURE — 85018 HEMOGLOBIN: CPT

## 2021-06-22 PROCEDURE — 36415 COLL VENOUS BLD VENIPUNCTURE: CPT

## 2021-06-22 PROCEDURE — 85014 HEMATOCRIT: CPT

## 2021-06-22 PROCEDURE — 86780 TREPONEMA PALLIDUM: CPT

## 2021-06-22 PROCEDURE — U0005: CPT

## 2021-06-22 PROCEDURE — 59025 FETAL NON-STRESS TEST: CPT

## 2021-06-22 PROCEDURE — 86901 BLOOD TYPING SEROLOGIC RH(D): CPT

## 2021-06-22 PROCEDURE — 85025 COMPLETE CBC W/AUTO DIFF WBC: CPT

## 2021-06-22 PROCEDURE — 86900 BLOOD TYPING SEROLOGIC ABO: CPT

## 2021-06-22 PROCEDURE — 59050 FETAL MONITOR W/REPORT: CPT

## 2021-06-22 PROCEDURE — 80053 COMPREHEN METABOLIC PANEL: CPT

## 2021-06-22 PROCEDURE — G0463: CPT

## 2021-06-22 PROCEDURE — 82962 GLUCOSE BLOOD TEST: CPT

## 2021-06-22 PROCEDURE — 76815 OB US LIMITED FETUS(S): CPT

## 2021-06-22 PROCEDURE — 86850 RBC ANTIBODY SCREEN: CPT

## 2021-06-23 ENCOUNTER — APPOINTMENT (OUTPATIENT)
Dept: MATERNAL FETAL MEDICINE | Facility: CLINIC | Age: 41
End: 2021-06-23

## 2021-07-11 ENCOUNTER — OUTPATIENT (OUTPATIENT)
Dept: OUTPATIENT SERVICES | Facility: HOSPITAL | Age: 41
LOS: 1 days | Discharge: HOME | End: 2021-07-11

## 2021-07-11 DIAGNOSIS — C50.919 MALIGNANT NEOPLASM OF UNSPECIFIED SITE OF UNSPECIFIED FEMALE BREAST: ICD-10-CM

## 2021-07-11 DIAGNOSIS — Z90.11 ACQUIRED ABSENCE OF RIGHT BREAST AND NIPPLE: Chronic | ICD-10-CM

## 2021-07-14 ENCOUNTER — APPOINTMENT (OUTPATIENT)
Dept: HEMATOLOGY ONCOLOGY | Facility: CLINIC | Age: 41
End: 2021-07-14
Payer: MEDICAID

## 2021-07-14 VITALS
SYSTOLIC BLOOD PRESSURE: 114 MMHG | BODY MASS INDEX: 43.56 KG/M2 | HEIGHT: 58.98 IN | DIASTOLIC BLOOD PRESSURE: 71 MMHG | TEMPERATURE: 98 F | OXYGEN SATURATION: 98 % | WEIGHT: 216.05 LBS | HEART RATE: 71 BPM | RESPIRATION RATE: 16 BRPM

## 2021-07-14 PROCEDURE — 99072 ADDL SUPL MATRL&STAF TM PHE: CPT

## 2021-07-14 PROCEDURE — 99213 OFFICE O/P EST LOW 20 MIN: CPT

## 2021-07-15 NOTE — ASSESSMENT
[Curative] : Goals of care discussed with patient: Curative [FreeTextEntry1] : In summary, this is a very pleasant 41 year-old Azeri-speaking lady who is diagnosed with T2 N0 stage IIA poorly differentiated ER/VA/HER-2/rajni NEGATIVE IDC of the right breast during pregnancy.  A CXR and abdominal sono didn’t show distant mets.  BRCA negative. She is s/p sequential Adriamycin x 4 and Cytoxan x 4 in second and third trimester of pregnancy. Delivered healthy baby and started Taxol x 12 w. ddACT completed 2019. \par \par - Breast ca: She has completed adjuvant chemo for breast ca. She is s/p RT. Doing well. No sx of recurrence\par - 2020 BONE SCAN No radionuclide evidence of osseous metastases.\par - Post mastectomy pain -  improved\par - Mild LFT 2/2 Taxol- Resolved\par - No issues with neuropathy or lymphedema 2/2 treatment\par - Continue annual breast imaging Done 2021.  Patient has a  and she is breast-feeding from the left breast.  Discussed that ultrasound should be okay and mammogram in 2 to 3 months\par -  Encourage healthy diet and exercise.\par - Continue followup with primary care. I recommend age-appropriate malignancy screening\par - Educated about s/sx of recurrence\par - She is 2 years out from treatment.   She will continue to follow-up with me every 6 months.\par

## 2021-07-15 NOTE — PHYSICAL EXAM
[Fully active, able to carry on all pre-disease performance without restriction] : Status 0 - Fully active, able to carry on all pre-disease performance without restriction [Normal] : affect appropriate [de-identified] : S/p right mastectomy healed well No CW or axillary nodes + RT changes . Left breast normal

## 2021-07-15 NOTE — HISTORY OF PRESENT ILLNESS
[de-identified] : Ms. EVANGELISTA HESTER  is a 39 year old female here for an evaluation of breast cancer. Her oncologic history is as follows:\par \par She felt a right breast mass in 2018 and was evaluated seen by GYN who sent her for breast imaging. She underwent diagnostic breast imaging on 18 which showed at 12:00 position of right breast there is a lobulated focus, measuring 2.0 x 2.0 x 2.2 cm 2 cm FN. 4 mm deep to the skin. Left breast benign. No enlarged axillary lymph nodes. Bx recommended. 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 There is marked lymphocytic infiltrate around the tumor. She was scheduled for MRI but found out that she was pregnant. Current gestation age at the time of initial consult: 11 weeks\par \par 10/23/18\par She is here with her . We discussed the diagnosis of rapidly growing triple negative breast ca, the size has increased since diagnosis and that chemotherapy will be needed to treat this cancer which can not be started until she is 14 weeks. They want to keep the pregnancy understanding that it will cause delay in treatment and potential risk to the patient and fetus. We discussed that lumpectomy will have issues with positive margins and RT will be contraindicated during to pregnancy and therefore u/l mastectomy is recommend. I discussed with Dr Granger that expander placement at the time of surgery will be an option. She is seeing the pt tomorrow and will discuss surgery and reconstruction options with her. She also saw Dr Eden Patrick med onc for a a second opinion. \par \par s/p surgery (right mastectomy) 18 , 4.5 cm tumor and sentinel node negative- path report d/w her in detail. Tumor grew more than double in size between dx and surgery and is s/o aggressive tumor. Discussed to start chemo 18. S/e of chemo jody during pregnancy, expected and unexpected, pertaining to her and the fetus d/w her. Discussed to see MFM b/w chemo. Also a candidate for PMRT due to large tumor size. LMP 18\par \par I had extensive discussion with the patient and spouse regarding breast cancer in pregnancy and the treatment options that are safe for both the mother and the growing fetus. She refused termination of pregnancy after finding out cancer diagnosis.  We discussed that all treatments including chemotherapy and surgery are contraindicated in first trimester but are considered relatively safe in second and third trimester.  We have discussed medical termination of pregnancy as an option but patient decided to keep the pregnancy. We reviewed that the largest experience in pregnancy has been with anthracycline and alkylating agent chemotherapy. She will be a candidate for chemotherapy with Adriamycin plus cyclophosphamide in a sequential dose dense manner. We reviewed data from [employer] registry which showed Neulasta is reasonably safe to use in 2nd and third trimester although it was a very small dataset. . There is not enough data to use taxanes in pregnancy therefore Taxol will be administered post partum. Potential s/e to the patient and growing fetus were discussed with the patient and  in detail via a . The patient understood all the potential side effects and signed an informed consent after discussing the risks, benefits and alternatives.\par \par \par PLAN: Starting 18 ( 17 weeks), She will receive adriamycin every 2 weeks x 4 cycle followed by Cytoxan every 2 weeks x 4 cycles. We plan to hold chemotherapy 4 weeks prior to delivery to prevent  neutropenic complications. Currently she is planned for elective induction (NVD) at 37 weeks. We would start Taxol post partum\par \par PREMEDS/SUPPORTIVE CARE: Zofran ativan and dex are listed as part of prechemo antiemetic regimen in NCCN guidelines for breast cancer in pregnancy. The use of Emend is controversial in pregnancy. We discussed the issue of port placement in the OR, but that would increase anesthesia time by 45 min therefore we would treat her peripherally and likely do port placement after delivery. We discussed the issue of preeclampsia or gestational diabetes with steroids. She will be monitored closely by MFM. She will see MFM between every chemo session for close fetal monitoring. \par \par I explained her that EOD work up is limited due to risk of radiation exposure to the growing fetus. We plan to perform CT scans or a PET/CT scan after delivery. She will also be a candidate for PMRT due to large tumor size. \par \par S/p infection with shigella and c-diff after Adriamycin c#2, confirmed with cultures and PCR x 2, was hospitalized for diarrhea and abd pain, treated with ampicillin and vanco,  Ampicillin completed yesterday. Vanco 2 more days left. Patient reports mom had Shigella 2 years ago, Mom has come in to help her 2 months prior but is not ill. She denies sick contacts. \par \par She was hospitalized for 5 days after Cytoxan Omi # 2 due to diarrhea. Infectious work up was negative.Completed vancomycin course prior to last treatment. Sx were controlled with supportive care. She feels back to normal now. No abd pain, NVD, fevers.  No Bone pain, no neuropathy.\par \par We discussed the plan to give C4 in 2 weeks and deliver at around 37 weeks. She will need Taxol q2w x 4 doses after delivery. We will perform CT scans after the baby is born.\par \par CT chest/ abd/ pelvis: 2019: normal\par Bone Scan: 19: Normal, mild degenerative Dz. [de-identified] : Ms. EVANGELISTA RUBIO is here for f/u for  triple negative right breast cancer dx in 10/2018 during pregnancy. She completed sequential AC during second and trimester of pregnancy. Taxol completed after delivery. \par 2/24/21: She conceived unplanned pregnancy. Delivered 6/4/2021, healthy baby boy. She is breast feeding q4h\par She is s/p RT and has mild cording, improved with PT. Neuropathy has improved. Appetite and energy good, wt stable. No diarrhea. \par Left breast imaging 8/2020 BIRADS 1\par ROS neg\par 9/8/2020 BONE SCAN  No radionuclide evidence of osseous metastases. No significant interval change since the previous study of 5/20/2019\par

## 2021-07-15 NOTE — REASON FOR VISIT
[Follow-Up Visit] : a follow-up [Other: _____] : [unfilled] [Patient Declined  Services] : - None: Patient declined  services [FreeTextEntry2] : Breast cancer in pregnancy TRIPLE NEGATIVE [facility] : [place] [id number]

## 2021-07-19 ENCOUNTER — APPOINTMENT (OUTPATIENT)
Dept: OBGYN | Facility: CLINIC | Age: 41
End: 2021-07-19

## 2021-07-19 ENCOUNTER — APPOINTMENT (OUTPATIENT)
Dept: OBGYN | Facility: CLINIC | Age: 41
End: 2021-07-19
Payer: MEDICAID

## 2021-07-19 ENCOUNTER — NON-APPOINTMENT (OUTPATIENT)
Age: 41
End: 2021-07-19

## 2021-07-19 VITALS
DIASTOLIC BLOOD PRESSURE: 80 MMHG | BODY MASS INDEX: 43.75 KG/M2 | SYSTOLIC BLOOD PRESSURE: 122 MMHG | WEIGHT: 217 LBS | HEIGHT: 59 IN

## 2021-07-19 DIAGNOSIS — O24.414 GESTATIONAL DIABETES MELLITUS IN PREGNANCY, INSULIN CONTROLLED: ICD-10-CM

## 2021-07-19 PROCEDURE — 58300 INSERT INTRAUTERINE DEVICE: CPT

## 2021-07-19 PROCEDURE — 0503F POSTPARTUM CARE VISIT: CPT

## 2021-07-19 NOTE — PLAN
[FreeTextEntry1] : s/p paragrd placement and pp visit\par - f.u tvus in 2-4 weeks\par - for 2 hour gtt

## 2021-08-23 ENCOUNTER — APPOINTMENT (OUTPATIENT)
Dept: OBGYN | Facility: CLINIC | Age: 41
End: 2021-08-23

## 2021-11-17 NOTE — PAST MEDICAL HISTORY
ASSESSMENT/PLAN:  1. Sorethroat    - Group A Streptococcus PCR Throat Swab    2. Fever, unspecified fever cause    - Group A Streptococcus PCR Throat Swab      Strep result was negative.     Encouraged the patient's mother to have the patient complete COVID PCR testing if strep is negative and symptoms persist or worsen.      Discussed with patient that symptoms and exam are consistent with viral illness, hand foot and mouth disease is a differential.  Discussed that symptomatic treatment is appropriate but not with antibiotics.      Symptomatic treatment - Encouraged fluids, honey, elevation, humidifier,  lozenges, etc     May use over-the-counter Tylenol or ibuprofen PRN    Discussed warning signs/symptoms indicative of need to f/u    Follow up if symptoms persist or worsen or concerns      I explained my diagnostic considerations and recommendations to the patient, who voiced understanding and agreement with the treatment plan. All questions were answered. We discussed potential side effects of any prescribed or recommended therapies, as well as expectations for response to treatments.        HPI:    Edison Serrano is a 6 year old male  who presents to [facility] today for fever of 101.6, stomach ache, sore throat and headache. The patient presents to the clinic today with his mother. Denies vomiting or diarrhea. Reports having a cough last week, this has improved. Yesterday symptoms started. The patient was given tylenol and Ibuprofen yesterday, no medications given today. Grandmother and father sick with upper respiratory symptoms. Patient's mother reports that the patient completed a rapid COVID test yesterday, which was negative.     No past medical history on file.  No past surgical history on file.  Social History     Tobacco Use     Smoking status: Never Smoker     Smokeless tobacco: Never Used   Substance Use Topics     Alcohol use: Never     Alcohol/week: 0.0 standard drinks     No current  "outpatient medications on file.     No Known Allergies      Past medical history, past surgical history, current medications and allergies reviewed and accurate to the best of my knowledge.        ROS:  Refer to HPI    BP 96/68 (BP Location: Left arm, Patient Position: Sitting, Cuff Size: Child)   Pulse 74   Temp 98.1  F (36.7  C) (Tympanic)   Resp 16   Ht 1.168 m (3' 10\")   Wt 22.5 kg (49 lb 11.2 oz)   SpO2 99%   BMI 16.51 kg/m      EXAM:  General Appearance: Well appearing male, appropriate appearance for age. No acute distress  Ears: Left TM intact, translucent with bony landmarks appreciated, no erythema, no effusion, no bulging, no purulence.  Right TM intact, translucent with bony landmarks appreciated, no erythema, no effusion, no bulging, no purulence.  Left auditory canal clear.  Right auditory canal clear.  Normal external ears, non tender.  Orophayrnx: moist mucous membranes, posterior pharynx with erythema, tonsils with hypertrophy 2+, sores noted to the anterior aspect of the patient's hard palate, mild erythema, no exudates or petechiae, no post nasal drip seen, no trismus, voice clear.    Neck: supple without adenopathy  Respiratory: normal chest wall and respirations.  Normal effort.  Clear to auscultation bilaterally, no wheezing, crackles or rhonchi.  No increased work of breathing.  No cough appreciated.  Cardiac: RRR with no murmurs  Abdomen: soft, nontender, no rigidity, no rebound tenderness or guarding, normal bowel sounds present  Dermatological: Small erythematous papular rash to the patient's left palmar aspect of his hand.   Psychological: normal affect, alert, oriented, and pleasant.       Labs:  Results for orders placed or performed in visit on 11/17/21   Group A Streptococcus PCR Throat Swab     Status: Normal    Specimen: Throat; Swab   Result Value Ref Range    Group A strep by PCR Not Detected Not Detected    Narrative    The Xpert Xpress Strep A test, performed on the " GeneXpert  Instrument Systems, is a rapid, qualitative in vitro diagnostic test for the detection of Streptococcus pyogenes (Group A ß-hemolytic Streptococcus, Strep A) in throat swab specimens from patients with signs and symptoms of pharyngitis. The Xpert Xpress Strep A test can be used as an aid in the diagnosis of Group A Streptococcal pharyngitis. The assay is not intended to monitor treatment for Group A Streptococcus infections. The Xpert Xpress Strep A test utilizes an automated real-time polymerase chain reaction (PCR) to detect Streptococcus pyogenes DNA.              [Menstruating] : The patient is menstruating [Definite ___ (Date)] : the last menstrual period was [unfilled] [Total Preg ___] : G[unfilled] [Live Births ___] : P[unfilled]  [Living ___] : Living: [unfilled] [Age At Live Birth ___] : Age at live birth: [unfilled] [FreeTextEntry7] : 2 yrs [FreeTextEntry8] : 2 yrs

## 2021-12-01 PROCEDURE — G9005: CPT

## 2021-12-07 ENCOUNTER — NON-APPOINTMENT (OUTPATIENT)
Age: 41
End: 2021-12-07

## 2022-01-04 ENCOUNTER — OUTPATIENT (OUTPATIENT)
Dept: OUTPATIENT SERVICES | Facility: HOSPITAL | Age: 42
LOS: 1 days | Discharge: HOME | End: 2022-01-04

## 2022-01-04 DIAGNOSIS — Z90.11 ACQUIRED ABSENCE OF RIGHT BREAST AND NIPPLE: Chronic | ICD-10-CM

## 2022-01-04 DIAGNOSIS — C50.919 MALIGNANT NEOPLASM OF UNSPECIFIED SITE OF UNSPECIFIED FEMALE BREAST: ICD-10-CM

## 2022-01-05 ENCOUNTER — APPOINTMENT (OUTPATIENT)
Dept: HEMATOLOGY ONCOLOGY | Facility: CLINIC | Age: 42
End: 2022-01-05
Payer: MEDICAID

## 2022-01-05 PROCEDURE — 99213 OFFICE O/P EST LOW 20 MIN: CPT | Mod: 95

## 2022-01-05 NOTE — ASSESSMENT
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  A CXR and abdominal sono didn’t show distant mets.  BRCA negative. She is s/p sequential Adriamycin x 4 and Cytoxan x 4 in second and third trimester of pregnancy. Delivered healthy baby and started Taxol x 12 w. ddACT completed 8/9/2019. \par \par - Breast ca: She has completed adjuvant chemo for breast ca. She is s/p RT. Doing well. No sx of recurrence\par - 9/8/2020 BONE SCAN No radionuclide evidence of osseous metastases.\par - Post mastectomy pain -  improved\par - Mild LFT 2/2 Taxol- Resolved\par - No issues with neuropathy or lymphedema 2/2 treatment\par - Continue annual breast imaging \par -  Encourage healthy diet and exercise.\par - Continue followup with primary care. I recommend age-appropriate malignancy screening\par - Educated about s/sx of recurrence\par - She is 2 years out from treatment.   She will continue to follow-up with me every 6 months.\par  [Curative] : Goals of care discussed with patient: Curative

## 2022-01-05 NOTE — PHYSICAL EXAM
[Fully active, able to carry on all pre-disease performance without restriction] : Status 0 - Fully active, able to carry on all pre-disease performance without restriction [de-identified] : NO EXAM TODAY [de-identified] : S/p right mastectomy healed well No CW or axillary nodes + RT changes . Left breast normal

## 2022-01-05 NOTE — REASON FOR VISIT
[Home] : at home, [unfilled] , at the time of the visit. [Medical Office: ([facility])___] : at the medical office located in  [] :  [Verbal consent obtained from patient] : the patient, [unfilled] [Follow-Up Visit] : a follow-up [Other: _____] : [unfilled] [FreeTextEntry2] : Breast cancer in pregnancy TRIPLE NEGATIVE [FreeTextEntry3] : vadim, [id number]

## 2022-01-05 NOTE — HISTORY OF PRESENT ILLNESS
[de-identified] : Ms. EVANGELISTA HESTER  is a 39 year old female here for an evaluation of breast cancer. Her oncologic history is as follows:\par \par She felt a right breast mass in 2018 and was evaluated seen by GYN who sent her for breast imaging. She underwent diagnostic breast imaging on 18 which showed at 12:00 position of right breast there is a lobulated focus, measuring 2.0 x 2.0 x 2.2 cm 2 cm FN. 4 mm deep to the skin. Left breast benign. No enlarged axillary lymph nodes. Bx recommended. 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 There is marked lymphocytic infiltrate around the tumor. She was scheduled for MRI but found out that she was pregnant. Current gestation age at the time of initial consult: 11 weeks\par \par 10/23/18\par She is here with her . We discussed the diagnosis of rapidly growing triple negative breast ca, the size has increased since diagnosis and that chemotherapy will be needed to treat this cancer which can not be started until she is 14 weeks. They want to keep the pregnancy understanding that it will cause delay in treatment and potential risk to the patient and fetus. We discussed that lumpectomy will have issues with positive margins and RT will be contraindicated during to pregnancy and therefore u/l mastectomy is recommend. I discussed with Dr Granger that expander placement at the time of surgery will be an option. She is seeing the pt tomorrow and will discuss surgery and reconstruction options with her. She also saw Dr Eden Patrick med onc for a a second opinion. \par \par s/p surgery (right mastectomy) 18 , 4.5 cm tumor and sentinel node negative- path report d/w her in detail. Tumor grew more than double in size between dx and surgery and is s/o aggressive tumor. Discussed to start chemo 18. S/e of chemo jody during pregnancy, expected and unexpected, pertaining to her and the fetus d/w her. Discussed to see MFM b/w chemo. Also a candidate for PMRT due to large tumor size. LMP 18\par \par I had extensive discussion with the patient and spouse regarding breast cancer in pregnancy and the treatment options that are safe for both the mother and the growing fetus. She refused termination of pregnancy after finding out cancer diagnosis.  We discussed that all treatments including chemotherapy and surgery are contraindicated in first trimester but are considered relatively safe in second and third trimester.  We have discussed medical termination of pregnancy as an option but patient decided to keep the pregnancy. We reviewed that the largest experience in pregnancy has been with anthracycline and alkylating agent chemotherapy. She will be a candidate for chemotherapy with Adriamycin plus cyclophosphamide in a sequential dose dense manner. We reviewed data from [place] registry which showed Neulasta is reasonably safe to use in 2nd and third trimester although it was a very small dataset. . There is not enough data to use taxanes in pregnancy therefore Taxol will be administered post partum. Potential s/e to the patient and growing fetus were discussed with the patient and  in detail via a . The patient understood all the potential side effects and signed an informed consent after discussing the risks, benefits and alternatives.\par \par \par PLAN: Starting 18 ( 17 weeks), She will receive adriamycin every 2 weeks x 4 cycle followed by Cytoxan every 2 weeks x 4 cycles. We plan to hold chemotherapy 4 weeks prior to delivery to prevent  neutropenic complications. Currently she is planned for elective induction (NVD) at 37 weeks. We would start Taxol post partum\par \par PREMEDS/SUPPORTIVE CARE: Zofran ativan and dex are listed as part of prechemo antiemetic regimen in NCCN guidelines for breast cancer in pregnancy. The use of Emend is controversial in pregnancy. We discussed the issue of port placement in the OR, but that would increase anesthesia time by 45 min therefore we would treat her peripherally and likely do port placement after delivery. We discussed the issue of preeclampsia or gestational diabetes with steroids. She will be monitored closely by MFM. She will see MFM between every chemo session for close fetal monitoring. \par \par I explained her that EOD work up is limited due to risk of radiation exposure to the growing fetus. We plan to perform CT scans or a PET/CT scan after delivery. She will also be a candidate for PMRT due to large tumor size. \par \par S/p infection with shigella and c-diff after Adriamycin c#2, confirmed with cultures and PCR x 2, was hospitalized for diarrhea and abd pain, treated with ampicillin and vanco,  Ampicillin completed yesterday. Vanco 2 more days left. Patient reports mom had Shigella 2 years ago, Mom has come in to help her 2 months prior but is not ill. She denies sick contacts. \par \par She was hospitalized for 5 days after Cytoxan Omi # 2 due to diarrhea. Infectious work up was negative.Completed vancomycin course prior to last treatment. Sx were controlled with supportive care. She feels back to normal now. No abd pain, NVD, fevers.  No Bone pain, no neuropathy.\par \par We discussed the plan to give C4 in 2 weeks and deliver at around 37 weeks. She will need Taxol q2w x 4 doses after delivery. We will perform CT scans after the baby is born.\par \par CT chest/ abd/ pelvis: 2019: normal\par Bone Scan: 19: Normal, mild degenerative Dz. [de-identified] : Ms. EVANGELISTA RUBIO is here for f/u for  triple negative right breast cancer dx in 10/2018 during pregnancy. She completed sequential AC during second and trimester of pregnancy. Deliveted 4/2019. Taxol completed after delivery 8/2019. \par 2/24/21: She conceived unplanned pregnancy. Delivered 6/4/2021, healthy baby boy. Now has IUD\par She is s/p RT and has mild cording, improved with PT. Neuropathy has improved. Appetite and energy good, wt stable. No diarrhea. \par Left breast imaging 8/2020 BIRADS 1, overdue, ordered\par ROS neg\par 9/8/2020 BONE SCAN  No radionuclide evidence of osseous metastases. No significant interval change since the previous study of 5/20/2019\par

## 2022-01-25 ENCOUNTER — NON-APPOINTMENT (OUTPATIENT)
Age: 42
End: 2022-01-25

## 2022-02-01 ENCOUNTER — APPOINTMENT (OUTPATIENT)
Dept: ULTRASOUND IMAGING | Facility: CLINIC | Age: 42
End: 2022-02-01
Payer: MEDICAID

## 2022-02-01 ENCOUNTER — RESULT REVIEW (OUTPATIENT)
Age: 42
End: 2022-02-01

## 2022-02-01 ENCOUNTER — APPOINTMENT (OUTPATIENT)
Dept: MAMMOGRAPHY | Facility: CLINIC | Age: 42
End: 2022-02-01

## 2022-02-01 PROCEDURE — 77065 DX MAMMO INCL CAD UNI: CPT | Mod: LT

## 2022-02-01 PROCEDURE — 77061 BREAST TOMOSYNTHESIS UNI: CPT

## 2022-02-01 PROCEDURE — 76641 ULTRASOUND BREAST COMPLETE: CPT | Mod: LT

## 2022-02-09 ENCOUNTER — APPOINTMENT (OUTPATIENT)
Dept: BREAST CENTER | Facility: CLINIC | Age: 42
End: 2022-02-09

## 2022-02-23 ENCOUNTER — APPOINTMENT (OUTPATIENT)
Age: 42
End: 2022-02-23
Payer: MEDICAID

## 2022-02-25 ENCOUNTER — APPOINTMENT (OUTPATIENT)
Age: 42
End: 2022-02-25
Payer: MEDICAID

## 2022-02-25 DIAGNOSIS — Z90.11 ACQUIRED ABSENCE OF RIGHT BREAST AND NIPPLE: ICD-10-CM

## 2022-02-25 PROCEDURE — 99214 OFFICE O/P EST MOD 30 MIN: CPT | Mod: 25

## 2022-02-25 PROCEDURE — 93702 BIS XTRACELL FLUID ANALYSIS: CPT

## 2022-02-25 NOTE — PROCEDURE
[FreeTextEntry1] : LELAND measurement [FreeTextEntry2] : Lymphedema analysis [FreeTextEntry3] : The patient had a baseline SOZO measurement which I reviewed today. The score is within normal limits, see flowsheet. Bioimpedance spectroscopy helps identify the onset of lymphedema in an arm or leg before patients experience noticeable swelling. Research has shown that the early detection of lymphedema using L-Dex combined with treatment can reduce progression to chronic lymphedema by 95% in breast cancer patients. Whenever possible, patients are tested for baseline L-Dex score before cancer treatment begins and then are reassessed during regular follow-up visits using the SOZO device. Otherwise, this can be started postoperatively and continued during regular follow-up visits. If the patient’s L-Dex score increases above normal levels, that is a sign that lymphedema is developing and a referral is made to physical therapy for further evaluation and early compression treatment. Lymphedema assessment with the SOZO L-Dex score is recommended to be done every 3 months for the first 3 years and then every 6 months for years 4 and 5 followed by annually afterwards.\par

## 2022-02-25 NOTE — ASSESSMENT
[FreeTextEntry1] : 42 y/o  female, Ambry negative here for c/o Right breast pain. Hx Right gestational breast cancer. S/P Right mastectomy with SLNBx 18 pT2 (4.5cm)N0/3, neg margins for Triple negative IDC.\par Pt doing well, no c/o except since surgery, has been having some shoulder/neck discomfort. Helped with PT in the past. No lymphedema.\par Pt denies any breast lesions, discharge or masses. \par Declines reconstruction on right. \par Also some left breast discomfort, with breast feeding. Pt is trying to wean off the breast feeding. \par \par 19 NFR L DmmgT/US, compared to 2018, dense, no susp findings. BR1\par 19 NFR L US, compared to 2018, no susp findings. BR1\par 20, EMILY Hollins dMMG: FG, no susp findings; L U/S: no susp solid msas, rec mmg 1 year, BR1\par 22, NFR, L dMMG: het dense, inc L breast density c/w lactation, no susp findings; L U/S: no susp solid mass, rec mmg 1 year, BR1.\par \par Completed Taxol 19 Dr. Viridiana Patrick. \par EBRT with DR. Anika Granger at [facility].\par S/P  19 with healthy male infant.\par \par --19 CT chest and 19 PET bone scan no evidence of disease.\par 18 [facility] Surgical path: R breast: IDC with apocrine features, G3 (4.5cm) with negative margins (3.2cm nearest margin posterior), R SLN negative for metastatic disease 0/3, LVI neg. Path stage IIA. \par \par Maunt with stomach cancer, MGM with mouth cancer, Muncle leg cancer. No breast or ovarian cancer.\par CBE: FLORI. s/p right mastectomy. Left neg. ROM is full but pt c/o shoulder discomfort, no obvious lymphedema.\par SOZO baseline done today. \par Rx for PT. \par Reviewed  Left mmmg, all negative, f.u 6 mos.

## 2022-02-25 NOTE — HISTORY OF PRESENT ILLNESS
[FreeTextEntry1] : 40 y/o  female, Ambry negative here for c/o Right breast pain. Hx Right gestational breast cancer. S/P Right mastectomy with SLNBx 18 pT2 (4.5cm)N0/3, neg margins for Triple negative IDC.\par Pt doing well, no c/o except since surgery, has been having some shoulder/neck discomfort. Helped with PT in the past. No lymphedema.\par Pt denies any breast lesions, discharge or masses. \par Declines reconstruction on right. \par Also some left breast discomfort, with breast feeding. Pt is trying to wean off the breast feeding. \par \par 19 NFR L DmmgT/US, compared to 2018, dense, no susp findings. BR1\par 19 NFR L US, compared to 2018, no susp findings. BR1\par 20, EMILY Hollins dMMG: FG, no susp findings; L U/S: no susp solid msas, rec mmg 1 year, BR1\par 22, NFR, L dMMG: het dense, inc L breast density c/w lactation, no susp findings; L U/S: no susp solid mass, rec mmg 1 year, BR1.\par \par Completed Taxol 19 Dr. Viridiana Patrick. \par EBRT with DR. Anika Gr[facility] at [facility].\par S/P  19 with healthy male infant.\par \par --19 CT chest and 19 PET bone scan no evidence of disease.\par 18 NW Surgical path: R breast: IDC with apocrine features, G3 (4.5cm) with negative margins (3.2cm nearest margin posterior), R SLN negative for metastatic disease 0/3, LVI neg. Path stage IIA. \par \par --9/10/18 office: R 12:00 2.2X 2.0cm- Invasive mammary carcinoma with medullary features. G2/3, No comedonecrosis, marked lymphatic infiltrate around the tumor. ER/RI/Uco6dvs (-); Ki67 (70%), p53 (-); S100 (focally +); CD3 (+); CD68 (+); CK5/6 D5/16 B4 Squamous and mesothelial cells (+); E-Caderin Epithelial, pronormoblasts (+). \par \par Maunt with stomach cancer, MGM with mouth cancer, Muncle leg cancer. No breast or ovarian cancer.\par

## 2022-02-25 NOTE — PHYSICAL EXAM
[Normocephalic] : normocephalic [Atraumatic] : atraumatic [Supple] : supple [No Supraclavicular Adenopathy] : no supraclavicular adenopathy [Examined in the supine and seated position] : examined in the supine and seated position [No dominant masses] : no dominant masses left breast [No Nipple Retraction] : no left nipple retraction [No Nipple Discharge] : no left nipple discharge [No Axillary Lymphadenopathy] : no left axillary lymphadenopathy [No Edema] : no edema [No Swelling] : no swelling [Full ROM] : full range of motion [No Rashes] : no rashes [No Ulceration] : no ulceration [de-identified] : s/p mastectomy

## 2022-07-19 ENCOUNTER — OUTPATIENT (OUTPATIENT)
Dept: OUTPATIENT SERVICES | Facility: HOSPITAL | Age: 42
LOS: 1 days | Discharge: HOME | End: 2022-07-19

## 2022-07-19 DIAGNOSIS — C50.919 MALIGNANT NEOPLASM OF UNSPECIFIED SITE OF UNSPECIFIED FEMALE BREAST: ICD-10-CM

## 2022-07-19 DIAGNOSIS — Z90.11 ACQUIRED ABSENCE OF RIGHT BREAST AND NIPPLE: Chronic | ICD-10-CM

## 2022-07-22 ENCOUNTER — APPOINTMENT (OUTPATIENT)
Dept: HEMATOLOGY ONCOLOGY | Facility: CLINIC | Age: 42
End: 2022-07-22

## 2022-07-22 ENCOUNTER — RESULT REVIEW (OUTPATIENT)
Age: 42
End: 2022-07-22

## 2022-07-22 VITALS
TEMPERATURE: 97.7 F | OXYGEN SATURATION: 99 % | DIASTOLIC BLOOD PRESSURE: 75 MMHG | HEART RATE: 87 BPM | WEIGHT: 218.24 LBS | HEIGHT: 59.65 IN | RESPIRATION RATE: 16 BRPM | SYSTOLIC BLOOD PRESSURE: 112 MMHG | BODY MASS INDEX: 42.85 KG/M2

## 2022-07-22 LAB
BASOPHILS # BLD AUTO: 0.04 K/UL — SIGNIFICANT CHANGE UP (ref 0–0.2)
BASOPHILS NFR BLD AUTO: 0.7 % — SIGNIFICANT CHANGE UP (ref 0–2)
EOSINOPHIL # BLD AUTO: 0.34 K/UL — SIGNIFICANT CHANGE UP (ref 0–0.5)
EOSINOPHIL NFR BLD AUTO: 5.8 % — SIGNIFICANT CHANGE UP (ref 0–6)
HCT VFR BLD CALC: 33.3 % — LOW (ref 34.5–45)
HGB BLD-MCNC: 10.2 G/DL — LOW (ref 11.5–15.5)
IMM GRANULOCYTES NFR BLD AUTO: 0.3 % — SIGNIFICANT CHANGE UP (ref 0–1.5)
LYMPHOCYTES # BLD AUTO: 1.28 K/UL — SIGNIFICANT CHANGE UP (ref 1–3.3)
LYMPHOCYTES # BLD AUTO: 22 % — SIGNIFICANT CHANGE UP (ref 13–44)
MCHC RBC-ENTMCNC: 22.7 PG — LOW (ref 27–34)
MCHC RBC-ENTMCNC: 30.6 G/DL — LOW (ref 32–36)
MCV RBC AUTO: 74.2 FL — LOW (ref 80–100)
MONOCYTES # BLD AUTO: 0.56 K/UL — SIGNIFICANT CHANGE UP (ref 0–0.9)
MONOCYTES NFR BLD AUTO: 9.6 % — SIGNIFICANT CHANGE UP (ref 2–14)
NEUTROPHILS # BLD AUTO: 3.58 K/UL — SIGNIFICANT CHANGE UP (ref 1.8–7.4)
NEUTROPHILS NFR BLD AUTO: 61.6 % — SIGNIFICANT CHANGE UP (ref 43–77)
NRBC # BLD: 0 /100 WBCS — SIGNIFICANT CHANGE UP (ref 0–0)
PLATELET # BLD AUTO: 351 K/UL — SIGNIFICANT CHANGE UP (ref 150–400)
RBC # BLD: 4.49 M/UL — SIGNIFICANT CHANGE UP (ref 3.8–5.2)
RBC # FLD: 15.1 % — HIGH (ref 10.3–14.5)
WBC # BLD: 5.82 K/UL — SIGNIFICANT CHANGE UP (ref 3.8–10.5)
WBC # FLD AUTO: 5.82 K/UL — SIGNIFICANT CHANGE UP (ref 3.8–10.5)

## 2022-07-22 PROCEDURE — 99213 OFFICE O/P EST LOW 20 MIN: CPT

## 2022-07-22 NOTE — ASSESSMENT
[FreeTextEntry1] : In summary, this is a very pleasant 41 year-old Turkmen-speaking lady who is diagnosed with T2 N0 stage IIA poorly differentiated ER/MS/HER-2/rajni NEGATIVE IDC of the right breast during pregnancy.  A CXR and abdominal sono didn’t show distant mets.  BRCA negative. She is s/p sequential Adriamycin x 4 and Cytoxan x 4 in second and third trimester of pregnancy. Delivered healthy baby and started Taxol x 12 w. ddACT completed 8/9/2019. \par \par - Breast ca: She has completed adjuvant chemo for breast ca. She is s/p RT. Doing well. No sx of recurrence\par - 9/8/2020 BONE SCAN No radionuclide evidence of osseous metastases.\par - Post mastectomy pain -  improved\par - Mild LFT 2/2 Taxol- Resolved\par - No issues with neuropathy or lymphedema 2/2 treatment\par - Continue annual breast imaging \par -  Encourage healthy diet and exercise- rec to loose wt\par - Continue followup with primary care. I recommend age-appropriate malignancy screening\par - Educated about s/sx of recurrence\par - She is 3 years out from treatment.   She will continue to follow-up with me every 6 months.\par  [Curative] : Goals of care discussed with patient: Curative

## 2022-07-22 NOTE — REASON FOR VISIT
[Follow-Up Visit] : a follow-up [Other: _____] : [unfilled] [FreeTextEntry2] : Breast cancer in pregnancy TRIPLE NEGATIVE [FreeTextEntry3] : aida [id number]

## 2022-07-22 NOTE — PHYSICAL EXAM
[Fully active, able to carry on all pre-disease performance without restriction] : Status 0 - Fully active, able to carry on all pre-disease performance without restriction [Obese] : obese [Normal] : affect appropriate [de-identified] : S/p right mastectomy healed well No CW or axillary nodes + RT changes . Left breast normal

## 2022-07-22 NOTE — HISTORY OF PRESENT ILLNESS
[de-identified] : Ms. EVANGELISTA HESTER  is a 39 year old female here for an evaluation of breast cancer. Her oncologic history is as follows:\par \par She felt a right breast mass in 2018 and was evaluated seen by GYN who sent her for breast imaging. She underwent diagnostic breast imaging on 18 which showed at 12:00 position of right breast there is a lobulated focus, measuring 2.0 x 2.0 x 2.2 cm 2 cm FN. 4 mm deep to the skin. Left breast benign. No enlarged axillary lymph nodes. Bx recommended. 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 There is marked lymphocytic infiltrate around the tumor. She was scheduled for MRI but found out that she was pregnant. Current gestation age at the time of initial consult: 11 weeks\par \par 10/23/18\par She is here with her . We discussed the diagnosis of rapidly growing triple negative breast ca, the size has increased since diagnosis and that chemotherapy will be needed to treat this cancer which can not be started until she is 14 weeks. They want to keep the pregnancy understanding that it will cause delay in treatment and potential risk to the patient and fetus. We discussed that lumpectomy will have issues with positive margins and RT will be contraindicated during to pregnancy and therefore u/l mastectomy is recommend. I discussed with Dr Granger that expander placement at the time of surgery will be an option. She is seeing the pt tomorrow and will discuss surgery and reconstruction options with her. She also saw Dr Eden Patrick med onc for a a second opinion. \par \par s/p surgery (right mastectomy) 18 , 4.5 cm tumor and sentinel node negative- path report d/w her in detail. Tumor grew more than double in size between dx and surgery and is s/o aggressive tumor. Discussed to start chemo 18. S/e of chemo jody during pregnancy, expected and unexpected, pertaining to her and the fetus d/w her. Discussed to see MFM b/w chemo. Also a candidate for PMRT due to large tumor size. LMP 18\par \par I had extensive discussion with the patient and spouse regarding breast cancer in pregnancy and the treatment options that are safe for both the mother and the growing fetus. She refused termination of pregnancy after finding out cancer diagnosis.  We discussed that all treatments including chemotherapy and surgery are contraindicated in first trimester but are considered relatively safe in second and third trimester.  We have discussed medical termination of pregnancy as an option but patient decided to keep the pregnancy. We reviewed that the largest experience in pregnancy has been with anthracycline and alkylating agent chemotherapy. She will be a candidate for chemotherapy with Adriamycin plus cyclophosphamide in a sequential dose dense manner. We reviewed data from [place] registry which showed Neulasta is reasonably safe to use in 2nd and third trimester although it was a very small dataset. . There is not enough data to use taxanes in pregnancy therefore Taxol will be administered post partum. Potential s/e to the patient and growing fetus were discussed with the patient and  in detail via a . The patient understood all the potential side effects and signed an informed consent after discussing the risks, benefits and alternatives.\par \par \par PLAN: Starting 18 ( 17 weeks), She will receive adriamycin every 2 weeks x 4 cycle followed by Cytoxan every 2 weeks x 4 cycles. We plan to hold chemotherapy 4 weeks prior to delivery to prevent  neutropenic complications. Currently she is planned for elective induction (NVD) at 37 weeks. We would start Taxol post partum\par \par PREMEDS/SUPPORTIVE CARE: Zofran ativan and dex are listed as part of prechemo antiemetic regimen in NCCN guidelines for breast cancer in pregnancy. The use of Emend is controversial in pregnancy. We discussed the issue of port placement in the OR, but that would increase anesthesia time by 45 min therefore we would treat her peripherally and likely do port placement after delivery. We discussed the issue of preeclampsia or gestational diabetes with steroids. She will be monitored closely by MFM. She will see MFM between every chemo session for close fetal monitoring. \par \par I explained her that EOD work up is limited due to risk of radiation exposure to the growing fetus. We plan to perform CT scans or a PET/CT scan after delivery. She will also be a candidate for PMRT due to large tumor size. \par \par S/p infection with shigella and c-diff after Adriamycin c#2, confirmed with cultures and PCR x 2, was hospitalized for diarrhea and abd pain, treated with ampicillin and vanco,  Ampicillin completed yesterday. Vanco 2 more days left. Patient reports mom had Shigella 2 years ago, Mom has come in to help her 2 months prior but is not ill. She denies sick contacts. \par \par She was hospitalized for 5 days after Cytoxan Omi # 2 due to diarrhea. Infectious work up was negative.Completed vancomycin course prior to last treatment. Sx were controlled with supportive care. She feels back to normal now. No abd pain, NVD, fevers.  No Bone pain, no neuropathy.\par \par We discussed the plan to give C4 in 2 weeks and deliver at around 37 weeks. She will need Taxol q2w x 4 doses after delivery. We will perform CT scans after the baby is born.\par \par CT chest/ abd/ pelvis: 2019: normal\par Bone Scan: 19: Normal, mild degenerative Dz. [de-identified] : Ms. EVANGELISTA RUBIO is here for f/u for  triple negative right breast cancer dx in 10/2018 during pregnancy. She completed sequential AC during second and trimester of pregnancy. Deliveted 4/2019. Taxol completed after delivery 8/2019. \par 2/24/21: She conceived unplanned pregnancy. Delivered 6/4/2021, healthy baby boy. Now has IUD\par She is s/p RT and has mild cording, improved with PT. Neuropathy has improved. Appetite and energy good, wt stable. No diarrhea. \par Left breast imaging 8/2020 BIRADS 1, left mamm/sono 2/2022 birads 2 \par ROS neg\par 9/8/2020 BONE SCAN  No radionuclide evidence of osseous metastases. No significant interval change since the previous study of 5/20/2019\par

## 2022-07-29 ENCOUNTER — NON-APPOINTMENT (OUTPATIENT)
Age: 42
End: 2022-07-29

## 2022-08-02 ENCOUNTER — NON-APPOINTMENT (OUTPATIENT)
Age: 42
End: 2022-08-02

## 2022-08-02 LAB
ALBUMIN SERPL ELPH-MCNC: 3.9 G/DL
ALP BLD-CCNC: 125 U/L
ALT SERPL-CCNC: 48 U/L
ANION GAP SERPL CALC-SCNC: 11 MMOL/L
AST SERPL-CCNC: 45 U/L
BILIRUB SERPL-MCNC: 0.2 MG/DL
BUN SERPL-MCNC: 7 MG/DL
CALCIUM SERPL-MCNC: 9.5 MG/DL
CHLORIDE SERPL-SCNC: 102 MMOL/L
CO2 SERPL-SCNC: 26 MMOL/L
CREAT SERPL-MCNC: 0.52 MG/DL
EGFR: 119 ML/MIN/1.73M2
GLUCOSE SERPL-MCNC: 224 MG/DL
POTASSIUM SERPL-SCNC: 4 MMOL/L
PROT SERPL-MCNC: 6.7 G/DL
SODIUM SERPL-SCNC: 138 MMOL/L

## 2022-08-09 NOTE — OB RN PATIENT PROFILE - INFANT HOME WITH MOTHER, OB PROFILE
6/11/20  Carlos Apple  1963     Employer: [employer] in [place]    FLU/COVID-19 CLINIC EVALUATION    HPI SYMPTOMS:  Violeta Hudson is a 64year old male who is in with flu like symptoms for 2 days. He states his symptoms started with a cough and he thinks is getting worse. He is also complaining of a mild headache (3/10), nausea and vomiting. He gives a chronic history of heart disease (has a pacemaker) and COPD. He states he works at [employer] and has some stress at work. He denies exposure to Covid 19.    [] Fevers  [] Chills  [x] Cough  [] Coughing up blood  [] Chest Congestion  [] Nasal Congestion  [] Feeling short of breath  [] Sometimes  [] Frequently  [] All the time  [] Chest pain  [x] Headaches  [x]Tolerable  [] Severe  [] Sore throat  [] Muscle aches  [x] Nausea  [x] Vomiting  []Unable to keep fluids down  [] Diarrhea  []Severe    [] OTHER SYMPTOMS:      Symptom Duration:   [] 1  [x] 2   [] 3   [] 4    [] 5   [] 6   [] 7   [] 8   [] 9   [] 10   [] 11   [] 12   [] 13   [] 14   [] Longer than 14 days    Symptom course:   [x] Worsening     [] Stable     [] Improving    RISK FACTORS:    [] Pregnant or possibly pregnant  [] Age over 61  [] Diabetes  [x] Heart disease  [] Asthma  [x] COPD/Other chronic lung diseases  [] Active Cancer  [] On Chemotherapy  [] Taking oral steroids  [] History Lymphoma/Leukemia  [] Close contact with a lab confirmed COVID-19 patient within 14 days of symptom onset  [] History of travel from affected geographical areas within 14 days of symptom onset       VITALS:  Vitals:    06/11/20 0918   Pulse: 84   Temp: 96.3 °F (35.7 °C)   TempSrc: Infrared   SpO2: 99%      Physical Exam   Constitutional: He appears healthy. No distress. HENT:   Nose: Nose normal. No nasal discharge. Mouth/Throat: Oropharynx is clear. Pharynx is normal.   Eyes: Pupils are equal, round, and reactive to light. Neck: Normal range of motion. Neck supple. No neck adenopathy.    Cardiovascular: Normal rate, regular see ambulance record yes

## 2022-08-23 NOTE — HISTORY OF PRESENT ILLNESS
[FreeTextEntry1] : 41 y/o  female, Ambry negative here for c/o Right breast pain. Hx Right gestational breast cancer. S/P Right mastectomy with SLNBx 18 pT2 (4.5cm)N0/3, neg margins for Triple negative IDC.\par Pt doing well, no c/o except since surgery, has been having some shoulder/neck discomfort. Helped with PT in the past. No lymphedema.\par Pt denies any breast lesions, discharge or masses. \par Declines reconstruction on right. \par Also some left breast discomfort, with breast feeding. Pt is trying to wean off the breast feeding. \par \par 19 NFR L DmmgT/US, compared to 2018, dense, no susp findings. BR1\par 19 NFR L US, compared to 2018, no susp findings. BR1\par 20, EMILY Hollins dMMG: FG, no susp findings; L U/S: no susp solid msas, rec mmg 1 year, BR1\par 22, NFR, L dMMG: het dense, inc L breast density c/w lactation, no susp findings; L U/S: no susp solid mass, rec mmg 1 year, BR1.\par \par Completed Taxol 19 Dr. Viridiana Patrick. \par EBRT with DR. Anika Granger at [facility].\par S/P  19 with healthy male infant.\par \par --19 CT chest and 19 PET bone scan no evidence of disease.\par 18 [facility] Surgical path: R breast: IDC with apocrine features, G3 (4.5cm) with negative margins (3.2cm nearest margin posterior), R SLN negative for metastatic disease 0/3, LVI neg. Path stage IIA. \par \par Maunt with stomach cancer, MGM with mouth cancer, Muncle leg cancer. No breast or ovarian cancer.\par CBE: FLORI. s/p right mastectomy. Left neg. ROM is full but pt c/o shoulder discomfort, no obvious lymphedema.\par \par Plan:\par F/u 6 mos \par PT for shoulder discomfort but rec f/u with PT.\par F.u with DR. Wendy Granger and Darnell as scheduled.  (Tumor was triple negative).

## 2022-08-24 ENCOUNTER — APPOINTMENT (OUTPATIENT)
Dept: BREAST CENTER | Facility: CLINIC | Age: 42
End: 2022-08-24

## 2022-09-20 DIAGNOSIS — R39.9 UNSPECIFIED SYMPTOMS AND SIGNS INVOLVING THE GENITOURINARY SYSTEM: ICD-10-CM

## 2022-09-21 ENCOUNTER — APPOINTMENT (OUTPATIENT)
Dept: OBGYN | Facility: CLINIC | Age: 42
End: 2022-09-21

## 2022-09-21 VITALS
HEIGHT: 59 IN | DIASTOLIC BLOOD PRESSURE: 80 MMHG | BODY MASS INDEX: 42.33 KG/M2 | SYSTOLIC BLOOD PRESSURE: 116 MMHG | WEIGHT: 210 LBS

## 2022-09-21 DIAGNOSIS — R10.2 PELVIC AND PERINEAL PAIN: ICD-10-CM

## 2022-09-21 DIAGNOSIS — N89.8 OTHER SPECIFIED NONINFLAMMATORY DISORDERS OF VAGINA: ICD-10-CM

## 2022-09-21 LAB
BILIRUB UR QL STRIP: NORMAL
CLARITY UR: CLEAR
COLLECTION METHOD: NORMAL
GLUCOSE UR-MCNC: NORMAL
HCG UR QL: 0.2 EU/DL
HGB UR QL STRIP.AUTO: NORMAL
KETONES UR-MCNC: NORMAL
LEUKOCYTE ESTERASE UR QL STRIP: NORMAL
NITRITE UR QL STRIP: NORMAL
PH UR STRIP: 5.5
PROT UR STRIP-MCNC: NORMAL
SP GR UR STRIP: 1.03

## 2022-09-21 PROCEDURE — 99213 OFFICE O/P EST LOW 20 MIN: CPT

## 2022-09-21 RX ORDER — FLUCONAZOLE 150 MG/1
150 TABLET ORAL
Qty: 2 | Refills: 2 | Status: ACTIVE | COMMUNITY
Start: 2022-09-21 | End: 1900-01-01

## 2022-09-21 NOTE — REVIEW OF SYSTEMS
[Patient Intake Form Reviewed] : Patient intake form was reviewed [Dysuria] : dysuria [Pelvic pain] : pelvic pain [Genital Rash/Irritation] : genital rash/irritation

## 2022-09-21 NOTE — PLAN
[FreeTextEntry1] : BD Affirm\par Urine C&S\par GC/CT\par In-House UA shows trace leuks and small blood\par Rx Diflucan\par For TVUS and follow up afterward

## 2022-09-21 NOTE — PHYSICAL EXAM
[Appropriately responsive] : appropriately responsive [Alert] : alert [No Acute Distress] : no acute distress [No Lymphadenopathy] : no lymphadenopathy [Regular Rate Rhythm] : regular rate rhythm [No Murmurs] : no murmurs [Clear to Auscultation B/L] : clear to auscultation bilaterally [Soft] : soft [Non-tender] : non-tender [Non-distended] : non-distended [No HSM] : No HSM [No Lesions] : no lesions [No Mass] : no mass [Oriented x3] : oriented x3 [Labia Majora] : normal [Labia Minora] : normal [Discharge] : a  ~M vaginal discharge was present [Scant] : scant [White] : white [Blood-Tinged] : blood-tinged [IUD String] : an IUD string was noted [Normal] : normal [Tenderness] : tender [Uterine Adnexae] : normal [Foul Smelling] : not foul smelling [FreeTextEntry6] : mild uterine tenderness

## 2022-09-22 LAB
C TRACH RRNA SPEC QL NAA+PROBE: NOT DETECTED
N GONORRHOEA RRNA SPEC QL NAA+PROBE: NOT DETECTED
SOURCE AMPLIFICATION: NORMAL

## 2022-09-26 LAB
BACTERIA UR CULT: ABNORMAL
CANDIDA VAG CYTO: DETECTED
G VAGINALIS+PREV SP MTYP VAG QL MICRO: NOT DETECTED
T VAGINALIS VAG QL WET PREP: NOT DETECTED

## 2022-10-25 ENCOUNTER — ASOB RESULT (OUTPATIENT)
Age: 42
End: 2022-10-25

## 2022-10-25 ENCOUNTER — APPOINTMENT (OUTPATIENT)
Dept: ANTEPARTUM | Facility: CLINIC | Age: 42
End: 2022-10-25

## 2022-10-25 PROCEDURE — 76856 US EXAM PELVIC COMPLETE: CPT | Mod: 59

## 2022-10-25 PROCEDURE — 76830 TRANSVAGINAL US NON-OB: CPT

## 2022-12-13 NOTE — PROCEDURE
[IUD Placement] : intrauterine device (IUD) placement [Time out performed] : Pre-procedure time out performed.  Patient's name, date of birth and procedure confirmed. [Consent Obtained] : Consent obtained [Prevention of Pregnancy] : prevention of pregnancy [Risks] : risks [Benefits] : benefits [Alternatives] : alternatives [Patient] : patient [Infection] : infection [Bleeding] : bleeding [Pain] : pain [Expulsion] : expulsion [Failure] : failure [Uterine Perforation] : uterine perforation [Neg Pregnancy Test] : negative pregnancy test [Neg GC/Chlamydia] : negative GC/Chlamydia [No Premedication] : No premedication [Betadine] : Betadine [Tenaculum] : Tenaculum [Easy Passage] : Easy passage [Sounded to ___ cm] : sounded to [unfilled] ~Ucm [ParaGard IUD] : ParaGard IUD [Tolerated Well] : Patient tolerated the procedure well [No Complications] : No complications [Motrin/Ibuprofen] : Motrin/Ibuprofen [de-identified] : postpartum [de-identified] : 7/19/2031 [de-identified] : prn Brow Lift Text: A midfrontal incision was made medially to the defect to allow access to the tissues just superior to the left eyebrow. Following careful dissection inferiorly in a supraperiosteal plane to the level of the left eyebrow, several 3-0 monocryl sutures were used to resuspend the eyebrow orbicularis oculi muscular unit to the superior frontal bone periosteum. This resulted in an appropriate reapproximation of static eyebrow symmetry and correction of the left brow ptosis.

## 2023-01-21 ENCOUNTER — OUTPATIENT (OUTPATIENT)
Dept: OUTPATIENT SERVICES | Facility: HOSPITAL | Age: 43
LOS: 1 days | Discharge: HOME | End: 2023-01-21

## 2023-01-21 DIAGNOSIS — Z90.11 ACQUIRED ABSENCE OF RIGHT BREAST AND NIPPLE: Chronic | ICD-10-CM

## 2023-01-21 DIAGNOSIS — C50.919 MALIGNANT NEOPLASM OF UNSPECIFIED SITE OF UNSPECIFIED FEMALE BREAST: ICD-10-CM

## 2023-01-27 ENCOUNTER — RESULT REVIEW (OUTPATIENT)
Age: 43
End: 2023-01-27

## 2023-01-27 ENCOUNTER — LABORATORY RESULT (OUTPATIENT)
Age: 43
End: 2023-01-27

## 2023-01-27 ENCOUNTER — APPOINTMENT (OUTPATIENT)
Dept: HEMATOLOGY ONCOLOGY | Facility: CLINIC | Age: 43
End: 2023-01-27
Payer: MEDICAID

## 2023-01-27 VITALS
DIASTOLIC BLOOD PRESSURE: 79 MMHG | HEART RATE: 74 BPM | RESPIRATION RATE: 16 BRPM | BODY MASS INDEX: 42.52 KG/M2 | WEIGHT: 210.54 LBS | OXYGEN SATURATION: 99 % | SYSTOLIC BLOOD PRESSURE: 119 MMHG | TEMPERATURE: 98.4 F

## 2023-01-27 DIAGNOSIS — E55.9 VITAMIN D DEFICIENCY, UNSPECIFIED: ICD-10-CM

## 2023-01-27 DIAGNOSIS — M25.511 PAIN IN RIGHT SHOULDER: ICD-10-CM

## 2023-01-27 LAB
BASOPHILS # BLD AUTO: 0.04 K/UL — SIGNIFICANT CHANGE UP (ref 0–0.2)
BASOPHILS NFR BLD AUTO: 0.9 % — SIGNIFICANT CHANGE UP (ref 0–2)
EOSINOPHIL # BLD AUTO: 0.23 K/UL — SIGNIFICANT CHANGE UP (ref 0–0.5)
EOSINOPHIL NFR BLD AUTO: 5.2 % — SIGNIFICANT CHANGE UP (ref 0–6)
HCT VFR BLD CALC: 36.5 % — SIGNIFICANT CHANGE UP (ref 34.5–45)
HGB BLD-MCNC: 11 G/DL — LOW (ref 11.5–15.5)
IMM GRANULOCYTES NFR BLD AUTO: 0.2 % — SIGNIFICANT CHANGE UP (ref 0–0.9)
LYMPHOCYTES # BLD AUTO: 1.51 K/UL — SIGNIFICANT CHANGE UP (ref 1–3.3)
LYMPHOCYTES # BLD AUTO: 34 % — SIGNIFICANT CHANGE UP (ref 13–44)
MCHC RBC-ENTMCNC: 23 PG — LOW (ref 27–34)
MCHC RBC-ENTMCNC: 30.1 G/DL — LOW (ref 32–36)
MCV RBC AUTO: 76.2 FL — LOW (ref 80–100)
MONOCYTES # BLD AUTO: 0.26 K/UL — SIGNIFICANT CHANGE UP (ref 0–0.9)
MONOCYTES NFR BLD AUTO: 5.9 % — SIGNIFICANT CHANGE UP (ref 2–14)
NEUTROPHILS # BLD AUTO: 2.39 K/UL — SIGNIFICANT CHANGE UP (ref 1.8–7.4)
NEUTROPHILS NFR BLD AUTO: 53.8 % — SIGNIFICANT CHANGE UP (ref 43–77)
NRBC # BLD: 0 /100 WBCS — SIGNIFICANT CHANGE UP (ref 0–0)
PLATELET # BLD AUTO: 298 K/UL — SIGNIFICANT CHANGE UP (ref 150–400)
RBC # BLD: 4.79 M/UL — SIGNIFICANT CHANGE UP (ref 3.8–5.2)
RBC # FLD: 17.2 % — HIGH (ref 10.3–14.5)
WBC # BLD: 4.44 K/UL — SIGNIFICANT CHANGE UP (ref 3.8–10.5)
WBC # FLD AUTO: 4.44 K/UL — SIGNIFICANT CHANGE UP (ref 3.8–10.5)

## 2023-01-27 PROCEDURE — 99213 OFFICE O/P EST LOW 20 MIN: CPT

## 2023-01-31 ENCOUNTER — NON-APPOINTMENT (OUTPATIENT)
Age: 43
End: 2023-01-31

## 2023-01-31 LAB
ALBUMIN SERPL ELPH-MCNC: 4 G/DL
ALP BLD-CCNC: 115 U/L
ALT SERPL-CCNC: 29 U/L
ANION GAP SERPL CALC-SCNC: 10 MMOL/L
AST SERPL-CCNC: 24 U/L
BILIRUB SERPL-MCNC: 0.2 MG/DL
BUN SERPL-MCNC: 9 MG/DL
CALCIUM SERPL-MCNC: 9.6 MG/DL
CHLORIDE SERPL-SCNC: 106 MMOL/L
CO2 SERPL-SCNC: 26 MMOL/L
CREAT SERPL-MCNC: 0.49 MG/DL
EGFR: 121 ML/MIN/1.73M2
GLUCOSE SERPL-MCNC: 130 MG/DL
POTASSIUM SERPL-SCNC: 4.6 MMOL/L
PROT SERPL-MCNC: 6.7 G/DL
SODIUM SERPL-SCNC: 142 MMOL/L

## 2023-02-02 PROBLEM — E55.9 VITAMIN D DEFICIENCY: Status: ACTIVE | Noted: 2020-02-09

## 2023-02-02 NOTE — ASSESSMENT
[Curative] : Goals of care discussed with patient: Curative [FreeTextEntry1] : In summary, this is a very pleasant 41 year-old Albanian-speaking lady who is diagnosed with T2 N0 stage IIA poorly differentiated ER/MI/HER-2/rajni NEGATIVE IDC of the right breast during pregnancy.  A CXR and abdominal sono didn’t show distant mets.  BRCA negative. She is s/p sequential Adriamycin x 4 and Cytoxan x 4 in second and third trimester of pregnancy. Delivered healthy baby and started Taxol x 12 w. ddACT completed 8/9/2019. \par \par - Breast ca: She has completed adjuvant chemo for breast ca. She is s/p RT. Doing well. No sx of recurrence\par - 2/2022 annual left breast imaging reviewed \par -  9/8/2020 BONE SCAN No radionuclide evidence of osseous metastases.\par - Post mastectomy pain -  improved, mild ongoing right shoulder pain ALK phos WNL..will restart PT as she has improvement with PT will check TM, if elevated will send for bone scan.\par - RX for mastectomy bras given\par - Mild LFT 2/2 Taxol- Resolved\par - No issues with neuropathy or lymphedema 2/2 treatment\par - Continue annual breast imaging, 2023 imaging ordered today\par -  Encourage healthy diet and exercise- rec to loose wt\par - Continue followup with primary care. I recommend age-appropriate malignancy screening\par - Educated about s/sx of recurrence\par - She is 3.5 years out from treatment.   She will continue to follow-up with me every 6 months.\par

## 2023-02-02 NOTE — PHYSICAL EXAM
[Fully active, able to carry on all pre-disease performance without restriction] : Status 0 - Fully active, able to carry on all pre-disease performance without restriction [Obese] : obese [Normal] : affect appropriate [de-identified] : S/p right mastectomy healed well No CW or axillary nodes + RT changes . Left breast normal

## 2023-02-02 NOTE — REASON FOR VISIT
[Follow-Up Visit] : a follow-up [Other: _____] : [unfilled] [FreeTextEntry2] : Breast cancer in pregnancy TRIPLE NEGATIVE [FreeTextEntry3] : aida [id number]

## 2023-02-02 NOTE — HISTORY OF PRESENT ILLNESS
[de-identified] : Ms. EVANGELISTA HESTER  is a 39 year old female here for an evaluation of breast cancer. Her oncologic history is as follows:\par \par She felt a right breast mass in 2018 and was evaluated seen by GYN who sent her for breast imaging. She underwent diagnostic breast imaging on 18 which showed at 12:00 position of right breast there is a lobulated focus, measuring 2.0 x 2.0 x 2.2 cm 2 cm FN. 4 mm deep to the skin. Left breast benign. No enlarged axillary lymph nodes. Bx recommended. 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 There is marked lymphocytic infiltrate around the tumor. She was scheduled for MRI but found out that she was pregnant. Current gestation age at the time of initial consult: 11 weeks\par \par 10/23/18\par She is here with her . We discussed the diagnosis of rapidly growing triple negative breast ca, the size has increased since diagnosis and that chemotherapy will be needed to treat this cancer which can not be started until she is 14 weeks. They want to keep the pregnancy understanding that it will cause delay in treatment and potential risk to the patient and fetus. We discussed that lumpectomy will have issues with positive margins and RT will be contraindicated during to pregnancy and therefore u/l mastectomy is recommend. I discussed with Dr Granger that expander placement at the time of surgery will be an option. She is seeing the pt tomorrow and will discuss surgery and reconstruction options with her. She also saw Dr Eden Patrick med onc for a a second opinion. \par \par s/p surgery (right mastectomy) 18 , 4.5 cm tumor and sentinel node negative- path report d/w her in detail. Tumor grew more than double in size between dx and surgery and is s/o aggressive tumor. Discussed to start chemo 18. S/e of chemo jody during pregnancy, expected and unexpected, pertaining to her and the fetus d/w her. Discussed to see MFM b/w chemo. Also a candidate for PMRT due to large tumor size. LMP 18\par \par I had extensive discussion with the patient and spouse regarding breast cancer in pregnancy and the treatment options that are safe for both the mother and the growing fetus. She refused termination of pregnancy after finding out cancer diagnosis.  We discussed that all treatments including chemotherapy and surgery are contraindicated in first trimester but are considered relatively safe in second and third trimester.  We have discussed medical termination of pregnancy as an option but patient decided to keep the pregnancy. We reviewed that the largest experience in pregnancy has been with anthracycline and alkylating agent chemotherapy. She will be a candidate for chemotherapy with Adriamycin plus cyclophosphamide in a sequential dose dense manner. We reviewed data from [place] registry which showed Neulasta is reasonably safe to use in 2nd and third trimester although it was a very small dataset. . There is not enough data to use taxanes in pregnancy therefore Taxol will be administered post partum. Potential s/e to the patient and growing fetus were discussed with the patient and  in detail via a . The patient understood all the potential side effects and signed an informed consent after discussing the risks, benefits and alternatives.\par \par \par PLAN: Starting 18 ( 17 weeks), She will receive adriamycin every 2 weeks x 4 cycle followed by Cytoxan every 2 weeks x 4 cycles. We plan to hold chemotherapy 4 weeks prior to delivery to prevent  neutropenic complications. Currently she is planned for elective induction (NVD) at 37 weeks. We would start Taxol post partum\par \par PREMEDS/SUPPORTIVE CARE: Zofran ativan and dex are listed as part of prechemo antiemetic regimen in NCCN guidelines for breast cancer in pregnancy. The use of Emend is controversial in pregnancy. We discussed the issue of port placement in the OR, but that would increase anesthesia time by 45 min therefore we would treat her peripherally and likely do port placement after delivery. We discussed the issue of preeclampsia or gestational diabetes with steroids. She will be monitored closely by MFM. She will see MFM between every chemo session for close fetal monitoring. \par \par I explained her that EOD work up is limited due to risk of radiation exposure to the growing fetus. We plan to perform CT scans or a PET/CT scan after delivery. She will also be a candidate for PMRT due to large tumor size. \par \par S/p infection with shigella and c-diff after Adriamycin c#2, confirmed with cultures and PCR x 2, was hospitalized for diarrhea and abd pain, treated with ampicillin and vanco,  Ampicillin completed yesterday. Vanco 2 more days left. Patient reports mom had Shigella 2 years ago, Mom has come in to help her 2 months prior but is not ill. She denies sick contacts. \par \par She was hospitalized for 5 days after Cytoxan Omi # 2 due to diarrhea. Infectious work up was negative.Completed vancomycin course prior to last treatment. Sx were controlled with supportive care. She feels back to normal now. No abd pain, NVD, fevers.  No Bone pain, no neuropathy.\par \par We discussed the plan to give C4 in 2 weeks and deliver at around 37 weeks. She will need Taxol q2w x 4 doses after delivery. We will perform CT scans after the baby is born.\par \par CT chest/ abd/ pelvis: 2019: normal\par Bone Scan: 19: Normal, mild degenerative Dz. [de-identified] : Ms. EVANGELISTA RUBIO is here for f/u for  triple negative right breast cancer dx in 10/2018 during pregnancy. She completed sequential AC during second and trimester of pregnancy. Deliveted 4/2019. Taxol completed after delivery 8/2019\par . \par 2/24/21: She conceived unplanned pregnancy. Delivered 6/4/2021, healthy baby boy, will be 2 years old 6/2023 Now has IUD \par She is s/p RT and has mild cording, improved with PT. Neuropathy has improved. Appetite and energy good, wt stable. No diarrhea.\par She reports ongoing mild right shoulder pain.will restart PT as she has improvement with PT\par Left breast imaging 8/2020 BIRADS 1, left mamm/sono 2/2022 birads 2 \par ROS neg\par 9/8/2020 BONE SCAN  No radionuclide evidence of osseous metastases. No significant interval change since the previous study of 5/20/2019\par

## 2023-03-21 ENCOUNTER — APPOINTMENT (OUTPATIENT)
Dept: MAMMOGRAPHY | Facility: CLINIC | Age: 43
End: 2023-03-21
Payer: MEDICAID

## 2023-03-21 ENCOUNTER — RESULT REVIEW (OUTPATIENT)
Age: 43
End: 2023-03-21

## 2023-03-21 ENCOUNTER — APPOINTMENT (OUTPATIENT)
Dept: ULTRASOUND IMAGING | Facility: CLINIC | Age: 43
End: 2023-03-21

## 2023-03-21 PROCEDURE — 77065 DX MAMMO INCL CAD UNI: CPT | Mod: RT

## 2023-03-21 PROCEDURE — 76641 ULTRASOUND BREAST COMPLETE: CPT | Mod: LT

## 2023-03-21 PROCEDURE — 77061 BREAST TOMOSYNTHESIS UNI: CPT

## 2023-07-06 NOTE — DISCHARGE NOTE OB - PRINCIPAL DIAGNOSIS
[Itching] : Itching [Negative] : Heme/Lymph [de-identified] : AS PER HPI  (normal spontaneous vaginal delivery)

## 2023-07-14 ENCOUNTER — OUTPATIENT (OUTPATIENT)
Dept: OUTPATIENT SERVICES | Facility: HOSPITAL | Age: 43
LOS: 1 days | Discharge: HOME | End: 2023-07-14

## 2023-07-14 DIAGNOSIS — C50.919 MALIGNANT NEOPLASM OF UNSPECIFIED SITE OF UNSPECIFIED FEMALE BREAST: ICD-10-CM

## 2023-07-14 DIAGNOSIS — Z90.11 ACQUIRED ABSENCE OF RIGHT BREAST AND NIPPLE: Chronic | ICD-10-CM

## 2023-07-24 ENCOUNTER — RESULT REVIEW (OUTPATIENT)
Age: 43
End: 2023-07-24

## 2023-07-24 ENCOUNTER — APPOINTMENT (OUTPATIENT)
Dept: HEMATOLOGY ONCOLOGY | Facility: CLINIC | Age: 43
End: 2023-07-24
Payer: MEDICAID

## 2023-07-24 VITALS
RESPIRATION RATE: 16 BRPM | TEMPERATURE: 97.2 F | HEART RATE: 63 BPM | SYSTOLIC BLOOD PRESSURE: 116 MMHG | OXYGEN SATURATION: 98 % | WEIGHT: 201.06 LBS | BODY MASS INDEX: 40.61 KG/M2 | DIASTOLIC BLOOD PRESSURE: 79 MMHG

## 2023-07-24 DIAGNOSIS — C50.911 MALIGNANT NEOPLASM OF UNSPECIFIED SITE OF RIGHT FEMALE BREAST: ICD-10-CM

## 2023-07-24 LAB
BASOPHILS # BLD AUTO: 0.03 K/UL — SIGNIFICANT CHANGE UP (ref 0–0.2)
BASOPHILS NFR BLD AUTO: 0.5 % — SIGNIFICANT CHANGE UP (ref 0–2)
EOSINOPHIL # BLD AUTO: 0.18 K/UL — SIGNIFICANT CHANGE UP (ref 0–0.5)
EOSINOPHIL NFR BLD AUTO: 2.9 % — SIGNIFICANT CHANGE UP (ref 0–6)
HCT VFR BLD CALC: 40.1 % — SIGNIFICANT CHANGE UP (ref 34.5–45)
HGB BLD-MCNC: 12.8 G/DL — SIGNIFICANT CHANGE UP (ref 11.5–15.5)
IMM GRANULOCYTES NFR BLD AUTO: 0.3 % — SIGNIFICANT CHANGE UP (ref 0–0.9)
LYMPHOCYTES # BLD AUTO: 1.51 K/UL — SIGNIFICANT CHANGE UP (ref 1–3.3)
LYMPHOCYTES # BLD AUTO: 23.9 % — SIGNIFICANT CHANGE UP (ref 13–44)
MCHC RBC-ENTMCNC: 26.5 PG — LOW (ref 27–34)
MCHC RBC-ENTMCNC: 31.9 G/DL — LOW (ref 32–36)
MCV RBC AUTO: 83 FL — SIGNIFICANT CHANGE UP (ref 80–100)
MONOCYTES # BLD AUTO: 0.43 K/UL — SIGNIFICANT CHANGE UP (ref 0–0.9)
MONOCYTES NFR BLD AUTO: 6.8 % — SIGNIFICANT CHANGE UP (ref 2–14)
NEUTROPHILS # BLD AUTO: 4.14 K/UL — SIGNIFICANT CHANGE UP (ref 1.8–7.4)
NEUTROPHILS NFR BLD AUTO: 65.6 % — SIGNIFICANT CHANGE UP (ref 43–77)
NRBC # BLD: 0 /100 WBCS — SIGNIFICANT CHANGE UP (ref 0–0)
PLATELET # BLD AUTO: 259 K/UL — SIGNIFICANT CHANGE UP (ref 150–400)
RBC # BLD: 4.83 M/UL — SIGNIFICANT CHANGE UP (ref 3.8–5.2)
RBC # FLD: 14.2 % — SIGNIFICANT CHANGE UP (ref 10.3–14.5)
WBC # BLD: 6.31 K/UL — SIGNIFICANT CHANGE UP (ref 3.8–10.5)
WBC # FLD AUTO: 6.31 K/UL — SIGNIFICANT CHANGE UP (ref 3.8–10.5)

## 2023-07-24 PROCEDURE — 99213 OFFICE O/P EST LOW 20 MIN: CPT

## 2023-07-24 NOTE — HISTORY OF PRESENT ILLNESS
[de-identified] : Ms. EVANGELISTA HESTER  is a 39 year old female here for an evaluation of breast cancer. Her oncologic history is as follows:\par \par She felt a right breast mass in 2018 and was evaluated seen by GYN who sent her for breast imaging. She underwent diagnostic breast imaging on 18 which showed at 12:00 position of right breast there is a lobulated focus, measuring 2.0 x 2.0 x 2.2 cm 2 cm FN. 4 mm deep to the skin. Left breast benign. No enlarged axillary lymph nodes. Bx recommended. 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 There is marked lymphocytic infiltrate around the tumor. She was scheduled for MRI but found out that she was pregnant. Current gestation age at the time of initial consult: 11 weeks\par \par 10/23/18\par She is here with her . We discussed the diagnosis of rapidly growing triple negative breast ca, the size has increased since diagnosis and that chemotherapy will be needed to treat this cancer which can not be started until she is 14 weeks. They want to keep the pregnancy understanding that it will cause delay in treatment and potential risk to the patient and fetus. We discussed that lumpectomy will have issues with positive margins and RT will be contraindicated during to pregnancy and therefore u/l mastectomy is recommend. I discussed with Dr Granger that expander placement at the time of surgery will be an option. She is seeing the pt tomorrow and will discuss surgery and reconstruction options with her. She also saw Dr Eden Patrick med onc for a a second opinion. \par \par s/p surgery (right mastectomy) 18 , 4.5 cm tumor and sentinel node negative- path report d/w her in detail. Tumor grew more than double in size between dx and surgery and is s/o aggressive tumor. Discussed to start chemo 18. S/e of chemo jody during pregnancy, expected and unexpected, pertaining to her and the fetus d/w her. Discussed to see MFM b/w chemo. Also a candidate for PMRT due to large tumor size. LMP 18\par \par I had extensive discussion with the patient and spouse regarding breast cancer in pregnancy and the treatment options that are safe for both the mother and the growing fetus. She refused termination of pregnancy after finding out cancer diagnosis.  We discussed that all treatments including chemotherapy and surgery are contraindicated in first trimester but are considered relatively safe in second and third trimester.  We have discussed medical termination of pregnancy as an option but patient decided to keep the pregnancy. We reviewed that the largest experience in pregnancy has been with anthracycline and alkylating agent chemotherapy. She will be a candidate for chemotherapy with Adriamycin plus cyclophosphamide in a sequential dose dense manner. We reviewed data from [place] registry which showed Neulasta is reasonably safe to use in 2nd and third trimester although it was a very small dataset. . There is not enough data to use taxanes in pregnancy therefore Taxol will be administered post partum. Potential s/e to the patient and growing fetus were discussed with the patient and  in detail via a . The patient understood all the potential side effects and signed an informed consent after discussing the risks, benefits and alternatives.\par \par \par PLAN: Starting 18 ( 17 weeks), She will receive adriamycin every 2 weeks x 4 cycle followed by Cytoxan every 2 weeks x 4 cycles. We plan to hold chemotherapy 4 weeks prior to delivery to prevent  neutropenic complications. Currently she is planned for elective induction (NVD) at 37 weeks. We would start Taxol post partum\par \par PREMEDS/SUPPORTIVE CARE: Zofran ativan and dex are listed as part of prechemo antiemetic regimen in NCCN guidelines for breast cancer in pregnancy. The use of Emend is controversial in pregnancy. We discussed the issue of port placement in the OR, but that would increase anesthesia time by 45 min therefore we would treat her peripherally and likely do port placement after delivery. We discussed the issue of preeclampsia or gestational diabetes with steroids. She will be monitored closely by MFM. She will see MFM between every chemo session for close fetal monitoring. \par \par I explained her that EOD work up is limited due to risk of radiation exposure to the growing fetus. We plan to perform CT scans or a PET/CT scan after delivery. She will also be a candidate for PMRT due to large tumor size. \par \par S/p infection with shigella and c-diff after Adriamycin c#2, confirmed with cultures and PCR x 2, was hospitalized for diarrhea and abd pain, treated with ampicillin and vanco,  Ampicillin completed yesterday. Vanco 2 more days left. Patient reports mom had Shigella 2 years ago, Mom has come in to help her 2 months prior but is not ill. She denies sick contacts. \par \par She was hospitalized for 5 days after Cytoxan Omi # 2 due to diarrhea. Infectious work up was negative.Completed vancomycin course prior to last treatment. Sx were controlled with supportive care. She feels back to normal now. No abd pain, NVD, fevers.  No Bone pain, no neuropathy.\par \par We discussed the plan to give C4 in 2 weeks and deliver at around 37 weeks. She will need Taxol q2w x 4 doses after delivery. We will perform CT scans after the baby is born.\par \par CT chest/ abd/ pelvis: 2019: normal\par Bone Scan: 19: Normal, mild degenerative Dz.\par \par 21: She conceived unplanned pregnancy. Delivered 2021, healthy baby boy, will be 2 years old 2023 Now has IUD  [de-identified] : Ms. EVANGELISTA RUBIO is here for f/u for  triple negative right breast cancer dx in 10/2018 during pregnancy. She completed sequential AC during second and trimester of pregnancy. Delivered 4/2019. Taxol completed after delivery 8/2019\par \par She is s/p RT and has mild cording, improved with PT. Neuropathy has improved. Appetite and energy good, wt stable. No diarrhea.\par She has 5 kids, working in the afternoon, keeping busy, tired end of day\par Left breast imaging 8/2020 BIRADS 1, left mamm/sono 2/2022 birads 2 , left m/s 3/2023 BIRADS 2 \par ROS neg\par BABY is 4 yrs, doing well\par 9/8/2020 BONE SCAN  No radionuclide evidence of osseous metastases. No significant interval change since the previous study of 5/20/2019\par She is almost 5 yrs out, doing well. Referral made to see plastics to consider recons options\par

## 2023-07-24 NOTE — ASSESSMENT
[Curative] : Goals of care discussed with patient: Curative [FreeTextEntry1] : In summary, this is a very pleasant 41 year-old Lao-speaking lady who is diagnosed with T2 N0 stage IIA poorly differentiated ER/MO/HER-2/rajni NEGATIVE IDC of the right breast during pregnancy.  A CXR and abdominal sono didn’t show distant mets.  BRCA negative. She is s/p sequential Adriamycin x 4 and Cytoxan x 4 in second and third trimester of pregnancy. Delivered healthy baby and started Taxol x 12 w. ddACT completed 8/9/2019. \par \par - Breast ca: She has completed adjuvant chemo for breast ca. She is s/p RT. Doing well. No sx of recurrence\par - 2/2023 annual left breast imaging reviewed \par -  9/8/2020 BONE SCAN No radionuclide evidence of osseous metastases.\par - She is almost 5 yrs out, doing well. Referral made to see plastics to consider recons options\par - RX for mastectomy bras given\par - Mild LFT 2/2 Taxol- Resolved\par - No issues with neuropathy or lymphedema 2/2 treatment\par -  Encourage healthy diet and exercise- rec to loose wt\par - Continue followup with primary care. I recommend age-appropriate malignancy screening\par - Educated about s/sx of recurrence\par rto 6m

## 2023-07-24 NOTE — REASON FOR VISIT
[Follow-Up Visit] : a follow-up [Other: _____] : [unfilled] [FreeTextEntry2] : Breast cancer in pregnancy TRIPLE NEGATIVE

## 2023-07-24 NOTE — PHYSICAL EXAM
[Fully active, able to carry on all pre-disease performance without restriction] : Status 0 - Fully active, able to carry on all pre-disease performance without restriction [Obese] : obese [Normal] : affect appropriate [de-identified] : S/p right mastectomy healed well No CW or axillary nodes + RT changes . Left breast normal

## 2023-07-25 ENCOUNTER — NON-APPOINTMENT (OUTPATIENT)
Age: 43
End: 2023-07-25

## 2023-07-25 NOTE — DISCHARGE NOTE OB - INFLUENZA IMMUNIZATION DATE (APPROXIMATE):
Pt called, cannot fill amphetamine-dextroamphetamine. I called and spoke with [facility]. Pharmacy has  Elite in stock and her insurance will not cover that . I spoke with Chasity at her insurance and they have a list of NDC's(ex: Hanna Zepeda) that will be covered but Elite is one that is not. They said pt will need to call around to see what pharmacy has a  in stock and have prescription sent there. Pt says she tried but pharmacy won't give her information. I called and spoke with [facility] on [address] and they have Teva  and feel she should be able to fill prescription. I have canceled at [facility]. Can you send to [facility] on [address]? Pt has been notified of information and change in pharmacy.    02-Nov-2018

## 2023-07-27 LAB
ALBUMIN SERPL ELPH-MCNC: 4.4 G/DL
ALP BLD-CCNC: 112 U/L
ALT SERPL-CCNC: 13 U/L
ANION GAP SERPL CALC-SCNC: 12 MMOL/L
AST SERPL-CCNC: 15 U/L
BILIRUB SERPL-MCNC: 0.3 MG/DL
BUN SERPL-MCNC: 8 MG/DL
CALCIUM SERPL-MCNC: 9.8 MG/DL
CANCER AG27-29 SERPL-ACNC: 9.9 U/ML
CEA SERPL-MCNC: 1 NG/ML
CHLORIDE SERPL-SCNC: 102 MMOL/L
CO2 SERPL-SCNC: 26 MMOL/L
CREAT SERPL-MCNC: 0.66 MG/DL
EGFR: 112 ML/MIN/1.73M2
GLUCOSE SERPL-MCNC: 88 MG/DL
POTASSIUM SERPL-SCNC: 4.3 MMOL/L
PROT SERPL-MCNC: 7.2 G/DL
SODIUM SERPL-SCNC: 139 MMOL/L

## 2023-08-21 ENCOUNTER — APPOINTMENT (OUTPATIENT)
Dept: PLASTIC SURGERY | Facility: HOSPITAL | Age: 43
End: 2023-08-21

## 2023-08-28 NOTE — OB RN PATIENT PROFILE - BP NONINVASIVE SYSTOLIC (MM HG)
No protocol for requested medication     Last office visit date: 8/14/23  Preferred pharmacy: pended    Orders pended, and routed to provider's nurse pool for review and or approval.   Please route any notes back to your nursing pool.       Thank you, Refill Center Staff  
[phone]

## 2024-01-12 NOTE — PROGRESS NOTE ADULT - ATTENDING COMMENTS
Caller: Raleigh Terrazassalas    Relationship: Self    Best call back number : [phone]    Requested Prescriptions:   Requested Prescriptions      No prescriptions requested or ordered in this encounter      BIRTH CONTROL PILLS AND PATIENT WAS NOT SURE OF MED NAME    Pharmacy where request should be sent:  [id number] [place], [place] - [facility] [address] AT [address] - [phone] [facility] [phone]  [phone]     Last office visit with prescribing clinician: Visit date not found   Last telemedicine visit with prescribing clinician: Visit date not found   Next office visit with prescribing clinician: Visit date not found     Additional details provided by patient: PT DOES NOT HAVE ANY MEDS ON HER MED LIST BUT SAYS [facility] RX'D THEM FOR HER AND NOW SHE NEEDS REFILLS    Does the patient have less than a 3 day supply:  [] Yes  [] No    Would you like a call back once the refill request has been completed: [] Yes [] No    If the office needs to give you a call back, can they leave a voicemail: [] Yes [] No    Yaw Barlow Rep   01/12/24 15:24 EST           Pt seen and evaluated  Stable PPD#1 s/p   Pt with Hx breast CA s/p mastectomy and chemo s/p (-)CT chest/Abd/Pelvis  For bone scan as o/pt  Routine PP care  check H/H  D/C planning for tomorrow

## 2024-06-06 NOTE — OB RN PATIENT PROFILE - CENTRAL VENOUS CATHETER
Advanced Practitioner Screening Evaluation    I have evaluated the patient for the purpose of an initial medical screening and to expedite care.     Isaac Bradshaw is a 38 year old male who presents to the emergency department today with left-sided facial/mastoid region.  Sent from urgent care to rule out mastoiditis.  Patient is already on Augmentin.    Limited screening exam:  Gen: Pt is alert, in no acute distress  Resp: Breathing comfortably, unlabored  HEENT: Left-sided facial/pre and postauricular swelling noted.  :    Initial orders have been placed for the patient's diagnosis and treatment.         This note may have been created using voice dictation technology and may include inadvertent transcriptional errors. Any such errors should be contextually interpreted.    Note to patient: The 21st Century Cures Act makes medical notes available to patients in the interest of transparency. Please be advised this note is a medical document. Medical documents are intended to carry relevant information, convey facts as evidence, and include the clinical opinion/interpretation of the practitioner. The medical note is intended as peer to peer communication and may appear blunt or direct. It is written in medical language and may contain abbreviations or verbiage that are unfamiliar.    JAVIER Caba, MS, PhD, [facility]-  Emergency Medicine  [employer]: [phone]     Jb Floyd, CNP  06/06/24 1215     no

## 2024-07-02 ENCOUNTER — OUTPATIENT (OUTPATIENT)
Dept: OUTPATIENT SERVICES | Facility: HOSPITAL | Age: 44
LOS: 1 days | Discharge: HOME | End: 2024-07-02

## 2024-07-02 DIAGNOSIS — C50.919 MALIGNANT NEOPLASM OF UNSPECIFIED SITE OF UNSPECIFIED FEMALE BREAST: ICD-10-CM

## 2024-07-02 DIAGNOSIS — Z90.11 ACQUIRED ABSENCE OF RIGHT BREAST AND NIPPLE: Chronic | ICD-10-CM

## 2024-07-08 ENCOUNTER — RESULT REVIEW (OUTPATIENT)
Age: 44
End: 2024-07-08

## 2024-07-08 ENCOUNTER — APPOINTMENT (OUTPATIENT)
Dept: HEMATOLOGY ONCOLOGY | Facility: CLINIC | Age: 44
End: 2024-07-08
Payer: COMMERCIAL

## 2024-07-08 VITALS
OXYGEN SATURATION: 100 % | DIASTOLIC BLOOD PRESSURE: 77 MMHG | RESPIRATION RATE: 15 BRPM | TEMPERATURE: 98.3 F | HEART RATE: 86 BPM | WEIGHT: 203.46 LBS | SYSTOLIC BLOOD PRESSURE: 112 MMHG | BODY MASS INDEX: 41.1 KG/M2

## 2024-07-08 DIAGNOSIS — C50.911 MALIGNANT NEOPLASM OF UNSPECIFIED SITE OF RIGHT FEMALE BREAST: ICD-10-CM

## 2024-07-08 LAB
BASOPHILS # BLD AUTO: 0.03 K/UL — SIGNIFICANT CHANGE UP (ref 0–0.2)
BASOPHILS NFR BLD AUTO: 0.5 % — SIGNIFICANT CHANGE UP (ref 0–2)
EOSINOPHIL # BLD AUTO: 0.2 K/UL — SIGNIFICANT CHANGE UP (ref 0–0.5)
EOSINOPHIL NFR BLD AUTO: 3.1 % — SIGNIFICANT CHANGE UP (ref 0–6)
HCT VFR BLD CALC: 37.7 % — SIGNIFICANT CHANGE UP (ref 34.5–45)
HGB BLD-MCNC: 11.9 G/DL — SIGNIFICANT CHANGE UP (ref 11.5–15.5)
IMM GRANULOCYTES NFR BLD AUTO: 0.2 % — SIGNIFICANT CHANGE UP (ref 0–0.9)
LYMPHOCYTES # BLD AUTO: 1.84 K/UL — SIGNIFICANT CHANGE UP (ref 1–3.3)
LYMPHOCYTES # BLD AUTO: 28.9 % — SIGNIFICANT CHANGE UP (ref 13–44)
MCHC RBC-ENTMCNC: 25.4 PG — LOW (ref 27–34)
MCHC RBC-ENTMCNC: 31.6 G/DL — LOW (ref 32–36)
MCV RBC AUTO: 80.4 FL — SIGNIFICANT CHANGE UP (ref 80–100)
MONOCYTES # BLD AUTO: 0.39 K/UL — SIGNIFICANT CHANGE UP (ref 0–0.9)
MONOCYTES NFR BLD AUTO: 6.1 % — SIGNIFICANT CHANGE UP (ref 2–14)
NEUTROPHILS # BLD AUTO: 3.89 K/UL — SIGNIFICANT CHANGE UP (ref 1.8–7.4)
NEUTROPHILS NFR BLD AUTO: 61.2 % — SIGNIFICANT CHANGE UP (ref 43–77)
NRBC # BLD: 0 /100 WBCS — SIGNIFICANT CHANGE UP (ref 0–0)
PLATELET # BLD AUTO: 290 K/UL — SIGNIFICANT CHANGE UP (ref 150–400)
RBC # BLD: 4.69 M/UL — SIGNIFICANT CHANGE UP (ref 3.8–5.2)
RBC # FLD: 14.6 % — HIGH (ref 10.3–14.5)
WBC # BLD: 6.36 K/UL — SIGNIFICANT CHANGE UP (ref 3.8–10.5)
WBC # FLD AUTO: 6.36 K/UL — SIGNIFICANT CHANGE UP (ref 3.8–10.5)

## 2024-07-08 PROCEDURE — 99214 OFFICE O/P EST MOD 30 MIN: CPT

## 2024-07-08 PROCEDURE — G2211 COMPLEX E/M VISIT ADD ON: CPT | Mod: NC,1L

## 2024-07-09 LAB
ALBUMIN SERPL ELPH-MCNC: 4 G/DL
ALP BLD-CCNC: 103 U/L
ALT SERPL-CCNC: 10 U/L
ANION GAP SERPL CALC-SCNC: 13 MMOL/L
BILIRUB SERPL-MCNC: 0.2 MG/DL
BUN SERPL-MCNC: 8 MG/DL
CALCIUM SERPL-MCNC: 9.1 MG/DL
CANCER AG27-29 SERPL-ACNC: 9.7 U/ML
CEA SERPL-MCNC: 1.1 NG/ML
CHLORIDE SERPL-SCNC: 105 MMOL/L
CO2 SERPL-SCNC: 22 MMOL/L
CREAT SERPL-MCNC: 0.56 MG/DL
EGFR: 115 ML/MIN/1.73M2
GLUCOSE SERPL-MCNC: 191 MG/DL
POTASSIUM SERPL-SCNC: 3.9 MMOL/L
PROT SERPL-MCNC: 6.6 G/DL
SODIUM SERPL-SCNC: 140 MMOL/L

## 2024-07-26 ENCOUNTER — APPOINTMENT (OUTPATIENT)
Dept: NUCLEAR MEDICINE | Facility: IMAGING CENTER | Age: 44
End: 2024-07-26
Payer: COMMERCIAL

## 2024-07-26 ENCOUNTER — APPOINTMENT (OUTPATIENT)
Dept: ULTRASOUND IMAGING | Facility: IMAGING CENTER | Age: 44
End: 2024-07-26
Payer: COMMERCIAL

## 2024-07-26 ENCOUNTER — RESULT REVIEW (OUTPATIENT)
Age: 44
End: 2024-07-26

## 2024-07-26 ENCOUNTER — APPOINTMENT (OUTPATIENT)
Dept: MAMMOGRAPHY | Facility: IMAGING CENTER | Age: 44
End: 2024-07-26
Payer: COMMERCIAL

## 2024-07-26 PROCEDURE — 78306 BONE IMAGING WHOLE BODY: CPT | Mod: 26

## 2024-07-26 PROCEDURE — 76641 ULTRASOUND BREAST COMPLETE: CPT | Mod: 26,LT

## 2024-07-26 PROCEDURE — 77067 SCR MAMMO BI INCL CAD: CPT | Mod: 26,LT,52

## 2024-07-26 PROCEDURE — 77063 BREAST TOMOSYNTHESIS BI: CPT | Mod: 26,52

## 2024-08-01 ENCOUNTER — NON-APPOINTMENT (OUTPATIENT)
Age: 44
End: 2024-08-01

## 2024-09-25 NOTE — PATIENT PROFILE OB - PRO FEEDING PLAN INFANT OB
"           [facility]                                            Progress Note    Patient Name: Idalia Hinojosa  Date:  9/5/2024         Service Type: Individual  Start time: 2:02 pm  End time:  2:54 pm  Session Length:  52 minutes    Session #: 8    Attendees: Client     Service Modality: Video visit: -       Provider verified identity through the following two step process.  Patient provided:  Patient is known previously to provider    Telemedicine Visit: The patient's condition can be safely assessed and treated via synchronous audio and visual telemedicine encounter.      Reason for Telemedicine Visit: Services only offered telehealth    Originating Site (Patient Location): Patient's home    Distant Site (Provider Location): Provider Remote Setting- Home Office    Consent:  The patient/guardian has verbally consented to: the potential risks and benefits of telemedicine (telephone visit) versus in person care; bill my insurance or make self-payment for services provided; and responsibility for payment of non-covered services.     Patient would like the video invitation sent by:  My Chart    Mode of Communication:  Video Conference via [employer],     As the provider I attest to compliance with applicable laws and regulations related to telemedicine.     Treatment Plan Last Reviewed: 8/22/2024    PHQ-9/DAVID-7: 8/22/2024      DATA  Interactive Complexity: No  Crisis: No        Progress Since Last Session (Related to Symptoms / Goals / Homework):   Symptoms: She reported that symptoms are \"I think ok\".  Her daughter has started school and this is going well so far.  Her spouse talked with his doctor about getting into therapy, and she is happy about this.  However, they had a difficult interaction that feels unresolved, that has been upsetting for her.      Answers submitted by the patient for this visit:  Patient Health Questionnaire (Submitted on 8/22/2024)  If you checked off any problems, " how difficult have these problems made it for you to do your work, take care of things at home, or get along with other people?: Somewhat difficult  PHQ9 TOTAL SCORE: 4    PHQ-9 scores of 4 and 5 at previous visits.      Homework:  - Successful with completing homework.            Episode of Care Goals: Idalia had recently returned to therapy after a several month break.  Her current concerns are related to feeling more frustrated and angry and not sure how to cope.  She identifies difficulties in her marriage as contributing factor to feeling more frustrated and angry.       Current / Ongoing Stressors and Concerns:  Current: On-going relationship concerns with spouse and deciding how she wants to address these concerns.   Spouses's mental health concerns, and her concerns that he is not getting treatment/support, has also been a stressor for her, though he talked with his doctor and now has a therapy appointment set up, so she is happy to see him reaching out for additional support.  Increased feelings of anger, frustration and irritability.  Symptoms of anxiety and depression.  Recent interaction with her spouse that has left her feeling upset.      Ongoing: (copied forward) Working on building effective emotion regulation strategies for managing daily frustrations and upsets.  Context for previous episode of care included stresses related to behavioral challenges with her daughter, who has been diagnosed with ADHD, and working with her  on addressing parenting differences.   Her  also had previously been diagnosed with cancer and went through treatment, and previous therapy focused on caregiver stress and helping her to attend to her self-care during this very stressful period of time.         Treatment Objective(s) Addressed in This Session:     Identify barriers to actively addressing and talking through concerns with spouse.    Identify costs/consequences of avoiding conflict    Continue with:  "    Review of self-care strategies and identification of areas of need   Learn and practice emotion regulation strategies (in particular, how to manage and separate your own emotional reaction when someone around you is escalating)  Learn and practice cognitive strategies to help with identifying and managing unhelpful thoughts  Identify recent stressors and triggers to feelings of upset and frustration and identify 1-2 steps you can take to actively address stressors  Review of interpersonal effectiveness strategies     Intervention:     DBT, CBT and Solution-Focused: She processed a recent difficult interaction with her spouse.  She was attempting to create a calm evening routine for her daughter, in light of the new school year starting and establishing new routines.  She said her spouse was drinking and not responsive to her attempts to engage his help with the evening routine, and this led to conflict between she and her spouse.  This has left her feeling upset.  She said \"I know I need to talk with him but I don't want to\".  She denied any concerns of him being physically or emotionally abusive to her and stated she feels safe, rather, she attributed her avoidance of conflict as not wanting this to trigger his anger and him making comments that she believes shuts the conversation down rather than allows them to discuss the concerns.  We talked about the consequences of the avoidance of addressing the concerns.  She's hopeful that him participating in therapy and getting help for his own mental health will help their overall situation.      Looked at and practiced ways she could communicate her concerns using \"I statements\" (to help with decreasing defensiveness when bringing up concerns).      Practiced role-playing ways she could communicate her concerns using I statements      Continued review of how she is doing with incorporating good self-care strategies (sleep, nutrition, exercise, seeking support, " "engaging in positive activities) to help with focusing on elements she has control over to help with emotion regulation and feeling at her best.      Continue to emphasize strategies she can use to help her focus on  her emotional reaction from others, and focusing on how to manage her own emotion reaction when others are escalating.      ASSESSMENT: Current Emotional / Mental Status (status of significant symptoms):   Risk status (Self / Other harm or suicidal ideation)   Patient denies current fears or concerns for personal safety.   Patient reports current suicidal ideation, she denies any current or recent suicidal behaviors.  She reports if suicidal  thoughts do occur, she knows steps she can take to manage them and believes she can manage them, and will reach out for additional help if needed.     Patientdenies current or recent homicidal ideation or behaviors.   Patient denies current or recent self injurious behavior or ideation.   Patient denies other safety concerns.   Patient reports there has not been a change in risk factors since their last session -      Recommended that patient call 911 or go to the local ED should there be a change in any of these risk factors.  We reviewed how to contact [facility] crisis/[facility] for urgent/crisis concerns 24/7.  Provided patient with crisis resources and she agrees to use safety plan should any safety concerns arise.      Professionals or agencies I can contact during a crisis:  Suicide Prevention Lifeline: call or text 578  Crisis Text Line: text \"[place]\" to 374036  Local Crisis Services: [facility]: [phone]  Call 911 or go to my nearest emergency department, or [facility].         Appearance:   Appropriate    Eye Contact:   Good    Psychomotor Behavior: Normal   Attitude:   Cooperative    Orientation:   All   Speech    Rate / Production: Normal     Volume:  Normal    Mood:    \"Up and down\"    Affect:    Congruent with mood   Thought " Content:  Clear    Thought Form:  Coherent  Logical    Insight:    Good      Medication Review:   No changes to psychiatric medication      Medication Compliance:   Yes      Changes in Health Issues:    No changes or new health concerns -     Chemical Use Review:  No changes or concerns with alcohol use.         Substance Use: Chemical use reviewed, no active concerns identified.      Tobacco Use: No current tobacco use.       Diagnosis:  Anxiety disorder unspecified    Will further evaluate if previous diagnosis of Major depression, recurrent episode, moderate is still active or meets criteria for remission.          PLAN: (Patient Tasks / Therapist Tasks / Other)    1) Idalia identified the following goals: Talk with Donald about nighttime routine  - schedule time to talk about this.      Remind myself that it is okay to have my thoughts, I can decide which are helpful and which are not.  Think about what I think would make my marriage better.      Continue practicing the strategies that have been helping to support your physical and emotional well-being.      Consider reading the book - Fighting For your Marriage (Rafi Gomez).  Consider referral to couples therapy to help with marital difficulties.       2) If there is a crisis situation, with your daughter or yourself, remember you are not alone and that there are people who can help you twenty-four hours a day, seven days a week.  Call [facility] ([facility]): [phone].      3) Next appt is scheduled for: 9/5/2024 at 2:00 pm.          Cristy Wilkinson PsyD, KYLIE  8/22/2024                                    ____________________________________    Treatment Plan     Patient's Name: Idalia Hinojosa                        YOB: 1982     Date of Creation: 1/6/2020  Date Treatment Plan Last Reviewed/Revised: 8/22/2024       DSM5 Diagnoses: 300.00 (F41.9) Unspecified Anxiety Disorder  Psychosocial / Contextual Factors: strain in  marital relationship, parenting challenges, adjustment challenges, 's cancer diagnosis  PROMIS (reviewed every 90 days):      Anticipated number of session for this episode of care: possibly a few more sessions if needed to support sustained progress and maintenance  Anticipation frequency of session: Monthly   Anticipated Duration of each session: 38-52 minutes  Treatment plan will be reviewed in 90 days or when goals have been changed.         Referral / Collaboration:  No referrals at this time.  Have previously recommended couples therapy referral.  Coordinate with Dr. Han, patient's PCP.        MeasurableTreatment Goal(s) related to diagnosis / functional impairment(s)  Goal 1: Patient will learn emotion regulation strategies to help when she is feeling upset/angry/frustrated/distressed    I will know I've met my goal when I would yell less, I would feel calmer, and I would not push others.  I would be less physical when I'm angry (e.g. wouldn't slam doors or hit things)        Objective #A (Patient Action)                          Patient will learn and practice at least 2 new emotion regulation strategies.  Status: Continued - Date(s):    9/13/2023- Regularly practicing emotion regulation strategies to help manage increase in emotional distress related to spouse's cancer diagnosis.  She has been successful with using strategies to help her stay calm when her daughter's experiencing heightened emotion and/or challenging behaviors.  She's working on remembering to engage calming strategies when feeling physically tense and/or anxious/stressed.    2/20/24 - Will continue to focus on building emotion regulation strategies    8/22/2024 - this is a primary focus of her current episode of care -is making initial progress on this goal in the context of her return to therapy -        Intervention(s)  Therapist will provide psychoeducation on emotion regulation module from DBT and will assign patient  homework to help reinforce skill development.     Objective #B  Patient will identify factors that increase vulnerability to emotion mind and identify ways to decrease vulnerability to emotion mind.  Status: Continued - Date(s):9/13/2023 - routinely incorporating mindfulness, distress tolerance and self-awareness strategies to increase attention and focus to factors that increase vulnerability to emotion mind  2/20/2024 - further review of treatment goals is in progress and will continue over the next session, given break in therapy   5/22/2024 - Idalia has made great progress identifying and practicing self-care habits/strategies that help decrease vulnerability to emotion mind   8/22/2024 -  this is a primary focus of her current episode of care -is making initial progress on this goal in the context of her return to therapy -     Intervention(s)  Therapist will provide information on factors that increase vulnerabiliyt to emotion mind      Objective #C  Patient will identify warning signs and signals that emotions are escalating and will learn ways to skillfully intervene early on.  Status: Date: 9/13/2023- in progress - has demonstrated good progress and awareness in being able to identify warning signs and intervene skillfully-working on incorporating deep breathing to help with calming and re-centering.    2/20/2024 - further review of treatment goals is in progress and will continue over the next session, given break in therapy   5/22/2024 - Goal completed        Intervention(s)  Therapist will help patient identify warning signs and signals, and will guide the patient in identifying skillful ways to intervene when exeriencing warning signs and signals.      Objective #B (Patient Action)                          Status: NEW - Date(s):9/13/2023- Patient will learn mind-body connection, healthy ways to manage stress, calming strategies, and ways she can address cognitive and behavioral factors that can  "contribute to pain experience.    2/20/2024 - further review of treatment goals is in progress and will continue over the next session, given break in therapy    5/22/2024 - goal completed       Patient will learn and practice at least two strategies that help improve her emotional well-being.       Intervention(s)  Therapist will teach calming strategies and healthy stress management strategies, and will assign homework to help reinforce skill development.  Status - patient has been working on engaging calming strategies and in particular, not trying to \"force herself\" to calm down which counters the relaxation response.  Working currently on how to cue/remind herself to engage these strategies.               Goal 2: Patient will learn new parenting strategies to help with managing parenting challenges.  Parent will work on improving relationship with her daughter and defining what she would like this relationship to look like.      I will know I've met my goal when I'm enjoying time with my daughter, teaching her new things, and not waiting for things to change       Objective #A (Patient Action)                          Status: Continued - Date(s):9/13/2023 2/20/2024 - further review of treatment goals is in progress and will continue over the next session, given break in therapy   8/22/2024 - this goal was not addressed in this most recent episode of care         Patient will increase understanding of developmental norms for her daughter and ways to manage challenging behaviors.     Intervention(s)  Therapist will provide psychoeducation on developmental norms and parenting strategies.     Objective #B  Patient will spend time reflecting on her relationship with her daughter and defining what she would like this relaitonship to look like.                  Status: Continued - Date(s):9/13/2023     Making good progress - has also been working with daughter's therapist and has been actively practicing and using " "suggested strategies.   Likely can work towards resolving this goal, as this goal has largely shifted towards the work she is doing with her daughter's individual therapist, where she believes they have been making good progress on helping her in her relationship with her daughter.       2/20/2024 - further review of treatment goals is in progress and will continue over the next session, given break in therapy    8/22/2024 - this goal was not addressed in this most recent episode of care     Intervention(s)  Therapist will guide the patient in reflecting upon her relationship with her daughter and help her define ways to move towards what she vaues in her relationship with her daughter.     Objective #C  Patient will increase time spent on fun activity and play with her daughter.  Status: Continued - Date(s):9/13/2023-has been enjoying time with daughter and devoting regular time to fun activities and play together  2/20/2024 - further review of treatment goals is in progress and will continue over the next session, given break in therapy    8/22/2024 - this goal was not addressed in this most recent episode of care           Intervention(s)  Therapist will guide the patient in identifying ways she can increase positive time with her daughter.  Therapist will also teach mindfulness strategies to help patient with being in the present moment when appropriate - .        Goal 3: Patiient will improve communication with her .      I will know I've met my goal when I feel less irritated with my  and communicate more openly with my .  I would like to be more assertive and less aggressive.   I would like to not avoid telling him things because I'm worried about his reaction or don't think he will react well.  I would like to be more present to the moment during times when this would be helpful.\"       Objective #A (Patient Action)                          Status: Continued - Date(s):9/13/2023   " continues to work on this goal, though emphasis has shift to managing caregiver stress related to 's cancer diagnosis and treatment.         2/20/2024 - further review of treatment goals is in progress and will continue over the next session, given break in therapy    8/22/2024 - this goal was not addressed in this most recent episode of care      Patient will learn and practice at least two new interpersonal effectiveness strategies.     Intervention(s)  Therapist will teach strategies from DBT module on interpersonal effectiveness, and will assign homework to help reinforce skill development.            Patient has reviewed and agreed to the above plan.        Cristy Wilkinson PsyD, LP  Licensed Psychologist  Reviewed on 8/22/2024                                                                                                        formula feeding

## 2025-05-19 ENCOUNTER — APPOINTMENT (OUTPATIENT)
Dept: OBGYN | Facility: CLINIC | Age: 45
End: 2025-05-19

## 2025-07-03 ENCOUNTER — OUTPATIENT (OUTPATIENT)
Dept: OUTPATIENT SERVICES | Facility: HOSPITAL | Age: 45
LOS: 1 days | Discharge: HOME | End: 2025-07-03

## 2025-07-03 DIAGNOSIS — Z90.11 ACQUIRED ABSENCE OF RIGHT BREAST AND NIPPLE: Chronic | ICD-10-CM

## 2025-07-03 DIAGNOSIS — C50.919 MALIGNANT NEOPLASM OF UNSPECIFIED SITE OF UNSPECIFIED FEMALE BREAST: ICD-10-CM

## 2025-07-07 ENCOUNTER — APPOINTMENT (OUTPATIENT)
Dept: HEMATOLOGY ONCOLOGY | Facility: CLINIC | Age: 45
End: 2025-07-07
Payer: COMMERCIAL

## 2025-07-07 VITALS
OXYGEN SATURATION: 96 % | SYSTOLIC BLOOD PRESSURE: 121 MMHG | RESPIRATION RATE: 14 BRPM | HEART RATE: 60 BPM | HEIGHT: 59.06 IN | DIASTOLIC BLOOD PRESSURE: 81 MMHG | WEIGHT: 220.46 LBS | TEMPERATURE: 97.1 F | BODY MASS INDEX: 44.44 KG/M2

## 2025-07-07 PROCEDURE — G2211 COMPLEX E/M VISIT ADD ON: CPT | Mod: NC

## 2025-07-07 PROCEDURE — 99213 OFFICE O/P EST LOW 20 MIN: CPT

## 2025-08-04 ENCOUNTER — APPOINTMENT (OUTPATIENT)
Dept: ULTRASOUND IMAGING | Facility: CLINIC | Age: 45
End: 2025-08-04
Payer: COMMERCIAL

## 2025-08-04 ENCOUNTER — RESULT REVIEW (OUTPATIENT)
Age: 45
End: 2025-08-04

## 2025-08-04 ENCOUNTER — OUTPATIENT (OUTPATIENT)
Dept: OUTPATIENT SERVICES | Facility: HOSPITAL | Age: 45
LOS: 1 days | End: 2025-08-04
Payer: COMMERCIAL

## 2025-08-04 ENCOUNTER — APPOINTMENT (OUTPATIENT)
Dept: MAMMOGRAPHY | Facility: CLINIC | Age: 45
End: 2025-08-04
Payer: COMMERCIAL

## 2025-08-04 DIAGNOSIS — Z90.11 ACQUIRED ABSENCE OF RIGHT BREAST AND NIPPLE: Chronic | ICD-10-CM

## 2025-08-04 DIAGNOSIS — C50.911 MALIGNANT NEOPLASM OF UNSPECIFIED SITE OF RIGHT FEMALE BREAST: ICD-10-CM

## 2025-08-04 PROCEDURE — 76641 ULTRASOUND BREAST COMPLETE: CPT | Mod: 26,LT

## 2025-08-04 PROCEDURE — 76641 ULTRASOUND BREAST COMPLETE: CPT

## 2025-08-04 PROCEDURE — 77063 BREAST TOMOSYNTHESIS BI: CPT | Mod: 26,52

## 2025-08-04 PROCEDURE — 77067 SCR MAMMO BI INCL CAD: CPT | Mod: 26,LT,52

## 2025-08-04 PROCEDURE — 77067 SCR MAMMO BI INCL CAD: CPT

## 2025-08-04 PROCEDURE — 77063 BREAST TOMOSYNTHESIS BI: CPT

## 2025-08-11 ENCOUNTER — APPOINTMENT (OUTPATIENT)
Dept: ULTRASOUND IMAGING | Facility: CLINIC | Age: 45
End: 2025-08-11

## 2025-08-11 ENCOUNTER — APPOINTMENT (OUTPATIENT)
Dept: MAMMOGRAPHY | Facility: CLINIC | Age: 45
End: 2025-08-11

## 2025-09-09 ENCOUNTER — NON-APPOINTMENT (OUTPATIENT)
Age: 45
End: 2025-09-09

## 2025-09-10 ENCOUNTER — APPOINTMENT (OUTPATIENT)
Dept: PLASTIC SURGERY | Facility: CLINIC | Age: 45
End: 2025-09-10
Payer: COMMERCIAL

## 2025-09-10 ENCOUNTER — NON-APPOINTMENT (OUTPATIENT)
Age: 45
End: 2025-09-10

## 2025-09-10 VITALS
HEIGHT: 59 IN | SYSTOLIC BLOOD PRESSURE: 122 MMHG | WEIGHT: 220 LBS | HEART RATE: 78 BPM | OXYGEN SATURATION: 99 % | TEMPERATURE: 97.8 F | BODY MASS INDEX: 44.35 KG/M2 | DIASTOLIC BLOOD PRESSURE: 70 MMHG

## 2025-09-10 DIAGNOSIS — L90.5 SCAR CONDITIONS AND FIBROSIS OF SKIN: ICD-10-CM

## 2025-09-10 DIAGNOSIS — Z42.8 ENCOUNTER FOR OTHER PLASTIC AND RECONSTRUCTIVE SURGERY FOLLOWING MEDICAL PROCEDURE OR HEALED INJURY: ICD-10-CM

## 2025-09-10 DIAGNOSIS — Z90.11 ACQUIRED ABSENCE OF RIGHT BREAST AND NIPPLE: ICD-10-CM

## 2025-09-10 PROCEDURE — 99204 OFFICE O/P NEW MOD 45 MIN: CPT
